# Patient Record
Sex: MALE | Race: WHITE | Employment: OTHER | URBAN - METROPOLITAN AREA
[De-identification: names, ages, dates, MRNs, and addresses within clinical notes are randomized per-mention and may not be internally consistent; named-entity substitution may affect disease eponyms.]

---

## 2017-07-25 ENCOUNTER — OFFICE VISIT (OUTPATIENT)
Dept: FAMILY MEDICINE CLINIC | Age: 62
End: 2017-07-25
Payer: COMMERCIAL

## 2017-07-25 VITALS
HEART RATE: 86 BPM | WEIGHT: 236 LBS | SYSTOLIC BLOOD PRESSURE: 118 MMHG | DIASTOLIC BLOOD PRESSURE: 70 MMHG | OXYGEN SATURATION: 96 % | HEIGHT: 72 IN | BODY MASS INDEX: 31.97 KG/M2

## 2017-07-25 DIAGNOSIS — R23.8 SKIN IRRITATION: Primary | ICD-10-CM

## 2017-07-25 PROCEDURE — 99213 OFFICE O/P EST LOW 20 MIN: CPT | Performed by: FAMILY MEDICINE

## 2017-07-25 RX ORDER — KETOCONAZOLE 20 MG/G
1 CREAM TOPICAL 2 TIMES DAILY
Qty: 60 G | Refills: 3 | Status: SHIPPED | OUTPATIENT
Start: 2017-07-25 | End: 2017-08-07 | Stop reason: SINTOL

## 2017-07-25 ASSESSMENT — PATIENT HEALTH QUESTIONNAIRE - PHQ9
1. LITTLE INTEREST OR PLEASURE IN DOING THINGS: 0
2. FEELING DOWN, DEPRESSED OR HOPELESS: 0
SUM OF ALL RESPONSES TO PHQ QUESTIONS 1-9: 0
SUM OF ALL RESPONSES TO PHQ9 QUESTIONS 1 & 2: 0

## 2017-08-07 ENCOUNTER — OFFICE VISIT (OUTPATIENT)
Dept: FAMILY MEDICINE CLINIC | Age: 62
End: 2017-08-07
Payer: COMMERCIAL

## 2017-08-07 VITALS
SYSTOLIC BLOOD PRESSURE: 118 MMHG | WEIGHT: 235 LBS | HEIGHT: 72 IN | DIASTOLIC BLOOD PRESSURE: 68 MMHG | BODY MASS INDEX: 31.83 KG/M2 | HEART RATE: 73 BPM

## 2017-08-07 DIAGNOSIS — B35.3 TINEA PEDIS OF BOTH FEET: ICD-10-CM

## 2017-08-07 DIAGNOSIS — B35.4 TINEA CORPORIS: Primary | ICD-10-CM

## 2017-08-07 PROCEDURE — 99213 OFFICE O/P EST LOW 20 MIN: CPT | Performed by: FAMILY MEDICINE

## 2017-08-07 RX ORDER — FLUCONAZOLE 100 MG/1
100 TABLET ORAL 2 TIMES DAILY
Qty: 14 TABLET | Refills: 0 | Status: SHIPPED | OUTPATIENT
Start: 2017-08-07 | End: 2017-08-14

## 2017-08-07 ASSESSMENT — ENCOUNTER SYMPTOMS
COUGH: 0
ABDOMINAL PAIN: 0
COLOR CHANGE: 1
BLOOD IN STOOL: 0
DIARRHEA: 0
BACK PAIN: 0
SHORTNESS OF BREATH: 0
CONSTIPATION: 0
WHEEZING: 0

## 2017-08-23 ENCOUNTER — OFFICE VISIT (OUTPATIENT)
Dept: FAMILY MEDICINE CLINIC | Age: 62
End: 2017-08-23
Payer: COMMERCIAL

## 2017-08-23 VITALS
HEART RATE: 81 BPM | SYSTOLIC BLOOD PRESSURE: 120 MMHG | DIASTOLIC BLOOD PRESSURE: 72 MMHG | BODY MASS INDEX: 31.46 KG/M2 | WEIGHT: 232 LBS

## 2017-08-23 DIAGNOSIS — B35.4 TINEA CORPORIS: Primary | ICD-10-CM

## 2017-08-23 PROCEDURE — 99213 OFFICE O/P EST LOW 20 MIN: CPT | Performed by: FAMILY MEDICINE

## 2017-08-23 RX ORDER — FLUCONAZOLE 100 MG/1
100 TABLET ORAL 2 TIMES DAILY
Qty: 14 TABLET | Refills: 0 | Status: SHIPPED | OUTPATIENT
Start: 2017-08-23 | End: 2017-08-30

## 2017-08-23 RX ORDER — CLOTRIMAZOLE AND BETAMETHASONE DIPROPIONATE 10; .64 MG/G; MG/G
CREAM TOPICAL
Qty: 45 G | Refills: 0 | Status: SHIPPED | OUTPATIENT
Start: 2017-08-23 | End: 2018-10-08 | Stop reason: ALTCHOICE

## 2017-08-23 ASSESSMENT — ENCOUNTER SYMPTOMS
WHEEZING: 0
CONSTIPATION: 0
ABDOMINAL PAIN: 0
SHORTNESS OF BREATH: 0
BLOOD IN STOOL: 0
BACK PAIN: 0
DIARRHEA: 0
COUGH: 0

## 2017-10-25 ENCOUNTER — NURSE ONLY (OUTPATIENT)
Dept: FAMILY MEDICINE CLINIC | Age: 62
End: 2017-10-25
Payer: COMMERCIAL

## 2017-10-25 DIAGNOSIS — Z23 IMMUNIZATION DUE: Primary | ICD-10-CM

## 2017-10-25 PROCEDURE — 90471 IMMUNIZATION ADMIN: CPT | Performed by: FAMILY MEDICINE

## 2017-10-25 PROCEDURE — 90688 IIV4 VACCINE SPLT 0.5 ML IM: CPT | Performed by: FAMILY MEDICINE

## 2018-01-04 ENCOUNTER — TELEPHONE (OUTPATIENT)
Dept: FAMILY MEDICINE CLINIC | Age: 63
End: 2018-01-04

## 2018-01-04 RX ORDER — TIZANIDINE HYDROCHLORIDE 2 MG/1
2 CAPSULE, GELATIN COATED ORAL 3 TIMES DAILY
Qty: 24 CAPSULE | Refills: 0 | Status: SHIPPED | OUTPATIENT
Start: 2018-01-04 | End: 2018-10-08 | Stop reason: ALTCHOICE

## 2018-09-05 ENCOUNTER — OFFICE VISIT (OUTPATIENT)
Dept: FAMILY MEDICINE CLINIC | Age: 63
End: 2018-09-05
Payer: COMMERCIAL

## 2018-09-05 VITALS
WEIGHT: 235.6 LBS | SYSTOLIC BLOOD PRESSURE: 128 MMHG | HEART RATE: 80 BPM | BODY MASS INDEX: 31.23 KG/M2 | DIASTOLIC BLOOD PRESSURE: 80 MMHG | OXYGEN SATURATION: 93 % | HEIGHT: 73 IN

## 2018-09-05 DIAGNOSIS — H61.22 IMPACTED CERUMEN OF LEFT EAR: ICD-10-CM

## 2018-09-05 DIAGNOSIS — H10.9 CONJUNCTIVITIS OF BOTH EYES, UNSPECIFIED CONJUNCTIVITIS TYPE: Primary | ICD-10-CM

## 2018-09-05 PROCEDURE — 99213 OFFICE O/P EST LOW 20 MIN: CPT | Performed by: NURSE PRACTITIONER

## 2018-09-05 RX ORDER — CIPROFLOXACIN HYDROCHLORIDE 3.5 MG/ML
1 SOLUTION/ DROPS TOPICAL
Qty: 120 DROP | Refills: 0 | Status: SHIPPED | OUTPATIENT
Start: 2018-09-05 | End: 2018-09-15

## 2018-09-05 ASSESSMENT — PATIENT HEALTH QUESTIONNAIRE - PHQ9
SUM OF ALL RESPONSES TO PHQ QUESTIONS 1-9: 0
SUM OF ALL RESPONSES TO PHQ QUESTIONS 1-9: 0
2. FEELING DOWN, DEPRESSED OR HOPELESS: 0
1. LITTLE INTEREST OR PLEASURE IN DOING THINGS: 0
SUM OF ALL RESPONSES TO PHQ9 QUESTIONS 1 & 2: 0

## 2018-09-05 NOTE — PROGRESS NOTES
Visit Information    Have you changed or started any medications since your last visit including any over-the-counter medicines, vitamins, or herbal medicines? no   Are you having any side effects from any of your medications? -  no  Have you stopped taking any of your medications? Is so, why? -  no    Have you seen any other physician or provider since your last visit? No  Have you had any other diagnostic tests since your last visit? No  Have you been seen in the emergency room and/or had an admission to a hospital since we last saw you? No  Have you had your routine dental cleaning in the past 6 months? yes -     Have you activated your freshbag account? If not, what are your barriers?  No:      Patient Care Team:  Kelli Calvillo MD as PCP - General (Family Medicine)    Medical History Review  Past Medical, Family, and Social History reviewed and does not contribute to the patient presenting condition    Health Maintenance   Topic Date Due    Hepatitis C screen  1955    HIV screen  05/25/1969    Shingles Vaccine (1 of 2 - 2 Dose Series) 05/25/2004    Colon Cancer Screen FIT/FOBT  10/03/2017    Flu vaccine (1) 09/01/2018    Lipid screen  06/07/2021    DTaP/Tdap/Td vaccine (2 - Td) 12/08/2026

## 2018-09-05 NOTE — PATIENT INSTRUCTIONS
· You have a loss of hearing.    Watch closely for changes in your health, and be sure to contact your doctor if:    · You have pain or reduced hearing after 1 week of home treatment.     · You have any new symptoms, such as nausea or balance problems. Where can you learn more? Go to [email or web address] org and sign in to your [employer] account. Enter X489 in the [facility] box to learn more about \"Earwax Blockage: Care Instructions. \"     If you do not have an account, please click on the \"Sign Up Now\" link. Current as of: November 20, 2017  Content Version: 11.7  © 9033-8571 [employer]. Care instructions adapted under license by [employer] [facility] ([facility]). If you have questions about a medical condition or this instruction, always ask your healthcare professional. [place] [address] any warranty or liability for your use of this information. Pinkeye: Care Instructions  Your Care Instructions    Pinkeye is redness and swelling of the eye surface and the conjunctiva (the lining of the eyelid and the covering of the white part of the eye). Pinkeye is also called conjunctivitis. Pinkeye is often caused by infection with bacteria or a virus. Dry air, allergies, smoke, and chemicals are other common causes. Pinkeye often clears on its own in 7 to 10 days. Antibiotics only help if the pinkeye is caused by bacteria. Pinkeye caused by infection spreads easily. If an allergy or chemical is causing pinkeye, it will not go away unless you can avoid whatever is causing it. Follow-up care is a key part of your treatment and safety. Be sure to make and go to all appointments, and call your doctor if you are having problems. It's also a good idea to know your test results and keep a list of the medicines you take. How can you care for yourself at home? · Wash your hands often. Always wash them before and after you treat pinkeye or touch your eyes or face.   · Use moist cotton or a clean, wet cloth to remove crust. Wipe from the inside corner of the eye to the outside. Use a clean part of the cloth for each wipe. · Put cold or warm wet cloths on your eye a few times a day if the eye hurts. · Do not wear contact lenses or eye makeup until the pinkeye is gone. Throw away any eye makeup you were using when you got pinkeye. Clean your contacts and storage case. If you wear disposable contacts, use a new pair when your eye has cleared and it is safe to wear contacts again. · If the doctor gave you antibiotic ointment or eyedrops, use them as directed. Use the medicine for as long as instructed, even if your eye starts looking better soon. Keep the bottle tip clean, and do not let it touch the eye area. · To put in eyedrops or ointment:  ¨ Tilt your head back, and pull your lower eyelid down with one finger. ¨ Drop or squirt the medicine inside the lower lid. ¨ Close your eye for 30 to 60 seconds to let the drops or ointment move around. ¨ Do not touch the ointment or dropper tip to your eyelashes or any other surface. · Do not share towels, pillows, or washcloths while you have pinkeye. When should you call for help? Call your doctor now or seek immediate medical care if:    · You have pain in your eye, not just irritation on the surface.     · You have a change in vision or loss of vision.     · You have an increase in discharge from the eye.     · Your eye has not started to improve or begins to get worse within 48 hours after you start using antibiotics.     · Pinkeye lasts longer than 7 days.    Watch closely for changes in your health, and be sure to contact your doctor if you have any problems. Where can you learn more? Go to https://HouzzpePickataleeb.1000 [email or web address] org and sign in to your [employer] account. Enter Y392 in the [facility] box to learn more about \"Pinkeye: Care Instructions. \"     If you do not have an account, please click on the \"Sign Up

## 2018-09-05 NOTE — PROGRESS NOTES
[address] Lukasz Blind, FNP-C    Mario Castillo is a 58 y.o. male who is here with c/o of:    Chief Complaint: Eye Pain (right eye and started a week ago )      Patient Accompanied by: self    HPI - Mario Castillo is here with c/o right eye pain that started approximately 1 week ago    Conjunctivitis  Patient presents for evaluation of discharge, erythema, itching, pain and tearing in both eyes. He has noticed the above symptoms for 1 week. Onset was acute, sudden. Patient denies blurred vision, foreign body sensation, photophobia and visual field deficit. There is a history of none. Patient Active Problem List:     BPH (benign prostatic hyperplasia)     No past medical history on file. Past Surgical History:   Procedure Laterality Date    COLONOSCOPY  09/24/2008    [facility] Dr. Carolyn Grigsby      No family history on file. Social History   Substance Use Topics    Smoking status: Former Smoker     Packs/day: 1.00     Years: 0.00     Quit date: 1/1/2002    Smokeless tobacco: Never Used    Alcohol use Not on file     ALLERGIES:  No Known Allergies       Subjective     · Constitutional:  Negative for activity change, appetite change, chills, fatigue, fever and unexpected weight change. · HENT: Negative for congestion, ear pain, rhinorrhea, sinus pain, sinus pressure and sore throat. · Eyes:  Positive for pain and discharge. · Respiratory:  Negative for cough, chest tightness, shortness of breath and wheezing. · Cardiovascular:  Negative for chest pain, palpitations and leg swelling. · Gastrointestinal: Negative for abdominal pain, blood in stool, constipation,diarrhea, nausea and vomiting. · Endocrine: Negative for cold intolerance, heat intolerance, polydipsia, polyphagia and polyuria. · Genitourinary: Negative for difficulty urinating, dysuria, flank pain, frequency, hematuria and urgency.    · Musculoskeletal: Negative for arthralgias, back pain, joint swelling, myalgias, neck pain and Alert and oriented to person, place, and time. Normal motor function, Normal sensory function, No focal deficits noted. He has normal strength. · Skin: Skin is warm, dry and intact. No obvious lesions on exposed skin  · Psychiatric: Normal mood and affect. Speech is normal and behavior is normal.     · Nursing note and vitals reviewed. Blood pressure 128/80, pulse 80, height 6' 1\" (1.854 m), weight 235 lb 9.6 oz (106.9 kg), SpO2 93 %. Body mass index is 31.08 kg/m². Wt Readings from Last 3 Encounters:   09/05/18 235 lb 9.6 oz (106.9 kg)   08/23/17 232 lb (105.2 kg)   08/07/17 235 lb (106.6 kg)     BP Readings from Last 3 Encounters:   09/05/18 128/80   08/23/17 120/72   08/07/17 118/68       Assessment       1. Conjunctivitis of both eyes, unspecified conjunctivitis type  - ciprofloxacin (CILOXAN) 0.3 % ophthalmic solution; Place 1 drop into both eyes every 2 hours for 10 days  Dispense: 120 drop; Refill: 0    2. Impacted cerumen of left ear  - carbamide peroxide (DEBROX) 6.5 % otic solution; Place 5 drops in ear(s) 2 times daily  Dispense: 1 Bottle; Refill: 3        Plan/Medical Decision Making     Be Pollard was seen in clinic today for bilateral conjunctivitis and incidental finding of left ear cerumen impaction. · Conjunctivitis - Ciloxan, warm compresses  · Left ear cerumen impaction - debrox, rinse in shower. F/u if decreased hearing is present for flush      Return if symptoms worsen or fail to improve. 1.  Be Pollard received counseling on the following healthy behaviors: nutrition, exercise and medication adherence  2. Patient given educational materials - see patient instructions  3. Was a self-tracking handout given in paper form or via [employer]Mobhart? No  If yes, see orders or list here. 4.  Discussed use, benefit, and side effects of prescribed medications. Barriers to medication compliance addressed. All patient questions answered. Pt voiced understanding.    5.  Reviewed prior labs, imaging, consultation, follow up, and health maintenance  6. Continue current medications, diet and exercise. 7. Discussed use, benefit, and side effects of prescribed medications. Barriers to medication compliance addressed. All her questions were answered. Pt voiced understanding. Isaac Juarez will continue current medications, diet and exercise. Completed Orders/Prescriptions   Orders Placed This Encounter   Medications    ciprofloxacin (CILOXAN) 0.3 % ophthalmic solution     Sig: Place 1 drop into both eyes every 2 hours for 10 days     Dispense:  120 drop     Refill:  0    carbamide peroxide (DEBROX) 6.5 % otic solution     Sig: Place 5 drops in ear(s) 2 times daily     Dispense:  1 Bottle     Refill:  3             Patient given educational materials on conjunctivitis, cerumen impaction    Of the 15 minute duration appointment visit, J Carlos Villegas CNP spent at least 50% of the face-to-face time in counseling, explanation of diagnosis, planning of further management, and answering all questions. Signed:  J Carlos Villegas CNP    This note is created with the assistance of a speech-recognition program.  While intending to generate a document that actually reflects the content of the visit, no guarantees can be provided that every mistake has been identified and corrected by editing.

## 2018-10-08 ENCOUNTER — OFFICE VISIT (OUTPATIENT)
Dept: FAMILY MEDICINE CLINIC | Age: 63
End: 2018-10-08
Payer: COMMERCIAL

## 2018-10-08 VITALS
BODY MASS INDEX: 31.4 KG/M2 | SYSTOLIC BLOOD PRESSURE: 124 MMHG | WEIGHT: 238 LBS | HEART RATE: 65 BPM | DIASTOLIC BLOOD PRESSURE: 80 MMHG

## 2018-10-08 DIAGNOSIS — H10.501 BLEPHAROCONJUNCTIVITIS OF RIGHT EYE, UNSPECIFIED BLEPHAROCONJUNCTIVITIS TYPE: Primary | ICD-10-CM

## 2018-10-08 DIAGNOSIS — Z23 IMMUNIZATION DUE: ICD-10-CM

## 2018-10-08 DIAGNOSIS — H00.011 HORDEOLUM EXTERNUM OF RIGHT UPPER EYELID: ICD-10-CM

## 2018-10-08 DIAGNOSIS — L57.0 AK (ACTINIC KERATOSIS): ICD-10-CM

## 2018-10-08 PROCEDURE — 90688 IIV4 VACCINE SPLT 0.5 ML IM: CPT | Performed by: FAMILY MEDICINE

## 2018-10-08 PROCEDURE — 90471 IMMUNIZATION ADMIN: CPT | Performed by: FAMILY MEDICINE

## 2018-10-08 PROCEDURE — 99213 OFFICE O/P EST LOW 20 MIN: CPT | Performed by: FAMILY MEDICINE

## 2018-10-08 RX ORDER — CEPHALEXIN 500 MG/1
500 CAPSULE ORAL 3 TIMES DAILY
Qty: 30 CAPSULE | Refills: 0 | Status: SHIPPED | OUTPATIENT
Start: 2018-10-08 | End: 2018-11-01 | Stop reason: ALTCHOICE

## 2018-10-08 RX ORDER — NEOMYCIN SULFATE, POLYMYXIN B SULFATE AND GRAMICIDIN 1.75; 10000; .025 MG/ML; [USP'U]/ML; MG/ML
1 SOLUTION/ DROPS OPHTHALMIC 4 TIMES DAILY
Qty: 1 BOTTLE | Refills: 0 | Status: SHIPPED | OUTPATIENT
Start: 2018-10-08 | End: 2018-11-01 | Stop reason: ALTCHOICE

## 2018-10-08 ASSESSMENT — ENCOUNTER SYMPTOMS
DIARRHEA: 0
EYE REDNESS: 1
EYE ITCHING: 1
EYE DISCHARGE: 1
PHOTOPHOBIA: 0
COUGH: 0
CONSTIPATION: 0
BACK PAIN: 0
BLOOD IN STOOL: 0
ABDOMINAL PAIN: 0
WHEEZING: 0
SHORTNESS OF BREATH: 0
EYE PAIN: 1

## 2018-11-01 ENCOUNTER — OFFICE VISIT (OUTPATIENT)
Dept: DERMATOLOGY | Age: 63
End: 2018-11-01
Payer: COMMERCIAL

## 2018-11-01 VITALS
WEIGHT: 238.6 LBS | BODY MASS INDEX: 30.62 KG/M2 | OXYGEN SATURATION: 96 % | SYSTOLIC BLOOD PRESSURE: 123 MMHG | HEART RATE: 78 BPM | DIASTOLIC BLOOD PRESSURE: 83 MMHG | HEIGHT: 74 IN

## 2018-11-01 DIAGNOSIS — L57.0 ACTINIC KERATOSES: Primary | ICD-10-CM

## 2018-11-01 DIAGNOSIS — L82.1 SEBORRHEIC KERATOSES: ICD-10-CM

## 2018-11-01 PROCEDURE — 99202 OFFICE O/P NEW SF 15 MIN: CPT | Performed by: DERMATOLOGY

## 2018-11-01 PROCEDURE — 17000 DESTRUCT PREMALG LESION: CPT | Performed by: DERMATOLOGY

## 2018-11-01 PROCEDURE — 17003 DESTRUCT PREMALG LES 2-14: CPT | Performed by: DERMATOLOGY

## 2018-11-01 RX ORDER — FLUOROURACIL 50 MG/G
CREAM TOPICAL
Qty: 40 G | Refills: 1 | Status: SHIPPED | OUTPATIENT
Start: 2018-11-01

## 2018-11-01 NOTE — LETTER
2. Seborrheic keratoses  - reassurance    RTC 6 months       Patient Instructions   Actinic Keratosis   Apply Efudex apply twice a day for three weeks. Apply a pea size amount on two fingers and rub over the scalp. (let the spots on your scalp heal for 2 weeks before applying the cream)  Cryotherapy    Liquid Nitrogen - \"freeze\" (Cryotherapy)  Your doctor has treated your skin lesions with a very cold substance. The liquid nitrogen is so cold that it may feel like the skin is burning during application. A clear blister or blood blister may form after treatment and may later form a scab. Leave the area alone. Usually this scab will fall of within 1-2 weeks. The area should be kept clean and can be covered with Vaseline and a Band-Aid if needed. If a large blister develops it is ok to use a clean needle to gently pop the blister. Please call our office with any concerns at [phone]. Follow up in 6 months, call if you have any problems        If you have questions, please do not hesitate to call me. I look forward to following Gustavo Da Silva along with you.     Sincerely,        Hitesh Todd MD

## 2018-11-01 NOTE — PROGRESS NOTES
Well nourished     Neuro: Alert and oriented to person, place and time  Psych: Normal affect   Lymph Node: Not performed    Cutaneous Exam: Performed as documented in clinic note below. Waist-up skin, which includes the head/face,neck, both arms, chest, back, abdomen, digits and/or nails, was examined. Pertinent Physical Exam Findings:  Physical Exam  Scalp with multiple gritty erythematous macules and thin papules/plaques, some with hyperkeratotic scale overlying  Crusted tan to brown stuck-on papules on trunk and extremities    Medical Necessity of Exam Performed:   Distribution of patient concerns    Additional Diagnostic Testing performed during exam: Not performed ,  Not performed    ASSESSMENT:   Diagnosis Orders   1. Actinic keratoses  fluorouracil (EFUDEX) 5 % cream    AL DESTRUC PREMALIGNANT, FIRST LESION    AL DESTRUC PREMALIGNANT,2-14 LESIONS   2. Seborrheic keratoses         Plan of Action is as Follows:  Assessment   1. Actinic keratoses, numerous of scalp  - discussed diagnosis, etiology, natural course, and treatment options  - Counseled on sun protection: avoidance, seek shade; use clothing/hats/scarves; use generous quantity of sunscreen, re-apply every 2 hours  -Cryotherapy: After verbal consent was obtained including discussion of the risks (lesion persistence, lesion recurrence and hypo/hyperpigmentation) and benefits (resolution of the lesion) 10 total Actinic Keratosis on the scalp were treated once with liquid nitrogen to achieve a 2-3 mm freeze border. Given the degree of dermatoheliosis and the amount of precancerous lesions present, patient and provider jointly agreed to perform field therapy for the treatment of actinic keratoses. Prescribed 5-fluorouracil 5% cream to be applied BID for 3 weeks to the scalp. Patient counseled on anticipated erythema, tenderness, pruritus, and photosensitivity with treatment, as well as the usual progression of cutaneous reactions.   Mitigation of

## 2019-05-17 ENCOUNTER — OFFICE VISIT (OUTPATIENT)
Dept: FAMILY MEDICINE CLINIC | Age: 64
End: 2019-05-17
Payer: MEDICARE

## 2019-05-17 VITALS
HEART RATE: 65 BPM | SYSTOLIC BLOOD PRESSURE: 118 MMHG | DIASTOLIC BLOOD PRESSURE: 76 MMHG | OXYGEN SATURATION: 99 % | BODY MASS INDEX: 30.3 KG/M2 | WEIGHT: 236 LBS

## 2019-05-17 DIAGNOSIS — Z48.02 ENCOUNTER FOR REMOVAL OF SUTURES: Primary | ICD-10-CM

## 2019-05-17 PROCEDURE — 1036F TOBACCO NON-USER: CPT | Performed by: FAMILY MEDICINE

## 2019-05-17 PROCEDURE — G8417 CALC BMI ABV UP PARAM F/U: HCPCS | Performed by: FAMILY MEDICINE

## 2019-05-17 PROCEDURE — 3017F COLORECTAL CA SCREEN DOC REV: CPT | Performed by: FAMILY MEDICINE

## 2019-05-17 PROCEDURE — G8427 DOCREV CUR MEDS BY ELIG CLIN: HCPCS | Performed by: FAMILY MEDICINE

## 2019-05-17 PROCEDURE — 99213 OFFICE O/P EST LOW 20 MIN: CPT | Performed by: FAMILY MEDICINE

## 2019-05-17 ASSESSMENT — ENCOUNTER SYMPTOMS
CONSTIPATION: 0
COUGH: 0
BLOOD IN STOOL: 0
WHEEZING: 0
BACK PAIN: 0
DIARRHEA: 0
SHORTNESS OF BREATH: 0
ABDOMINAL PAIN: 0

## 2019-05-17 ASSESSMENT — PATIENT HEALTH QUESTIONNAIRE - PHQ9
1. LITTLE INTEREST OR PLEASURE IN DOING THINGS: 0
2. FEELING DOWN, DEPRESSED OR HOPELESS: 0
SUM OF ALL RESPONSES TO PHQ9 QUESTIONS 1 & 2: 0
SUM OF ALL RESPONSES TO PHQ QUESTIONS 1-9: 0
SUM OF ALL RESPONSES TO PHQ QUESTIONS 1-9: 0

## 2019-05-17 NOTE — PROGRESS NOTES
Visit Information          Awa Mckeon is a 58 y.o. male who presents todayfor his medical conditions/complaints as noted below. Awa Mckeon is c/o of Suture / Staple Removal (10 staples placed in top of head on 5/11/19 )  Recheck on head wound. Some purulence was noted as dressing change yesterday by his daughter. HPI:     Visit Information    Have you changed or started any medications since your last visit including any over-the-counter medicines, vitamins, or herbal medicines? no   Are you having any side effects from any of your medications? -  no  Have you stopped taking any of your medications? Is so, why? -  no    Have you seen any other physician or provider since your last visit? No  Have you had any other diagnostic tests since your last visit? No  Have you been seen in the emergency room and/or had an admission to a hospital since we last saw you? Yes - Records Requested  Have you had your routine dental cleaning in the past 6 months? yes -     Have you activated your [employer] account? If not, what are your barriers? No:      Patient Care Team:  Wendie Chiu MD as PCP - General (Family Medicine)    Medical History Review  Past Medical, Family, and Social History reviewed and does not contribute to the patient presenting condition    Health Maintenance   Topic Date Due    Hepatitis C screen  1955    HIV screen  05/25/1969    Shingles Vaccine (1 of 2) 05/25/2004    Colon Cancer Screen FIT/FOBT  10/03/2017    Lipid screen  06/07/2021    DTaP/Tdap/Td vaccine (3 - Td) 05/11/2029    Flu vaccine  Completed    Pneumococcal 0-64 years Vaccine  Aged Out        No past medical history on file. Past Surgical History:   Procedure Laterality Date    COLONOSCOPY  09/24/2008    [facility] Dr. Tilley Base      No family history on file.   Social History     Tobacco Use    Smoking status: Former Smoker     Packs/day: 1.00     Years: 0.00     Pack years: 0.00     Last attempt to quit: 1/1/2002 exhibits tenderness (staples removed some separation middle of wound with slight bleeding easily controlled. Steri strips applied to middle portion of head wound. Wound dressed. ). He exhibits no edema. Lymphadenopathy:     He has no cervical adenopathy. Neurological: He is alert and oriented to person, place, and time. No cranial nerve deficit. Coordination normal.   Skin: No rash noted. He is not diaphoretic. Psychiatric: He has a normal mood and affect. His behavior is normal. Judgment and thought content normal.       Assessment:       Diagnosis Orders   1. Encounter for removal of sutures           Plan:      No follow-ups on file. No orders of the defined types were placed in this encounter. No orders of the defined types were placed in this encounter. Wound care   Tetanus status updated.

## 2020-01-29 ENCOUNTER — OFFICE VISIT (OUTPATIENT)
Dept: FAMILY MEDICINE CLINIC | Age: 65
End: 2020-01-29
Payer: COMMERCIAL

## 2020-01-29 VITALS
HEIGHT: 73 IN | OXYGEN SATURATION: 98 % | BODY MASS INDEX: 31.94 KG/M2 | WEIGHT: 241 LBS | HEART RATE: 88 BPM | DIASTOLIC BLOOD PRESSURE: 72 MMHG | SYSTOLIC BLOOD PRESSURE: 110 MMHG

## 2020-01-29 PROCEDURE — 1123F ACP DISCUSS/DSCN MKR DOCD: CPT | Performed by: FAMILY MEDICINE

## 2020-01-29 PROCEDURE — 90688 IIV4 VACCINE SPLT 0.5 ML IM: CPT | Performed by: FAMILY MEDICINE

## 2020-01-29 PROCEDURE — 3017F COLORECTAL CA SCREEN DOC REV: CPT | Performed by: FAMILY MEDICINE

## 2020-01-29 PROCEDURE — 4040F PNEUMOC VAC/ADMIN/RCVD: CPT | Performed by: FAMILY MEDICINE

## 2020-01-29 PROCEDURE — G8417 CALC BMI ABV UP PARAM F/U: HCPCS | Performed by: FAMILY MEDICINE

## 2020-01-29 PROCEDURE — 90471 IMMUNIZATION ADMIN: CPT | Performed by: FAMILY MEDICINE

## 2020-01-29 PROCEDURE — G8482 FLU IMMUNIZE ORDER/ADMIN: HCPCS | Performed by: FAMILY MEDICINE

## 2020-01-29 PROCEDURE — 1036F TOBACCO NON-USER: CPT | Performed by: FAMILY MEDICINE

## 2020-01-29 PROCEDURE — G8427 DOCREV CUR MEDS BY ELIG CLIN: HCPCS | Performed by: FAMILY MEDICINE

## 2020-01-29 PROCEDURE — 99213 OFFICE O/P EST LOW 20 MIN: CPT | Performed by: FAMILY MEDICINE

## 2020-01-29 RX ORDER — TAMSULOSIN HYDROCHLORIDE 0.4 MG/1
0.4 CAPSULE ORAL NIGHTLY PRN
Qty: 30 CAPSULE | Refills: 5 | Status: SHIPPED | OUTPATIENT
Start: 2020-01-29 | End: 2021-05-13

## 2020-01-29 ASSESSMENT — ENCOUNTER SYMPTOMS
ABDOMINAL PAIN: 0
SHORTNESS OF BREATH: 0
BLOOD IN STOOL: 0
BACK PAIN: 0
CONSTIPATION: 0
DIARRHEA: 0
WHEEZING: 0
COUGH: 0

## 2020-01-29 ASSESSMENT — PATIENT HEALTH QUESTIONNAIRE - PHQ9
SUM OF ALL RESPONSES TO PHQ QUESTIONS 1-9: 1
SUM OF ALL RESPONSES TO PHQ QUESTIONS 1-9: 1
SUM OF ALL RESPONSES TO PHQ9 QUESTIONS 1 & 2: 1
1. LITTLE INTEREST OR PLEASURE IN DOING THINGS: 0
2. FEELING DOWN, DEPRESSED OR HOPELESS: 1

## 2020-01-29 NOTE — PROGRESS NOTES
Standing Expiration Date:   1/29/2021    Comprehensive Metabolic Panel     Standing Status:   Future     Standing Expiration Date:   1/29/2021    Lipid Panel     Standing Status:   Future     Standing Expiration Date:   1/29/2021     Order Specific Question:   Is Patient Fasting?/# of Hours     Answer:   12 hour fast    Psa screening     Standing Status:   Future     Standing Expiration Date:   1/29/2021    External Referral To Ophthalmology     Referral Priority:   Routine     Referral Type:   Eval and Treat     Referral Reason:   Specialty Services Required     Requested Specialty:   Ophthalmology     Number of Visits Requested:   1     Orders Placed This Encounter   Medications    tamsulosin (FLOMAX) 0.4 MG capsule     Sig: Take 1 capsule by mouth nightly as needed (urinary symptoms)     Dispense:  30 capsule     Refill:  5     Update labs  Rx trial on flomax. Encourage Derm follow up on skin lesions.

## 2020-03-10 ENCOUNTER — OFFICE VISIT (OUTPATIENT)
Dept: FAMILY MEDICINE CLINIC | Age: 65
End: 2020-03-10
Payer: COMMERCIAL

## 2020-03-10 ENCOUNTER — NURSE TRIAGE (OUTPATIENT)
Dept: OTHER | Facility: CLINIC | Age: 65
End: 2020-03-10

## 2020-03-10 VITALS
SYSTOLIC BLOOD PRESSURE: 128 MMHG | HEART RATE: 78 BPM | HEIGHT: 73 IN | BODY MASS INDEX: 32.29 KG/M2 | OXYGEN SATURATION: 94 % | DIASTOLIC BLOOD PRESSURE: 80 MMHG | WEIGHT: 243.6 LBS

## 2020-03-10 PROCEDURE — 99213 OFFICE O/P EST LOW 20 MIN: CPT | Performed by: FAMILY MEDICINE

## 2020-03-10 RX ORDER — AZITHROMYCIN 250 MG/1
TABLET, FILM COATED ORAL
Qty: 1 PACKET | Refills: 0 | Status: SHIPPED | OUTPATIENT
Start: 2020-03-10 | End: 2020-10-08

## 2020-03-10 ASSESSMENT — ENCOUNTER SYMPTOMS
BLOOD IN STOOL: 0
CONSTIPATION: 0
DIARRHEA: 0
SHORTNESS OF BREATH: 0
BACK PAIN: 0
SINUS PRESSURE: 1
ABDOMINAL PAIN: 0
WHEEZING: 0
COUGH: 1
SINUS PAIN: 1

## 2020-10-08 ENCOUNTER — HOSPITAL ENCOUNTER (OUTPATIENT)
Dept: GENERAL RADIOLOGY | Age: 65
Discharge: HOME | End: 2020-10-10
Payer: MEDICARE

## 2020-10-08 ENCOUNTER — HOSPITAL ENCOUNTER (OUTPATIENT)
Age: 65
Discharge: HOME | End: 2020-10-10
Payer: MEDICARE

## 2020-10-08 ENCOUNTER — OFFICE VISIT (OUTPATIENT)
Dept: FAMILY MEDICINE CLINIC | Age: 65
End: 2020-10-08
Payer: COMMERCIAL

## 2020-10-08 VITALS
BODY MASS INDEX: 32.18 KG/M2 | DIASTOLIC BLOOD PRESSURE: 80 MMHG | HEIGHT: 73 IN | SYSTOLIC BLOOD PRESSURE: 124 MMHG | OXYGEN SATURATION: 95 % | TEMPERATURE: 97.5 F | HEART RATE: 70 BPM | WEIGHT: 242.8 LBS

## 2020-10-08 PROCEDURE — 90471 IMMUNIZATION ADMIN: CPT | Performed by: FAMILY MEDICINE

## 2020-10-08 PROCEDURE — 99213 OFFICE O/P EST LOW 20 MIN: CPT | Performed by: FAMILY MEDICINE

## 2020-10-08 PROCEDURE — 90686 IIV4 VACC NO PRSV 0.5 ML IM: CPT | Performed by: FAMILY MEDICINE

## 2020-10-08 PROCEDURE — 73560 X-RAY EXAM OF KNEE 1 OR 2: CPT

## 2020-10-08 SDOH — ECONOMIC STABILITY: TRANSPORTATION INSECURITY
IN THE PAST 12 MONTHS, HAS LACK OF TRANSPORTATION KEPT YOU FROM MEETINGS, WORK, OR FROM GETTING THINGS NEEDED FOR DAILY LIVING?: NO

## 2020-10-08 SDOH — ECONOMIC STABILITY: FOOD INSECURITY: WITHIN THE PAST 12 MONTHS, THE FOOD YOU BOUGHT JUST DIDN'T LAST AND YOU DIDN'T HAVE MONEY TO GET MORE.: NEVER TRUE

## 2020-10-08 SDOH — ECONOMIC STABILITY: FOOD INSECURITY: WITHIN THE PAST 12 MONTHS, YOU WORRIED THAT YOUR FOOD WOULD RUN OUT BEFORE YOU GOT MONEY TO BUY MORE.: NEVER TRUE

## 2020-10-08 SDOH — ECONOMIC STABILITY: INCOME INSECURITY: HOW HARD IS IT FOR YOU TO PAY FOR THE VERY BASICS LIKE FOOD, HOUSING, MEDICAL CARE, AND HEATING?: NOT HARD AT ALL

## 2020-10-08 SDOH — ECONOMIC STABILITY: TRANSPORTATION INSECURITY
IN THE PAST 12 MONTHS, HAS THE LACK OF TRANSPORTATION KEPT YOU FROM MEDICAL APPOINTMENTS OR FROM GETTING MEDICATIONS?: NO

## 2020-10-08 ASSESSMENT — ENCOUNTER SYMPTOMS
DIARRHEA: 0
ABDOMINAL PAIN: 0
BACK PAIN: 0
BLOOD IN STOOL: 0
WHEEZING: 0
CONSTIPATION: 0
SHORTNESS OF BREATH: 0
COUGH: 0

## 2020-10-08 ASSESSMENT — PATIENT HEALTH QUESTIONNAIRE - PHQ9
SUM OF ALL RESPONSES TO PHQ QUESTIONS 1-9: 0
2. FEELING DOWN, DEPRESSED OR HOPELESS: 0
1. LITTLE INTEREST OR PLEASURE IN DOING THINGS: 0
SUM OF ALL RESPONSES TO PHQ QUESTIONS 1-9: 0
SUM OF ALL RESPONSES TO PHQ9 QUESTIONS 1 & 2: 0

## 2020-10-08 NOTE — PROGRESS NOTES
kg)   SpO2 95%   BMI 32.04 kg/m²     Physical Exam  Constitutional:       General: He is not in acute distress. Appearance: He is well-developed. He is not diaphoretic. HENT:      Head: Normocephalic and atraumatic. Mouth/Throat:      Pharynx: No oropharyngeal exudate. Eyes:      General: No scleral icterus. Right eye: No discharge. Left eye: No discharge. Neck:      Musculoskeletal: Neck supple. Thyroid: No thyromegaly. Vascular: No carotid bruit. Cardiovascular:      Rate and Rhythm: Normal rate and regular rhythm. Heart sounds: Normal heart sounds. No murmur. No friction rub. No gallop. Pulmonary:      Effort: No respiratory distress. Breath sounds: Normal breath sounds. No wheezing or rales. Chest:      Chest wall: No tenderness. Abdominal:      Tenderness: There is no abdominal tenderness. Musculoskeletal:      Left knee: He exhibits normal range of motion, no swelling, no effusion and normal patellar mobility. Tenderness found. Medial joint line tenderness noted. No lateral joint line, no MCL, no LCL and no patellar tendon tenderness noted. Right lower leg: No edema. Left lower leg: No edema. Lymphadenopathy:      Cervical: No cervical adenopathy. Skin:     Findings: No rash. Neurological:      Mental Status: He is alert and oriented to person, place, and time. Cranial Nerves: No cranial nerve deficit. Coordination: Coordination normal.   Psychiatric:         Behavior: Behavior normal.         Thought Content: Thought content normal.         Judgment: Judgment normal.         Assessment:       Diagnosis Orders   1. Acute pain of left knee  XR KNEE LEFT (1-2 VIEWS)         Plan:      Return if symptoms worsen or fail to improve.     Orders Placed This Encounter   Procedures    XR KNEE LEFT (1-2 VIEWS)     Standing Status:   Future     Standing Expiration Date:   10/8/2021     No orders of the defined types were placed in this encounter. Bay samples  Knee xray  Flu shot update  He will see Dr Florencia Cronin going forward  I did Rx adjustable knee velcro support.

## 2021-03-01 DIAGNOSIS — H00.011 HORDEOLUM EXTERNUM OF RIGHT UPPER EYELID: ICD-10-CM

## 2021-03-01 DIAGNOSIS — H10.501 BLEPHAROCONJUNCTIVITIS OF RIGHT EYE, UNSPECIFIED BLEPHAROCONJUNCTIVITIS TYPE: ICD-10-CM

## 2021-03-01 RX ORDER — NEOMYCIN SULFATE, POLYMYXIN B SULFATE AND GRAMICIDIN 1.75; 10000; .025 MG/ML; [USP'U]/ML; MG/ML
1 SOLUTION/ DROPS OPHTHALMIC 4 TIMES DAILY
Qty: 1 BOTTLE | Refills: 0 | Status: SHIPPED | OUTPATIENT
Start: 2021-03-01 | End: 2021-05-13

## 2021-03-01 NOTE — TELEPHONE ENCOUNTER
Refilled medication. Please have Andrew Gann make an appt to establish care in the near future.  Thank you

## 2021-03-01 NOTE — TELEPHONE ENCOUNTER
Janis Nicolas is calling to request a refill on the following medication(s):    Patient requesting for eye infection he gets. Medication Request:  Requested Prescriptions     Pending Prescriptions Disp Refills    neomycin-polymyxin-gramicidin (NEOSPORIN) [id number]-. 025 ophthalmic solution 1 Bottle 0     Sig: Place 1 drop into the right eye 4 times daily       Last Visit Date (If Applicable):  Visit date not found    Next Visit Date:    Visit date not found

## 2021-03-01 NOTE — LETTER
Casey Jerome, DO  [facility]  [address] [facility] [address]  Dept: [phone]    3/1/2021    [address]  [id number]      Dear Peyton Rodriguez    In addition to helping you feel better when you are sick, we are interested in preventing illness and injury in the first place. In the spirit of maintaining your good health, your record indicates that you are due for an appointment.        Please call [phone] to schedule     I look forward to seeing you soon     Sincerely,       [facility]

## 2021-05-13 ENCOUNTER — OFFICE VISIT (OUTPATIENT)
Dept: FAMILY MEDICINE CLINIC | Age: 66
End: 2021-05-13
Payer: COMMERCIAL

## 2021-05-13 VITALS
WEIGHT: 249.8 LBS | DIASTOLIC BLOOD PRESSURE: 66 MMHG | SYSTOLIC BLOOD PRESSURE: 130 MMHG | BODY MASS INDEX: 32.96 KG/M2 | OXYGEN SATURATION: 96 % | HEART RATE: 83 BPM

## 2021-05-13 DIAGNOSIS — N40.1 BENIGN PROSTATIC HYPERPLASIA WITH URINARY FREQUENCY: Primary | ICD-10-CM

## 2021-05-13 DIAGNOSIS — Z00.00 ROUTINE GENERAL MEDICAL EXAMINATION AT A HEALTH CARE FACILITY: ICD-10-CM

## 2021-05-13 DIAGNOSIS — Z77.090 ASBESTOS EXPOSURE: ICD-10-CM

## 2021-05-13 DIAGNOSIS — Z12.11 ENCOUNTER FOR SCREENING FOR MALIGNANT NEOPLASM OF COLON: ICD-10-CM

## 2021-05-13 DIAGNOSIS — E66.09 CLASS 1 OBESITY DUE TO EXCESS CALORIES WITHOUT SERIOUS COMORBIDITY WITH BODY MASS INDEX (BMI) OF 32.0 TO 32.9 IN ADULT: ICD-10-CM

## 2021-05-13 DIAGNOSIS — Z87.891 FORMER SMOKER: ICD-10-CM

## 2021-05-13 DIAGNOSIS — Z12.5 ENCOUNTER FOR SCREENING FOR MALIGNANT NEOPLASM OF PROSTATE: ICD-10-CM

## 2021-05-13 DIAGNOSIS — Z78.9 ALCOHOL USE: ICD-10-CM

## 2021-05-13 DIAGNOSIS — R35.0 BENIGN PROSTATIC HYPERPLASIA WITH URINARY FREQUENCY: Primary | ICD-10-CM

## 2021-05-13 PROBLEM — E66.811 CLASS 1 OBESITY DUE TO EXCESS CALORIES WITHOUT SERIOUS COMORBIDITY WITH BODY MASS INDEX (BMI) OF 32.0 TO 32.9 IN ADULT: Status: ACTIVE | Noted: 2021-05-13

## 2021-05-13 PROBLEM — F10.90 ALCOHOL USE: Status: ACTIVE | Noted: 2021-05-13

## 2021-05-13 PROCEDURE — G0438 PPPS, INITIAL VISIT: HCPCS | Performed by: FAMILY MEDICINE

## 2021-05-13 PROCEDURE — 99213 OFFICE O/P EST LOW 20 MIN: CPT | Performed by: FAMILY MEDICINE

## 2021-05-13 ASSESSMENT — ENCOUNTER SYMPTOMS
ALLERGIC/IMMUNOLOGIC NEGATIVE: 1
EYES NEGATIVE: 1
GASTROINTESTINAL NEGATIVE: 1
RESPIRATORY NEGATIVE: 1

## 2021-05-13 ASSESSMENT — LIFESTYLE VARIABLES
HAVE YOU OR SOMEONE ELSE BEEN INJURED AS A RESULT OF YOUR DRINKING: 0
HOW OFTEN DO YOU HAVE SIX OR MORE DRINKS ON ONE OCCASION: 0
HOW OFTEN DURING THE LAST YEAR HAVE YOU NEEDED AN ALCOHOLIC DRINK FIRST THING IN THE MORNING TO GET YOURSELF GOING AFTER A NIGHT OF HEAVY DRINKING: 0
HOW OFTEN DURING THE LAST YEAR HAVE YOU FOUND THAT YOU WERE NOT ABLE TO STOP DRINKING ONCE YOU HAD STARTED: 0
AUDIT-C TOTAL SCORE: 3
HAS A RELATIVE, FRIEND, DOCTOR, OR ANOTHER HEALTH PROFESSIONAL EXPRESSED CONCERN ABOUT YOUR DRINKING OR SUGGESTED YOU CUT DOWN: 0
HOW MANY STANDARD DRINKS CONTAINING ALCOHOL DO YOU HAVE ON A TYPICAL DAY: 0
HOW OFTEN DURING THE LAST YEAR HAVE YOU FAILED TO DO WHAT WAS NORMALLY EXPECTED FROM YOU BECAUSE OF DRINKING: 0

## 2021-05-13 ASSESSMENT — PATIENT HEALTH QUESTIONNAIRE - PHQ9
2. FEELING DOWN, DEPRESSED OR HOPELESS: 0
2. FEELING DOWN, DEPRESSED OR HOPELESS: 0
SUM OF ALL RESPONSES TO PHQ QUESTIONS 1-9: 0
SUM OF ALL RESPONSES TO PHQ QUESTIONS 1-9: 0
SUM OF ALL RESPONSES TO PHQ9 QUESTIONS 1 & 2: 0

## 2021-05-13 NOTE — PROGRESS NOTES
[facility] Progress Note    Date:5/13/2021         Patient Name:Nando Gomez     Date of Birth:[birth year]     Age:65 y.o. Assessment/Plan        Problem List Items Addressed This Visit        Genitourinary    BPH (benign prostatic hyperplasia) - Primary      Borderline controlled, continue current treatment plan and stopped flomax b/c of dizziness         Relevant Orders    PSA screening    Comprehensive Metabolic Panel       Other    Asbestos exposure     CXR from [facility] showed scarring/atelectasis RLL  Saw pulm  Monitor         Relevant Orders    Comprehensive Metabolic Panel    Class 1 obesity due to excess calories without serious comorbidity with body mass index (BMI) of 32.0 to 32.9 in adult     · I generally recommend that people of all ages try to get 150 minutes of physical activity per week and it doesnt matter how this totals up, in other words 30 minutes 5 days per week is as good as 50 minutes 3 days a week and so on. The level of activity should be such that it is able to get your heart rate up to 100 or more, for example a brisk walk should achieve this rate. · In terms of diet, I generally recommend low-carb and low-fat, trying to avoid processed foods wherever possible (anything that comes in a can or a box) which can be achieved by sticking to the outside walls of the grocery store where generally you will find fresh fruits/vegetables, meats, dairy, and frozen foods. · Try to avoid starches in the diet where possible and minimize bread, rice, potatoes, and pasta in the diet. Specifically try to avoid gluten, which even in people that dont have a rafa allergy, causes havoc in the small intestine and alters absorption of nutrients which can in turn lead to obesity.            Relevant Orders    Lipid Panel    Comprehensive Metabolic Panel    Former smoker      Quit 20+ years ago         Relevant Orders    Comprehensive Metabolic Panel    Alcohol use     Discussed safe drinking habits Other Visit Diagnoses     Encounter for screening for malignant neoplasm of colon        Relevant Orders    Cologuard (For External Results Only)    Routine general medical examination at a health care facility        Encounter for screening for malignant neoplasm of prostate         Relevant Orders    PSA screening           Return in 6 months (on 11/13/2021). Electronically signed by Michael Huffman DO on 5/13/21         Samantha Soriano is a 76 y.o. male presenting today for   Chief Complaint   Patient presents with   Orest Dane Establish Care  Medicare AWV    COPD     Discuss lung condition   . 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 The current episode started more than 1 year ago. The problem is unchanged. Irritative symptoms include frequency and nocturia. Irritative symptoms do not include urgency. Obstructive symptoms do not include dribbling, incomplete emptying, an intermittent stream, a slower stream, straining or a weak stream. Pertinent negatives include no dysuria, genital pain, hematuria or hesitancy. Past treatments include tamsulosin. The treatment provided no relief. Review of Systems   Review of Systems   Constitutional: Negative. HENT: Negative. Eyes: Negative. Respiratory: Negative. Cardiovascular: Negative. Gastrointestinal: Negative. Endocrine: Negative. Genitourinary: Positive for frequency and nocturia. Negative for dysuria, hematuria, hesitancy, incomplete emptying and urgency. Musculoskeletal: Negative. Skin: Negative. Allergic/Immunologic: Negative. Neurological: Negative. Hematological: Negative. Psychiatric/Behavioral: Negative. All other systems reviewed and are negative.       Medications     Current Outpatient Medications Medication Sig Dispense Refill    fluorouracil (EFUDEX) 5 % cream Apply twice daily to actinic keratosis for 3-4 weeks. Expect redness and irritation. 40 g 1     No current facility-administered medications for this visit. Past History    Past Medical History:   has no past medical history on file. Social History:   reports that he quit smoking about 19 years ago. He smoked 1.00 pack per day for 0.00 years. He has never used smokeless tobacco. He reports current alcohol use. He reports current drug use. Drug: Marijuana. Family History: No family history on file. Surgical History:   Past Surgical History:   Procedure Laterality Date    COLONOSCOPY  09/24/2008    [facility] Dr. Khadijah Drew         Physical Examination      Vitals:  /66 (Site: Right Upper Arm, Position: Sitting, Cuff Size: Medium Adult)   Pulse 83   Wt 249 lb 12.8 oz (113.3 kg)   SpO2 96%   BMI 32.96 kg/m²     Physical Exam  Vitals signs and nursing note reviewed. Constitutional:       General: He is not in acute distress. Appearance: Normal appearance. He is obese. He is not ill-appearing, toxic-appearing or diaphoretic. HENT:      Head: Normocephalic and atraumatic. Right Ear: External ear normal.      Left Ear: External ear normal.   Eyes:      General: No scleral icterus. Right eye: No discharge. Left eye: No discharge. Conjunctiva/sclera: Conjunctivae normal.   Cardiovascular:      Rate and Rhythm: Normal rate and regular rhythm. Pulses: Normal pulses. Heart sounds: Normal heart sounds. No murmur. No friction rub. No gallop. Pulmonary:      Effort: Pulmonary effort is normal. No respiratory distress. Breath sounds: Normal breath sounds. No stridor. No wheezing, rhonchi or rales. Chest:      Chest wall: No tenderness. Skin:     General: Skin is warm. Coloration: Skin is not jaundiced or pale.    Neurological:      Mental Status: He is alert and oriented to person, place, and time. Mental status is at baseline. Psychiatric:         Mood and Affect: Mood normal.         Behavior: Behavior normal.         Thought Content: Thought content normal.         Judgment: Judgment normal.         Labs/Imaging/Diagnostics   Labs:  No results found for: CMPWITHGFR, CBCAUTODIF, TSHFT4, LABA1C, LIPIDPAN    Imaging Last 24 Hours:  XR KNEE LEFT (1-2 VIEWS)  Narrative: EXAMINATION:  TWO XRAY VIEWS OF THE LEFT KNEE    10/8/2020 1:03 pm    COMPARISON:  None. HISTORY:  ORDERING SYSTEM PROVIDED HISTORY: Acute pain of left knee  TECHNOLOGIST PROVIDED HISTORY:  Reason for Exam: medial knee pain no trauma  Acuity: Unknown  Type of Exam: Unknown    FINDINGS:  There is mild medial tibiofemoral and patellofemoral joint space compromise.     Trace joint effusion    There is no definite fracture, dislocation, radiopaque foreign body or bony  destructive lesion  Impression: Mild bicompartmental degenerative changes of the knee    Trace joint effusion

## 2021-05-13 NOTE — PROGRESS NOTES
Medicare Annual Wellness Visit  Name: Alma Baum Date: 2021   MRN: [id number] Sex: Male   Age: 76 y.o. Ethnicity: Non-/Non    : 1955 Race: Lexy Boyd is here for Establish Care, Medicare AWV, and COPD (Discuss lung condition)    Screenings for behavioral, psychosocial and functional/safety risks, and cognitive dysfunction are all negative except as indicated below. These results, as well as other patient data from the [address] form, are documented in Flowsheets linked to this Encounter. No Known Allergies    Prior to Visit Medications    Medication Sig Taking? Authorizing Provider   fluorouracil (EFUDEX) 5 % cream Apply twice daily to actinic keratosis for 3-4 weeks. Expect redness and irritation. Yes Robert Hood MD       No past medical history on file. Past Surgical History:   Procedure Laterality Date    COLONOSCOPY  2008    [facility] Dr. Morgan Love        No family history on file. CareTeam (Including outside providers/suppliers regularly involved in providing care):   Patient Care Team:  Michael Huffman DO as PCP - General (Family Medicine)  Michael Huffman DO as PCP - [facility] Empaneled Provider    Wt Readings from Last 3 Encounters:   21 249 lb 12.8 oz (113.3 kg)   10/08/20 242 lb 12.8 oz (110.1 kg)   03/10/20 243 lb 9.6 oz (110.5 kg)     Vitals:    21 1306   BP: 130/66   Site: Right Upper Arm   Position: Sitting   Cuff Size: Medium Adult   Pulse: 83   SpO2: 96%   Weight: 249 lb 12.8 oz (113.3 kg)     Body mass index is 32.96 kg/m². Based upon direct observation of the patient, evaluation of cognition reveals recent and remote memory intact. Patient's complete Health Risk Assessment and screening values have been reviewed and are found in Flowsheets. The following problems were reviewed today and where indicated follow up appointments were made and/or referrals ordered.     Positive Risk Factor Screenings with Interventions:         Substance History:  Social History     Tobacco History     Smoking Status  Former Smoker Quit date  1/1/2002 Smoking Frequency  1 pack/day for 0 years (0 pk yrs)    Smokeless Tobacco Use  Never Used          Alcohol History     Alcohol Use Status  Yes Comment  beer          Drug Use     Drug Use Status  Yes Types  Marijuana          Sexual Activity     Sexually Active  Not Asked               Alcohol Screening: Audit-C Score: 3  Total Score: 3    A score of 8 or more is associated with harmful or hazardous drinking. A score of 13 or more in women, and 15 or more in men, is likely to indicate alcohol dependence. Substance Abuse Interventions:  · Alcohol misuse/dependence:  educational materials provided    General Health and ACP:  General  In general, how would you say your health is?: Good  In the past 7 days, have you experienced any of the following?  New or Increased Pain, New or Increased Fatigue, Loneliness, Social Isolation, Stress or Anger?: (!) New or Increased Pain, New or Increased Fatigue  Do you get the social and emotional support that you need?: Yes  Do you have a Living Will?: Yes  Advance Directives     Power of  Living Will ACP-Advance Directive ACP-Power of     Not on File Not on File Not on File Not on File      General Health Risk Interventions:  · Pain issues: home exercises provided    Health Habits/Nutrition:  Health Habits/Nutrition  Do you exercise for at least 20 minutes 2-3 times per week?: Yes  Have you lost any weight without trying in the past 3 months?: No  Do you eat only one meal per day?: No  Have you seen the dentist within the past year?: (!) No     Health Habits/Nutrition Interventions:  · Dental exam overdue:  patient encouraged to make appointment with his/her dentist     Safety:  Safety  Do you have working smoke detectors?: Yes  Have all throw rugs been removed or fastened?: (!) No  Do you have non-slip mats or surfaces in all bathtubs/showers?: (!) No  Do all of your stairways have a railing or banister?: Yes  Are your doorways, halls and stairs free of clutter?: Yes  Do you always fasten your seatbelt when you are in a car?: Yes  Safety Interventions:  · Home safety tips provided     Personalized Preventive Plan   Current Health Maintenance Status  Immunization History   Administered Date(s) Administered    Influenza, Quadv, IM, (6 mo and older Fluzone, Flulaval, Fluarix and 3 yrs and older Afluria) 10/25/2017, 10/08/2018, 01/29/2020    Influenza, Norman Majestic, IM, PF (6 mo and older Fluzone, Flulaval, Fluarix, and 3 yrs and older Afluria) 10/08/2020    Tdap (Boostrix, Adacel) 12/08/2016, 05/11/2019        Health Maintenance   Topic Date Due    AAA screen  Never done    Hepatitis C screen  Never done    Diabetes screen  Never done    Shingles Vaccine (1 of 2) Never done    PSA counseling  06/07/2017    Colon Cancer Screen FIT/FOBT  10/03/2017    Pneumococcal 65+ years Vaccine (1 of 1 - PPSV23) Never done   [employer] Visit (AWV)  Never done    Lipid screen  06/07/2021    DTaP/Tdap/Td vaccine (3 - Td) 05/11/2029    Flu vaccine  Completed    COVID-19 Vaccine  Completed    Hepatitis A vaccine  Aged Out    Hepatitis B vaccine  Aged Out    Hib vaccine  Aged Out    Meningococcal (ACWY) vaccine  Aged Out     Recommendations for [employer] Due: see orders and patient instructions/AVS.  . Recommended screening schedule for the next 5-10 years is provided to the patient in written form: see Patient Cindra Holstein was seen today for [facility] [place] care, medicare awv and copd. Diagnoses and all orders for this visit:    Encounter for screening for malignant neoplasm of colon  -     Cologuard (For External Results Only);  Future    Routine general medical examination at a health care facility           [address]

## 2021-05-13 NOTE — ASSESSMENT & PLAN NOTE
· I generally recommend that people of all ages try to get 150 minutes of physical activity per week and it doesnt matter how this totals up, in other words 30 minutes 5 days per week is as good as 50 minutes 3 days a week and so on. The level of activity should be such that it is able to get your heart rate up to 100 or more, for example a brisk walk should achieve this rate. · In terms of diet, I generally recommend low-carb and low-fat, trying to avoid processed foods wherever possible (anything that comes in a can or a box) which can be achieved by sticking to the outside walls of the grocery store where generally you will find fresh fruits/vegetables, meats, dairy, and frozen foods. · Try to avoid starches in the diet where possible and minimize bread, rice, potatoes, and pasta in the diet. Specifically try to avoid gluten, which even in people that dont have a rafa allergy, causes havoc in the small intestine and alters absorption of nutrients which can in turn lead to obesity.

## 2021-08-13 ENCOUNTER — HOSPITAL ENCOUNTER (OUTPATIENT)
Age: 66
Setting detail: SPECIMEN
Discharge: HOME | End: 2021-08-13
Payer: MEDICARE

## 2021-08-13 DIAGNOSIS — Z12.5 ENCOUNTER FOR SCREENING FOR MALIGNANT NEOPLASM OF PROSTATE: ICD-10-CM

## 2021-08-13 DIAGNOSIS — N40.1 BENIGN PROSTATIC HYPERPLASIA WITH URINARY FREQUENCY: ICD-10-CM

## 2021-08-13 DIAGNOSIS — Z87.891 FORMER SMOKER: ICD-10-CM

## 2021-08-13 DIAGNOSIS — Z77.090 ASBESTOS EXPOSURE: ICD-10-CM

## 2021-08-13 DIAGNOSIS — R35.0 BENIGN PROSTATIC HYPERPLASIA WITH URINARY FREQUENCY: ICD-10-CM

## 2021-08-13 DIAGNOSIS — E66.09 CLASS 1 OBESITY DUE TO EXCESS CALORIES WITHOUT SERIOUS COMORBIDITY WITH BODY MASS INDEX (BMI) OF 32.0 TO 32.9 IN ADULT: ICD-10-CM

## 2021-08-13 LAB
ALBUMIN SERPL-MCNC: 3.8 G/DL (ref 3.5–5.2)
ALBUMIN/GLOBULIN RATIO: 1.3 (ref 1–2.5)
ALP BLD-CCNC: 86 U/L (ref 40–129)
ALT SERPL-CCNC: 15 U/L (ref 5–41)
ANION GAP SERPL CALCULATED.3IONS-SCNC: 12 MMOL/L (ref 9–17)
AST SERPL-CCNC: 14 U/L
BILIRUB SERPL-MCNC: 1.85 MG/DL (ref 0.3–1.2)
BUN BLDV-MCNC: 10 MG/DL (ref 8–23)
BUN/CREAT BLD: ABNORMAL (ref 9–20)
CALCIUM SERPL-MCNC: 8.9 MG/DL (ref 8.6–10.4)
CHLORIDE BLD-SCNC: 108 MMOL/L (ref 98–107)
CHOLESTEROL/HDL RATIO: 5.6
CHOLESTEROL: 189 MG/DL
CO2: 22 MMOL/L (ref 20–31)
CREAT SERPL-MCNC: 0.75 MG/DL (ref 0.7–1.2)
GFR AFRICAN AMERICAN: >60 ML/MIN
GFR NON-AFRICAN AMERICAN: >60 ML/MIN
GFR SERPL CREATININE-BSD FRML MDRD: ABNORMAL ML/MIN/{1.73_M2}
GFR SERPL CREATININE-BSD FRML MDRD: ABNORMAL ML/MIN/{1.73_M2}
GLUCOSE BLD-MCNC: 106 MG/DL (ref 70–99)
HDLC SERPL-MCNC: 34 MG/DL
LDL CHOLESTEROL: 111 MG/DL (ref 0–130)
POTASSIUM SERPL-SCNC: 4.1 MMOL/L (ref 3.7–5.3)
PROSTATE SPECIFIC ANTIGEN: 18.86 UG/L
SODIUM BLD-SCNC: 142 MMOL/L (ref 135–144)
TOTAL PROTEIN: 6.7 G/DL (ref 6.4–8.3)
TRIGL SERPL-MCNC: 219 MG/DL
VLDLC SERPL CALC-MCNC: ABNORMAL MG/DL (ref 1–30)

## 2021-08-23 ENCOUNTER — OFFICE VISIT (OUTPATIENT)
Dept: FAMILY MEDICINE CLINIC | Age: 66
End: 2021-08-23
Payer: MEDICARE

## 2021-08-23 VITALS
BODY MASS INDEX: 33.46 KG/M2 | WEIGHT: 247 LBS | HEIGHT: 72 IN | OXYGEN SATURATION: 97 % | HEART RATE: 72 BPM | DIASTOLIC BLOOD PRESSURE: 72 MMHG | TEMPERATURE: 97.2 F | RESPIRATION RATE: 14 BRPM | SYSTOLIC BLOOD PRESSURE: 114 MMHG

## 2021-08-23 DIAGNOSIS — H92.01 OTALGIA OF RIGHT EAR: Primary | ICD-10-CM

## 2021-08-23 DIAGNOSIS — H61.23 BILATERAL IMPACTED CERUMEN: ICD-10-CM

## 2021-08-23 DIAGNOSIS — R97.20 ELEVATED PSA, BETWEEN 10 AND LESS THAN 20 NG/ML: ICD-10-CM

## 2021-08-23 PROCEDURE — 99213 OFFICE O/P EST LOW 20 MIN: CPT | Performed by: FAMILY MEDICINE

## 2021-08-23 NOTE — PROGRESS NOTES
Progress Note    Marjan Rodriguez is a 78 y.o.  male who presents today alone for evaluation of   Chief Complaint   Patient presents with    Ear Problem     RT Ear           HPI:   Patient is here for same day visit today. Patient reports ear fullness, pain, and ringing for the past 2 days. Patient denies f/c. Patient states he does use cue tips. Patient denies bloody or purulent discharge. Patient last PSA ~18 2021. Patient would like referral to urology. Health Maintenance Due   Topic Date Due    AAA screen  Never done    Hepatitis C screen  Never done    Diabetes screen  Never done    Shingles Vaccine (1 of 2) Never done    Colon Cancer Screen FIT/FOBT  10/03/2017    Pneumococcal 65+ years Vaccine (1 of 1 - PPSV23) Never done        Current Medications:     Current Outpatient Medications   Medication Sig Dispense Refill    carbamide peroxide (DEBROX) 6.5 % otic solution Place 5 drops into both ears 2 times daily 1 Bottle 2    fluorouracil (EFUDEX) 5 % cream Apply twice daily to actinic keratosis for 3-4 weeks. Expect redness and irritation. 40 g 1     No current facility-administered medications for this visit. Allergies:   No Known Allergies     Medical History:   No past medical history on file. Past Surgical History:   Procedure Laterality Date    COLONOSCOPY  2008    [facility] Dr. Dotty Steward        No family history on file.      Social History:     Social History     Socioeconomic History    Marital status:      Spouse name: Not on file    Number of children: Not on file    Years of education: Not on file    Highest education level: Not on file   Occupational History    Not on file   Tobacco Use    Smoking status: Former Smoker     Packs/day: 1.00     Years: 0.00     Pack years: 0.00     Quit date: 2002     Years since quittin.6    Smokeless tobacco: Never Used   Substance and Sexual Activity    Alcohol use: Yes     Comment: beer    Drug use: Yes     Types: Marijuana    Sexual activity: Not on file   Other Topics Concern    Not on file   Social History Narrative    Not on file     Social Determinants of Health     Financial Resource Strain: Low Risk     Difficulty of Paying Living Expenses: Not hard at all   Food Insecurity: No Food Insecurity    Worried About Running Out of Food in the Last Year: Never true    [place]o of Food in the Last Year: Never true   Transportation Needs: No Transportation Needs    Lack of Transportation (Medical): No    Lack of Transportation (Non-Medical): No   Physical Activity:     Days of Exercise per Week:     Minutes of Exercise per Session:    Stress:     Feeling of Stress :    Social Connections:     Frequency of Communication with Friends and Family:     Frequency of Social Gatherings with Friends and Family:     Attends [employer] Services:     Active Member of Clubs or Organizations:     Attends Club or Organization Meetings:     Marital Status:    Intimate Partner Violence:     Fear of Current or Ex-Partner:     Emotionally Abused:     Physically Abused:     Sexually Abused:         ROS:     Constitutional: No fevers, chills, fatigue. ENT: No nasal congestion or sore throat; +ear fullness, pain, and ring bilateral ears  Respiratory: No difficulty in breathing or cough. Cardiovascular: No chest pain, palpitations or shortness of breath  Gastrointestinal: No abdominal pain or change in bowel movements. Genitourinary: No change in urinary frequency or dysuria. Skin: No rashes or skin lesions. Neurological: No weakness. No headaches. Last Filed Vitals:  /72 (Site: Left Upper Arm, Position: Sitting, Cuff Size: Medium Adult)   Pulse 72   Temp 97.2 °F (36.2 °C) (Temporal)   Resp 14   Ht 6' (1.829 m)   Wt 247 lb (112 kg)   SpO2 97%   BMI 33.50 kg/m²      Physical Examination:     GENERAL APPEARANCE: in no acute distress, well developed, well nourished.    HEAD: normocephalic, atraumatic. EYES: extraocular movement intact (EOMI), pupils equal, round, reactive to light and accommodation. EARS: abnormal, cerumen impaction bilaterally; no skin lesions. NOSE: nares patent, no erythema, sinuses nontender bilaterally, no rhinorrhea. ORAL CAVITY: mucosa moist, no lesions. THROAT: clear, no mass, no exudate. NECK/THYROID: neck supple, full range of motion, no thyromegaly. HEART: no murmurs, regular rate and rhythm, S1, S2 normal.   LUNGS: clear to auscultation bilaterally, no wheezes, rales, rhonchi. ABDOMEN: normal, bowel sounds present, soft, nontender, nondistended, no rebound guarding or rigidity    Recent Labs/ In Office Testing/ Radiograph review:     Hospital Outpatient Visit on 08/13/2021   Component Date Value Ref Range Status    Cholesterol 08/13/2021 189  <200 mg/dL Final    Comment:    Cholesterol Guidelines:      <200  Desirable   200-240  Borderline      >240  Undesirable         HDL 08/13/2021 34* >40 mg/dL Final    Comment:    HDL Guidelines:    <40     Undesirable   40-59    Borderline    >59     Desirable         LDL Cholesterol 08/13/2021 111  0 - 130 mg/dL Final    Comment:    LDL Guidelines:     <100    Desirable   100-129   Near to/above Desirable   130-159   Borderline      >159   Undesirable     Direct (measured) LDL and calculated LDL are not interchangeable tests.  Chol/HDL Ratio 08/13/2021 5.6* <5 Final            Triglycerides 08/13/2021 219* <150 mg/dL Final    Comment:    Triglyceride Guidelines:     <150   Desirable   150-199  Borderline   200-499  High     >499   Very high   Based on AHA Guidelines for fasting triglyceride, October 2012.          VLDL 08/13/2021 NOT REPORTED* 1 - 30 mg/dL Final    Glucose 08/13/2021 106* 70 - 99 mg/dL Final    BUN 08/13/2021 10  8 - 23 mg/dL Final    CREATININE 08/13/2021 0.75  0.70 - 1.20 mg/dL Final    Bun/Cre Ratio 08/13/2021 NOT REPORTED  9 - 20 Final    Calcium 08/13/2021 8.9  8.6 - 10.4 mg/dL Final    Sodium 08/13/2021 142  135 - 144 mmol/L Final    Potassium 08/13/2021 4.1  3.7 - 5.3 mmol/L Final    Chloride 08/13/2021 108* 98 - 107 mmol/L Final    CO2 08/13/2021 22  20 - 31 mmol/L Final    Anion Gap 08/13/2021 12  9 - 17 mmol/L Final    Alkaline Phosphatase 08/13/2021 86  40 - 129 U/L Final    ALT 08/13/2021 15  5 - 41 U/L Final    AST 08/13/2021 14  <40 U/L Final    Total Bilirubin 08/13/2021 1.85* 0.3 - 1.2 mg/dL Final    Total Protein 08/13/2021 6.7  6.4 - 8.3 g/dL Final    Albumin 08/13/2021 3.8  3.5 - 5.2 g/dL Final    Albumin/Globulin Ratio 08/13/2021 1.3  1.0 - 2.5 Final    GFR Non- 08/13/2021 >60  >60 mL/min Final    GFR  08/13/2021 >60  >60 mL/min Final    GFR Comment 08/13/2021        Final    Comment: Average GFR for 61-76 years old:   80 mL/min/1.73sq m  Chronic Kidney Disease:   <60 mL/min/1.73sq m  Kidney failure:   <15 mL/min/1.73sq m              eGFR calculated using average adult body mass. Additional eGFR calculator available at:        [email or web address]            GFR Staging 08/13/2021 NOT REPORTED   Final    PSA 08/13/2021 18.86* <4.1 ug/L Final    Comment: The Roche \"ECLIA\" assay is used. Results obtained with different assay methods cannot be   used interchangeably. No results found for this visit on 08/23/21. Assessment/Plan:     Jake Crawley was seen today for ear problem. Diagnoses and all orders for this visit:    Otalgia of right ear    Elevated PSA, between 10 and less than 20 ng/ml  -     Surinder Smith MD, Urology, [place]    Bilateral impacted cerumen  -     carbamide peroxide (DEBROX) 6.5 % otic solution; Place 5 drops into both ears 2 times daily    Bilateral cerumen impaction. Ear lavage performed by MA. Referral as above. Encouraged OTC analgesics for pain. TM and auditory canal clear and without abnormality bilaterally. Suspect symptoms due to cerumen impaction. RTC precautions provided. Debrox provided to reduce cerumen burden. All questions answered and addressed to patient satisfaction. Patient understands and agrees to the plan. The patient was evaluated and treated today based on the osteopathic principle that each person is a unit of body, mind, and spirit, the body is capable of self-regulation, self-healing, and health maintenance and that structure and function are reciprocally interrelated. Follow-up:   Return if symptoms worsen or fail to improve.       Angelito Spence D.O.

## 2021-09-14 ENCOUNTER — OFFICE VISIT (OUTPATIENT)
Dept: UROLOGY | Age: 66
End: 2021-09-14
Payer: MEDICARE

## 2021-09-14 ENCOUNTER — HOSPITAL ENCOUNTER (OUTPATIENT)
Age: 66
Setting detail: SPECIMEN
Discharge: HOME | End: 2021-09-14
Payer: MEDICARE

## 2021-09-14 VITALS
TEMPERATURE: 97.5 F | BODY MASS INDEX: 33.46 KG/M2 | WEIGHT: 247 LBS | HEART RATE: 80 BPM | SYSTOLIC BLOOD PRESSURE: 130 MMHG | HEIGHT: 72 IN | DIASTOLIC BLOOD PRESSURE: 80 MMHG

## 2021-09-14 DIAGNOSIS — N13.8 BPH WITH OBSTRUCTION/LOWER URINARY TRACT SYMPTOMS: ICD-10-CM

## 2021-09-14 DIAGNOSIS — N40.1 BPH WITH OBSTRUCTION/LOWER URINARY TRACT SYMPTOMS: ICD-10-CM

## 2021-09-14 DIAGNOSIS — R97.20 ELEVATED PSA: Primary | ICD-10-CM

## 2021-09-14 PROCEDURE — 99204 OFFICE O/P NEW MOD 45 MIN: CPT | Performed by: UROLOGY

## 2021-09-14 ASSESSMENT — ENCOUNTER SYMPTOMS
EYE REDNESS: 0
EYE PAIN: 0
ABDOMINAL PAIN: 0
COUGH: 0
VOMITING: 0
CONSTIPATION: 0
BACK PAIN: 0
NAUSEA: 0
DIARRHEA: 0
SHORTNESS OF BREATH: 0
WHEEZING: 0

## 2021-09-14 NOTE — PROGRESS NOTES
[address] [address]  Dept: [phone]  Dept Fax: Pura Dickson [facility] Urology Office Note - New patient    Patient:  Tricia Norris  YOB: 1955  Date: 9/14/2021    The patient is a 78 y.o. male who presents todayfor evaluation of the following problems:   Chief Complaint   Patient presents with    New Patient     Elevated PSA 18.86    referred by Marcus Jansen DO.      HPI  He is here for elevated PSA. He had a biopsy in 2013, which was negative. His PSA was just over 5 at that time. He was seen for a bout a year after, and his PSA was just over 6. He was then lost to follow up. His PSA is now up to 18. He has some urgency at times. No recent infections. He had been on Flomax and got dizzy, so he stopped it. (Patient's old records have been requested, reviewed and summarized in today's note.)    Summary of old records: N/A    Additional History: N/A    Procedures Today: N/A    Last several PSA's:  Lab Results   Component Value Date    PSA 18.86 (H) 08/13/2021    PSA 6.83 (H) 06/07/2016    PSA 6.01 (H) 03/16/2015     Last total testosterone:  Lab Results   Component Value Date    TESTOSTERONE 95 (L) 03/16/2015     Urinalysis today:  No results found for this visit on 09/14/21. AUA Symptom Score (9/14/2021):   INCOMPLETE EMPTYING: How often have you had the sensation of not emptying your bladder?: Less than Half the time (In the morning feels like doesn't empty bladder completely)  FREQUENCY: How often do you have to urinate less than every two hours?: Not at all  INTERMITTENCY: How often have you found you stopped and started again several times when you urinated?: Not at all  URGENCY: How often have you found it difficult to postpone urination?: Almost always  WEAK STREAM: How often have you had a weak urinary stream?: Less than Half the time  STRAINING: How often have you had to strain to start  urination?: Not at all  NOCTURIA: How many times did you typically get up at night to uriniate?: 2 Times  TOTAL I-PSS SCORE[de-identified] 11       Last BUN and creatinine:  Lab Results   Component Value Date    BUN 10 2021     Lab Results   Component Value Date    CREATININE 0.75 2021       Additional Lab/Culture results: none    Imaging Reviewed during this Office Visit: none  (results were independently reviewed by physician and radiology report verified)    PAST MEDICAL, FAMILY AND SOCIAL HISTORY:  No past medical history on file. Past Surgical History:   Procedure Laterality Date    COLONOSCOPY  2008    [facility] Dr. Mandeep Pinto      No family history on file. Outpatient Medications Marked as Taking for the 21 encounter (Office Visit) with Fabiola Tohmas MD   Medication Sig Dispense Refill    carbamide peroxide (DEBROX) 6.5 % otic solution Place 5 drops into both ears 2 times daily 1 Bottle 2    fluorouracil (EFUDEX) 5 % cream Apply twice daily to actinic keratosis for 3-4 weeks. Expect redness and irritation. 40 g 1        Patient has no known allergies. Social History     Tobacco Use   Smoking Status Former Smoker    Packs/day: 1.00    Years: 0.00    Pack years: 0.00    Quit date: 2002    Years since quittin.7   Smokeless Tobacco Never Used      (If patient a smoker, smoking cessation counseling offered)   Social History     Substance and Sexual Activity   Alcohol Use Yes    Comment: beer       REVIEW OF SYSTEMS:  Review of Systems    Physical Exam:    This a 78 y.o. male   Vitals:    21 1025   BP: 130/80   Pulse: 80   Temp: 97.5 °F (36.4 °C)     Body mass index is 33.5 kg/m². Physical Exam  Constitutional: Patient in no acute distress. Neuro: Alert and oriented to person, place and time.   Psych: Mood normal, affect normal  Skin: No rash noted  Lungs:Respiratory effort is normal  Cardiovascular: Warm & Pink  Abdomen: Soft, non-tender, non-distendedwith no CVA,  No flank tenderness,  Orhepatosplenomegaly   Lymphatics: No palpable lymphadenopathy. Bladder non-tender and not distended. Musculoskeletal: Normal gait and station  Penis normal and circumcised  Urethral meatus normal  Scrotal exam normal  Testicles normal bilaterally  Epididymis normal bilaterally  No evidence of inguinal hernia  Normal rectal tone with no masses  Prostate:  50 grams, smooth, no nodules. Assessment and Plan      1. Elevated PSA    2. BPH with obstruction/lower urinary tract symptoms           Plan:        He had a negative biopsy in the past.   His PSA has worsened to 18. Will move forward with MRI of the prostate. Discussed biopsy, which will likely be needed based on MRI. Prescriptions Ordered:  No orders of the defined types were placed in this encounter. Orders Placed:  Orders Placed This Encounter   Procedures    MRI PROSTATE W WO CONTRAST     Standing Status:   Future     Standing Expiration Date:   9/14/2022             Shayne Olguin MD    Agree with the ROS entered by the MA.

## 2021-09-20 LAB
FLUOROQUINOLONE RESISTANT ORG, CULT: NORMAL
ZZ NTE WITH NAME CLEAN UP: SPECIMEN SOURCE: NORMAL

## 2021-10-18 ENCOUNTER — HOSPITAL ENCOUNTER (OUTPATIENT)
Dept: MRI IMAGING | Age: 66
Discharge: HOME | End: 2021-10-20
Payer: MEDICARE

## 2021-10-18 DIAGNOSIS — R97.20 ELEVATED PSA: ICD-10-CM

## 2021-10-18 LAB
BUN BLDV-MCNC: 9 MG/DL (ref 8–23)
CREAT SERPL-MCNC: 0.79 MG/DL (ref 0.7–1.2)
GFR AFRICAN AMERICAN: >60 ML/MIN
GFR NON-AFRICAN AMERICAN: >60 ML/MIN
GFR SERPL CREATININE-BSD FRML MDRD: NORMAL ML/MIN/{1.73_M2}
GFR SERPL CREATININE-BSD FRML MDRD: NORMAL ML/MIN/{1.73_M2}

## 2021-10-18 PROCEDURE — 2580000003 HC RX 258: Performed by: UROLOGY

## 2021-10-18 PROCEDURE — A9579 GAD-BASE MR CONTRAST NOS,1ML: HCPCS | Performed by: UROLOGY

## 2021-10-18 PROCEDURE — 84520 ASSAY OF UREA NITROGEN: CPT

## 2021-10-18 PROCEDURE — 36415 COLL VENOUS BLD VENIPUNCTURE: CPT

## 2021-10-18 PROCEDURE — 82565 ASSAY OF CREATININE: CPT

## 2021-10-18 PROCEDURE — 6360000004 HC RX CONTRAST MEDICATION: Performed by: UROLOGY

## 2021-10-18 PROCEDURE — 72197 MRI PELVIS W/O & W/DYE: CPT

## 2021-10-18 RX ORDER — SODIUM CHLORIDE 0.9 % (FLUSH) 0.9 %
10 SYRINGE (ML) INJECTION PRN
Status: DISCONTINUED | OUTPATIENT
Start: 2021-10-18 | End: 2021-10-21 | Stop reason: HOSPADM

## 2021-10-18 RX ORDER — 0.9 % SODIUM CHLORIDE 0.9 %
50 INTRAVENOUS SOLUTION INTRAVENOUS ONCE
Status: COMPLETED | OUTPATIENT
Start: 2021-10-18 | End: 2021-10-18

## 2021-10-18 RX ADMIN — SODIUM CHLORIDE 50 ML: 9 INJECTION, SOLUTION INTRAVENOUS at 11:33

## 2021-10-18 RX ADMIN — SODIUM CHLORIDE, PRESERVATIVE FREE 10 ML: 5 INJECTION INTRAVENOUS at 11:33

## 2021-10-18 RX ADMIN — GADOTERIDOL 20 ML: 279.3 INJECTION, SOLUTION INTRAVENOUS at 11:33

## 2021-10-26 ENCOUNTER — TELEPHONE (OUTPATIENT)
Dept: UROLOGY | Age: 66
End: 2021-10-26

## 2021-10-26 ENCOUNTER — OFFICE VISIT (OUTPATIENT)
Dept: UROLOGY | Age: 66
End: 2021-10-26
Payer: MEDICARE

## 2021-10-26 VITALS
HEART RATE: 101 BPM | TEMPERATURE: 97.3 F | SYSTOLIC BLOOD PRESSURE: 119 MMHG | BODY MASS INDEX: 33.46 KG/M2 | DIASTOLIC BLOOD PRESSURE: 77 MMHG | HEIGHT: 72 IN | WEIGHT: 247 LBS

## 2021-10-26 DIAGNOSIS — N13.8 BPH WITH OBSTRUCTION/LOWER URINARY TRACT SYMPTOMS: ICD-10-CM

## 2021-10-26 DIAGNOSIS — R93.89 ABNORMAL MRI: ICD-10-CM

## 2021-10-26 DIAGNOSIS — R97.20 ELEVATED PSA: Primary | ICD-10-CM

## 2021-10-26 DIAGNOSIS — N40.1 BPH WITH OBSTRUCTION/LOWER URINARY TRACT SYMPTOMS: ICD-10-CM

## 2021-10-26 PROCEDURE — 99214 OFFICE O/P EST MOD 30 MIN: CPT | Performed by: UROLOGY

## 2021-10-26 ASSESSMENT — ENCOUNTER SYMPTOMS
DIARRHEA: 0
ABDOMINAL PAIN: 0
CONSTIPATION: 0
COUGH: 0
EYE PAIN: 0
SHORTNESS OF BREATH: 0
BACK PAIN: 0
NAUSEA: 0
VOMITING: 0
EYE REDNESS: 0
WHEEZING: 0

## 2021-10-26 NOTE — PROGRESS NOTES
[address] [address]  Dept:  [facility] Urology Office Note - Established    Patient:  Jillian Kay  YOB: 1955  Date: 10/26/2021    The patient is a 78 y.o. male who presents todayfor evaluation of the following problems:   Chief Complaint   Patient presents with    Results     MRI       HPI  Here for f/u prostate MRI. Hx elevated PSA, now up to 18. He did have a negative biopsy in 2013- PSA just over 5 at that time. He does urinary urgency and frequency, weak stream, nocturia x2. Tried flomax in the past but caused dizziness. Summary of old records: N/A    Additional History: N/A    Procedures Today: N/A    Urinalysis today:  No results found for this visit on 10/26/21. Last several PSA's:  Lab Results   Component Value Date    PSA 18.86 (H) 08/13/2021    PSA 6.83 (H) 06/07/2016    PSA 6.01 (H) 03/16/2015     Last total testosterone:  Lab Results   Component Value Date    TESTOSTERONE 95 (L) 03/16/2015       AUA Symptom Score (10/26/2021):   INCOMPLETE EMPTYING: How often have you had the sensation of not emptying your bladder?: Less than Half the time  FREQUENCY: How often do you have to urinate less than every two hours?: Not at all  INTERMITTENCY: How often have you found you stopped and started again several times when you urinated?: Not at all  URGENCY: How often have you found it difficult to postpone urination?: Almost always  WEAK STREAM: How often have you had a weak urinary stream?: Less than Half the time  STRAINING: How often have you had to strain to start  urination?: Not at all  NOCTURIA: How many times did you typically get up at night to uriniate?: 2 Times  TOTAL I-PSS SCORE[de-identified] 11       Last BUN and creatinine:  Lab Results   Component Value Date    BUN 9 10/18/2021     Lab Results   Component Value Date    CREATININE 0.79 10/18/2021       Additional Lab/Culture results: none    Imaging Reviewed during this Office Visit: MRI reviewed and is PI RADS 4.   (results were independently reviewed by physician and radiology report verified)    PAST MEDICAL, FAMILY AND SOCIAL HISTORY UPDATE:  History reviewed. No pertinent past medical history. Past Surgical History:   Procedure Laterality Date    COLONOSCOPY  2008    [facility] Dr. Timi Bravo      History reviewed. No pertinent family history. Outpatient Medications Marked as Taking for the 10/26/21 encounter (Office Visit) with Dragan Swain MD   Medication Sig Dispense Refill    fluorouracil (EFUDEX) 5 % cream Apply twice daily to actinic keratosis for 3-4 weeks. Expect redness and irritation. 40 g 1       Patient has no known allergies. Social History     Tobacco Use   Smoking Status Former Smoker    Packs/day: 1.00    Years: 0.00    Pack years: 0.00    Quit date: 2002    Years since quittin.8   Smokeless Tobacco Never Used     (Ifpatient a smoker, smoking cessation counseling offered)    Social History     Substance and Sexual Activity   Alcohol Use Yes    Comment: beer       REVIEW OF SYSTEMS:  Review of Systems    Physical Exam:      Vitals:    10/26/21 1039   BP: 119/77   Pulse: 101   Temp: 97.3 °F (36.3 °C)     Body mass index is 33.5 kg/m². Patient is a 78 y.o. male in no acute distress and alert and oriented to person, place and time. Physical Exam  Constitutional: Patient in no acute distress. Neuro: Alert and oriented to person, place and time. Psych: Mood normal, affect normal  Lungs: Respiratory effort is normal  Cardiovascular: Warm & Pink  Abdomen: Soft, non-tender, non-distended with no CVA,  No flank tenderness,  Or hepatosplenomegaly   Lymphatics: No palpablelymphadenopathy. Bladder non-tender and not distended. Assessment and Plan      1. Elevated PSA    2. BPH with obstruction/lower urinary tract symptoms    3.  Abnormal MRI           Plan:          He is doing well  PSa is up  MRI shows PI RADS 4 lesion. Will move forward with targeted biopsy. Return in about 4 weeks (around 11/23/2021) for prostate biopsy. .    Prescriptions Ordered:  No orders of the defined types were placed in this encounter. Orders Placed:  No orders of the defined types were placed in this encounter. Delphine Arzola MD    Agree with the ROS entered by the MA.

## 2021-10-26 NOTE — LETTER
[address] [address]  Dept: [phone]  Dept Fax: [phone]        10/26/21    Patient: Jose Balbuena  YOB: 1955    Dear Faye Felipe DO,    I had the pleasure of seeing one of your patients, Aureliano Orona today in the office today. Below are the relevant portions of my assessment and plan of care. IMPRESSION:  1. Elevated PSA    2. BPH with obstruction/lower urinary tract symptoms    3. Abnormal MRI        PLAN:  He is doing well  PSa is up  MRI shows PI RADS 4 lesion. Will move forward with targeted biopsy. Return in about 4 weeks (around 11/23/2021) for prostate biopsy. .    Prescriptions Ordered:  No orders of the defined types were placed in this encounter. Orders Placed:  No orders of the defined types were placed in this encounter. Thank you for allowing me to participate in the care of this patient. I will keep you updated on this patient's follow up and I look forward to serving you and your patients again in the future.         Shakila Grullon MD

## 2021-10-26 NOTE — PROGRESS NOTES
Review of Systems   Constitutional: Negative for chills, fatigue and fever. Eyes: Negative for pain, redness and visual disturbance. Respiratory: Negative for cough, shortness of breath and wheezing. Cardiovascular: Negative for chest pain and leg swelling. Gastrointestinal: Negative for abdominal pain, constipation, diarrhea, nausea and vomiting. Genitourinary: Positive for frequency and urgency. Negative for difficulty urinating, dysuria, flank pain, hematuria, scrotal swelling and testicular pain. Musculoskeletal: Negative for back pain, joint swelling and myalgias. Skin: Negative for rash and wound. Neurological: Negative for dizziness, tremors and numbness. Hematological: Does not bruise/bleed easily.

## 2021-10-26 NOTE — TELEPHONE ENCOUNTER
fusion prostate bx & us @ [facility] 11/22/21 10:15am **STOP BLOOD THINNERS 11/15/21**   PAT @ [facility] 11/11/21 10:30am   Vaccinated- Kade  with patient in office, procedure info given to patient.

## 2021-10-27 RX ORDER — CIPROFLOXACIN 500 MG/1
500 TABLET, FILM COATED ORAL 2 TIMES DAILY
Qty: 6 TABLET | Refills: 0 | Status: SHIPPED | OUTPATIENT
Start: 2021-10-27 | End: 2021-10-30

## 2021-10-27 NOTE — TELEPHONE ENCOUNTER
Per Dr. Espinoza [place] 500 mg BID for 3 days. Starting the day prior to Prostate Bx. Pt notified.

## 2021-11-10 RX ORDER — SODIUM CHLORIDE, SODIUM LACTATE, POTASSIUM CHLORIDE, CALCIUM CHLORIDE 600; 310; 30; 20 MG/100ML; MG/100ML; MG/100ML; MG/100ML
1000 INJECTION, SOLUTION INTRAVENOUS CONTINUOUS
Status: CANCELLED | OUTPATIENT
Start: 2021-11-10

## 2021-11-11 ENCOUNTER — HOSPITAL ENCOUNTER (OUTPATIENT)
Dept: PREADMISSION TESTING | Age: 66
Discharge: HOME | End: 2021-11-15
Payer: MEDICARE

## 2021-11-11 ENCOUNTER — TELEPHONE (OUTPATIENT)
Dept: FAMILY MEDICINE CLINIC | Age: 66
End: 2021-11-11

## 2021-11-11 VITALS
DIASTOLIC BLOOD PRESSURE: 84 MMHG | WEIGHT: 232 LBS | RESPIRATION RATE: 20 BRPM | BODY MASS INDEX: 30.75 KG/M2 | TEMPERATURE: 97.8 F | SYSTOLIC BLOOD PRESSURE: 126 MMHG | HEIGHT: 73 IN | OXYGEN SATURATION: 96 % | HEART RATE: 67 BPM

## 2021-11-11 DIAGNOSIS — R19.5 POSITIVE FIT (FECAL IMMUNOCHEMICAL TEST): Primary | ICD-10-CM

## 2021-11-11 LAB
BUN BLDV-MCNC: 14 MG/DL (ref 8–23)
CREAT SERPL-MCNC: 0.71 MG/DL (ref 0.7–1.2)
GFR AFRICAN AMERICAN: >60 ML/MIN
GFR NON-AFRICAN AMERICAN: >60 ML/MIN
GFR SERPL CREATININE-BSD FRML MDRD: NORMAL ML/MIN/{1.73_M2}
GFR SERPL CREATININE-BSD FRML MDRD: NORMAL ML/MIN/{1.73_M2}
GLUCOSE BLD-MCNC: 116 MG/DL (ref 70–99)
HCT VFR BLD CALC: 45.4 % (ref 40.7–50.3)
HEMOGLOBIN: 14.4 G/DL (ref 13–17)

## 2021-11-11 PROCEDURE — 82565 ASSAY OF CREATININE: CPT

## 2021-11-11 PROCEDURE — 36415 COLL VENOUS BLD VENIPUNCTURE: CPT

## 2021-11-11 PROCEDURE — 93005 ELECTROCARDIOGRAM TRACING: CPT | Performed by: ANESTHESIOLOGY

## 2021-11-11 PROCEDURE — 85018 HEMOGLOBIN: CPT

## 2021-11-11 PROCEDURE — 82947 ASSAY GLUCOSE BLOOD QUANT: CPT

## 2021-11-11 PROCEDURE — 87086 URINE CULTURE/COLONY COUNT: CPT

## 2021-11-11 PROCEDURE — 85014 HEMATOCRIT: CPT

## 2021-11-11 PROCEDURE — 84520 ASSAY OF UREA NITROGEN: CPT

## 2021-11-11 NOTE — TELEPHONE ENCOUNTER
Pt had a positive fit test called pt to advise of this and let him know we are Referring him  to dr. Shannan Cross for a colonoscopy. Patient did not answer, left a detailed message on recorder to call back.  Please advise patient and give contact referral information: [facility]- [place] Leslie, [id number] [id number] [facility]  [phone]

## 2021-11-11 NOTE — PROGRESS NOTES
Anesthesia Focused Assessment    Has patient ever tested positive for COVID? Patient has been vaccinated. STOP-BANG Sleep Apnea Questionnaire    SNORE loudly (heard through closed doors)? Yes  TIRED, fatigued, sleepy during daytime? No  OBSERVED stopping breathing during sleep? No  High blood PRESSURE being treated? No    BMI over 35? No  AGE over 48? Yes  NECK circumference over 16\"? No  GENDER (male)? Yes             Total 3  High risk 5-8  Intermediate risk 3-4  Low risk 0-2    Obstructive Sleep Apnea: snores but denies apnea  If YES, machine used: no     Type 1 DM:   no  T2DM:  no    Coronary Artery Disease:  no  Hypertension:  no    Active smoker:  Quit 2002, was 1 PPD for years prior to quitting. Drinks Alcohol:  weekly    Dentition: upper and lower full dentures    Defib / AICD / Pacemaker: no      Renal Failure/dialysis:  no    Patient was evaluated in PAT & anesthesia guidelines were applied. NPO guidelines, medication instructions and scheduled arrival time were reviewed with patient.     Hx of anesthesia complications:  no  Family hx of anesthesia complications:  no                                                                                                                     Anesthesia contacted:   no  Medical or cardiac clearance ordered: no    Gabriela Lynn PA-C  11/11/21  10:57 AM

## 2021-11-11 NOTE — H&P
History and Physical    Pt Name: Graciela Macdonald  MRN: [id number]  YOB: 1955  Date of evaluation: 11/11/2021    SUBJECTIVE:   History of Chief Complaint:    Patient presents for PAT appointment. He says that he has a history of elevated PSA level, s/p prostate biopsy in the office in the past.  He says that he has since then had MRI of the prostate and a \"nodule\" was detected he states on imaging. He has been scheduled for prostate biopsy with US. Past Medical History    has a past medical history of Elevated PSA, Hearing loss, Keratosis, Prostate nodule, Snores, Wears dentures, Wears reading eyeglasses, and Wellness examination. Past Surgical History   has a past surgical history that includes Colonoscopy (09/24/2008); Tonsillectomy; hernia repair; skin biopsy; and Prostate biopsy. Medications  Prior to Admission medications    Medication Sig Start Date End Date Taking? Authorizing Provider   fluorouracil (EFUDEX) 5 % cream Apply twice daily to actinic keratosis for 3-4 weeks. Expect redness and irritation. Patient taking differently: Apply twice daily to actinic keratosis for 3-4 weeks. Expect redness and irritation. Not currently using. 11/1/18   Ray Dumont MD     Allergies  has No Known Allergies. Family History  family history is not on file. Social History   reports that he quit smoking about 19 years ago. He smoked 1.00 pack per day for 0.00 years. He has never used smokeless tobacco.   reports current alcohol use. reports current drug use. Drug: Marijuana Candiss Littler).   Marital Status   Occupation retired from [employer] 119:  CONSTITUTIONAL:   negative for fevers, chills, fatigue and malaise    EYES:   negative for double vision, blurred vision and photophobia    HEENT:   negative for tinnitus, epistaxis and sore throat     RESPIRATORY:   negative for cough, shortness of breath, wheezing     CARDIOVASCULAR:   negative for chest pain, palpitations, syncope, edema GASTROINTESTINAL:   negative for nausea, vomiting     GENITOURINARY:   negative for incontinence     MUSCULOSKELETAL:   negative for neck or back pain     NEUROLOGICAL:   Negative for weakness and tingling  negative for headaches and dizziness     PSYCHIATRIC:   negative for anxiety         OBJECTIVE:   VITALS:  height is 6' 1\" (1.854 m) and weight is 232 lb (105.2 kg). His temporal temperature is 97.8 °F (36.6 °C). His blood pressure is 126/84 and his pulse is 67. His respiration is 20 and oxygen saturation is 96%. CONSTITUTIONAL:alert & cooperative, no acute distress. Very pleasant and talkative. SKIN:  Warm and dry, no rashes on exposed areas of skin   HEAD:  Normocephalic, atraumatic   EYES: EOMs intact. EARS:  Hearing loss suspected, very mild  NOSE:  Nares patent. No rhinorrhea   MOUTH/THROAT:  benign  NECK:supple, no lymphadenopathy  LUNGS: Clear to auscultation bilaterally, no wheezes. CARDIOVASCULAR: Heart sounds are normal.  Regular rate and rhythm without murmur. ABDOMEN: soft, non tender, non distended. EXTREMITIES: no edema bilateral lower extremities. Testing:   EK21  Labs pending: drawn 2021     IMPRESSIONS:   Elevated PSA   has a past medical history of Elevated PSA, Hearing loss, Keratosis, Prostate nodule, Snores, Wears dentures, Wears reading eyeglasses, and Wellness examination.    PLANS:   Prostate biopsy with Osman Henderson PA-C  Electronically signed 2021 at 11:15 AM

## 2021-11-11 NOTE — TELEPHONE ENCOUNTER
Patient called back to say he had gotten the message about the abnormal FIT. Patient stated he was having trouble with his hemorrhoids when he took the specimen and believes the blood was from that. He has an appointment with Dr Blanca Matos on 11-17-21 wand wants to discuss this with him at that visit.

## 2021-11-11 NOTE — H&P (VIEW-ONLY)
History and Physical    Pt Name: Bob Andrade  MRN: [id number]  YOB: 1955  Date of evaluation: 11/11/2021    SUBJECTIVE:   History of Chief Complaint:    Patient presents for PAT appointment. He says that he has a history of elevated PSA level, s/p prostate biopsy in the office in the past.  He says that he has since then had MRI of the prostate and a \"nodule\" was detected he states on imaging. He has been scheduled for prostate biopsy with US. Past Medical History    has a past medical history of Elevated PSA, Hearing loss, Keratosis, Prostate nodule, Snores, Wears dentures, Wears reading eyeglasses, and Wellness examination. Past Surgical History   has a past surgical history that includes Colonoscopy (09/24/2008); Tonsillectomy; hernia repair; skin biopsy; and Prostate biopsy. Medications  Prior to Admission medications    Medication Sig Start Date End Date Taking? Authorizing Provider   fluorouracil (EFUDEX) 5 % cream Apply twice daily to actinic keratosis for 3-4 weeks. Expect redness and irritation. Patient taking differently: Apply twice daily to actinic keratosis for 3-4 weeks. Expect redness and irritation. Not currently using. 11/1/18   Carlos Ye MD     Allergies  has No Known Allergies. Family History  family history is not on file. Social History   reports that he quit smoking about 19 years ago. He smoked 1.00 pack per day for 0.00 years. He has never used smokeless tobacco.   reports current alcohol use. reports current drug use. Drug: Marijuana Maurita Handsome).   Marital Status   Occupation retired from [employer] 119:  CONSTITUTIONAL:   negative for fevers, chills, fatigue and malaise    EYES:   negative for double vision, blurred vision and photophobia    HEENT:   negative for tinnitus, epistaxis and sore throat     RESPIRATORY:   negative for cough, shortness of breath, wheezing     CARDIOVASCULAR:   negative for chest pain, palpitations, syncope, edema GASTROINTESTINAL:   negative for nausea, vomiting     GENITOURINARY:   negative for incontinence     MUSCULOSKELETAL:   negative for neck or back pain     NEUROLOGICAL:   Negative for weakness and tingling  negative for headaches and dizziness     PSYCHIATRIC:   negative for anxiety         OBJECTIVE:   VITALS:  height is 6' 1\" (1.854 m) and weight is 232 lb (105.2 kg). His temporal temperature is 97.8 °F (36.6 °C). His blood pressure is 126/84 and his pulse is 67. His respiration is 20 and oxygen saturation is 96%. CONSTITUTIONAL:alert & cooperative, no acute distress. Very pleasant and talkative. SKIN:  Warm and dry, no rashes on exposed areas of skin   HEAD:  Normocephalic, atraumatic   EYES: EOMs intact. EARS:  Hearing loss suspected, very mild  NOSE:  Nares patent. No rhinorrhea   MOUTH/THROAT:  benign  NECK:supple, no lymphadenopathy  LUNGS: Clear to auscultation bilaterally, no wheezes. CARDIOVASCULAR: Heart sounds are normal.  Regular rate and rhythm without murmur. ABDOMEN: soft, non tender, non distended. EXTREMITIES: no edema bilateral lower extremities. Testing:   EK21  Labs pending: drawn 2021     IMPRESSIONS:   Elevated PSA   has a past medical history of Elevated PSA, Hearing loss, Keratosis, Prostate nodule, Snores, Wears dentures, Wears reading eyeglasses, and Wellness examination.    PLANS:   Prostate biopsy with Osman Henderson PA-C  Electronically signed 2021 at 11:15 AM

## 2021-11-12 LAB
CULTURE: NO GROWTH
EKG ATRIAL RATE: 65 BPM
EKG P AXIS: 38 DEGREES
EKG P-R INTERVAL: 144 MS
EKG Q-T INTERVAL: 404 MS
EKG QRS DURATION: 90 MS
EKG QTC CALCULATION (BAZETT): 420 MS
EKG R AXIS: 16 DEGREES
EKG T AXIS: 48 DEGREES
EKG VENTRICULAR RATE: 65 BPM
Lab: NORMAL
SPECIMEN DESCRIPTION: NORMAL

## 2021-11-12 PROCEDURE — 93010 ELECTROCARDIOGRAM REPORT: CPT | Performed by: INTERNAL MEDICINE

## 2021-11-17 ENCOUNTER — OFFICE VISIT (OUTPATIENT)
Dept: FAMILY MEDICINE CLINIC | Age: 66
End: 2021-11-17
Payer: MEDICARE

## 2021-11-17 VITALS
WEIGHT: 237.8 LBS | OXYGEN SATURATION: 97 % | HEART RATE: 82 BPM | DIASTOLIC BLOOD PRESSURE: 74 MMHG | SYSTOLIC BLOOD PRESSURE: 118 MMHG | BODY MASS INDEX: 31.37 KG/M2

## 2021-11-17 DIAGNOSIS — H61.23 BILATERAL IMPACTED CERUMEN: ICD-10-CM

## 2021-11-17 DIAGNOSIS — L72.0 EPIDERMOID CYST OF FINGER OF LEFT HAND: ICD-10-CM

## 2021-11-17 DIAGNOSIS — N40.1 BENIGN PROSTATIC HYPERPLASIA WITH URINARY FREQUENCY: Primary | ICD-10-CM

## 2021-11-17 DIAGNOSIS — R35.0 BENIGN PROSTATIC HYPERPLASIA WITH URINARY FREQUENCY: Primary | ICD-10-CM

## 2021-11-17 PROCEDURE — 69210 REMOVE IMPACTED EAR WAX UNI: CPT | Performed by: FAMILY MEDICINE

## 2021-11-17 PROCEDURE — 99214 OFFICE O/P EST MOD 30 MIN: CPT | Performed by: FAMILY MEDICINE

## 2021-11-17 SDOH — ECONOMIC STABILITY: FOOD INSECURITY: WITHIN THE PAST 12 MONTHS, THE FOOD YOU BOUGHT JUST DIDN'T LAST AND YOU DIDN'T HAVE MONEY TO GET MORE.: NEVER TRUE

## 2021-11-17 SDOH — ECONOMIC STABILITY: FOOD INSECURITY: WITHIN THE PAST 12 MONTHS, YOU WORRIED THAT YOUR FOOD WOULD RUN OUT BEFORE YOU GOT MONEY TO BUY MORE.: NEVER TRUE

## 2021-11-17 ASSESSMENT — SOCIAL DETERMINANTS OF HEALTH (SDOH): HOW HARD IS IT FOR YOU TO PAY FOR THE VERY BASICS LIKE FOOD, HOUSING, MEDICAL CARE, AND HEATING?: NOT HARD AT ALL

## 2021-11-17 ASSESSMENT — ENCOUNTER SYMPTOMS
ALLERGIC/IMMUNOLOGIC NEGATIVE: 1
EYES NEGATIVE: 1
RESPIRATORY NEGATIVE: 1
GASTROINTESTINAL NEGATIVE: 1

## 2021-11-17 NOTE — PATIENT INSTRUCTIONS
Patient Education        Earwax Blockage: Care Instructions  Your Care Instructions     Earwax is a natural substance that protects the ear canal. Normally, earwax drains from the ears and does not cause problems. Sometimes earwax builds up and hardens. Earwax blockage (also called cerumen impaction) can cause some loss of hearing and pain. When wax is tightly packed, you will need to have your doctor remove it. Follow-up care is a key part of your treatment and safety. Be sure to make and go to all appointments, and call your doctor if you are having problems. It's also a good idea to know your test results and keep a list of the medicines you take. How can you care for yourself at home? · Do not try to remove earwax with cotton swabs, fingers, or other objects. This can make the blockage worse and damage the eardrum. · If your doctor recommends that you try to remove earwax at home:  ? Soften and loosen the earwax with warm mineral oil. You also can try hydrogen peroxide mixed with an equal amount of room temperature water. Place 2 drops of the fluid, warmed to body temperature, in the ear two times a day for up to 5 days. ? Once the wax is loose and soft, all that is usually needed to remove it from the ear canal is a gentle, warm shower. Direct the water into the ear, then tip your head to let the earwax drain out. Dry your ear thoroughly with a hair dryer set on low. Hold the dryer several inches from your ear. ? If the warm mineral oil and shower do not work, use an over-the-counter wax softener. Read and follow all instructions on the label. After using the wax softener, use an ear syringe to gently flush the ear. Make sure the flushing solution is body temperature. Cool or hot fluids in the ear can cause dizziness. When should you call for help?    Call your doctor now or seek immediate medical care if:    · Pus or blood drains from your ear.     · Your ears are ringing or feel full.     · You have a loss of hearing. Watch closely for changes in your health, and be sure to contact your doctor if:    · You have pain or reduced hearing after 1 week of home treatment.     · You have any new symptoms, such as nausea or balance problems. Where can you learn more? Go to https://[email or web address].[employer]. org and sign in to your [employer] account. Enter D484 in the [employer] box to learn more about \"Earwax Blockage: Care Instructions. \"     If you do not have an account, please click on the \"Sign Up Now\" link. Current as of: July 1, 2021               Content Version: 13.0  © 8275-8212 [employer], [employer]. Care instructions adapted under license by [facility] ([facility]). If you have questions about a medical condition or this instruction, always ask your healthcare professional. [address] any warranty or liability for your use of this information.

## 2021-11-17 NOTE — PROGRESS NOTES
[facility] Progress Note    Date:11/17/2021         Patient Name:Nando Vences     Date of Birth:[birth year]     Age:66 y.o. Assessment/Plan        Problem List Items Addressed This Visit        Musculoskeletal and Integument    Epidermoid cyst of finger of left hand      Monitor            Genitourinary    BPH (benign prostatic hyperplasia) - Primary      Monitored by specialist- no acute findings meriting change in the plan   Undergoing biopsy Monday           Other Visit Diagnoses     Bilateral impacted cerumen        Removed in office today - remnants left in left side           Return in about 3 months (around 2/17/2022). Electronically signed by Opal Godfrey DO on 11/17/21         Subjective     Dalia Marcelo is a 78 y.o. male presenting today for   Chief Complaint   Patient presents with    Benign Prostatic Hypertrophy     having Bx monday AM    .    Epidermal Cyst  Patient complains of a subcutaneous nodule located over the left thumb. This has been present for several months. There has been no associated symptoms of discharge, tenderness, sudden swelling, or pain. Patient does not have a history of epidermal inclusion cysts. Cerumen Impaction  Leandro Scales presents for evaluation of a plugged ear. He noticed the symptoms in both ears, several weeks ago. He does have a prior history of cerumen impaction. Leandro Scales denies ear pain. He was using ear drops to loosen wax immediately prior to this visit. Benign Prostatic Hypertrophy  This is a chronic problem. The current episode started more than 1 year ago. The problem is unchanged. Irritative symptoms include frequency and nocturia. Irritative symptoms do not include urgency. Obstructive symptoms do not include dribbling, incomplete emptying, an intermittent stream, a slower stream, straining or a weak stream. Pertinent negatives include no dysuria, genital pain, hematuria or hesitancy. Past treatments include tamsulosin.  The treatment provided no relief. Review of Systems   Review of Systems   Constitutional: Negative. HENT: Negative. Eyes: Negative. Respiratory: Negative. Cardiovascular: Negative. Gastrointestinal: Negative. Endocrine: Negative. Genitourinary: Positive for frequency and nocturia. Negative for dysuria, hematuria, hesitancy, incomplete emptying and urgency. Musculoskeletal: Negative. Skin: Negative. Allergic/Immunologic: Negative. Neurological: Negative. Hematological: Negative. Psychiatric/Behavioral: Negative. All other systems reviewed and are negative. Medications     Current Outpatient Medications   Medication Sig Dispense Refill    fluorouracil (EFUDEX) 5 % cream Apply twice daily to actinic keratosis for 3-4 weeks. Expect redness and irritation. (Patient taking differently: Apply twice daily to actinic keratosis for 3-4 weeks. Expect redness and irritation. Not currently using.) 40 g 1     No current facility-administered medications for this visit. Past History    Past Medical History:   has a past medical history of Elevated PSA, Hearing loss, Keratosis, Prostate nodule, Snores, Wears dentures, Wears reading eyeglasses, and Wellness examination. Social History:   reports that he quit smoking about 19 years ago. He smoked 1.00 pack per day for 0.00 years. He has never used smokeless tobacco. He reports current alcohol use. He reports current drug use. Drug: Marijuana Salena Postin). Family History: No family history on file. Surgical History:   Past Surgical History:   Procedure Laterality Date    COLONOSCOPY  09/24/2008    [facility]  [address]   rt inguinal w/ mesh at [address]      head noncancerous. Just keratosis.      TONSILLECTOMY          Physical Examination      Vitals:  /74   Pulse 82   Wt 237 lb 12.8 oz (107.9 kg)   SpO2 97%   BMI 31.37 kg/m²     Physical Exam  Vitals and nursing note reviewed. Constitutional:       General: He is not in acute distress. Appearance: Normal appearance. He is obese. He is not ill-appearing, toxic-appearing or diaphoretic. HENT:      Head: Normocephalic and atraumatic. Right Ear: Tympanic membrane and external ear normal. There is impacted cerumen. Left Ear: Tympanic membrane and external ear normal. There is impacted cerumen. Ears:      Comments: TMs viewed after ears cleaned  Eyes:      General: No scleral icterus. Right eye: No discharge. Left eye: No discharge. Conjunctiva/sclera: Conjunctivae normal.   Cardiovascular:      Rate and Rhythm: Normal rate and regular rhythm. Pulses: Normal pulses. Heart sounds: Normal heart sounds. No murmur heard. No friction rub. No gallop. Pulmonary:      Effort: Pulmonary effort is normal. No respiratory distress. Breath sounds: Normal breath sounds. No stridor. No wheezing, rhonchi or rales. Chest:      Chest wall: No tenderness. Skin:     General: Skin is warm. Coloration: Skin is not jaundiced or pale. Neurological:      Mental Status: He is alert and oriented to person, place, and time. Mental status is at baseline. Psychiatric:         Mood and Affect: Mood normal.         Behavior: Behavior normal.         Thought Content: Thought content normal.         Judgment: Judgment normal.     Patient Name: Cornelia Funes   Medical Record Number: [id number]  Date: 11/17/2021   Time: 2:28 PM     Ear Cerumen Removal Procedure Note  Indication: ear cerumen impaction and unable to visualize tympanic membrane    Procedure: After placing the patient's head in the appropriate position, the patient's bilateral ear canal was irrigated with the appropriate solution and curetted until the majority of the cerumen was flushed out of the canal.  The remaining cerumen was removed by curette. The patient tolerated the procedure well.     Complications: None        Labs/Imaging/Diagnostics   Labs:  No results found for: CMPWITHGFR, CBCAUTODIF, TSHFT4, LABA1C, LIPIDPAN    Imaging Last 24 Hours:  MRI PROSTATE W WO CONTRAST  Narrative: EXAMINATION:  MULTIPARAMETRIC MRI OF THE PROSTATE WITH AND WITHOUT CONTRAST    10/18/2021:    TECHNIQUE:  Multiparametric imaging with dynamic contrast enhanced imaging and diffusion  weighted imaging was performed. COMPARISON:  None. HISTORY:  ORDERING SYSTEM PROVIDED HISTORY: Elevated PSA  TECHNOLOGIST PROVIDED HISTORY:  Reason for Exam: elevated PSA 18.86    FINDINGS:  Prostate dimensions: 6.4 x 4.8 x 5.9 cm    Prostate volume: 94 mL    Peripheral zone: Mildly heterogenous in T2 signal suggestive of prior  prostatitis. A focal area of T2 hypointensity is identified involving the  posterolateral aspect of the left peripheral zone at the level of the mid  gland measuring approximately 5 x 5 mm with associated low ADC value. Please  see series 6, image 14 and series 400, image 16. This is isointense on the  high B value DWI imaging. There appears to be a degree of capsular  retraction in this region. No extension past the capsule is seen. No  subjective abnormal enhancement. Central/transitional zone: BPH changes. No focal T2 or ADC map abnormality  is identified. No subjective abnormal enhancement. Seminal vesicles: Unremarkable. Neurovascular bundle:  Unremarkable. Lymphadenopathy:  No evidence of lymphadenopathy. Bladder:  Urinary bladder diverticulum. Bowel: The visualized bowel is without acute abnormality. Peritoneal cavity:   Left-sided fat filled hernia. Bones/soft tissues:   No suspicious bony lesion. Impression: * A focal area of T2 hypointensity is identified involving the posterolateral  aspect of the left peripheral zone at the level of the mid gland measuring  approximately 5 x 5 mm with associated low ADC value. Please see series 6,  image 14 and series 400, image 16.  This is isointense on the high B value DWI  imaging. There appears to be a degree of capsular retraction in this region. PI-RADS 4. Targeting of this region on biopsy is recommended. *BPH changes.

## 2021-11-22 ENCOUNTER — HOSPITAL ENCOUNTER (OUTPATIENT)
Age: 66
Setting detail: OUTPATIENT SURGERY
Discharge: HOME | End: 2021-11-22
Attending: UROLOGY | Admitting: UROLOGY
Payer: MEDICARE

## 2021-11-22 ENCOUNTER — ANESTHESIA EVENT (OUTPATIENT)
Dept: OPERATING ROOM | Age: 66
End: 2021-11-22
Payer: MEDICARE

## 2021-11-22 ENCOUNTER — HOSPITAL ENCOUNTER (OUTPATIENT)
Dept: ULTRASOUND IMAGING | Age: 66
Discharge: HOME | End: 2021-11-24
Payer: MEDICARE

## 2021-11-22 ENCOUNTER — ANESTHESIA (OUTPATIENT)
Dept: OPERATING ROOM | Age: 66
End: 2021-11-22
Payer: MEDICARE

## 2021-11-22 VITALS
RESPIRATION RATE: 15 BRPM | OXYGEN SATURATION: 98 % | HEART RATE: 75 BPM | SYSTOLIC BLOOD PRESSURE: 127 MMHG | DIASTOLIC BLOOD PRESSURE: 75 MMHG | TEMPERATURE: 97 F

## 2021-11-22 VITALS
DIASTOLIC BLOOD PRESSURE: 73 MMHG | SYSTOLIC BLOOD PRESSURE: 110 MMHG | RESPIRATION RATE: 11 BRPM | OXYGEN SATURATION: 99 %

## 2021-11-22 PROCEDURE — 88305 TISSUE EXAM BY PATHOLOGIST: CPT

## 2021-11-22 PROCEDURE — 7100000040 HC SPAR PHASE II RECOVERY - FIRST 15 MIN: Performed by: UROLOGY

## 2021-11-22 PROCEDURE — 3700000000 HC ANESTHESIA ATTENDED CARE: Performed by: UROLOGY

## 2021-11-22 PROCEDURE — 3600000012 HC SURGERY LEVEL 2 ADDTL 15MIN: Performed by: UROLOGY

## 2021-11-22 PROCEDURE — 76942 ECHO GUIDE FOR BIOPSY: CPT

## 2021-11-22 PROCEDURE — 7100000041 HC SPAR PHASE II RECOVERY - ADDTL 15 MIN: Performed by: UROLOGY

## 2021-11-22 PROCEDURE — 3600000002 HC SURGERY LEVEL 2 BASE: Performed by: UROLOGY

## 2021-11-22 PROCEDURE — 2500000003 HC RX 250 WO HCPCS: Performed by: UROLOGY

## 2021-11-22 PROCEDURE — 3700000001 HC ADD 15 MINUTES (ANESTHESIA): Performed by: UROLOGY

## 2021-11-22 PROCEDURE — 2580000003 HC RX 258: Performed by: ANESTHESIOLOGY

## 2021-11-22 PROCEDURE — 6360000002 HC RX W HCPCS: Performed by: NURSE ANESTHETIST, CERTIFIED REGISTERED

## 2021-11-22 PROCEDURE — 2500000003 HC RX 250 WO HCPCS: Performed by: NURSE ANESTHETIST, CERTIFIED REGISTERED

## 2021-11-22 PROCEDURE — 2709999900 HC NON-CHARGEABLE SUPPLY: Performed by: UROLOGY

## 2021-11-22 RX ORDER — LIDOCAINE HYDROCHLORIDE 10 MG/ML
INJECTION, SOLUTION EPIDURAL; INFILTRATION; INTRACAUDAL; PERINEURAL PRN
Status: DISCONTINUED | OUTPATIENT
Start: 2021-11-22 | End: 2021-11-22 | Stop reason: ALTCHOICE

## 2021-11-22 RX ORDER — MIDAZOLAM HYDROCHLORIDE 1 MG/ML
INJECTION INTRAMUSCULAR; INTRAVENOUS PRN
Status: DISCONTINUED | OUTPATIENT
Start: 2021-11-22 | End: 2021-11-22 | Stop reason: SDUPTHER

## 2021-11-22 RX ORDER — SODIUM CHLORIDE, SODIUM LACTATE, POTASSIUM CHLORIDE, CALCIUM CHLORIDE 600; 310; 30; 20 MG/100ML; MG/100ML; MG/100ML; MG/100ML
1000 INJECTION, SOLUTION INTRAVENOUS CONTINUOUS
Status: DISCONTINUED | OUTPATIENT
Start: 2021-11-22 | End: 2021-11-22 | Stop reason: HOSPADM

## 2021-11-22 RX ORDER — LIDOCAINE HYDROCHLORIDE 10 MG/ML
INJECTION, SOLUTION EPIDURAL; INFILTRATION; INTRACAUDAL; PERINEURAL PRN
Status: DISCONTINUED | OUTPATIENT
Start: 2021-11-22 | End: 2021-11-22 | Stop reason: SDUPTHER

## 2021-11-22 RX ORDER — FENTANYL CITRATE 50 UG/ML
INJECTION, SOLUTION INTRAMUSCULAR; INTRAVENOUS PRN
Status: DISCONTINUED | OUTPATIENT
Start: 2021-11-22 | End: 2021-11-22 | Stop reason: SDUPTHER

## 2021-11-22 RX ORDER — PROPOFOL 10 MG/ML
INJECTION, EMULSION INTRAVENOUS CONTINUOUS PRN
Status: DISCONTINUED | OUTPATIENT
Start: 2021-11-22 | End: 2021-11-22 | Stop reason: SDUPTHER

## 2021-11-22 RX ADMIN — MIDAZOLAM HYDROCHLORIDE 1 MG: 1 INJECTION, SOLUTION INTRAMUSCULAR; INTRAVENOUS at 10:01

## 2021-11-22 RX ADMIN — PROPOFOL 100 MCG/KG/MIN: 10 INJECTION, EMULSION INTRAVENOUS at 09:51

## 2021-11-22 RX ADMIN — MIDAZOLAM HYDROCHLORIDE 1 MG: 1 INJECTION, SOLUTION INTRAMUSCULAR; INTRAVENOUS at 09:55

## 2021-11-22 RX ADMIN — FENTANYL CITRATE 25 MCG: 50 INJECTION, SOLUTION INTRAMUSCULAR; INTRAVENOUS at 10:05

## 2021-11-22 RX ADMIN — SODIUM CHLORIDE, POTASSIUM CHLORIDE, SODIUM LACTATE AND CALCIUM CHLORIDE 1000 ML: 600; 310; 30; 20 INJECTION, SOLUTION INTRAVENOUS at 08:24

## 2021-11-22 RX ADMIN — LIDOCAINE HYDROCHLORIDE 50 MG: 10 INJECTION, SOLUTION EPIDURAL; INFILTRATION; INTRACAUDAL; PERINEURAL at 09:50

## 2021-11-22 ASSESSMENT — PULMONARY FUNCTION TESTS
PIF_VALUE: 0

## 2021-11-22 ASSESSMENT — PAIN - FUNCTIONAL ASSESSMENT: PAIN_FUNCTIONAL_ASSESSMENT: 0-10

## 2021-11-22 ASSESSMENT — LIFESTYLE VARIABLES: SMOKING_STATUS: 0

## 2021-11-22 NOTE — PROGRESS NOTES
Writer spoke with pt's friend, Adryan Dunbar, whom confirmed that he would pick pt up from procedure.

## 2021-11-22 NOTE — ANESTHESIA POSTPROCEDURE EVALUATION
Department of Anesthesiology  Postprocedure Note    Patient: Udell Mcburney  MRN: [id number]  YOB: 1955  Date of evaluation: 11/22/2021  Time:  12:36 PM     Procedure Summary     Date: 11/22/21 Room / Location: [address]    Anesthesia Start: 8404 Anesthesia Stop: 3979    Procedure: FUSION PROSTATE BIOPSY WITH ULTRASOUND, OFFICE NOTIFIED ULTRASOUND (N/A ) Diagnosis: (ELEVATED PSA)    Surgeons: Lydia Scott MD Responsible Provider: Agapito Perez MD    Anesthesia Type: MAC ASA Status: 2          Anesthesia Type: MAC    Chacho Phase I:      Chacho Phase II: Chacho Score: 10    Last vitals: Reviewed and per EMR flowsheets.        Anesthesia Post Evaluation    Patient location during evaluation: PACU  Patient participation: complete - patient participated  Level of consciousness: awake and alert  Pain score: 0  Nausea & Vomiting: no nausea  Cardiovascular status: hemodynamically stable  Respiratory status: room air

## 2021-11-22 NOTE — OP NOTE
Dr. Trish Adams MD      [address] [facility]. [place]    DATE:  11/22/21    SURGEON:   Trish Adams MD    ASSISTANT:  Kelly Alcala MD , PGY5     PREOPERATIVE DIAGNOSIS:  Elevated PSA      POSTOPERATIVE DIAGNOSIS:  same    PROCEDURE PERFORMED:  Tangela Schmidt transrectal ultrasound-guided fusion biopsy of the prostate      ANESTHESIA:  General    COMPLICATIONS:  None. ESTIMATED BLOOD LOSS:  Minimal <5 cc     DRAINS:  None    SPECIMENS: 18 Prostate Cores (12 Standard Prostate Cores and 6 from the targeted lesion)    ANTIBIOTICS: Cipro 500 mg PO     DVT prophylaxis: EPC cuffs Functioning Prior to Induction of Anesthesia    Volume: 103 cc      Indication: This is 78 y.o. gentleman with PSA of 18.86 on 8/13/2021. He had prostate biopsy in 2013 which showed benign prostate tissue in all cores.     Posterolateral left peripheral zone at level of mid gland measuring 5 mm, PIRADS 4 lesion. Had rectal swab on 9/14/21 negative for Fluoroquinolone resistant organisms. His MRI of prostate was mapped for the lesion at the mid/base peripheral zone lesion for the fusion biopsy. Risks benefits and alternatives goals and possible complications of the procedure were explained to the patient for consent was obtained. He has elected to proceed. Procedure in Detail: Patient was brought back to the operating room table. He was laid in the left lateral position. EPC cuffs were placed on and functioning prior to induction of anesthesia. Anesthesia was inducted. A timeout performed. A digital rectal exam showed smooth prostate without nodule. The ultrasound probe was placed per rectum using the Zwittle biopsy system. The prostate was brought into view. The prostate was then mapped and fused to the MRI using the [email or web address]. As previously noted his prostates proximally 103 cc.   Seminal vesicles appeared normal. Using the fused MRI/ultrasound technology, targeted

## 2021-11-22 NOTE — INTERVAL H&P NOTE
Update History & Physical    The patient's History and Physical of May 25, 2021 was reviewed with the patient and I examined the patient. There was no change. The surgical site was confirmed by the patient and me. Elevated psa with nodule on MRI. PSA was 18.86 on 8/13/2021. He had prostate biopsy in 2013 which showed benign prostate tissue in all cores. Posterolateral left peripheral zone at level of mid lgand measuring 5 mm, PIRADS 4 lesion. Had rectal swab on 9/14/21 negative for Fluoroquinolone resistant organisms. Does not take blood thinners. Plan: The risks, benefits, expected outcome, and alternative to the recommended procedure have been discussed with the patient. Patient understands and wants to proceed with the procedure.      Electronically signed by Gena Joseph MD on 11/22/2021 at 8:57 AM

## 2021-11-22 NOTE — PROGRESS NOTES
Anesthesia updated, pt to be discharged, discharge instructions given via phone to pt.kika bynum, he verbalized understanding.

## 2021-11-23 LAB — SURGICAL PATHOLOGY REPORT: NORMAL

## 2021-12-03 ENCOUNTER — OFFICE VISIT (OUTPATIENT)
Dept: UROLOGY | Age: 66
End: 2021-12-03
Payer: MEDICARE

## 2021-12-03 VITALS
HEIGHT: 73 IN | SYSTOLIC BLOOD PRESSURE: 110 MMHG | BODY MASS INDEX: 31.41 KG/M2 | DIASTOLIC BLOOD PRESSURE: 82 MMHG | WEIGHT: 237 LBS | TEMPERATURE: 97.4 F

## 2021-12-03 DIAGNOSIS — R97.20 ELEVATED PSA: Primary | ICD-10-CM

## 2021-12-03 DIAGNOSIS — N13.8 BPH WITH OBSTRUCTION/LOWER URINARY TRACT SYMPTOMS: ICD-10-CM

## 2021-12-03 DIAGNOSIS — N40.1 BPH WITH OBSTRUCTION/LOWER URINARY TRACT SYMPTOMS: ICD-10-CM

## 2021-12-03 DIAGNOSIS — R35.0 URINARY FREQUENCY: ICD-10-CM

## 2021-12-03 PROCEDURE — 99214 OFFICE O/P EST MOD 30 MIN: CPT | Performed by: UROLOGY

## 2021-12-03 ASSESSMENT — ENCOUNTER SYMPTOMS
VOMITING: 0
ABDOMINAL PAIN: 0
WHEEZING: 0
CONSTIPATION: 0
EYE REDNESS: 0
BACK PAIN: 0
SHORTNESS OF BREATH: 0
NAUSEA: 0
DIARRHEA: 0
EYE PAIN: 0
COUGH: 1

## 2021-12-03 NOTE — PROGRESS NOTES
[address] [address]  Dept:  [facility] Urology Office Note - Established    Patient:  Negro Stanford  YOB: 1955  Date: 12/3/2021    The patient is a 78 y.o. male who presents todayfor evaluation of the following problems:   Chief Complaint   Patient presents with    Results     S/P fusion Bx results        HPI  He is here in follow up after fusion biopsy. He did well. His PSA had gone up to 18. He had a negative biopsy 5 years ago. MRI was PI RADS 4, but was only 5x5. His prostate is enlarged at 94ml. He had been on Flomax, but it made him dizzy. Summary of old records: N/A    Additional History: N/A    Procedures Today: N/A    Urinalysis today:  No results found for this visit on 12/03/21. Last several PSA's:  Lab Results   Component Value Date    PSA 18.86 (H) 08/13/2021    PSA 6.83 (H) 06/07/2016    PSA 6.01 (H) 03/16/2015     Last total testosterone:  Lab Results   Component Value Date    TESTOSTERONE 95 (L) 03/16/2015       AUA Symptom Score (12/3/2021):   INCOMPLETE EMPTYING: How often have you had the sensation of not emptying your bladder?: Not at all  FREQUENCY: How often do you have to urinate less than every two hours?: Not at all  INTERMITTENCY: How often have you found you stopped and started again several times when you urinated?: Less than 1 to 5 times (only at night )  URGENCY: How often have you found it difficult to postpone urination?: More than Half the time  WEAK STREAM: How often have you had a weak urinary stream?: Less than 1 to 5 times (only at night )  STRAINING: How often have you had to strain to start  urination?: Not at all  NOCTURIA: How many times did you typically get up at night to uriniate?: 2 Times  TOTAL I-PSS SCORE[de-identified] 8  How would you feel if you were to spend the rest of your life with your urinary condition?: Mixe    Last BUN and creatinine:  Lab Results   Component Value Date    BUN 14 2021     Lab Results   Component Value Date    CREATININE 0.71 2021       Additional Lab/Culture results: none    Imaging Reviewed during this Office Visit: none  (results were independently reviewed by physician and radiology report verified)    PAST MEDICAL, FAMILY AND SOCIAL HISTORY UPDATE:  Past Medical History:   Diagnosis Date    Elevated PSA     Dr. Monroy January    Hearing loss     Keratosis     uses Effudex prn    Prostate nodule     on MRI per patient    Snores     no sleep apnea diagnosis    Wears dentures     full dentures    Wears reading eyeglasses     Wellness examination     Dr. Jeanine Boyd  2021  Has appt .  2pm     Past Surgical History:   Procedure Laterality Date    COLONOSCOPY  2008    [facility]  [address]   rt inguinal w/ mesh at [address] N/A 2021    FUSION PROSTATE BIOPSY WITH ULTRASOUND, OFFICE NOTIFIED ULTRASOUND performed by Delphine Arzola MD at [address]      head noncancerous. Just keratosis.  TONSILLECTOMY       No family history on file. Outpatient Medications Marked as Taking for the 12/3/21 encounter (Office Visit) with Delphine Arzola MD   Medication Sig Dispense Refill    fluorouracil (EFUDEX) 5 % cream Apply twice daily to actinic keratosis for 3-4 weeks. Expect redness and irritation. (Patient taking differently: Apply twice daily to actinic keratosis for 3-4 weeks. Expect redness and irritation. Not currently using.) 40 g 1       Patient has no known allergies.   Social History     Tobacco Use   Smoking Status Former Smoker    Packs/day: 1.00    Years: 0.00    Pack years: 0.00    Quit date: 2002    Years since quittin.9   Smokeless Tobacco Never Used     (Ifpatient a smoker, smoking cessation counseling offered)    Social History     Substance and

## 2021-12-03 NOTE — LETTER
[address] [address]  Dept: [phone]  Dept Fax: [phone]        12/3/21    Patient: Ted Tong  YOB: 1955    Dear Ayana Higginbotham DO,    I had the pleasure of seeing one of your patients, Albertina Leyden today in the office today. Below are the relevant portions of my assessment and plan of care. IMPRESSION:  1. Elevated PSA    2. BPH with obstruction/lower urinary tract symptoms    3. Urinary frequency        PLAN:  He is doing well after the biopsy   Path was all benign. PSA was 18. He has frequency, but did not tolerate Flomax, and he would rather hold off on any other meds at this stephane as we did discuss finasteride. F/U in 3 months with a PSA. Return in about 3 months (around 3/3/2022) for Labs. Prescriptions Ordered:  No orders of the defined types were placed in this encounter. Orders Placed:  Orders Placed This Encounter   Procedures    PSA, Diagnostic     Standing Status:   Future     Standing Expiration Date:   12/3/2022        Thank you for allowing me to participate in the care of this patient. I will keep you updated on this patient's follow up and I look forward to serving you and your patients again in the future.         Cat Brizuela MD

## 2021-12-22 ENCOUNTER — TELEPHONE (OUTPATIENT)
Dept: GASTROENTEROLOGY | Age: 66
End: 2021-12-22

## 2021-12-22 DIAGNOSIS — R19.5 POSITIVE FIT (FECAL IMMUNOCHEMICAL TEST): Primary | ICD-10-CM

## 2021-12-22 RX ORDER — BISACODYL 5 MG
TABLET, DELAYED RELEASE (ENTERIC COATED) ORAL
Qty: 4 TABLET | Refills: 0 | Status: SHIPPED | OUTPATIENT
Start: 2021-12-22 | End: 2022-05-23

## 2021-12-22 RX ORDER — POLYETHYLENE GLYCOL 3350 17 G/17G
POWDER, FOR SOLUTION ORAL
Qty: 238 G | Refills: 0 | Status: SHIPPED | OUTPATIENT
Start: 2021-12-22 | End: 2022-05-23

## 2021-12-22 NOTE — TELEPHONE ENCOUNTER
Pt is not established with this office. Writer called and spoke to pt to discuss scheduling colon per referral. Colon screen questionnaire completed and pt ok to schedule. Pt scheduled STA colon F Maritza Escobar 2/17/22 @ 1130am miralax/mag-cm- vaccinated. Reviewed bowel prep instructions with patient over phone and mailed to home address.

## 2022-02-17 ENCOUNTER — HOSPITAL ENCOUNTER (OUTPATIENT)
Age: 67
Setting detail: OUTPATIENT SURGERY
Discharge: HOME | End: 2022-02-17
Attending: INTERNAL MEDICINE | Admitting: INTERNAL MEDICINE
Payer: MEDICARE

## 2022-02-17 ENCOUNTER — ANESTHESIA EVENT (OUTPATIENT)
Dept: OPERATING ROOM | Age: 67
End: 2022-02-17
Payer: MEDICARE

## 2022-02-17 ENCOUNTER — ANESTHESIA (OUTPATIENT)
Dept: OPERATING ROOM | Age: 67
End: 2022-02-17
Payer: MEDICARE

## 2022-02-17 VITALS
HEIGHT: 73 IN | RESPIRATION RATE: 15 BRPM | HEART RATE: 73 BPM | SYSTOLIC BLOOD PRESSURE: 130 MMHG | BODY MASS INDEX: 30.48 KG/M2 | TEMPERATURE: 97.2 F | DIASTOLIC BLOOD PRESSURE: 88 MMHG | WEIGHT: 230 LBS | OXYGEN SATURATION: 95 %

## 2022-02-17 VITALS
SYSTOLIC BLOOD PRESSURE: 113 MMHG | RESPIRATION RATE: 15 BRPM | OXYGEN SATURATION: 98 % | DIASTOLIC BLOOD PRESSURE: 57 MMHG

## 2022-02-17 PROCEDURE — 88305 TISSUE EXAM BY PATHOLOGIST: CPT

## 2022-02-17 PROCEDURE — 2500000003 HC RX 250 WO HCPCS: Performed by: NURSE ANESTHETIST, CERTIFIED REGISTERED

## 2022-02-17 PROCEDURE — 3700000000 HC ANESTHESIA ATTENDED CARE: Performed by: INTERNAL MEDICINE

## 2022-02-17 PROCEDURE — 6360000002 HC RX W HCPCS: Performed by: NURSE ANESTHETIST, CERTIFIED REGISTERED

## 2022-02-17 PROCEDURE — 7100000010 HC PHASE II RECOVERY - FIRST 15 MIN: Performed by: INTERNAL MEDICINE

## 2022-02-17 PROCEDURE — 7100000011 HC PHASE II RECOVERY - ADDTL 15 MIN: Performed by: INTERNAL MEDICINE

## 2022-02-17 PROCEDURE — 2709999900 HC NON-CHARGEABLE SUPPLY: Performed by: INTERNAL MEDICINE

## 2022-02-17 PROCEDURE — 3700000001 HC ADD 15 MINUTES (ANESTHESIA): Performed by: INTERNAL MEDICINE

## 2022-02-17 PROCEDURE — 2580000003 HC RX 258: Performed by: ANESTHESIOLOGY

## 2022-02-17 PROCEDURE — 3609010400 HC COLONOSCOPY POLYPECTOMY HOT BIOPSY: Performed by: INTERNAL MEDICINE

## 2022-02-17 PROCEDURE — 45385 COLONOSCOPY W/LESION REMOVAL: CPT | Performed by: INTERNAL MEDICINE

## 2022-02-17 RX ORDER — ONDANSETRON 2 MG/ML
4 INJECTION INTRAMUSCULAR; INTRAVENOUS
Status: DISCONTINUED | OUTPATIENT
Start: 2022-02-17 | End: 2022-02-17 | Stop reason: HOSPADM

## 2022-02-17 RX ORDER — SODIUM CHLORIDE, SODIUM LACTATE, POTASSIUM CHLORIDE, CALCIUM CHLORIDE 600; 310; 30; 20 MG/100ML; MG/100ML; MG/100ML; MG/100ML
INJECTION, SOLUTION INTRAVENOUS CONTINUOUS
Status: DISCONTINUED | OUTPATIENT
Start: 2022-02-18 | End: 2022-02-17 | Stop reason: HOSPADM

## 2022-02-17 RX ORDER — LIDOCAINE HYDROCHLORIDE 10 MG/ML
1 INJECTION, SOLUTION EPIDURAL; INFILTRATION; INTRACAUDAL; PERINEURAL
Status: DISCONTINUED | OUTPATIENT
Start: 2022-02-18 | End: 2022-02-17 | Stop reason: HOSPADM

## 2022-02-17 RX ORDER — PROPOFOL 10 MG/ML
INJECTION, EMULSION INTRAVENOUS PRN
Status: DISCONTINUED | OUTPATIENT
Start: 2022-02-17 | End: 2022-02-17 | Stop reason: SDUPTHER

## 2022-02-17 RX ORDER — LIDOCAINE HYDROCHLORIDE 20 MG/ML
INJECTION, SOLUTION INFILTRATION; PERINEURAL PRN
Status: DISCONTINUED | OUTPATIENT
Start: 2022-02-17 | End: 2022-02-17 | Stop reason: SDUPTHER

## 2022-02-17 RX ADMIN — PROPOFOL 20 MG: 10 INJECTION, EMULSION INTRAVENOUS at 12:01

## 2022-02-17 RX ADMIN — PROPOFOL 20 MG: 10 INJECTION, EMULSION INTRAVENOUS at 11:58

## 2022-02-17 RX ADMIN — SODIUM CHLORIDE, POTASSIUM CHLORIDE, SODIUM LACTATE AND CALCIUM CHLORIDE: 600; 310; 30; 20 INJECTION, SOLUTION INTRAVENOUS at 10:31

## 2022-02-17 RX ADMIN — LIDOCAINE HYDROCHLORIDE 100 MG: 20 INJECTION, SOLUTION INFILTRATION; PERINEURAL at 11:43

## 2022-02-17 RX ADMIN — PROPOFOL 20 MG: 10 INJECTION, EMULSION INTRAVENOUS at 11:48

## 2022-02-17 RX ADMIN — PROPOFOL 20 MG: 10 INJECTION, EMULSION INTRAVENOUS at 11:53

## 2022-02-17 RX ADMIN — PROPOFOL 20 MG: 10 INJECTION, EMULSION INTRAVENOUS at 11:51

## 2022-02-17 RX ADMIN — PROPOFOL 20 MG: 10 INJECTION, EMULSION INTRAVENOUS at 11:46

## 2022-02-17 RX ADMIN — PROPOFOL 30 MG: 10 INJECTION, EMULSION INTRAVENOUS at 11:44

## 2022-02-17 RX ADMIN — PROPOFOL 50 MG: 10 INJECTION, EMULSION INTRAVENOUS at 11:43

## 2022-02-17 RX ADMIN — PROPOFOL 20 MG: 10 INJECTION, EMULSION INTRAVENOUS at 11:56

## 2022-02-17 ASSESSMENT — PULMONARY FUNCTION TESTS
PIF_VALUE: 1

## 2022-02-17 ASSESSMENT — PAIN SCALES - GENERAL
PAINLEVEL_OUTOF10: 0

## 2022-02-17 ASSESSMENT — PAIN - FUNCTIONAL ASSESSMENT: PAIN_FUNCTIONAL_ASSESSMENT: 0-10

## 2022-02-17 NOTE — ANESTHESIA PRE PROCEDURE
Department of Anesthesiology  Preprocedure Note       Name:  Pascual Bose   Age:  78 y.o.  :  1955                                          MRN:  [id number]         Date:  2022      Surgeon: Bryan Craig):  Brian Bro MD    Procedure: Procedure(s):  COLONOSCOPY DIAGNOSTIC    Medications prior to admission:   Prior to Admission medications    Medication Sig Start Date End Date Taking? Authorizing Provider   polyethylene glycol (GLYCOLAX) 17 GM/SCOOP powder Follow Instructions provided by physician's office 21  Yes Brian Bro MD   bisacodyl (BISACODYL) 5 MG EC tablet Take 4 tablets in the morning 21  Yes Brian Bro MD   magnesium citrate solution Take 296 mLs by mouth once for 1 dose 21  Brian Bro MD   fluorouracil (EFUDEX) 5 % cream Apply twice daily to actinic keratosis for 3-4 weeks. Expect redness and irritation. Patient taking differently: Apply twice daily to actinic keratosis for 3-4 weeks. Expect redness and irritation. Not currently using. 18   Jed Centeno MD       Current medications:    No current facility-administered medications for this encounter. Allergies:  No Known Allergies    Problem List:    Patient Active Problem List   Diagnosis Code    BPH (benign prostatic hyperplasia) N40.0    Asbestos exposure Z77.090    Class 1 obesity due to excess calories without serious comorbidity with body mass index (BMI) of 32.0 to 32.9 in adult E66.09, Z68.32    Former smoker Z87.891    Alcohol use Z72.89    Epidermoid cyst of finger of left hand L72.0       Past Medical History:        Diagnosis Date    Elevated PSA     Dr. Robert Gudino Hearing loss     Keratosis     uses Effudex prn    Prostate nodule     on MRI per patient    Snores     no sleep apnea diagnosis    Wears dentures     full dentures    Wears reading eyeglasses     Wellness examination     Dr. [facility]  2021  Has appt .   2pm Past Surgical History:        Procedure Laterality Date    COLONOSCOPY  2008    [facility]  [address]   rt inguinal w/ mesh at [address] N/A 2021    FUSION PROSTATE BIOPSY WITH ULTRASOUND, OFFICE NOTIFIED ULTRASOUND performed by Lawrence Eldridge MD at [address] [facility]      head noncancerous. Just keratosis.  TONSILLECTOMY         Social History:    Social History     Tobacco Use    Smoking status: Former Smoker     Packs/day: 1.00     Years: 0.00     Pack years: 0.00     Quit date: 2002     Years since quittin.1    Smokeless tobacco: Never Used   Substance Use Topics    Alcohol use: Yes     Comment: beer few times/wk drinks 3-4                                Counseling given: Not Answered      Vital Signs (Current):   Vitals:    22 1008 22 1012   BP:  116/77   Pulse:  99   Resp:  18   Temp:  97.7 °F (36.5 °C)   TempSrc:  Temporal   SpO2:  95%   Weight: 230 lb (104.3 kg)    Height: 6' 1\" (1.854 m)                                               BP Readings from Last 3 Encounters:   22 116/77   21 110/82   21 127/75       NPO Status: Time of last liquid consumption:                         Time of last solid consumption:                         Date of last liquid consumption: 22                        Date of last solid food consumption: 02/15/22    BMI:   Wt Readings from Last 3 Encounters:   22 230 lb (104.3 kg)   21 237 lb (107.5 kg)   21 237 lb 12.8 oz (107.9 kg)     Body mass index is 30.34 kg/m².     CBC:   Lab Results   Component Value Date    WBC 8.4 2016    RBC 5.04 2016    HGB 14.4 2021    HCT 45.4 2021    MCV 90.7 2016    RDW 13.4 2016     2016       CMP:   Lab Results   Component Value Date     2021    K 4.1 2021     2021    CO2 22 2021    BUN 14 11/11/2021    CREATININE 0.71 11/11/2021    GFRAA >60 11/11/2021    LABGLOM >60 11/11/2021    GLUCOSE 116 11/11/2021    PROT 6.7 08/13/2021    CALCIUM 8.9 08/13/2021    BILITOT 1.85 08/13/2021    ALKPHOS 86 08/13/2021    AST 14 08/13/2021    ALT 15 08/13/2021       POC Tests: No results for input(s): POCGLU, POCNA, POCK, POCCL, POCBUN, POCHEMO, POCHCT in the last 72 hours. Coags: No results found for: PROTIME, INR, APTT    HCG (If Applicable): No results found for: PREGTESTUR, PREGSERUM, HCG, HCGQUANT     ABGs: No results found for: PHART, PO2ART, HGW8GAG, GAP5CXE, BEART, U1XENRXI     Type & Screen (If Applicable):  No results found for: LABABO, LABRH    Drug/Infectious Status (If Applicable):  No results found for: HIV, HEPCAB    COVID-19 Screening (If Applicable): No results found for: COVID19        Anesthesia Evaluation    Airway: Mallampati: I  TM distance: >3 FB   Neck ROM: full  Mouth opening: > = 3 FB Dental:          Pulmonary:                              Cardiovascular:                      Neuro/Psych:               GI/Hepatic/Renal:             Endo/Other:                     Abdominal:             Vascular:           Other Findings:             Anesthesia Plan      MAC     ASA 2                                 Iam Skinner MD   2/17/2022

## 2022-02-17 NOTE — OP NOTE
PROCEDURE NOTE    DATE OF PROCEDURE: 2/17/2022    SURGEON: Dwight Davison MD    ASSISTANT: None    PREOPERATIVE DIAGNOSIS: SCREENING    POSTOPERATIVE DIAGNOSIS: as described below    OPERATION: Total colonoscopy HOT SNARE AND COLD SNARE POLYPECTOMIES    ANESTHESIA: MAC PER ANESTHESIA     ESTIMATED BLOOD LOSS: less than 50     COMPLICATIONS: None. SPECIMENS:  Was Obtained:     HISTORY: The patient is a 78y.o. year old male with history of above preop diagnosis. I recommended colonoscopy with possible biopsy or polypectomy and I explained the risk, benefits, expected outcome, and alternatives to the procedure. Risks included but are not limited to bleeding, infection, respiratory distress, hypotension, and perforation of the colon and possibility of missing a lesion. The patient understands and is in agreement. PROCEDURE: The patient was given IV conscious sedation. The patient's SPO2 remained above 90% throughout the procedure. The colonoscope was inserted per rectum and advanced under direct vision to the cecum without difficulty. The prep was fair. PREP WAS NOT IDEAL AND WAS FAIR  LARGE AMOUNT OF RETAINED STOOLS AND HARD BALLS WERE NOTED    Findings:  Terminal ileum: not examined    Cecum/Ascending colon: abnormal: EXTENSIVE DIVERTICULOSIS   RETAINED PASTY STOOLS    Transverse colon: abnormal: EXTENSIVE DIVERTICULOSIS  PREP IN THIS AREA WAS BETTER    ONE 4 MM POLYP WAS REMOVED WITH COLD SNARE  ONE ONE CM POLYP WAS REMOVED WITH HOT SNARE    Descending/Sigmoid colon: abnormal: EXTENSIVE LARGE MOUTHED DIVETICULI WERE NOTED WITH RETAINED STOOLS AND HARD BALLS  NO GROSS PATHOLOGY WAS NOTED    Rectum/Anus: examined in normal and retroflexed positions and was abnormal: RETAINED STOOLS  INT HEMORRHOIDS    Withdrawal Time was (minutes): 22    The colon was decompressed and the scope was removed. The patient tolerated the procedure well. Recommendations/Plan:   1.  Lifestyle and dietary modifications as discussed  2. F/U Biopsies  3. F/U In Office in 3-4 weeks  4. Discussed with the family  5. Colonoscopy Recall :1 -1.5 YEARS WITH BETTER PREP  6. POST SEDATION PATIENT WAS STABLE WITH STABLE VITAL SIGNS AND OXYGEN SATURATIONS AND WAS DISCHARGED HOME WITH RIDE IN A STABLE CONDITION.     Electronically signed by Shanika Bashir MD  on 2/17/2022 at 12:05 PM

## 2022-02-17 NOTE — ANESTHESIA POSTPROCEDURE EVALUATION
Department of Anesthesiology  Postprocedure Note    Patient: Mellisa Alcantar  MRN: [id number]  [place]: 1955  Date of evaluation: 2/17/2022  Time:  1:13 PM     Procedure Summary     Date: 02/17/22 Room / Location: [place] [address] / [facility] - INPATIENT    Anesthesia Start: 3444 Anesthesia Stop: 6003    Procedure: COLONOSCOPY POLYPECTOMY HOT BIOPSY (N/A ) Diagnosis: (DX POSITIVE FIT)    Surgeons: Gina Cha MD Responsible Provider: Dolly Tellez MD    Anesthesia Type: MAC ASA Status: 2          Anesthesia Type: MAC    Chacho Phase I:      Chacho Phase II: Chacho Score: 8    Last vitals: Reviewed and per EMR flowsheets.        Anesthesia Post Evaluation    Complications: no

## 2022-02-17 NOTE — H&P
History and Physical Service   [facility] - INPATIENT    HISTORY AND PHYSICAL EXAMINATION            Date of Evaluation: 2/17/2022  Patient name:  Nkechi Hong  MRN:   [id number]  YOB: 1955  PCP:    Liudmila Deal DO    History Obtained From:     Patient, medical records     History of Present Illness: This is Nkechi Hong a 78 y.o. male who presents today for a diagnostic colonoscopy by Dr. Patricia Price for Positive FIT. The patient completed the prep as directed until watery clear. Bowel movements have not significantly changed No FH colon cancer or polyps. Previous colonoscopy 2013. Patient denies bowel changes, blood tarry stools, unintentional weight loss, abdominal pain   No constipation, fever, chills, night sweats, pain or unexplained weight loss. Past Medical History:     Past Medical History:   Diagnosis Date    Elevated PSA     Dr. Becca Mensah    Hearing loss     Keratosis     uses Effudex prn    Prostate nodule     on MRI per patient    Snores     no sleep apnea diagnosis    Wears dentures     full dentures    Wears reading eyeglasses     Wellness examination     Dr. Saul Orozco  9/2021  Has appt . 11/17 2pm        Past Surgical History:     Past Surgical History:   Procedure Laterality Date    COLONOSCOPY  09/24/2008    [facility]  [address]   rt inguinal w/ mesh at [address] N/A 11/22/2021    FUSION PROSTATE BIOPSY WITH ULTRASOUND, OFFICE NOTIFIED ULTRASOUND performed by Ayo Gomez MD at [address]      head noncancerous. Just keratosis.  TONSILLECTOMY          Medications Prior to Admission:     Prior to Admission medications    Medication Sig Start Date End Date Taking?  Authorizing Provider   polyethylene glycol (GLYCOLAX) 17 GM/SCOOP powder Follow Instructions provided by physician's office 12/22/21  Yes Nahid Cheung MD bisacodyl (BISACODYL) 5 MG EC tablet Take 4 tablets in the morning 12/22/21  Yes Ruth Solo MD   magnesium citrate solution Take 296 mLs by mouth once for 1 dose 12/22/21 12/22/21  Ruth Solo MD   fluorouracil (EFUDEX) 5 % cream Apply twice daily to actinic keratosis for 3-4 weeks. Expect redness and irritation. Patient taking differently: Apply twice daily to actinic keratosis for 3-4 weeks. Expect redness and irritation. Not currently using. 11/1/18   Bhavesh Mora MD        Allergies:     Patient has no known allergies. Social History:     Tobacco:    reports that he quit smoking about 20 years ago. He smoked 1.00 pack per day for 0.00 years. He has never used smokeless tobacco.  Alcohol:      reports current alcohol use. Drug Use:  reports current drug use. Drug: Marijuana Oleguerline Rubin). Family History:     No family history on file. Review of Systems:     Positive and Negative as described in HPI. CONSTITUTIONAL:  negative for fevers, chills, sweats, fatigue, weight loss  HEENT:  Glasses  Full dentures negative for vision, hearing changes, runny nose, throat pain  RESPIRATORY:  negative for shortness of breath, cough, congestion, wheezing. CARDIOVASCULAR:  negative for chest pain, palpitations.   GASTROINTESTINAL:  negative for nausea, vomiting, diarrhea, constipation, change in bowel habits, abdominal pain   GENITOURINARY: elevated PSA  Follows with Dr Cornelius Smith Had fusion prostate bx 11/22/21  negative for difficulty of urination, burning with urination, frequency   INTEGUMENT:  negative for rash, skin lesions, easy bruising   HEMATOLOGIC/LYMPHATIC:  negative for swelling/edema   ALLERGIC/IMMUNOLOGIC:  negative for urticaria , itching  ENDOCRINE:  negative increase in drinking, increase in urination, hot or cold intolerance  MUSCULOSKELETAL:  negative joint pains, muscle aches, swelling of joints  NEUROLOGICAL:  negative for headaches, dizziness, lightheadedness, numbness, pain, tingling extremities  BEHAVIOR/PSYCH:  negative for depression, anxiety    Physical Exam:   /77   Pulse 99   Temp 97.7 °F (36.5 °C) (Temporal)   Resp 18   Ht 6' 1\" (1.854 m)   Wt 230 lb (104.3 kg)   SpO2 95%   BMI 30.34 kg/m²      General Appearance:  alert, well appearing, and in no acute distress  Mental status: oriented to person, place, and time with normal affect  Head:  normocephalic, atraumatic. Eye: no icterus, redness, pupils equal and reactive, extraocular eye movements intact, conjunctiva clear  Ear: normal external ear, no discharge, hearing intact  Nose:  no drainage noted  Mouth: mucous membranes moist Full dentures   Neck: supple, no carotid bruits, thyroid not palpable  Lungs: Bilateral equal air entry, clear to ausculation, no wheezing, rales or rhonchi, normal effort  Cardiovascular: HR 99  normal rate, regular rhythm, no murmur, gallop, rub. Abdomen: Soft, nontender, nondistended, normal bowel sounds, no hepatomegaly or splenomegaly  Neurologic: There are no new focal motor or sensory deficits, normal muscle tone and bulk, no abnormal sensation, normal speech, cranial nerves II through XII grossly intact  Skin: generalized senile keratosis No gross lesions, rashes, bruising or bleeding on exposed skin area  Extremities:  peripheral pulses palpable, no pedal edema or calf pain with palpation  Psych: normal affect     Investigations:      Laboratory Testing:  No results for input(s): COVID19 in the last 720 hours. No results for input(s): POCGLU in the last 72 hours. No results found for this or any previous visit (from the past 24 hour(s)). No results for input(s): HGB, HCT, WBC, MCV, PLATELET, NA, K, CL, CO2, BUN, CREATININE, GLUCOSE, INR, PROTIME, APTT, AST, ALT, LABALBU, HCG in the last 720 hours. Imaging/Diagnostics:    Diagnosis:      1. Positive FIT    Plans:     1.  Diagnostic colonoscopy      RAMYA Martínez CNP  2/17/2022  10:19 AM

## 2022-02-21 LAB — SURGICAL PATHOLOGY REPORT: NORMAL

## 2022-03-01 ENCOUNTER — TELEPHONE (OUTPATIENT)
Dept: UROLOGY | Age: 67
End: 2022-03-01

## 2022-03-01 NOTE — TELEPHONE ENCOUNTER
Patient called in to find out if his appointment on 03/04/22 is necessary. Patient states that he knows that everything has checked out but was not sure if this appointment was needed. Informed that a message will be sent to MA, who will follow up with Dr. Jennifer Murray.

## 2022-03-04 NOTE — TELEPHONE ENCOUNTER
Patient called in and cancelled appointment. Per note, patient states that he does not wish to reschedule/be seen at this time.

## 2022-05-20 ENCOUNTER — TELEPHONE (OUTPATIENT)
Dept: FAMILY MEDICINE CLINIC | Age: 67
End: 2022-05-20

## 2022-05-20 NOTE — TELEPHONE ENCOUNTER
----- Message from Cymbet Madison sent at 5/20/2022 10:50 AM EDT -----  Subject: Appointment Request    Reason for Call: Routine (Patient Request) No Script    QUESTIONS  Type of Appointment? Established Patient  Reason for appointment request? No appointments available during search  Additional Information for Provider? Pt is having digestive issues, some   vomiting, hurts when he swallows. No immediate appts available. . Please   reach out to Pt if he can be seen sometime next week.   ---------------------------------------------------------------------------  --------------  CALL BACK INFO  What is the best way for the office to contact you? OK to leave message on   voicemail  Preferred Call Back Phone Number? [phone]  ---------------------------------------------------------------------------  --------------  SCRIPT ANSWERS  Relationship to Patient? Self  Do you have pain that has started or worsened within the past 24 hours? No  Are you vomiting blood or have bloody or black stool? No  Have you recently (14 days) seen a provider for this pain? No  Have you been diagnosed with, awaiting test results for, or told that you   are suspected of having COVID-19 (Coronavirus)? (If patient has tested   negative or was tested as a requirement for work, school, or travel and   not based on symptoms, answer no)? No  Within the past 10 days have you developed any of the following symptoms   (answer no if symptoms have been present longer than 10 days or began   more than 10 days ago)? Fever or Chills, Cough, Shortness of breath or   difficulty breathing, Loss of taste or smell, Sore throat, Nasal   congestion, Sneezing or runny nose, Fatigue or generalized body aches   (answer no if pain is specific to a body part e.g. back pain), Diarrhea,   Headache? No  Have you had close contact with someone with COVID-19 in the last 7 days?    No  (Service Expert  click yes below to proceed with [employer] As Usual Scheduling)? Yes  (Is the patient requesting to see the provider for a procedure?)? No  (Is the patient requesting to see the provider urgently  today or   tomorrow. )?  No

## 2022-05-23 ENCOUNTER — OFFICE VISIT (OUTPATIENT)
Dept: FAMILY MEDICINE CLINIC | Age: 67
End: 2022-05-23
Payer: MEDICARE

## 2022-05-23 VITALS
BODY MASS INDEX: 30.3 KG/M2 | HEART RATE: 79 BPM | DIASTOLIC BLOOD PRESSURE: 78 MMHG | HEIGHT: 73 IN | OXYGEN SATURATION: 96 % | WEIGHT: 228.6 LBS | SYSTOLIC BLOOD PRESSURE: 112 MMHG | TEMPERATURE: 97.8 F

## 2022-05-23 DIAGNOSIS — R13.10 DYSPHAGIA, UNSPECIFIED TYPE: ICD-10-CM

## 2022-05-23 DIAGNOSIS — R11.11 NON-INTRACTABLE VOMITING WITHOUT NAUSEA, UNSPECIFIED VOMITING TYPE: Primary | ICD-10-CM

## 2022-05-23 DIAGNOSIS — K21.9 GASTROESOPHAGEAL REFLUX DISEASE, UNSPECIFIED WHETHER ESOPHAGITIS PRESENT: ICD-10-CM

## 2022-05-23 PROCEDURE — 99214 OFFICE O/P EST MOD 30 MIN: CPT | Performed by: FAMILY MEDICINE

## 2022-05-23 RX ORDER — OMEPRAZOLE 40 MG/1
40 CAPSULE, DELAYED RELEASE ORAL
Qty: 30 CAPSULE | Refills: 0 | Status: ON HOLD | OUTPATIENT
Start: 2022-05-23 | End: 2022-08-01

## 2022-05-23 ASSESSMENT — ENCOUNTER SYMPTOMS
NAUSEA: 0
WHEEZING: 0
VOMITING: 1
BELCHING: 1
ALLERGIC/IMMUNOLOGIC NEGATIVE: 1
CHOKING: 0
HEARTBURN: 1
RESPIRATORY NEGATIVE: 1
COUGH: 0
SWOLLEN GLANDS: 0
ABDOMINAL PAIN: 0
VISUAL CHANGE: 0
EYES NEGATIVE: 1
GLOBUS SENSATION: 0
CHANGE IN BOWEL HABIT: 0
WATER BRASH: 0
STRIDOR: 0
SORE THROAT: 0
HOARSE VOICE: 0

## 2022-05-23 ASSESSMENT — PATIENT HEALTH QUESTIONNAIRE - PHQ9
2. FEELING DOWN, DEPRESSED OR HOPELESS: 0
SUM OF ALL RESPONSES TO PHQ QUESTIONS 1-9: 0
SUM OF ALL RESPONSES TO PHQ QUESTIONS 1-9: 0
2. FEELING DOWN, DEPRESSED OR HOPELESS: 0
SUM OF ALL RESPONSES TO PHQ QUESTIONS 1-9: 0
SUM OF ALL RESPONSES TO PHQ9 QUESTIONS 1 & 2: 0
1. LITTLE INTEREST OR PLEASURE IN DOING THINGS: 0
SUM OF ALL RESPONSES TO PHQ QUESTIONS 1-9: 0
SUM OF ALL RESPONSES TO PHQ9 QUESTIONS 1 & 2: 0
SUM OF ALL RESPONSES TO PHQ QUESTIONS 1-9: 0
1. LITTLE INTEREST OR PLEASURE IN DOING THINGS: 0

## 2022-05-23 NOTE — PROGRESS NOTES
[facility] Progress Note    Date:5/23/2022         Patient Name:Nando Espinoza     YOB: 1955     Age:66 y.o. Assessment/Plan        Problem List Items Addressed This Visit     None      Visit Diagnoses     Non-intractable vomiting without nausea, unspecified vomiting type    -  Primary    Relevant Medications    omeprazole (PRILOSEC) 40 MG delayed release capsule    Other Relevant Orders    Bernabe Cohen MD, Gastroenterology, [place]    Dysphagia, unspecified type        Relevant Medications    omeprazole (PRILOSEC) 40 MG delayed release capsule    Other Relevant Orders    Bernabe Cohen MD, Gastroenterology, [place]    Gastroesophageal reflux disease, unspecified whether esophagitis present        Trial omeprazole    Relevant Medications    omeprazole (PRILOSEC) 40 MG delayed release capsule    Other Relevant Orders    Bernabe Cohen MD, Gastroenterology, [place]           Return if symptoms worsen or fail to improve. Electronically signed by Jordan Jacobsen DO on 5/23/22       Total time spent was between  mins. This included time spent reviewing the patient's medical record (e.g., recent visits, labs, and studies); seeing the patient in the office (face-to-face time); ordering medications, studies, procedures, or referrals; calling the patient or family later in the day with results and further recommendations; and documenting the visit in the medical record. Samantha Foster is a 78 y.o. male presenting today for   Chief Complaint   Patient presents with    Nausea & Vomiting     digestive issues when eating, vomiting right after eating, gas and burping often    . Nausea & Vomiting  This is a new problem. The current episode started more than 1 month ago. The problem occurs intermittently. The problem has been waxing and waning. Associated symptoms include vomiting.  Pertinent negatives include no abdominal pain, anorexia, arthralgias, change in bowel habit, chest pain, chills, congestion, coughing, diaphoresis, fatigue, fever, headaches, joint swelling, myalgias, nausea, neck pain, numbness, rash, sore throat, swollen glands, urinary symptoms, vertigo, visual change or weakness. The symptoms are aggravated by eating. He has tried nothing for the symptoms. The treatment provided no relief. Gastroesophageal Reflux  He complains of belching, dysphagia and heartburn. He reports no abdominal pain, no chest pain, no choking, no coughing, no early satiety, no globus sensation, no hoarse voice, no nausea, no sore throat, no stridor, no tooth decay, no water brash or no wheezing. This is a new problem. The current episode started more than 1 month ago. The problem occurs occasionally. The problem has been waxing and waning. The symptoms are aggravated by certain foods and caffeine. Pertinent negatives include no anemia, fatigue, melena, muscle weakness, orthopnea or weight loss. He has tried nothing for the symptoms. Review of Systems   Review of Systems   Constitutional: Negative. Negative for chills, diaphoresis, fatigue, fever and weight loss. HENT: Negative. Negative for congestion, hoarse voice and sore throat. Eyes: Negative. Respiratory: Negative. Negative for cough, choking and wheezing. Cardiovascular: Negative. Negative for chest pain. Gastrointestinal: Positive for dysphagia, heartburn and vomiting. Negative for abdominal pain, anorexia, change in bowel habit, melena and nausea. Endocrine: Negative. Genitourinary: Negative. Musculoskeletal: Negative. Negative for arthralgias, joint swelling, myalgias, muscle weakness and neck pain. Skin: Negative. Negative for rash. Allergic/Immunologic: Negative. Neurological: Negative. Negative for vertigo, weakness, numbness and headaches. Hematological: Negative. Psychiatric/Behavioral: Negative. All other systems reviewed and are negative.       Medications     Current Outpatient Medications   Medication Sig Dispense Refill    omeprazole (PRILOSEC) 40 MG delayed release capsule Take 1 capsule by mouth every morning (before breakfast) 30 capsule 0    fluorouracil (EFUDEX) 5 % cream Apply twice daily to actinic keratosis for 3-4 weeks. Expect redness and irritation. (Patient taking differently: Apply twice daily to actinic keratosis for 3-4 weeks. Expect redness and irritation. Not currently using.) 40 g 1     No current facility-administered medications for this visit. Past History    Past Medical History:   has a past medical history of Elevated PSA, Hearing loss, Keratosis, Prostate nodule, Snores, Wears dentures, Wears reading eyeglasses, and Wellness examination. Social History:   reports that he quit smoking about 20 years ago. He smoked 1.00 pack per day for 0.00 years. He has never used smokeless tobacco. He reports current alcohol use. He reports current drug use. Drug: Marijuana Melissa Awkward). Family History: No family history on file. Surgical History:   Past Surgical History:   Procedure Laterality Date    COLONOSCOPY  09/24/2008    [place] [facility] Dr. Efe Marroquin     COLONOSCOPY N/A 2/17/2022    COLONOSCOPY POLYPECTOMY HOT BIOPSY performed by Stefano Alcantar MD at [place] [address]   rt inguinal w/ mesh at [address] [place] [address] N/A 11/22/2021    FUSION PROSTATE BIOPSY WITH ULTRASOUND, OFFICE NOTIFIED ULTRASOUND performed by Quyen Zelaya MD at [address] [facility]      head noncancerous. Just keratosis.  TONSILLECTOMY          Physical Examination      Vitals:  /78 (Site: Left Upper Arm, Position: Sitting, Cuff Size: Large Adult)   Pulse 79   Temp 97.8 °F (36.6 °C) (Temporal)   Ht 6' 1\" (1.854 m)   Wt 228 lb 9.6 oz (103.7 kg)   SpO2 96%   BMI 30.16 kg/m²     Physical Exam  Vitals and nursing note reviewed. Constitutional:       General: He is not in acute distress.      Appearance: Normal appearance. He is normal weight. He is not ill-appearing, toxic-appearing or diaphoretic. HENT:      Head: Normocephalic and atraumatic. Right Ear: External ear normal.      Left Ear: External ear normal.   Eyes:      General: No scleral icterus. Right eye: No discharge. Left eye: No discharge. Conjunctiva/sclera: Conjunctivae normal.   Cardiovascular:      Rate and Rhythm: Normal rate and regular rhythm. Pulses: Normal pulses. Heart sounds: Normal heart sounds. No murmur heard. No friction rub. No gallop. Pulmonary:      Effort: Pulmonary effort is normal. No respiratory distress. Breath sounds: Normal breath sounds. No stridor. No wheezing, rhonchi or rales. Chest:      Chest wall: No tenderness. Abdominal:      General: Abdomen is flat. Bowel sounds are normal. There is no distension. Palpations: Abdomen is soft. There is no mass. Tenderness: There is no abdominal tenderness. There is no guarding or rebound. Hernia: No hernia is present. Skin:     General: Skin is warm. Coloration: Skin is not jaundiced or pale. Neurological:      Mental Status: He is alert and oriented to person, place, and time. Mental status is at baseline. Psychiatric:         Mood and Affect: Mood normal.         Behavior: Behavior normal.         Thought Content: Thought content normal.         Judgment: Judgment normal.         Labs/Imaging/Diagnostics   Labs:  No results found for: CMPWITHGFR, CBCAUTODIF, TSHFT4, LABA1C, LIPIDPAN    Imaging Last 24 Hours:  [address] [place] [address]  Radiology exam is complete. No Radiologist dictation. Please follow up   with ordering provider.

## 2022-05-23 NOTE — PATIENT INSTRUCTIONS
Patient Education        Gastroesophageal Reflux Disease (GERD): Care Instructions  Overview     Gastroesophageal reflux disease (GERD) is the backward flow of stomach acid into the esophagus. The esophagus is the tube that leads from your throat to your stomach. A one-way valve prevents the stomach acid from backing up into this tube. But when you have GERD, this valve does not close tightly enough. This can also cause pain and swelling in your esophagus. (This is calledesophagitis.)  If you have mild GERD symptoms including heartburn, you may be able to control the problem with antacids or over-the-counter medicine. You can also make lifestyle changes to help reduce your symptoms. These include changing yourdiet and eating habits, such as not eating late at night and losing weight. Follow-up care is a key part of your treatment and safety. Be sure to make and go to all appointments, and call your doctor if you are having problems. It's also a good idea to know your test results and keep alist of the medicines you take. How can you care for yourself at home?  Take your medicines exactly as prescribed. Call your doctor if you think you are having a problem with your medicine.  Your doctor may recommend over-the-counter medicine. For mild or occasional indigestion, antacids, such as Tums, Gaviscon, Mylanta, or Maalox, may help. Your doctor also may recommend over-the-counter acid reducers, such as famotidine (Pepcid AC), cimetidine (Tagamet HB), or omeprazole (Prilosec). Read and follow all instructions on the label. If you use these medicines often, talk with your doctor.  Change your eating habits. ? It's best to eat several small meals instead of two or three large meals. ? After you eat, wait 2 to 3 hours before you lie down. ? Avoid foods that make your symptoms worse.  These may include chocolate, mint, alcohol, pepper, spicy foods, high-fat foods, or drinks with caffeine in them, such as tea, coffee, zuri, or energy drinks. If your symptoms are worse after you eat a certain food, you may want to stop eating it to see if your symptoms get better.  Do not smoke or chew tobacco. Smoking can make GERD worse. If you need help quitting, talk to your doctor about stop-smoking programs and medicines. These can increase your chances of quitting for good.  If you have GERD symptoms at night, raise the head of your bed 6 to 8 inches by putting the frame on blocks or placing a foam wedge under the head of your mattress. (Adding extra pillows does not work.)   Do not wear tight clothing around your middle.  Lose weight if you need to. Losing just 5 to 10 pounds can help. When should you call for help? Call your doctor now or seek immediate medical care if:     You have new or different belly pain.      Your stools are black and tarlike or have streaks of blood. Watch closely for changes in your health, and be sure to contact your doctor if:     Your symptoms have not improved after 2 days.      Food seems to catch in your throat or chest.   Where can you learn more? Go to https://[employer] org and sign in to your [employer] account. Enter B690 in the [employer] box to learn more about \"Gastroesophageal Reflux Disease (GERD): Care Instructions. \"     If you do not have an account, please click on the \"Sign Up Now\" link. Current as of: September 8, 2021               Content Version: 13.2  © 0935-5403 Healthwise, [facility]. Care instructions adapted under license by [facility] ([facility]). If you have questions about a medical condition or this instruction, always ask your healthcare professional. [place] [address] any warranty or liability for your use of this information.

## 2022-06-16 ENCOUNTER — OFFICE VISIT (OUTPATIENT)
Dept: FAMILY MEDICINE CLINIC | Age: 67
End: 2022-06-16
Payer: MEDICARE

## 2022-06-16 VITALS
OXYGEN SATURATION: 93 % | HEART RATE: 111 BPM | WEIGHT: 216.6 LBS | TEMPERATURE: 98.2 F | DIASTOLIC BLOOD PRESSURE: 68 MMHG | BODY MASS INDEX: 28.58 KG/M2 | SYSTOLIC BLOOD PRESSURE: 108 MMHG

## 2022-06-16 DIAGNOSIS — R82.998 DARK URINE: ICD-10-CM

## 2022-06-16 DIAGNOSIS — K21.9 GASTROESOPHAGEAL REFLUX DISEASE, UNSPECIFIED WHETHER ESOPHAGITIS PRESENT: ICD-10-CM

## 2022-06-16 DIAGNOSIS — R73.01 IMPAIRED FASTING GLUCOSE: ICD-10-CM

## 2022-06-16 DIAGNOSIS — R13.10 DYSPHAGIA, UNSPECIFIED TYPE: ICD-10-CM

## 2022-06-16 DIAGNOSIS — R63.1 POLYDIPSIA: ICD-10-CM

## 2022-06-16 DIAGNOSIS — R10.84 GENERALIZED ABDOMINAL PAIN: ICD-10-CM

## 2022-06-16 DIAGNOSIS — R11.11 NON-INTRACTABLE VOMITING WITHOUT NAUSEA, UNSPECIFIED VOMITING TYPE: Primary | ICD-10-CM

## 2022-06-16 PROCEDURE — 99214 OFFICE O/P EST MOD 30 MIN: CPT | Performed by: FAMILY MEDICINE

## 2022-06-16 PROCEDURE — 83036 HEMOGLOBIN GLYCOSYLATED A1C: CPT | Performed by: FAMILY MEDICINE

## 2022-06-16 PROCEDURE — 1123F ACP DISCUSS/DSCN MKR DOCD: CPT | Performed by: FAMILY MEDICINE

## 2022-06-16 ASSESSMENT — ENCOUNTER SYMPTOMS
ALLERGIC/IMMUNOLOGIC NEGATIVE: 1
CRAMPS: 1
GLOBUS SENSATION: 0
WATER BRASH: 0
BELCHING: 1
RESPIRATORY NEGATIVE: 1
HOARSE VOICE: 0
EYES NEGATIVE: 1
HEARTBURN: 1
VOMITING: 1
STRIDOR: 0
CHOKING: 0
NAUSEA: 1
WHEEZING: 0

## 2022-06-16 ASSESSMENT — PATIENT HEALTH QUESTIONNAIRE - PHQ9
1. LITTLE INTEREST OR PLEASURE IN DOING THINGS: 0
SUM OF ALL RESPONSES TO PHQ9 QUESTIONS 1 & 2: 0
SUM OF ALL RESPONSES TO PHQ QUESTIONS 1-9: 0
2. FEELING DOWN, DEPRESSED OR HOPELESS: 0

## 2022-06-16 NOTE — PATIENT INSTRUCTIONS
Patient Education        Learning About the Diet for Swallowing Problems  What are swallowing problems? Difficulty swallowing is also called dysphagia (say \"[id number]\"). It is most often a sign of a problem with your throat or esophagus. This is the tubethat moves food and liquids from the back of your mouth to your stomach. Trouble swallowing can occur when the muscles and nerves that move food through the throat and esophagus are not working right. To help you swallow food, yourdoctor or speech therapist may advise a special dysphagia diet for you. Why is a special diet important? A dysphagia diet can help you handle some problems that can occur when it'shard to swallow food and liquids easily. These problems can include:   Malnutrition. This means you aren't getting enough healthy foods to keep your body working well.  Dehydration. This means you aren't getting enough liquids to keep your body healthy.  Aspiration. This means that food, liquid, or saliva goes down your windpipe (trachea) into your lungs, instead of down your esophagus to your stomach. This can lead to aspiration pneumonia, which is an inflammation of the lungs. What is the dysphagia diet? In the dysphagia diet, you change the foods you eat and the liquids you drinkto make it easier to swallow them. You may:   Change the texture of the foods you eat. Your doctor or speech therapist may advise you to eat one of these types of foods:  ? Easy-to-chew foods. These are foods that are soft or tender. ? Soft and bite-sized foods. These are soft foods that have been cut into small pieces. ? Minced and moist foods. These are very soft, small, and moist lumps of food that need very little chewing. ? Pureed foods. These are foods that have been blended smooth. The puree must be thick enough to hold its shape on a spoon. These foods don't need to be chewed. ? Liquidized foods.  These are foods that have been blended smooth but are not as thick as pureed foods. You can drink them from a cup.  Thicken the liquids you drink. Your doctor or speech therapist will tell you what kind of thickener to use and how thick to make the liquids. ? Slightly thick liquids. These are thicker than water but can flow through a straw. ? Mildly thick liquids. These can be sipped from a cup but take some effort to drink with a straw. ? Moderately thick liquids. These liquids are thick enough to drink from a cup or from a spoon. But they are hard to drink through a wide straw. ? Extremely thick liquids. These are thick enough to hold their shape on a spoon. They are too thick to drink from a cup or suck through a straw. Your speech therapist will help you learn exercises to train your muscles to work together so you can swallow. You may also need to learn how to [facility] body or how to put food in your mouth to be able to swallow better. Follow-up care is a key part of your treatment and safety. Be sure to make and go to all appointments, and call your doctor if you are having problems. It's also a good idea to know your test results and keep alist of the medicines you take. Where can you learn more? Go to https://[email or web address] [employer]. org and sign in to your [employer] account. Enter B009 in the [facility] box to learn more about \"Learning About the Diet for Swallowing Problems. \"     If you do not have an account, please click on the \"Sign Up Now\" link. Current as of: July 1, 2021               Content Version: 13.2  © 2006-2022 Healthwise, Incorporated. Care instructions adapted under license by [facility] ([facility]). If you have questions about a medical condition or this instruction, always ask your healthcare professional. [place] [address] any warranty or liability for your use of this information.

## 2022-06-16 NOTE — PROGRESS NOTES
[facility] Progress Note    Date:6/16/2022         Patient Name:Nando Montanez     Date of Birth:[birth year]     Age:67 y.o. Assessment/Plan        Problem List Items Addressed This Visit     None      Visit Diagnoses     Non-intractable vomiting without nausea, unspecified vomiting type    -  Primary    Relevant Orders    CT ABDOMEN PELVIS W IV CONTRAST    CBC with Auto Differential    Comprehensive Metabolic Panel    [facility]    Gastroesophageal reflux disease, unspecified whether esophagitis present        Relevant Orders    CT ABDOMEN PELVIS W IV CONTRAST    CBC with Auto Differential    Comprehensive Metabolic Panel    [facility]    Polydipsia        Relevant Orders    POCT glycosylated hemoglobin (Hb A1C)    CBC with Auto Differential    Comprehensive Metabolic Panel    Dark urine        Relevant Orders    CBC with Auto Differential    Comprehensive Metabolic Panel    Dysphagia, unspecified type        Relevant Orders    CT ABDOMEN PELVIS W IV CONTRAST    CBC with Auto Differential    Comprehensive Metabolic Panel    [facility]    Generalized abdominal pain        Relevant Orders    CT ABDOMEN PELVIS W IV CONTRAST    CBC with Auto Differential    Comprehensive Metabolic Panel    [facility]    Impaired fasting glucose        A1c 5.1% in office    Relevant Orders    POCT glycosylated hemoglobin (Hb A1C)    CBC with Auto Differential    Comprehensive Metabolic Panel           Return in about 1 month (around 7/16/2022), or if symptoms worsen or fail to improve. Electronically signed by Aminah Jones DO on 6/16/22       Total time spent was between Time personally spent assessing and managing the patient on the date of service: Est: 30-39 minutes ([id number]) mins.  This included time spent reviewing the patient's medical record (e.g., recent visits, labs, and studies); seeing the patient in the office (face-to-face time); ordering medications, studies, procedures, or referrals; calling the patient or family later in the day with results and further recommendations; and documenting the visit in the medical record. Subjective     Orly Tovar is a 75 y.o. male presenting today for   Chief Complaint   Patient presents with    Follow-up    Other     Uncomfortable when eating certain things, vomiting when eating    . Nausea & Vomiting  This is a new problem. The current episode started more than 1 month ago. The problem occurs intermittently. The problem has been waxing and waning. Associated symptoms include anorexia, nausea and vomiting. The symptoms are aggravated by eating. He has tried nothing for the symptoms. The treatment provided no relief. Gastroesophageal Reflux  He complains of belching, dysphagia, heartburn and nausea. He reports no choking, no early satiety, no globus sensation, no hoarse voice, no stridor, no tooth decay, no water brash or no wheezing. This is a new problem. The current episode started more than 1 month ago. The problem occurs occasionally. The problem has been waxing and waning. The symptoms are aggravated by certain foods and caffeine. Pertinent negatives include no anemia, melena, muscle weakness, orthopnea or weight loss. He has tried nothing for the symptoms. Abdominal Cramping  This is a chronic problem. The current episode started more than 1 month ago. The onset quality is sudden. The problem occurs intermittently. The problem has been waxing and waning. The pain is located in the generalized abdominal region. The pain is mild. The quality of the pain is colicky. Associated symptoms include anorexia, belching, nausea and vomiting. Pertinent negatives include no melena or weight loss. He has tried proton pump inhibitors for the symptoms. The treatment provided mild relief.  His past medical history is significant for GERD.       Review of Systems   Review of Systems   Constitutional: Negative. Negative for weight loss. HENT: Negative. Negative for hoarse voice. Eyes: Negative. Respiratory: Negative. Negative for choking and wheezing. Cardiovascular: Negative. Gastrointestinal: Positive for anorexia, dysphagia, heartburn, nausea and vomiting. Negative for melena. Endocrine: Negative. Genitourinary: Negative. Musculoskeletal: Negative. Negative for muscle weakness. Skin: Negative. Allergic/Immunologic: Negative. Neurological: Negative. Hematological: Negative. Psychiatric/Behavioral: Negative. All other systems reviewed and are negative. Medications     Current Outpatient Medications   Medication Sig Dispense Refill    omeprazole (PRILOSEC) 40 MG delayed release capsule Take 1 capsule by mouth every morning (before breakfast) 30 capsule 0    fluorouracil (EFUDEX) 5 % cream Apply twice daily to actinic keratosis for 3-4 weeks. Expect redness and irritation. (Patient taking differently: Apply twice daily to actinic keratosis for 3-4 weeks. Expect redness and irritation. Not currently using.) 40 g 1     No current facility-administered medications for this visit. Past History    Past Medical History:   has a past medical history of Elevated PSA, Hearing loss, Keratosis, Prostate nodule, Snores, Wears dentures, Wears reading eyeglasses, and Wellness examination. Social History:   reports that he quit smoking about 20 years ago. He smoked 1.00 pack per day for 0.00 years. He has never used smokeless tobacco. He reports current alcohol use. He reports current drug use. Drug: Marijuana Mariia Dinning). Family History: No family history on file.     Surgical History:   Past Surgical History:   Procedure Laterality Date    COLONOSCOPY  09/24/2008    [facility] Dr. Mily Barclay     COLONOSCOPY N/A 2/17/2022    COLONOSCOPY POLYPECTOMY HOT BIOPSY performed by Marcelina Young MD at [address] [place] [address] [facility]   rt inguinal w/ mesh at [address] N/A 11/22/2021    FUSION PROSTATE BIOPSY WITH ULTRASOUND, OFFICE NOTIFIED ULTRASOUND performed by Bambi Mendiola MD at [facility]      head noncancerous. Just keratosis.  TONSILLECTOMY          Physical Examination      Vitals:  /68 (Site: Left Upper Arm, Position: Sitting, Cuff Size: Large Adult)   Pulse (!) 111   Temp 98.2 °F (36.8 °C) (Temporal)   Wt 216 lb 9.6 oz (98.2 kg)   SpO2 93%   BMI 28.58 kg/m²     Physical Exam  Vitals and nursing note reviewed. Constitutional:       General: He is not in acute distress. Appearance: Normal appearance. He is obese. He is not ill-appearing, toxic-appearing or diaphoretic. HENT:      Head: Normocephalic and atraumatic. Right Ear: External ear normal.      Left Ear: External ear normal.   Eyes:      General: No scleral icterus. Right eye: No discharge. Left eye: No discharge. Conjunctiva/sclera: Conjunctivae normal.   Cardiovascular:      Rate and Rhythm: Normal rate and regular rhythm. Pulses: Normal pulses. Heart sounds: Normal heart sounds. No murmur heard. No friction rub. No gallop. Pulmonary:      Effort: Pulmonary effort is normal. No respiratory distress. Breath sounds: Normal breath sounds. No stridor. No wheezing, rhonchi or rales. Chest:      Chest wall: No tenderness. Abdominal:      General: There is distension. Tenderness: There is abdominal tenderness. There is no guarding or rebound. Skin:     General: Skin is warm. Coloration: Skin is not jaundiced or pale. Neurological:      Mental Status: He is alert and oriented to person, place, and time. Mental status is at baseline. Psychiatric:         Mood and Affect: Mood normal.         Behavior: Behavior normal.         Thought Content:  Thought content normal.         Judgment: Judgment normal. Labs/Imaging/Diagnostics   Labs:  No results found for: CMPWITHGFR, CBCAUTODIF, TSHFT4, LABA1C, LIPIDPAN    Imaging Last 24 Hours:  [address] [place]  Radiology exam is complete. No Radiologist dictation. Please follow up   with ordering provider.

## 2022-06-20 ENCOUNTER — HOSPITAL ENCOUNTER (OUTPATIENT)
Age: 67
Setting detail: SPECIMEN
Discharge: HOME | End: 2022-06-20

## 2022-06-20 DIAGNOSIS — R82.998 DARK URINE: ICD-10-CM

## 2022-06-20 DIAGNOSIS — R73.01 IMPAIRED FASTING GLUCOSE: ICD-10-CM

## 2022-06-20 DIAGNOSIS — K21.9 GASTROESOPHAGEAL REFLUX DISEASE, UNSPECIFIED WHETHER ESOPHAGITIS PRESENT: ICD-10-CM

## 2022-06-20 DIAGNOSIS — R13.10 DYSPHAGIA, UNSPECIFIED TYPE: ICD-10-CM

## 2022-06-20 DIAGNOSIS — R11.11 NON-INTRACTABLE VOMITING WITHOUT NAUSEA, UNSPECIFIED VOMITING TYPE: ICD-10-CM

## 2022-06-20 DIAGNOSIS — R63.1 POLYDIPSIA: ICD-10-CM

## 2022-06-20 DIAGNOSIS — R10.84 GENERALIZED ABDOMINAL PAIN: ICD-10-CM

## 2022-06-20 LAB
ABSOLUTE EOS #: 0.34 K/UL (ref 0–0.44)
ABSOLUTE IMMATURE GRANULOCYTE: 0.04 K/UL (ref 0–0.3)
ABSOLUTE LYMPH #: 1.33 K/UL (ref 1.1–3.7)
ABSOLUTE MONO #: 0.83 K/UL (ref 0.1–1.2)
ALBUMIN SERPL-MCNC: 3.8 G/DL (ref 3.5–5.2)
ALBUMIN/GLOBULIN RATIO: 1.3 (ref 1–2.5)
ALP BLD-CCNC: 85 U/L (ref 40–129)
ALT SERPL-CCNC: 12 U/L (ref 5–41)
ANION GAP SERPL CALCULATED.3IONS-SCNC: 13 MMOL/L (ref 9–17)
AST SERPL-CCNC: 12 U/L
BASOPHILS # BLD: 1 % (ref 0–2)
BASOPHILS ABSOLUTE: 0.07 K/UL (ref 0–0.2)
BILIRUB SERPL-MCNC: 1.95 MG/DL (ref 0.3–1.2)
BUN BLDV-MCNC: 10 MG/DL (ref 8–23)
CALCIUM SERPL-MCNC: 9.3 MG/DL (ref 8.6–10.4)
CHLORIDE BLD-SCNC: 102 MMOL/L (ref 98–107)
CO2: 24 MMOL/L (ref 20–31)
CREAT SERPL-MCNC: 0.66 MG/DL (ref 0.7–1.2)
EOSINOPHILS RELATIVE PERCENT: 4 % (ref 1–4)
GFR AFRICAN AMERICAN: >60 ML/MIN
GFR NON-AFRICAN AMERICAN: >60 ML/MIN
GFR SERPL CREATININE-BSD FRML MDRD: ABNORMAL ML/MIN/{1.73_M2}
GLUCOSE BLD-MCNC: 99 MG/DL (ref 70–99)
HCT VFR BLD CALC: 43.4 % (ref 40.7–50.3)
HEMOGLOBIN: 13.8 G/DL (ref 13–17)
IMMATURE GRANULOCYTES: 1 %
LYMPHOCYTES # BLD: 17 % (ref 24–43)
MCH RBC QN AUTO: 30.4 PG (ref 25.2–33.5)
MCHC RBC AUTO-ENTMCNC: 31.8 G/DL (ref 28.4–34.8)
MCV RBC AUTO: 95.6 FL (ref 82.6–102.9)
MONOCYTES # BLD: 10 % (ref 3–12)
NRBC AUTOMATED: 0 PER 100 WBC
PDW BLD-RTO: 12.5 % (ref 11.8–14.4)
PLATELET # BLD: 333 K/UL (ref 138–453)
PMV BLD AUTO: 11.7 FL (ref 8.1–13.5)
POTASSIUM SERPL-SCNC: 4.2 MMOL/L (ref 3.7–5.3)
RBC # BLD: 4.54 M/UL (ref 4.21–5.77)
SEG NEUTROPHILS: 67 % (ref 36–65)
SEGMENTED NEUTROPHILS ABSOLUTE COUNT: 5.45 K/UL (ref 1.5–8.1)
SODIUM BLD-SCNC: 139 MMOL/L (ref 135–144)
TOTAL PROTEIN: 6.7 G/DL (ref 6.4–8.3)
WBC # BLD: 8.1 K/UL (ref 3.5–11.3)

## 2022-07-13 ENCOUNTER — OFFICE VISIT (OUTPATIENT)
Dept: GASTROENTEROLOGY | Age: 67
End: 2022-07-13
Payer: MEDICARE

## 2022-07-13 VITALS
DIASTOLIC BLOOD PRESSURE: 81 MMHG | WEIGHT: 219 LBS | TEMPERATURE: 97.9 F | BODY MASS INDEX: 28.89 KG/M2 | SYSTOLIC BLOOD PRESSURE: 132 MMHG

## 2022-07-13 DIAGNOSIS — K21.9 GASTROESOPHAGEAL REFLUX DISEASE, UNSPECIFIED WHETHER ESOPHAGITIS PRESENT: ICD-10-CM

## 2022-07-13 DIAGNOSIS — E66.09 CLASS 1 OBESITY DUE TO EXCESS CALORIES WITHOUT SERIOUS COMORBIDITY WITH BODY MASS INDEX (BMI) OF 32.0 TO 32.9 IN ADULT: ICD-10-CM

## 2022-07-13 DIAGNOSIS — R11.14 BILIOUS VOMITING WITH NAUSEA: ICD-10-CM

## 2022-07-13 DIAGNOSIS — Z87.891 FORMER SMOKER: ICD-10-CM

## 2022-07-13 DIAGNOSIS — Z78.9 ALCOHOL USE: ICD-10-CM

## 2022-07-13 DIAGNOSIS — R10.84 ABDOMINAL PAIN, GENERALIZED: Primary | ICD-10-CM

## 2022-07-13 PROCEDURE — 1123F ACP DISCUSS/DSCN MKR DOCD: CPT | Performed by: INTERNAL MEDICINE

## 2022-07-13 PROCEDURE — 99214 OFFICE O/P EST MOD 30 MIN: CPT | Performed by: INTERNAL MEDICINE

## 2022-07-13 ASSESSMENT — ENCOUNTER SYMPTOMS
RECTAL PAIN: 0
VOMITING: 1
SHORTNESS OF BREATH: 0
ANAL BLEEDING: 0
ABDOMINAL PAIN: 0
ABDOMINAL DISTENTION: 0
CHOKING: 0
SORE THROAT: 0
WHEEZING: 0
DIARRHEA: 0
VOICE CHANGE: 0
NAUSEA: 1
CONSTIPATION: 0
COUGH: 0
TROUBLE SWALLOWING: 0
BLOOD IN STOOL: 0

## 2022-07-13 NOTE — PROGRESS NOTES
GI CLINIC FOLLOW UP    NTERVAL HISTORY:   DO Sahil Awan [address]  [facility]    Chief Complaint   Patient presents with    Emesis     pt is here today for vomiting, GERD & generalized abd pain. 1. Abdominal pain, generalized    2. Gastroesophageal reflux disease, unspecified whether esophagitis present    3. Class 1 obesity due to excess calories without serious comorbidity with body mass index (BMI) of 32.0 to 32.9 in adult    4. Bilious vomiting with nausea    5. Alcohol use    6. Former smoker         The patient is here as a follow up of his recent GI procedure.    The results have been sent to you separately   The findings were explained to the patient in detail and biopsies were also discussed   with him    This patient had a colonoscopy done in February and was found to have multiple polyps revealing them to be tubular adenoma    He was found to have extensive colonic diverticular    Prep was not ideal and repeat colonoscopy is recommended for 1.5 years    Patient has not been seen in the office before he is primarily here because he complains a lot of abdominal bloating gas symptoms nausea and vomiting indigestion acid reflux    He has been taking Prilosec with some partial benefit of the    He is a former smoker he feels like food gets stuck in the lower esophageal area        He also admits to drinking some time he drinks more than other times however denies any alcohol abuse or known liver problems or pancreatitis in the past    He has history for asbestos exposure    Denies any weight loss or loss of appetite    Patient has been complaining of some abdominal pains, off and on cramping  Also complains of abdominal bloating and gas  Has off and on nausea without any sig vomiting  Has some alternating constipation and diarrhea  Has no weight loss  Has some anxiety issues    HISTORY OF PRESENT ILLNESS: Abhi Suarez is a 75 y.o. male with a past history remarkable for , referred for evaluation of   Chief Complaint   Patient presents with    Emesis     pt is here today for vomiting, GERD & generalized abd pain. .    Past Medical,Family, and Social History reviewed and does contribute to the patient presenting condition. Patient's PMH/PSH,SH,PSYCH Hx, MEDs, ALLERGIES, and ROS were all reviewed and updated in the appropriate sections. PAST MEDICAL HISTORY:  Past Medical History:   Diagnosis Date    Elevated PSA     Dr. Romero Glow Hearing loss     Keratosis     uses Effudex prn    Prostate nodule     on MRI per patient    Snores     no sleep apnea diagnosis    Wears dentures     full dentures    Wears reading eyeglasses     Wellness examination     Dr. Amanda Priest  9/2021  Has appt . 11/17 2pm       Past Surgical History:   Procedure Laterality Date    COLONOSCOPY  09/24/2008    [facility] Dr. Samia Hermosillo     COLONOSCOPY N/A 2/17/2022    COLONOSCOPY POLYPECTOMY HOT BIOPSY performed by Rukhsana Delaney MD at [facility]   rt inguinal w/ mesh at [address] N/A 11/22/2021    FUSION PROSTATE BIOPSY WITH ULTRASOUND, OFFICE NOTIFIED ULTRASOUND performed by Fariha Padilla MD at [address] [facility] [address]      head noncancerous. Just keratosis.  TONSILLECTOMY         CURRENT MEDICATIONS:    Current Outpatient Medications:     omeprazole (PRILOSEC) 40 MG delayed release capsule, Take 1 capsule by mouth every morning (before breakfast) (Patient not taking: Reported on 7/13/2022), Disp: 30 capsule, Rfl: 0    fluorouracil (EFUDEX) 5 % cream, Apply twice daily to actinic keratosis for 3-4 weeks. Expect redness and irritation. (Patient not taking: Reported on 7/13/2022), Disp: 40 g, Rfl: 1    ALLERGIES:   No Known Allergies    FAMILY HISTORY: No family history on file.       SOCIAL HISTORY:   Social History     Socioeconomic History    Marital status:  Spouse name: Not on file    Number of children: Not on file    Years of education: Not on file    Highest education level: Not on file   Occupational History    Not on file   Tobacco Use    Smoking status: Former Smoker     Packs/day: 1.00     Years: 0.00     Pack years: 0.00     Quit date: 2002     Years since quittin.5    Smokeless tobacco: Never Used   Vaping Use    Vaping Use: Never used   Substance and Sexual Activity    Alcohol use: Yes     Comment: beer few times/wk drinks 3-4    Drug use: Yes     Types: Marijuana (Weed)     Comment: uses edibles. Does not smoke    Sexual activity: Not on file   Other Topics Concern    Not on file   Social History Narrative    Not on file     Social Determinants of Health     Financial Resource Strain: Low Risk     Difficulty of Paying Living Expenses: Not hard at all   Food Insecurity: No Food Insecurity    Worried About Running Out of Food in the Last Year: Never true    [address] in the Last Year: Never true   Transportation Needs:     Lack of Transportation (Medical): Not on file    Lack of Transportation (Non-Medical):  Not on file   Physical Activity:     Days of Exercise per Week: Not on file    Minutes of Exercise per Session: Not on file   Stress:     Feeling of Stress : Not on file   Social Connections:     Frequency of Communication with Friends and Family: Not on file    Frequency of Social Gatherings with Friends and Family: Not on file    Attends Orthodox Services: Not on file    Active Member of Clubs or Organizations: Not on file    Attends Club or Organization Meetings: Not on file    Marital Status: Not on file   Intimate Partner Violence:     Fear of Current or Ex-Partner: Not on file    Emotionally Abused: Not on file    Physically Abused: Not on file    Sexually Abused: Not on file   Housing Stability:     Unable to Pay for Housing in the Last Year: Not on file    Number of [place] in the Last Year: Not on file    Unstable Housing in the Last Year: Not on file         REVIEW OF SYSTEMS:         Review of Systems   Constitutional: Positive for appetite change and fatigue. Negative for unexpected weight change. HENT: Negative for sore throat, trouble swallowing and voice change. Respiratory: Negative for cough, choking, shortness of breath and wheezing. Cardiovascular: Negative for chest pain, palpitations and leg swelling. Gastrointestinal: Positive for nausea and vomiting. Negative for abdominal distention, abdominal pain, anal bleeding, blood in stool, constipation, diarrhea and rectal pain. Neurological: Negative for dizziness, weakness, light-headedness, numbness and headaches. Hematological: Does not bruise/bleed easily. Psychiatric/Behavioral: Negative for confusion and sleep disturbance. The patient is not nervous/anxious. PHYSICAL EXAMINATION: Vital signs reviewed per the nursing documentation. /81   Temp 97.9 °F (36.6 °C)   Wt 219 lb (99.3 kg)   BMI 28.89 kg/m²   Body mass index is 28.89 kg/m². Physical Exam  Nursing note reviewed. Constitutional:       Appearance: He is well-developed. Comments: Anxious     HENT:      Head: Normocephalic and atraumatic. Eyes:      Conjunctiva/sclera: Conjunctivae normal.      Pupils: Pupils are equal, round, and reactive to light. Cardiovascular:      Rate and Rhythm: Normal rate and regular rhythm. Pulmonary:      Effort: Pulmonary effort is normal.      Breath sounds: Normal breath sounds. Abdominal:      General: Bowel sounds are normal.      Palpations: Abdomen is soft. Comments: NON TENDER, NON DISTENTED  LIVER SPLEEN AND HERNIAS ARE NOT  PALPABLE  BOWEL SOUNDS ARE POSITIVE      Genitourinary:     Rectum: Normal.   Musculoskeletal:         General: Normal range of motion. Cervical back: Normal range of motion and neck supple. Skin:     General: Skin is warm.    Neurological:      Mental Status: He is alert and oriented to person, place, and time. Deep Tendon Reflexes: Reflexes are normal and symmetric. LABORATORY DATA: Reviewed  Lab Results   Component Value Date    WBC 8.1 06/20/2022    HGB 13.8 06/20/2022    HCT 43.4 06/20/2022    MCV 95.6 06/20/2022     06/20/2022     06/20/2022    K 4.2 06/20/2022     06/20/2022    CO2 24 06/20/2022    BUN 10 06/20/2022    CREATININE 0.66 (L) 06/20/2022    LABPROT 7.0 03/09/2013    LABALBU 3.8 06/20/2022    BILITOT 1.95 (H) 06/20/2022    ALKPHOS 85 06/20/2022    AST 12 06/20/2022    ALT 12 06/20/2022         Lab Results   Component Value Date    RBC 4.54 06/20/2022    HGB 13.8 06/20/2022    MCV 95.6 06/20/2022    MCH 30.4 06/20/2022    MCHC 31.8 06/20/2022    RDW 12.5 06/20/2022    MPV 11.7 06/20/2022    BASOPCT 1 06/20/2022    LYMPHSABS 1.33 06/20/2022    MONOSABS 0.83 06/20/2022    NEUTROABS 5.45 06/20/2022    EOSABS 0.34 06/20/2022    BASOSABS 0.07 06/20/2022         DIAGNOSTIC TESTING:     No results found. Assessment  1. Abdominal pain, generalized    2. Gastroesophageal reflux disease, unspecified whether esophagitis present    3. Class 1 obesity due to excess calories without serious comorbidity with body mass index (BMI) of 32.0 to 32.9 in adult    4. Bilious vomiting with nausea    5. Alcohol use    6. Former smoker        Plan    Plan EGD    Check for C sprue    Quit ETOH     Extensive discussions were done about some lifestyle and dietary modification    The Endoscopic procedure was explained to the patient in detail  The prep and NPO were explained  All the Risks, Benefits, and Alternatives were explained  Risk of Bleeding, Perforation and Cardio Respiratory risks were explained  his questions were answered  The procedure has been scheduled with the  in the office  Patient was asked to give us a call for any questions  The patient has verbalized understanding and agreement to this plan.      Pt seems to have signs and symptoms consistent with GERD, acid indigestion and heartburns. He was discussed  in detail about some possible life style and dietary modifications. He was stressed about the maintenance  of appropriate weight and effect of obesity contributing to reflux symptoms. Routine exercise was streesed. Avoidance of Caffeine, nicotine and chocolate were explained. Pt was asked to avoid spices grease and fried food. Advices were also given about avoidance of any kind of fast foods, soda pops and high energy drinks. Pt was advised to place two small block under the head end of the bed which may help with night time reflux. Was advised not to eat any thin at least 2-3 hrs before going to bed and walk especially after dinner    Pt has verbalized understanding and agreement to this plan. Try to avoid ETOH     The patient was instructed to start taking some OTC Probiotics products   These are available over the counter at the Pharmacy stores and Grocery stores  He was explained about the beneficial effects they have in the GI track  They will help to establish the good bacterial trena and will help with the digestion and bowel movements  The patient has verbalized understanding and agreement to this plan      Pt was advised in detail about some life style and dietary modifications. He was advised about avoidance of caffeine, nicotine and chocolate. Pt was also told to stay away from any kind of fast foods, soda pops. He was also advised to avoid lots of spices, grease and fried food etc.     Instructions were also given about trying to arrange the timing, quality and quantity of food. Instructions were given about using ample amount of fiber including dietary and supplemental fiber either metamucil, bennafiber or citrucell etc.  Pt was advised about drinking ample amount of water without any colors or chemicals. Stress was given about regular exercise.     Pt has verbalized understanding and agreement to these modifications. Thank you for allowing me to participate in the care of Mr. Moose Fitzgerald. For any further questions please do not hesitate to contact me. I have reviewed and agree with the ROS entered by the MA/Nurse.          Deisy De La Cruz MD, [facility]  Board Certified in Gastroenterology and [address] [place] [address] Gastroenterology  Office #: [phone]

## 2022-07-14 ENCOUNTER — HOSPITAL ENCOUNTER (OUTPATIENT)
Dept: CT IMAGING | Age: 67
Discharge: HOME | End: 2022-07-16
Payer: MEDICARE

## 2022-07-14 ENCOUNTER — TELEPHONE (OUTPATIENT)
Dept: GASTROENTEROLOGY | Age: 67
End: 2022-07-14

## 2022-07-14 DIAGNOSIS — K21.9 GASTROESOPHAGEAL REFLUX DISEASE, UNSPECIFIED WHETHER ESOPHAGITIS PRESENT: ICD-10-CM

## 2022-07-14 DIAGNOSIS — R10.84 GENERALIZED ABDOMINAL PAIN: ICD-10-CM

## 2022-07-14 DIAGNOSIS — R13.10 DYSPHAGIA, UNSPECIFIED TYPE: ICD-10-CM

## 2022-07-14 DIAGNOSIS — R11.11 NON-INTRACTABLE VOMITING WITHOUT NAUSEA, UNSPECIFIED VOMITING TYPE: ICD-10-CM

## 2022-07-14 PROCEDURE — 6360000004 HC RX CONTRAST MEDICATION: Performed by: FAMILY MEDICINE

## 2022-07-14 PROCEDURE — 74177 CT ABD & PELVIS W/CONTRAST: CPT

## 2022-07-14 PROCEDURE — 2580000003 HC RX 258: Performed by: FAMILY MEDICINE

## 2022-07-14 PROCEDURE — 2500000003 HC RX 250 WO HCPCS: Performed by: FAMILY MEDICINE

## 2022-07-14 RX ORDER — 0.9 % SODIUM CHLORIDE 0.9 %
80 INTRAVENOUS SOLUTION INTRAVENOUS ONCE
Status: COMPLETED | OUTPATIENT
Start: 2022-07-14 | End: 2022-07-14

## 2022-07-14 RX ORDER — SODIUM CHLORIDE 0.9 % (FLUSH) 0.9 %
10 SYRINGE (ML) INJECTION PRN
Status: DISCONTINUED | OUTPATIENT
Start: 2022-07-14 | End: 2022-07-17 | Stop reason: HOSPADM

## 2022-07-14 RX ADMIN — IOPAMIDOL 75 ML: 755 INJECTION, SOLUTION INTRAVENOUS at 09:12

## 2022-07-14 RX ADMIN — SODIUM CHLORIDE 80 ML: 9 INJECTION, SOLUTION INTRAVENOUS at 09:12

## 2022-07-14 RX ADMIN — SODIUM CHLORIDE, PRESERVATIVE FREE 10 ML: 5 INJECTION INTRAVENOUS at 09:12

## 2022-07-14 RX ADMIN — BARIUM SULFATE 450 ML: 20 SUSPENSION ORAL at 09:12

## 2022-07-14 NOTE — TELEPHONE ENCOUNTER
Writer called and spoke to pt to move him up from waitlist. Pt now scheduled  NERI No[facility] 8/1/22 @ [facility]. Reviewed egd prep instructions with patient over phone and mailed to home address.

## 2022-07-18 ENCOUNTER — OFFICE VISIT (OUTPATIENT)
Dept: FAMILY MEDICINE CLINIC | Age: 67
End: 2022-07-18
Payer: MEDICARE

## 2022-07-18 VITALS
BODY MASS INDEX: 28.44 KG/M2 | SYSTOLIC BLOOD PRESSURE: 118 MMHG | HEART RATE: 80 BPM | DIASTOLIC BLOOD PRESSURE: 70 MMHG | OXYGEN SATURATION: 98 % | WEIGHT: 215.6 LBS | TEMPERATURE: 97.3 F

## 2022-07-18 DIAGNOSIS — K76.89 LIVER CYST: ICD-10-CM

## 2022-07-18 DIAGNOSIS — N40.1 BENIGN PROSTATIC HYPERPLASIA WITH URINARY FREQUENCY: ICD-10-CM

## 2022-07-18 DIAGNOSIS — M47.816 ARTHRITIS OF LUMBAR SPINE: ICD-10-CM

## 2022-07-18 DIAGNOSIS — N28.1 BENIGN CYST OF RIGHT KIDNEY: ICD-10-CM

## 2022-07-18 DIAGNOSIS — K21.9 GASTROESOPHAGEAL REFLUX DISEASE, UNSPECIFIED WHETHER ESOPHAGITIS PRESENT: ICD-10-CM

## 2022-07-18 DIAGNOSIS — K44.9 HIATAL HERNIA: Primary | ICD-10-CM

## 2022-07-18 DIAGNOSIS — R11.11 VOMITING WITHOUT NAUSEA, INTRACTABILITY OF VOMITING NOT SPECIFIED, UNSPECIFIED VOMITING TYPE: ICD-10-CM

## 2022-07-18 DIAGNOSIS — H61.23 BILATERAL IMPACTED CERUMEN: ICD-10-CM

## 2022-07-18 DIAGNOSIS — K57.92 DIVERTICULITIS: ICD-10-CM

## 2022-07-18 DIAGNOSIS — R35.0 BENIGN PROSTATIC HYPERPLASIA WITH URINARY FREQUENCY: ICD-10-CM

## 2022-07-18 PROBLEM — R11.14 BILIOUS VOMITING WITH NAUSEA: Status: RESOLVED | Noted: 2022-07-13 | Resolved: 2022-07-18

## 2022-07-18 PROCEDURE — 99214 OFFICE O/P EST MOD 30 MIN: CPT | Performed by: FAMILY MEDICINE

## 2022-07-18 PROCEDURE — 69210 REMOVE IMPACTED EAR WAX UNI: CPT | Performed by: FAMILY MEDICINE

## 2022-07-18 PROCEDURE — 1123F ACP DISCUSS/DSCN MKR DOCD: CPT | Performed by: FAMILY MEDICINE

## 2022-07-18 RX ORDER — CIPROFLOXACIN 500 MG/1
500 TABLET, FILM COATED ORAL 2 TIMES DAILY
Qty: 14 TABLET | Refills: 0 | Status: SHIPPED | OUTPATIENT
Start: 2022-07-18 | End: 2022-07-25

## 2022-07-18 RX ORDER — METRONIDAZOLE 500 MG/1
500 TABLET ORAL 2 TIMES DAILY
Qty: 14 TABLET | Refills: 0 | Status: SHIPPED | OUTPATIENT
Start: 2022-07-18 | End: 2022-07-25

## 2022-07-18 RX ORDER — BIFIDOBACTERIUM LONGUM SUBSP. INFANTIS, AVOBENZONE, HOMOSALATE, OCTISALATE, OCTOCRYLENE, AND OXYBENZONE
KIT
COMMUNITY

## 2022-07-18 ASSESSMENT — ENCOUNTER SYMPTOMS
COUGH: 0
EYES NEGATIVE: 1
WHEEZING: 0
STRIDOR: 0
HEARTBURN: 1
SORE THROAT: 0
CHOKING: 0
VOMITING: 1
RESPIRATORY NEGATIVE: 1
WATER BRASH: 0
NAUSEA: 1
CRAMPS: 1
RHINORRHEA: 0
ABDOMINAL PAIN: 0
GLOBUS SENSATION: 0
BELCHING: 1
ALLERGIC/IMMUNOLOGIC NEGATIVE: 1
HOARSE VOICE: 0

## 2022-07-18 ASSESSMENT — PATIENT HEALTH QUESTIONNAIRE - PHQ9
SUM OF ALL RESPONSES TO PHQ QUESTIONS 1-9: 0
1. LITTLE INTEREST OR PLEASURE IN DOING THINGS: 0
SUM OF ALL RESPONSES TO PHQ QUESTIONS 1-9: 0
SUM OF ALL RESPONSES TO PHQ9 QUESTIONS 1 & 2: 0
SUM OF ALL RESPONSES TO PHQ QUESTIONS 1-9: 0
SUM OF ALL RESPONSES TO PHQ QUESTIONS 1-9: 0
2. FEELING DOWN, DEPRESSED OR HOPELESS: 0

## 2022-07-18 NOTE — PROGRESS NOTES
APSO Progress Note    Date:7/18/2022         Patient Name:Nando Montanez     Date of Birth:[birth year]     Age:67 y.o. Assessment/Plan        Problem List Items Addressed This Visit          Digestive    Gastroesophageal reflux disease      Well-controlled, continue current medications, continue current treatment plan, medication adherence emphasized and lifestyle modifications recommended         Relevant Medications    Bacillus Coagulans-Inulin (ALIGN PREBIOTIC-PROBIOTIC) 5-1.25 MG-GM CHEW    Liver cyst      Seen on CT scan  Likely benign            Musculoskeletal and Integument    Arthritis of lumbar spine      Seen on CT            Genitourinary    Benign cyst of right kidney       Seen on CT scan  Likely benign         BPH (benign prostatic hyperplasia)      Monitored by specialist- no acute findings meriting change in the plan            Other    Hiatal hernia - Primary      Refer to CT surgery         Relevant Orders    Mino Brantley MD, Cardiothoracic Surgery, [facility]     Other Visit Diagnoses       Diverticulitis        Relevant Medications    ciprofloxacin (CIPRO) 500 MG tablet    metroNIDAZOLE (FLAGYL) 500 MG tablet    Vomiting without nausea, intractability of vomiting not specified, unspecified vomiting type        Possibly 2/2 to hiatal hernia    Bilateral impacted cerumen        Cleaned in office today             Return in about 4 months (around 11/18/2022). Electronically signed by Willy Medina DO on 7/18/22       Total time spent was between Time personally spent assessing and managing the patient on the date of service: Est: 30-39 minutes (10655) mins.  This included time spent reviewing the patient's medical record (e.g., recent visits, labs, and studies); seeing the patient in the office (face-to-face time); ordering medications, studies, procedures, or referrals; calling the patient or family later in the day with results and further recommendations; and documenting the visit in the medical record. Subjective     Rodo Spencer is a 75 y.o. male presenting today for   Chief Complaint   Patient presents with    Results     Ct results from [facility] .    Belinda Coles presents for evaluation of a plugged ear. He noticed the symptoms in both ears, several weeks ago. He does have a prior history of cerumen impaction. Syliva Primas denies ear pain. He was using ear drops to loosen wax immediately prior to this visit. CT scan showed hiatal hernia and diverticulitis, also arthritis in low back and likely benign liver and kidney cysts. He saw GI who is planning an EGD    Benign Prostatic Hypertrophy  This is a chronic problem. The current episode started more than 1 year ago. The problem is unchanged. Irritative symptoms include frequency and nocturia. Irritative symptoms do not include urgency. Obstructive symptoms do not include dribbling, incomplete emptying, an intermittent stream, a slower stream, straining or a weak stream. Associated symptoms include nausea and vomiting. Pertinent negatives include no dysuria, genital pain, hematuria or hesitancy. Past treatments include tamsulosin. The treatment provided no relief. Nausea & Vomiting  This is a new problem. The current episode started more than 1 month ago. The problem occurs intermittently. The problem has been waxing and waning. Associated symptoms include anorexia, nausea and vomiting. Pertinent negatives include no abdominal pain, coughing, headaches, neck pain, rash or sore throat. The symptoms are aggravated by eating. He has tried nothing for the symptoms. The treatment provided no relief. Gastroesophageal Reflux  He complains of belching, dysphagia, heartburn and nausea. He reports no abdominal pain, no choking, no coughing, no early satiety, no globus sensation, no hoarse voice, no sore throat, no stridor, no tooth decay, no water brash or no wheezing. This is a new problem.  The current episode started more than 1 month ago. The problem occurs occasionally. The problem has been waxing and waning. The symptoms are aggravated by certain foods and caffeine. Pertinent negatives include no anemia, melena, muscle weakness, orthopnea or weight loss. He has tried nothing for the symptoms. Abdominal Cramping  This is a chronic problem. The current episode started more than 1 month ago. The onset quality is sudden. The problem occurs intermittently. The problem has been waxing and waning. The pain is located in the generalized abdominal region. The pain is mild. The quality of the pain is colicky. Associated symptoms include anorexia, belching, frequency, nausea and vomiting. Pertinent negatives include no dysuria, headaches, hematuria, melena or weight loss. He has tried proton pump inhibitors for the symptoms. The treatment provided mild relief. His past medical history is significant for GERD. Ear Fullness   There is pain in both ears. This is a chronic problem. The current episode started more than 1 month ago. The problem occurs constantly. The problem has been gradually worsening. There has been no fever. The pain is mild. Associated symptoms include ear discharge and vomiting. Pertinent negatives include no abdominal pain, coughing, headaches, hearing loss, neck pain, rash, rhinorrhea or sore throat. Review of Systems   Review of Systems   Constitutional: Negative. Negative for weight loss. HENT:  Positive for ear discharge. Negative for hearing loss, hoarse voice, rhinorrhea and sore throat. Eyes: Negative. Respiratory: Negative. Negative for cough, choking and wheezing. Cardiovascular: Negative. Gastrointestinal:  Positive for anorexia, dysphagia, heartburn, nausea and vomiting. Negative for abdominal pain and melena. Endocrine: Negative. Genitourinary:  Positive for frequency and nocturia. Negative for dysuria, hematuria, hesitancy, incomplete emptying and urgency.    Musculoskeletal: Negative. Negative for muscle weakness and neck pain. Skin: Negative. Negative for rash. Allergic/Immunologic: Negative. Neurological: Negative. Negative for headaches. Hematological: Negative. Psychiatric/Behavioral: Negative. All other systems reviewed and are negative. Medications     Current Outpatient Medications   Medication Sig Dispense Refill    Bacillus Coagulans-Inulin (ALIGN PREBIOTIC-PROBIOTIC) 5-1.25 MG-GM CHEW Take by mouth      ciprofloxacin (CIPRO) 500 MG tablet Take 1 tablet by mouth in the morning and 1 tablet before bedtime. Do all this for 7 days. 14 tablet 0    metroNIDAZOLE (FLAGYL) 500 MG tablet Take 1 tablet by mouth in the morning and 1 tablet before bedtime. Do all this for 7 days. 14 tablet 0    fluorouracil (EFUDEX) 5 % cream Apply twice daily to actinic keratosis for 3-4 weeks. Expect redness and irritation. 40 g 1    omeprazole (PRILOSEC) 40 MG delayed release capsule Take 1 capsule by mouth every morning (before breakfast) (Patient not taking: No sig reported) 30 capsule 0     No current facility-administered medications for this visit. Past History    Past Medical History:   has a past medical history of Elevated PSA, Hearing loss, Keratosis, Prostate nodule, Snores, Wears dentures, Wears reading eyeglasses, and Wellness examination. Social History:   reports that he quit smoking about 20 years ago. He smoked an average of 1.00 packs per day. He has never used smokeless tobacco. He reports current alcohol use. He reports current drug use. Drug: Marijuana Juanetta Smyth). Family History: History reviewed. No pertinent family history.     Surgical History:   Past Surgical History:   Procedure Laterality Date    COLONOSCOPY  09/24/2008    [facility] Dr. Layla Madrid     COLONOSCOPY N/A 2/17/2022    COLONOSCOPY POLYPECTOMY HOT BIOPSY performed by Mary Mueller MD at [address] [place], [place] [address]ate Dr rt farley w/ mesh at [place] [address] PROSTATE BIOPSY N/A 11/22/2021    FUSION PROSTATE BIOPSY WITH ULTRASOUND, OFFICE NOTIFIED ULTRASOUND performed by Harinder Rincon MD at [address]      head noncancerous. Just keratosis. TONSILLECTOMY          Physical Examination      Vitals:  /70 (Site: Left Upper Arm, Position: Sitting, Cuff Size: Large Adult)   Pulse 80   Temp 97.3 °F (36.3 °C) (Temporal)   Wt 215 lb 9.6 oz (97.8 kg)   SpO2 98%   BMI 28.44 kg/m²     Physical Exam  Vitals and nursing note reviewed. Constitutional:       General: He is not in acute distress. Appearance: Normal appearance. He is obese. He is not ill-appearing, toxic-appearing or diaphoretic. HENT:      Head: Normocephalic and atraumatic. Right Ear: External ear normal.      Left Ear: External ear normal.   Eyes:      General: No scleral icterus. Right eye: No discharge. Left eye: No discharge. Conjunctiva/sclera: Conjunctivae normal.   Cardiovascular:      Rate and Rhythm: Normal rate and regular rhythm. Pulses: Normal pulses. Heart sounds: Normal heart sounds. No murmur heard. No friction rub. No gallop. Pulmonary:      Effort: Pulmonary effort is normal. No respiratory distress. Breath sounds: Normal breath sounds. No stridor. No wheezing, rhonchi or rales. Chest:      Chest wall: No tenderness. Abdominal:      General: There is distension. Tenderness: There is abdominal tenderness. There is no guarding or rebound. Skin:     General: Skin is warm. Coloration: Skin is not jaundiced or pale. Neurological:      Mental Status: He is alert and oriented to person, place, and time. Mental status is at baseline. Psychiatric:         Mood and Affect: Mood normal.         Behavior: Behavior normal.         Thought Content:  Thought content normal.         Judgment: Judgment normal.     Patient Name: Bella Nicole   Medical Record Number: [id number]  Date: 7/18/2022   Time: 1:16 PM     Ear Cerumen Removal Procedure Note  Indication: ear cerumen impaction and unable to visualize tympanic membrane    Procedure: After placing the patient's head in the appropriate position, the patient's bilateral ear canal was irrigated with the appropriate solution and curetted until all cerumen was removed and the ear canal was clear. At this point, the procedure was complete. The patient tolerated the procedure well. Complications: None      Labs/Imaging/Diagnostics   Labs:  No results found for: CMPWITHGFR, CBCAUTODIF, TSHFT4, LABA1C, LIPIDPAN    Imaging Last 24 Hours:  CT ABDOMEN PELVIS W IV CONTRAST  Narrative: EXAMINATION:  CT OF THE ABDOMEN AND PELVIS WITH CONTRAST 7/14/2022 9:19 am    TECHNIQUE:  CT of the abdomen and pelvis was performed with the administration of  intravenous contrast. Multiplanar reformatted images are provided for review. Automated exposure control, iterative reconstruction, and/or weight based  adjustment of the mA/kV was utilized to reduce the radiation dose to as low  as reasonably achievable. COMPARISON:  None. HISTORY:  ORDERING SYSTEM PROVIDED HISTORY: Non-intractable vomiting without nausea,  unspecified vomiting type  TECHNOLOGIST PROVIDED HISTORY:  STAT Creatinine as needed:->Yes  See ICDM-10 code attached  Reason for Exam: Non-intractable vomiting without nausea, unspecified  vomiting type, Gastroesophageal reflux disease, unspecified whether  esophagitis present, dysphagia    FINDINGS:  There iare some chronic changes in the lung bases bilaterally with some  atelectatic changes. Some scar. Some findings suggesting old viral  pneumonia residual.  No evidence of pericardial or pleural effusion. The  heart appears within limits of normal for size. No evidence of threshold enlarged adenopathy. The wall of the abdomen and pelvis appears intact. Very small fatty  umbilical hernia.     Osseous structures: Mild scoliosis of the lumbar spine, convexity toward the  left, with straightening. Narrowing of the disc spaces, more pronounced at  L5-S1. Spondylitic endplate degenerative changes throughout. Degenerative  changes in the lower facets. Aorta and inferior vena cava: Unremarkable in appearance. Liver: Scattered areas of decreased attenuation density compatible with  cysts. The largest of these resides in the right lower lobe and measures up  to 7.2 by 5.6 cm. Spleen: Unremarkable in appearance. Adrenals: Normal in appearance. Pancreas: Normal in appearance. Right kidney: Peripheral cyst arising from the lower pole measuring 20 mm  diameter. Left kidney: Unremarkable in appearance. Contrast excretion from both kidneys appears symmetrical.  No evidence of  renal or ureteral calculus. GI/bowel: Moderate sized hiatal hernia measuring up to 5.5 cm diameter. Normal distribution. Very small area of fat stranding adjacent to the  junction of distal descending and sigmoid colon compatible with a small focus  of diverticulitis. A few diverticuli are apparent in this region. (Series 2  images 118-121). Pelvic structures: Very small amount of contrast within the base of the  urinary bladder. No evidence of wall thickening. Compression on the  posterior aspect of the urinary bladder due to a large prostate with the  prostate measuring at least 5.5 x 7.3 cm with minimal irregularity of the  margins and some adjacent shotty lymph nodes. Seminal vesicles are somewhat  asymmetric and lobulated. No evidence of fat stranding in the perirectal fat. Impression: 1. Very small area compatible with diverticulitis at the junction of distal  descending and retrosigmoid colon. 2.  Enlarged prostate. 3.  Benign-appearing cysts in the liver. Single benign-appearing cyst right  kidney. 4.  Degenerative changes in the spine. 5.  Changes in the lung bases as described above. 6.  Moderate sized hiatal hernia.

## 2022-08-01 ENCOUNTER — ANESTHESIA (OUTPATIENT)
Dept: OPERATING ROOM | Age: 67
End: 2022-08-01
Payer: MEDICARE

## 2022-08-01 ENCOUNTER — ANESTHESIA EVENT (OUTPATIENT)
Dept: OPERATING ROOM | Age: 67
End: 2022-08-01
Payer: MEDICARE

## 2022-08-01 ENCOUNTER — HOSPITAL ENCOUNTER (OUTPATIENT)
Age: 67
Setting detail: OUTPATIENT SURGERY
Discharge: HOME | End: 2022-08-01
Attending: INTERNAL MEDICINE | Admitting: INTERNAL MEDICINE
Payer: MEDICARE

## 2022-08-01 VITALS
SYSTOLIC BLOOD PRESSURE: 105 MMHG | WEIGHT: 214 LBS | HEIGHT: 73 IN | RESPIRATION RATE: 18 BRPM | BODY MASS INDEX: 28.36 KG/M2 | TEMPERATURE: 96.8 F | HEART RATE: 90 BPM | DIASTOLIC BLOOD PRESSURE: 80 MMHG | OXYGEN SATURATION: 95 %

## 2022-08-01 DIAGNOSIS — K21.9 GASTROESOPHAGEAL REFLUX DISEASE, UNSPECIFIED WHETHER ESOPHAGITIS PRESENT: ICD-10-CM

## 2022-08-01 PROCEDURE — 7100000010 HC PHASE II RECOVERY - FIRST 15 MIN: Performed by: INTERNAL MEDICINE

## 2022-08-01 PROCEDURE — 2709999900 HC NON-CHARGEABLE SUPPLY: Performed by: INTERNAL MEDICINE

## 2022-08-01 PROCEDURE — 3609012400 HC EGD TRANSORAL BIOPSY SINGLE/MULTIPLE: Performed by: INTERNAL MEDICINE

## 2022-08-01 PROCEDURE — 43239 EGD BIOPSY SINGLE/MULTIPLE: CPT | Performed by: INTERNAL MEDICINE

## 2022-08-01 PROCEDURE — 3700000000 HC ANESTHESIA ATTENDED CARE: Performed by: INTERNAL MEDICINE

## 2022-08-01 PROCEDURE — 7100000011 HC PHASE II RECOVERY - ADDTL 15 MIN: Performed by: INTERNAL MEDICINE

## 2022-08-01 PROCEDURE — 2580000003 HC RX 258: Performed by: ANESTHESIOLOGY

## 2022-08-01 PROCEDURE — 88305 TISSUE EXAM BY PATHOLOGIST: CPT

## 2022-08-01 PROCEDURE — 6360000002 HC RX W HCPCS: Performed by: NURSE ANESTHETIST, CERTIFIED REGISTERED

## 2022-08-01 RX ORDER — LIDOCAINE HYDROCHLORIDE 10 MG/ML
1 INJECTION, SOLUTION EPIDURAL; INFILTRATION; INTRACAUDAL; PERINEURAL
Status: DISCONTINUED | OUTPATIENT
Start: 2022-08-01 | End: 2022-08-01 | Stop reason: HOSPADM

## 2022-08-01 RX ORDER — PROPOFOL 10 MG/ML
INJECTION, EMULSION INTRAVENOUS PRN
Status: DISCONTINUED | OUTPATIENT
Start: 2022-08-01 | End: 2022-08-01 | Stop reason: SDUPTHER

## 2022-08-01 RX ORDER — SODIUM CHLORIDE 0.9 % (FLUSH) 0.9 %
5-40 SYRINGE (ML) INJECTION EVERY 12 HOURS SCHEDULED
Status: DISCONTINUED | OUTPATIENT
Start: 2022-08-01 | End: 2022-08-01 | Stop reason: HOSPADM

## 2022-08-01 RX ORDER — SODIUM CHLORIDE, SODIUM LACTATE, POTASSIUM CHLORIDE, CALCIUM CHLORIDE 600; 310; 30; 20 MG/100ML; MG/100ML; MG/100ML; MG/100ML
INJECTION, SOLUTION INTRAVENOUS CONTINUOUS
Status: DISCONTINUED | OUTPATIENT
Start: 2022-08-01 | End: 2022-08-01 | Stop reason: HOSPADM

## 2022-08-01 RX ORDER — SODIUM CHLORIDE 9 MG/ML
INJECTION, SOLUTION INTRAVENOUS CONTINUOUS
Status: DISCONTINUED | OUTPATIENT
Start: 2022-08-01 | End: 2022-08-01 | Stop reason: HOSPADM

## 2022-08-01 RX ORDER — SODIUM CHLORIDE 0.9 % (FLUSH) 0.9 %
5-40 SYRINGE (ML) INJECTION PRN
Status: DISCONTINUED | OUTPATIENT
Start: 2022-08-01 | End: 2022-08-01 | Stop reason: HOSPADM

## 2022-08-01 RX ORDER — SODIUM CHLORIDE 9 MG/ML
INJECTION, SOLUTION INTRAVENOUS PRN
Status: DISCONTINUED | OUTPATIENT
Start: 2022-08-01 | End: 2022-08-01 | Stop reason: HOSPADM

## 2022-08-01 RX ADMIN — PROPOFOL 30 MG: 10 INJECTION, EMULSION INTRAVENOUS at 11:24

## 2022-08-01 RX ADMIN — PROPOFOL 20 MG: 10 INJECTION, EMULSION INTRAVENOUS at 11:25

## 2022-08-01 RX ADMIN — PROPOFOL 30 MG: 10 INJECTION, EMULSION INTRAVENOUS at 11:26

## 2022-08-01 RX ADMIN — PROPOFOL 50 MG: 10 INJECTION, EMULSION INTRAVENOUS at 11:22

## 2022-08-01 RX ADMIN — PROPOFOL 30 MG: 10 INJECTION, EMULSION INTRAVENOUS at 11:28

## 2022-08-01 RX ADMIN — SODIUM CHLORIDE, POTASSIUM CHLORIDE, SODIUM LACTATE AND CALCIUM CHLORIDE: 600; 310; 30; 20 INJECTION, SOLUTION INTRAVENOUS at 10:58

## 2022-08-01 ASSESSMENT — PAIN - FUNCTIONAL ASSESSMENT: PAIN_FUNCTIONAL_ASSESSMENT: 0-10

## 2022-08-01 NOTE — ANESTHESIA POSTPROCEDURE EVALUATION
Department of Anesthesiology  Postprocedure Note    Patient: Alon Mckeon  MRN: [id number]  [place]: 1955  Date of evaluation: 8/1/2022      Procedure Summary     Date: 08/01/22 Room / Location: [place] [address] / [facility] - INPATIENT    Anesthesia Start: 1118 Anesthesia Stop: 9430    Procedure: EGD BIOPSY Diagnosis:       Gastroesophageal reflux disease, unspecified whether esophagitis present      (Gastroesophageal reflux disease, unspecified whether esophagitis present [K21.9])    Surgeons: Deisi Munoz MD Responsible Provider: Patricia Jovel MD    Anesthesia Type: MAC ASA Status: 2          Anesthesia Type: No value filed.     Chacho Phase I:      Chacho Phase II: Chacho Score: 10      Anesthesia Post Evaluation    Patient location during evaluation: PACU  Patient participation: complete - patient participated  Level of consciousness: awake  Airway patency: patent  Nausea & Vomiting: no nausea and no vomiting  Complications: no  Cardiovascular status: hemodynamically stable  Respiratory status: acceptable  Hydration status: stable  Multimodal analgesia pain management approach

## 2022-08-01 NOTE — DISCHARGE INSTRUCTIONS
Upper GI Endoscopy: What to Expect at [address] may have a sore throat for a day or two after the test.  How can you care for yourself at home? Activity  Rest as much as you need to after you go home. You should be able to go back to your usual activities the day after the test.  Diet  Drink plenty of fluids (unless your doctor has told you not to). Follow-up care is a key part of your treatment and safety. Be sure to make and go to all appointments, and call your doctor if you are having problems. When should you call for help? Call 911 anytime you think you may need emergency care. For example, call if:  You passed out (lost consciousness). You cough up blood. You vomit blood or what looks like coffee grounds. You pass maroon or very bloody stools. Call your doctor now or seek immediate medical care if:  You have trouble swallowing. You have belly pain. Your stools are black and tarlike or have streaks of blood. You are sick to your stomach or cannot keep fluids down. Watch closely for changes in your health, and be sure to contact your doctor if:  Your throat still hurts after a day or two. You do not get better as expected. Where can you learn more? Go to [email or web address] org and sign in to your [employer] account. Enter S018 in the [facility] box to learn more about Upper GI Endoscopy: What to Expect at Home.     .     © 8810-8008 Healthwise, Incorporated. Care instructions adapted under license by [facility]. This care instruction is for use with your licensed healthcare professional. If you have questions about a medical condition or this instruction, always ask your healthcare professional. [place] [address] any warranty or liability for your use of this information.   Content Version: 8.2.565756; Last Revised: February 20, 2013

## 2022-08-01 NOTE — ANESTHESIA POSTPROCEDURE EVALUATION
Department of Anesthesiology  Postprocedure Note    Patient: Hansa Dominguez  MRN: [id number]  [place]: 1955  Date of evaluation: 8/1/2022      Procedure Summary     Date: 08/01/22 [facility] [address] [place] [address]    Anesthesia Start: 1118 Anesthesia Stop: 2723    Procedure: EGD BIOPSY Diagnosis:       Gastroesophageal reflux disease, unspecified whether esophagitis present      (Gastroesophageal reflux disease, unspecified whether esophagitis present [K21.9])    Surgeons: America Arredondo MD Responsible Provider: Malka Bo MD    Anesthesia Type: MAC ASA Status: 2          Anesthesia Type: No value filed.     Chacho Phase I:      Chacoh Phase II: Chacho Score: 10      Anesthesia Post Evaluation    Patient location during evaluation: PACU  Patient participation: complete - patient participated  Level of consciousness: awake and alert  Airway patency: patent  Nausea & Vomiting: no nausea and no vomiting  Complications: no  Cardiovascular status: hemodynamically stable  Respiratory status: acceptable  Hydration status: stable

## 2022-08-01 NOTE — ANESTHESIA PRE PROCEDURE
Department of Anesthesiology  Preprocedure Note       Name:  Torres Olivera   Age:  75 y.o.  :  1955                                          MRN:  [id number]         Date:  2022      Surgeon: Dago Query):  Jose Chiu MD    Procedure: Procedure(s):  EGD ESOPHAGOGASTRODUODENOSCOPY    Medications prior to admission:   Prior to Admission medications    Medication Sig Start Date End Date Taking? Authorizing Provider   Bacillus Coagulans-Inulin (ALIGN PREBIOTIC-PROBIOTIC) 5-1.25 MG-GM CHEW Take by mouth    Historical Provider, MD   fluorouracil (EFUDEX) 5 % cream Apply twice daily to actinic keratosis for 3-4 weeks. Expect redness and irritation.  18   Vince Richard MD       Current medications:    Current Facility-Administered Medications   Medication Dose Route Frequency Provider Last Rate Last Admin    lidocaine PF 1 % injection 1 mL  1 mL IntraDERmal Once PRN Sharyn Oleary MD        0.9 % sodium chloride infusion   IntraVENous Continuous Ndal Deisi aBr MD        lactated ringers infusion   IntraVENous Continuous Ndal Deisi Bar MD        sodium chloride flush 0.9 % injection 5-40 mL  5-40 mL IntraVENous 2 times per day Sharyn Oleary MD        sodium chloride flush 0.9 % injection 5-40 mL  5-40 mL IntraVENous PRN Sharyn Oleary MD        0.9 % sodium chloride infusion   IntraVENous PRN Sharyn Oleary MD           Allergies:  No Known Allergies    Problem List:    Patient Active Problem List   Diagnosis Code    BPH (benign prostatic hyperplasia) N40.0    Asbestos exposure Z77.090    Class 1 obesity due to excess calories without serious comorbidity with body mass index (BMI) of 32.0 to 32.9 in adult E66.09, Z68.32    Former smoker Z87.891    Alcohol use Z72.89    Epidermoid cyst of finger of left hand L72.0    Gastroesophageal reflux disease K21.9    Abdominal pain, generalized R10.84    Hiatal hernia K44.9    Arthritis of lumbar spine M47.816    Benign cyst of right kidney N28.1    Liver cyst K76.89       Past Medical History:        Diagnosis Date    Elevated PSA     Dr. Finney Nickel    Hearing loss     Keratosis     uses Effudex prn    Prostate nodule     on MRI per patient    Snores     no sleep apnea diagnosis    Wears dentures     full dentures    Wears reading eyeglasses     Wellness examination     Dr. Sandra José  2021  Has appt .  2pm       Past Surgical History:        Procedure Laterality Date    COLONOSCOPY  [facility]    [facility] Dr. Ileana Vázquez     COLONOSCOPY N/A 2022    COLONOSCOPY POLYPECTOMY HOT BIOPSY performed by Zeferino Coto MD at [facility]   rt inguinal w/ mesh at [email or web address] N/A 2021    FUSION PROSTATE BIOPSY WITH ULTRASOUND, OFFICE NOTIFIED ULTRASOUND performed by Sai Mejia MD at [facility]      head noncancerous. Just keratosis.      TONSILLECTOMY         Social History:    Social History     Tobacco Use    Smoking status: Former     Packs/day: 1.00     Years: 0.00     Pack years: 0.00     Types: Cigarettes     Quit date: 2002     Years since quittin.5    Smokeless tobacco: Never   Substance Use Topics    Alcohol use: Yes     Comment: beer few times/wk drinks 3-4                                Counseling given: Not Answered      Vital Signs (Current):   Vitals:    22 1046 22 1049   BP: 115/62    Pulse: 73    Resp: 16    Temp: 96.9 °F (36.1 °C)    SpO2: 95%    Weight:  214 lb (97.1 kg)   Height:  6' 1\" (1.854 m)                                              BP Readings from Last 3 Encounters:   22 115/62   22 118/70   22 132/81       NPO Status: Time of last liquid consumption:                         Time of last solid consumption:                         Date of last liquid consumption: 22                        Date of last solid food consumption: 22    BMI:   Wt Readings from Last 3 Encounters:   08/01/22 214 lb (97.1 kg)   07/18/22 215 lb 9.6 oz (97.8 kg)   07/13/22 219 lb (99.3 kg)     Body mass index is 28.23 kg/m². CBC:   Lab Results   Component Value Date/Time    WBC 8.1 06/20/2022 10:18 AM    RBC 4.54 06/20/2022 10:18 AM    HGB 13.8 06/20/2022 10:18 AM    HCT 43.4 06/20/2022 10:18 AM    MCV 95.6 06/20/2022 10:18 AM    RDW 12.5 06/20/2022 10:18 AM     06/20/2022 10:18 AM       CMP:   Lab Results   Component Value Date/Time     06/20/2022 10:18 AM    K 4.2 06/20/2022 10:18 AM     06/20/2022 10:18 AM    CO2 24 06/20/2022 10:18 AM    BUN 10 06/20/2022 10:18 AM    CREATININE 0.66 06/20/2022 10:18 AM    GFRAA >60 06/20/2022 10:18 AM    LABGLOM >60 06/20/2022 10:18 AM    GLUCOSE 99 06/20/2022 10:18 AM    PROT 6.7 06/20/2022 10:18 AM    CALCIUM 9.3 06/20/2022 10:18 AM    BILITOT 1.95 06/20/2022 10:18 AM    ALKPHOS 85 06/20/2022 10:18 AM    AST 12 06/20/2022 10:18 AM    ALT 12 06/20/2022 10:18 AM       POC Tests: No results for input(s): POCGLU, POCNA, POCK, POCCL, POCBUN, POCHEMO, POCHCT in the last 72 hours.     Coags: No results found for: PROTIME, INR, APTT    HCG (If Applicable): No results found for: PREGTESTUR, PREGSERUM, HCG, HCGQUANT     ABGs: No results found for: PHART, PO2ART, PAY8VTG, PFT5WIH, BEART, C5EUCAOG     Type & Screen (If Applicable):  No results found for: LABABO, LABRH    Drug/Infectious Status (If Applicable):  No results found for: HIV, HEPCAB    COVID-19 Screening (If Applicable): No results found for: COVID19        Anesthesia Evaluation  Patient summary reviewed no history of anesthetic complications:   Airway: Mallampati: I  TM distance: >3 FB   Neck ROM: full  Mouth opening: > = 3 FB   Dental:    (+) upper dentures, lower dentures and edentulous      Pulmonary:normal exam        (-) COPD and asthma                           Cardiovascular:  Exercise tolerance: good (>4 METS),       (-) valvular problems/murmurs, past MI, CABG/stent, dysrhythmias and  angina    ECG reviewed                        Neuro/Psych:      (-) seizures and CVA           GI/Hepatic/Renal:   (+) hiatal hernia, GERD:,      (-) liver disease and no renal disease      ROS comment: diverticulitis. Endo/Other:        (-) diabetes mellitus, hypothyroidism               Abdominal:             Vascular:     - DVT and PE. Other Findings:             Anesthesia Plan      MAC     ASA 2       Induction: intravenous. Anesthetic plan and risks discussed with patient. Plan discussed with CRNA.     Attending anesthesiologist reviewed and agrees with Glorya Sacks, MD   8/1/2022

## 2022-08-01 NOTE — H&P
Interval H&P Note    Pt Name: Jose Lauren  MRN: [id number]  YOB: 1955  Date of evaluation: 8/1/2022      [x] I have reviewed in [facility] the gastroenterology progress note by Dr. Karol Edmonds dated 7/13/2022 for an Interval History and Physical note. [x] I have examined  Jose Lauren  There are no changes to the patient who is scheduled for EGD ESOPHAGOGASTRODUODENOSCOPY by Sejal Meek MD for Gastroesophageal reflux disease, unspecified whether esophagitis present [K21.9]. Patient states he was treated for diverticulitis 1 week ago per PCP. The patient denies new health changes, fever, chills, wheezing, cough, increased SOB, chest pain, open sores or wounds. Denies hx diabetes and taking any blood thinning medications in the last 10 days. Vital signs: /62   Pulse 73   Temp 96.9 °F (36.1 °C)   Resp 16   Ht 6' 1\" (1.854 m)   Wt 214 lb (97.1 kg)   SpO2 95%   BMI 28.23 kg/m²     Allergies:  Patient has no known allergies. Medications:    Prior to Admission medications    Medication Sig Start Date End Date Taking? Authorizing Provider   Bacillus Coagulans-Inulin (ALIGN PREBIOTIC-PROBIOTIC) 5-1.25 MG-GM CHEW Take by mouth    Historical Provider, MD   fluorouracil (EFUDEX) 5 % cream Apply twice daily to actinic keratosis for 3-4 weeks. Expect redness and irritation. 11/1/18   Isadora Limon MD         This is a 75 y.o. male who is pleasant, cooperative, alert and oriented x3, in no acute distress. Heart: Heart sounds are normal.  HR 73 regular rate and rhythm without murmur, gallop or rub. Lungs: Normal respiratory effort with equal expansion, good air exchange, unlabored and clear to auscultation without wheezes or rales bilaterally   Abdomen: soft, tender right lower quadrant with palpation, nondistended with bowel sounds active.        Labs:  No results for input(s): HGB, HCT, WBC, MCV, PLT, NA, K, CL, CO2, BUN, CREATININE, GLUCOSE, INR, PROTIME, APTT, AST, ALT, LABALBU, HCG in the last 720 hours. No results for input(s): COVID19 in the last 720 hours. RAMYA Willard CNP  Electronically signed 8/1/2022 at [address] [place], MD   Physician   Specialty:  Gastroenterology   Progress Notes       Signed   Encounter Date:  7/13/2022          Related encounter: Office Visit from 7/13/2022 in [address]:   Keyla Murphy, DO  [address] [facility]  [facility] [address]  [facility]          Chief Complaint   Patient presents with    Emesis       pt is here today for vomiting, GERD & generalized abd pain. 1. Abdominal pain, generalized   2. Gastroesophageal reflux disease, unspecified whether esophagitis present   3. Class 1 obesity due to excess calories without serious comorbidity with body mass index (BMI) of 32.0 to 32.9 in adult   4. Bilious vomiting with nausea   5. Alcohol use   6. Former smoker           The patient is here as a follow up of his recent GI procedure.    The results have been sent to you separately   The findings were explained to the patient in detail and biopsies were also discussed   with him     This patient had a colonoscopy done in February and was found to have multiple polyps revealing them to be tubular adenoma     He was found to have extensive colonic diverticular     Prep was not ideal and repeat colonoscopy is recommended for 1.5 years     Patient has not been seen in the office before he is primarily here because he complains a lot of abdominal bloating gas symptoms nausea and vomiting indigestion acid reflux     He has been taking Prilosec with some partial benefit of the     He is a former smoker he feels like food gets stuck in the lower esophageal area           He also admits to drinking some time he drinks more than other times however denies any alcohol abuse or known liver problems or pancreatitis in the past     He has history for asbestos exposure     Denies any weight loss or loss of appetite     Patient has been complaining of some abdominal pains, off and on cramping  Also complains of abdominal bloating and gas  Has off and on nausea without any sig vomiting  Has some alternating constipation and diarrhea  Has no weight loss  Has some anxiety issues     HISTORY OF PRESENT ILLNESS: Isabel Carmona is a 75 y.o. male with a past history remarkable for , referred for evaluation of       Chief Complaint   Patient presents with    Emesis       pt is here today for vomiting, GERD & generalized abd pain. .     Past Medical,Family, and Social History reviewed and does contribute to the patient presenting condition. Patient's PMH/PSH,SH,PSYCH Hx, MEDs, ALLERGIES, and ROS were all reviewed and updated in the appropriate sections. PAST MEDICAL HISTORY:  Past Medical History        Past Medical History:   Diagnosis Date    Elevated PSA       Dr. Laura Duong    Hearing loss      Keratosis       uses Effudex prn    Prostate nodule       on MRI per patient    Snores       no sleep apnea diagnosis    Wears dentures       full dentures    Wears reading eyeglasses      Wellness examination       Dr. Muir [facility]  9/2021  Has appt . 11/17 2pm            Past Surgical History         Past Surgical History:   Procedure Laterality Date    COLONOSCOPY   09/24/2008     [facility] Dr. Reuben Gage    COLONOSCOPY N/A 2/17/2022     COLONOSCOPY POLYPECTOMY HOT BIOPSY performed by Janel Lion MD at [address]   rt inguinal w/ mesh at [facility] N/A 11/22/2021     FUSION PROSTATE BIOPSY WITH ULTRASOUND, OFFICE NOTIFIED ULTRASOUND performed by Sukhi Kinney MD at [address]         head noncancerous. Just keratosis.     TONSILLECTOMY                CURRENT MEDICATIONS:    Current Medication      Current Outpatient Medications: omeprazole (PRILOSEC) 40 MG delayed release capsule, Take 1 capsule by mouth every morning (before breakfast) (Patient not taking: Reported on 2022), Disp: 30 capsule, Rfl: 0    fluorouracil (EFUDEX) 5 % cream, Apply twice daily to actinic keratosis for 3-4 weeks. Expect redness and irritation. (Patient not taking: Reported on 2022), Disp: 40 g, Rfl: 1        ALLERGIES:   No Known Allergies     FAMILY HISTORY:   Family History   No family history on file. SOCIAL HISTORY:   Social History               Socioeconomic History    Marital status:        Spouse name: Not on file    Number of children: Not on file    Years of education: Not on file    Highest education level: Not on file   Occupational History    Not on file   Tobacco Use    Smoking status: Former Smoker       Packs/day: 1.00       Years: 0.00       Pack years: 0.00       Quit date: 2002       Years since quittin.5    Smokeless tobacco: Never Used   Vaping Use    Vaping Use: Never used   Substance and Sexual Activity    Alcohol use: Yes       Comment: beer few times/wk drinks 3-4    Drug use: Yes       Types: Marijuana (Weed)       Comment: uses edibles. Does not smoke    Sexual activity: Not on file   Other Topics Concern    Not on file   Social History Narrative    Not on file      Social Determinants of Health          Financial Resource Strain: Low Risk    Difficulty of Paying Living Expenses: Not hard at all   Food Insecurity: No Food Insecurity    Worried About [address] in the Last Year: Never true    [address] in the Last Year: Never true   Transportation Needs:    Lack of Transportation (Medical): Not on file    Lack of Transportation (Non-Medical):  Not on file   Physical Activity:    Days of Exercise per Week: Not on file    Minutes of Exercise per Session: Not on file   Stress:    Feeling of Stress : Not on file   Social Connections:    Frequency of Communication with Friends and Family: Not on file    Frequency of Social Gatherings with Friends and Family: Not on file    Attends Muslim Services: Not on file    Active Member of Clubs or Organizations: Not on file    Attends Club or Organization Meetings: Not on file    Marital Status: Not on file   Intimate Partner Violence:    Fear of Current or Ex-Partner: Not on file    Emotionally Abused: Not on file    Physically Abused: Not on file    Sexually Abused: Not on file   Housing Stability:    Unable to Pay for Housing in the Last Year: Not on file    Number of [place] in the Last Year: Not on file    Unstable Housing in the Last Year: Not on file               REVIEW OF SYSTEMS:           Review of Systems  Constitutional: Positive for appetite change and fatigue. Negative for unexpected weight change. HENT: Negative for sore throat, trouble swallowing and voice change. Respiratory: Negative for cough, choking, shortness of breath and wheezing. Cardiovascular: Negative for chest pain, palpitations and leg swelling. Gastrointestinal: Positive for nausea and vomiting. Negative for abdominal distention, abdominal pain, anal bleeding, blood in stool, constipation, diarrhea and rectal pain. Neurological: Negative for dizziness, weakness, light-headedness, numbness and headaches. Hematological: Does not bruise/bleed easily. Psychiatric/Behavioral: Negative for confusion and sleep disturbance. The patient is not nervous/anxious. PHYSICAL EXAMINATION: Vital signs reviewed per the nursing documentation. /81   Temp 97.9 °F (36.6 °C)   Wt 219 lb (99.3 kg)   BMI 28.89 kg/m²   Body mass index is 28.89 kg/m². Physical Exam  Nursing note reviewed. Constitutional:       Appearance: He is well-developed. Comments: Anxious     HENT:     Head: Normocephalic and atraumatic. Eyes:     Conjunctiva/sclera: Conjunctivae normal.      Pupils: Pupils are equal, round, and reactive to light.    Cardiovascular:     Rate and Rhythm: Normal rate and regular rhythm. Pulmonary:     Effort: Pulmonary effort is normal.      Breath sounds: Normal breath sounds. Abdominal:      General: Bowel sounds are normal.      Palpations: Abdomen is soft. Comments: NON TENDER, NON DISTENTED  LIVER SPLEEN AND HERNIAS ARE NOT  PALPABLE  BOWEL SOUNDS ARE POSITIVE      Genitourinary:     Rectum: Normal.   Musculoskeletal:         General: Normal range of motion. Cervical back: Normal range of motion and neck supple. Skin:     General: Skin is warm. Neurological:     Mental Status: He is alert and oriented to person, place, and time. Deep Tendon Reflexes: Reflexes are normal and symmetric. LABORATORY DATA: Reviewed        Lab Results   Component Value Date     WBC 8.1 06/20/2022     HGB 13.8 06/20/2022     HCT 43.4 06/20/2022     MCV 95.6 06/20/2022      06/20/2022      06/20/2022     K 4.2 06/20/2022      06/20/2022     CO2 24 06/20/2022     BUN 10 06/20/2022     CREATININE 0.66 (L) 06/20/2022     LABPROT 7.0 03/09/2013     LABALBU 3.8 06/20/2022     BILITOT 1.95 (H) 06/20/2022     ALKPHOS 85 06/20/2022     AST 12 06/20/2022     ALT 12 06/20/2022                  Lab Results   Component Value Date     RBC 4.54 06/20/2022     HGB 13.8 06/20/2022     MCV 95.6 06/20/2022     MCH 30.4 06/20/2022     MCHC 31.8 06/20/2022     RDW 12.5 06/20/2022     MPV 11.7 06/20/2022     BASOPCT 1 06/20/2022     LYMPHSABS 1.33 06/20/2022     MONOSABS 0.83 06/20/2022     NEUTROABS 5.45 06/20/2022     EOSABS 0.34 06/20/2022     BASOSABS 0.07 06/20/2022            DIAGNOSTIC TESTING:     No results found. Assessment  1. Abdominal pain, generalized   2. Gastroesophageal reflux disease, unspecified whether esophagitis present   3. Class 1 obesity due to excess calories without serious comorbidity with body mass index (BMI) of 32.0 to 32.9 in adult   4. Bilious vomiting with nausea   5. Alcohol use   6.  Former smoker Plan     Plan EGD     Check for C sprue     Quit ETOH      Extensive discussions were done about some lifestyle and dietary modification     The Endoscopic procedure was explained to the patient in detail  The prep and NPO were explained  All the Risks, Benefits, and Alternatives were explained  Risk of Bleeding, Perforation and Cardio Respiratory risks were explained  his questions were answered  The procedure has been scheduled with the  in the office  Patient was asked to give us a call for any questions  The patient has verbalized understanding and agreement to this plan. Pt seems to have signs and symptoms consistent with GERD, acid indigestion and heartburns. He was discussed  in detail about some possible life style and dietary modifications. He was stressed about the maintenance  of appropriate weight and effect of obesity contributing to reflux symptoms. Routine exercise was streesed. Avoidance of Caffeine, nicotine and chocolate were explained. Pt was asked to avoid spices grease and fried food. Advices were also given about avoidance of any kind of fast foods, soda pops and high energy drinks. Pt was advised to place two small block under the head end of the bed which may help with night time reflux. Was advised not to eat any thin at least 2-3 hrs before going to bed and walk especially after dinner     Pt has verbalized understanding and agreement to this plan. Try to avoid ETOH      The patient was instructed to start taking some OTC Probiotics products  These are available over the counter at the Pharmacy stores and Grocery stores  He was explained about the beneficial effects they have in the GI track  They will help to establish the good bacterial trena and will help with the digestion and bowel movements  The patient has verbalized understanding and agreement to this plan        Pt was advised in detail about some life style and dietary modifications.  He was advised about avoidance of caffeine, nicotine and chocolate. Pt was also told to stay away from any kind of fast foods, soda pops. He was also advised to avoid lots of spices, grease and fried food etc.     Instructions were also given about trying to arrange the timing, quality and quantity of food. Instructions were given about using ample amount of fiber including dietary and supplemental fiber either metamucil, bennafiber or citrucell etc.  Pt was advised about drinking ample amount of water without any colors or chemicals. Stress was given about regular exercise. Pt has verbalized understanding and agreement to these modifications. Thank you for allowing me to participate in the care of Mr. Luh Soto. For any further questions please do not hesitate to contact me. I have reviewed and agree with the ROS entered by the MA/Nurse.            Jay Burgess MD, [facility]  Board Certified in Gastroenterology and [address] [place] [address] Gastroenterology  Office #: [phone]                Revision History

## 2022-08-01 NOTE — OP NOTE
PROCEDURE NOTE    DATE OF PROCEDURE: 8/1/2022     SURGEON: Karla Casiano MD    ASSISTANT: None    PREOPERATIVE DIAGNOSIS: DYSPHAGIA  GERD  ABD PAINS  HX OF SMOKING    POSTOPERATIVE DIAGNOSIS: As described below    OPERATION: Upper GI endoscopy with Biopsy    ANESTHESIA: MAC PER ANESTHESIA     ESTIMATED BLOOD LOSS: Less than 50 ml    COMPLICATIONS: None. SPECIMENS:  Was Obtained:     HISTORY: The patient is a 75y.o. year old male with history of above preop diagnosis. I recommended esophagogastroduodenoscopy with possible biopsy and I explained the risk, benefits, expected outcome, and alternatives to the procedure. Risks included but are not limited to bleeding, infection, respiratory distress, hypotension, and perforation of the esophagus, stomach, or duodenum. Patient understands and is in agreement. PROCEDURE: The patient was given IV conscious sedation. The patient's SPO2 remained above 90% throughout the procedure. The gastroscope was inserted orally and advanced under direct vision through the esophagus, through the stomach, through the pylorus, and into the descending duodenum. Findings:    Retropharyngeal area was grossly normal appearing    Esophagus: abnormal: TERTIARY CONTRACTIONS  A LESION IN THE DISTAL ESOPHAGUS WAS NOTED  THIS LESION STARTED AT 36 CM AND EXTENDED IN TO A WHAT APPEARS TO BE A HIATAL HERNIA? 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      Recommendations/Plan:   F/U Biopsies  F/U In Office in 1-2   Discussed with the family  Post sedation patient was stable with stable vital signs and stable O2 saturations    Electronically signed by Renetta Roque MD  on 8/1/2022 at 11:29 AM

## 2022-08-03 LAB — SURGICAL PATHOLOGY REPORT: NORMAL

## 2022-08-10 ENCOUNTER — TELEPHONE (OUTPATIENT)
Dept: GASTROENTEROLOGY | Age: 67
End: 2022-08-10

## 2022-08-10 NOTE — TELEPHONE ENCOUNTER
Called pt to move his appt up from 11/23 to [email or web address] LVM letting pt know his appt was moved up. If pt calls back just please tell him Dr. Radha Brown would like to see him sooner.

## 2022-08-17 ENCOUNTER — OFFICE VISIT (OUTPATIENT)
Dept: GASTROENTEROLOGY | Age: 67
End: 2022-08-17
Payer: MEDICARE

## 2022-08-17 VITALS
BODY MASS INDEX: 27.31 KG/M2 | SYSTOLIC BLOOD PRESSURE: 103 MMHG | WEIGHT: 207 LBS | DIASTOLIC BLOOD PRESSURE: 64 MMHG | TEMPERATURE: 97.5 F

## 2022-08-17 DIAGNOSIS — Z87.891 FORMER SMOKER: ICD-10-CM

## 2022-08-17 DIAGNOSIS — K44.9 HIATAL HERNIA: ICD-10-CM

## 2022-08-17 DIAGNOSIS — D00.1 ADENOCARCINOMA IN SITU OF ESOPHAGUS: Primary | ICD-10-CM

## 2022-08-17 DIAGNOSIS — K21.00 GASTROESOPHAGEAL REFLUX DISEASE WITH ESOPHAGITIS WITHOUT HEMORRHAGE: ICD-10-CM

## 2022-08-17 DIAGNOSIS — K22.89 ESOPHAGEAL MASS: ICD-10-CM

## 2022-08-17 DIAGNOSIS — Z78.9 ALCOHOL USE: ICD-10-CM

## 2022-08-17 DIAGNOSIS — K76.89 LIVER CYST: ICD-10-CM

## 2022-08-17 PROBLEM — C15.9: Status: ACTIVE | Noted: 2022-08-17

## 2022-08-17 PROCEDURE — 99214 OFFICE O/P EST MOD 30 MIN: CPT | Performed by: INTERNAL MEDICINE

## 2022-08-17 PROCEDURE — 1123F ACP DISCUSS/DSCN MKR DOCD: CPT | Performed by: INTERNAL MEDICINE

## 2022-08-17 RX ORDER — OMEPRAZOLE 20 MG/1
20 CAPSULE, DELAYED RELEASE ORAL
Qty: 180 CAPSULE | Refills: 1 | Status: SHIPPED | OUTPATIENT
Start: 2022-08-17

## 2022-08-17 ASSESSMENT — ENCOUNTER SYMPTOMS
ABDOMINAL PAIN: 1
COLOR CHANGE: 0
ANAL BLEEDING: 0
SHORTNESS OF BREATH: 0
WHEEZING: 0
RECTAL PAIN: 0
TROUBLE SWALLOWING: 1
CHOKING: 0
SORE THROAT: 0
BLOOD IN STOOL: 0
VOMITING: 0
COUGH: 0
NAUSEA: 0
DIARRHEA: 0
ABDOMINAL DISTENTION: 0
CONSTIPATION: 1

## 2022-08-17 NOTE — PROGRESS NOTES
GI CLINIC FOLLOW UP    NTERVAL HISTORY:   No referring provider defined for this encounter. Chief Complaint   Patient presents with    Procedure     Pt is here today for a f/u from EGD proc done on 08/01/22, pt last seen for GERD & abd pain. 1. Adenocarcinoma in situ of esophagus    2. Alcohol use    3. Former smoker    4. Liver cyst    5. Hiatal hernia    6. Gastroesophageal reflux disease with esophagitis without hemorrhage    7. Esophageal mass       The patient is here as a follow up of his recent GI procedure. The results have been sent to you separately   The findings were explained to the patient in detail and biopsies were also discussed   with him    He is unfortunately found to have adenocarcinoma of the esophagus with presence of hiatus hernia    Results were explained to the patient in length pictures were shown    He had a CT scan done recently which was reviewed with him    Has not seen the oncologist yet    Mild dysphagia especially with solid food however overall feeling okay and eating well      Not taking any PPIs    Had a colonoscopy done in the recent past was found to have polyps no rectal bleeding melanotic stool            HISTORY OF PRESENT ILLNESS: Ricardo Neff is a 75 y.o. male with a past history remarkable for , referred for evaluation of   Chief Complaint   Patient presents with    Procedure     Pt is here today for a f/u from EGD proc done on 08/01/22, pt last seen for GERD & abd pain. .    Past Medical,Family, and Social History reviewed and does contribute to the patient presenting condition. Patient's PMH/PSH,SH,PSYCH Hx, MEDs, ALLERGIES, and ROS were all reviewed and updated in the appropriate sections.     PAST MEDICAL HISTORY:  Past Medical History:   Diagnosis Date    Elevated PSA     Dr. Monroy January    Hearing loss     Keratosis     uses Effudex prn    Prostate nodule     on MRI per patient    Snores     no sleep apnea diagnosis    Wears dentures Dora Martinez     Comment: uses edibles. Does not smoke    Sexual activity: Not on file   Other Topics Concern    Not on file   Social History Narrative    Not on file     Social Determinants of Health     Financial Resource Strain: Low Risk     Difficulty of Paying Living Expenses: Not hard at all   Food Insecurity: No Food Insecurity    Worried About Running Out of Food in the Last Year: Never true    Ran Out of Food in the Last Year: Never true   Transportation Needs: Not on file   Physical Activity: Not on file   Stress: Not on file   Social Connections: Not on file   Intimate Partner Violence: Not on file   Housing Stability: Not on file         REVIEW OF SYSTEMS:         Review of Systems   Constitutional:  Positive for appetite change (no appetite) and unexpected weight change. Negative for fatigue. HENT:  Positive for trouble swallowing. Negative for sore throat. Eyes:  Negative for visual disturbance. Respiratory:  Negative for cough, choking, shortness of breath and wheezing. Cardiovascular:  Negative for chest pain, palpitations and leg swelling. Gastrointestinal:  Positive for abdominal pain (discomfort) and constipation (Occasional). Negative for abdominal distention, anal bleeding, blood in stool, diarrhea, nausea, rectal pain and vomiting. Skin:  Negative for color change. Allergic/Immunologic: Negative for environmental allergies and food allergies. Neurological:  Negative for dizziness, weakness, light-headedness, numbness and headaches. Hematological:  Does not bruise/bleed easily. Psychiatric/Behavioral:  Negative for confusion and sleep disturbance. The patient is not nervous/anxious. PHYSICAL EXAMINATION: Vital signs reviewed per the nursing documentation. /64   Temp 97.5 °F (36.4 °C)   Wt 207 lb (93.9 kg)   BMI 27.31 kg/m²   Body mass index is 27.31 kg/m². Physical Exam  Nursing note reviewed. Constitutional:       Appearance: He is well-developed. Comments: Anxious    HENT:      Head: Normocephalic and atraumatic. Eyes:      Conjunctiva/sclera: Conjunctivae normal.      Pupils: Pupils are equal, round, and reactive to light. Cardiovascular:      Rate and Rhythm: Normal rate and regular rhythm. Pulmonary:      Effort: Pulmonary effort is normal.      Breath sounds: Normal breath sounds. Abdominal:      General: Bowel sounds are normal.      Palpations: Abdomen is soft. Comments: NON TENDER, NON DISTENTED  LIVER SPLEEN AND HERNIAS ARE NOT  PALPABLE  BOWEL SOUNDS ARE POSITIVE      Genitourinary:     Rectum: Normal.   Musculoskeletal:         General: Normal range of motion. Cervical back: Normal range of motion and neck supple. Skin:     General: Skin is warm. Neurological:      Mental Status: He is alert and oriented to person, place, and time. Deep Tendon Reflexes: Reflexes are normal and symmetric. LABORATORY DATA: Reviewed  Lab Results   Component Value Date    WBC 8.1 06/20/2022    HGB 13.8 06/20/2022    HCT 43.4 06/20/2022    MCV 95.6 06/20/2022     06/20/2022     06/20/2022    K 4.2 06/20/2022     06/20/2022    CO2 24 06/20/2022    BUN 10 06/20/2022    CREATININE 0.66 (L) 06/20/2022    LABPROT 7.0 03/09/2013    LABALBU 3.8 06/20/2022    BILITOT 1.95 (H) 06/20/2022    ALKPHOS 85 06/20/2022    AST 12 06/20/2022    ALT 12 06/20/2022         Lab Results   Component Value Date    RBC 4.54 06/20/2022    HGB 13.8 06/20/2022    MCV 95.6 06/20/2022    MCH 30.4 06/20/2022    MCHC 31.8 06/20/2022    RDW 12.5 06/20/2022    MPV 11.7 06/20/2022    BASOPCT 1 06/20/2022    LYMPHSABS 1.33 06/20/2022    MONOSABS 0.83 06/20/2022    NEUTROABS 5.45 06/20/2022    EOSABS 0.34 06/20/2022    BASOSABS 0.07 06/20/2022         DIAGNOSTIC TESTING:     No results found. Assessment  1. Adenocarcinoma in situ of esophagus    2. Alcohol use    3. Former smoker    4. Liver cyst    5. Hiatal hernia    6.  Gastroesophageal reflux disease with esophagitis without hemorrhage    7. Esophageal mass        Plan    Will refer to oncology     Pt was asked to chew food well  Take time in eating  Sit up or prop up when eating  Don't talk when eating  Walk after finish eating  Use liquids with meals if has issues  The patient has verbalized understanding and agreemenet to this. May need PEG in future depending on his condition    Start omeprazole    Avoid ETOH    Pt seems to have signs and symptoms consistent with GERD, acid indigestion and heartburns. He was discussed  in detail about some possible life style and dietary modifications. He was stressed about the maintenance  of appropriate weight and effect of obesity contributing to reflux symptoms. Routine exercise was streesed. Avoidance of Caffeine, nicotine and chocolate were explained. Pt was asked to avoid spices grease and fried food. Advices were also given about avoidance of any kind of fast foods, soda pops and high energy drinks. Pt was advised to place two small block under the head end of the bed which may help with night time reflux. Was advised not to eat any thin at least 2-3 hrs before going to bed and walk especially after dinner    Pt has verbalized understanding and agreement to this plan. Pt was advised in detail about some life style and dietary modifications. He was advised about avoidance of caffeine, nicotine and chocolate. Pt was also told to stay away from any kind of fast foods, soda pops. He was also advised to avoid lots of spices, grease and fried food etc.     Instructions were also given about trying to arrange the timing, quality and quantity of food. Instructions were given about using ample amount of fiber including dietary and supplemental fiber either metamucil, bennafiber or citrucell etc.  Pt was advised about drinking ample amount of water without any colors or chemicals. Stress was given about regular exercise.     Pt has verbalized understanding and agreement to these modifications. Thank you for allowing me to participate in the care of Mr. Luh Soto. For any further questions please do not hesitate to contact me. I have reviewed and agree with the ROS entered by the MA/Nurse.          Jay Burgess MD, [facility]  Board Certified in Gastroenterology and [address] [place] [address] Gastroenterology  Office #: [phone]

## 2022-08-18 ENCOUNTER — INITIAL CONSULT (OUTPATIENT)
Dept: ONCOLOGY | Age: 67
End: 2022-08-18
Payer: MEDICARE

## 2022-08-18 ENCOUNTER — TELEPHONE (OUTPATIENT)
Dept: ONCOLOGY | Age: 67
End: 2022-08-18

## 2022-08-18 ENCOUNTER — HOSPITAL ENCOUNTER (OUTPATIENT)
Facility: MEDICAL CENTER | Age: 67
End: 2022-08-18

## 2022-08-18 ENCOUNTER — TELEPHONE (OUTPATIENT)
Dept: GASTROENTEROLOGY | Age: 67
End: 2022-08-18

## 2022-08-18 VITALS
HEART RATE: 71 BPM | RESPIRATION RATE: 18 BRPM | HEIGHT: 73 IN | DIASTOLIC BLOOD PRESSURE: 67 MMHG | TEMPERATURE: 99 F | WEIGHT: 205.5 LBS | SYSTOLIC BLOOD PRESSURE: 112 MMHG | BODY MASS INDEX: 27.23 KG/M2

## 2022-08-18 DIAGNOSIS — C15.5 MALIGNANT NEOPLASM OF LOWER THIRD OF ESOPHAGUS (HCC): Primary | ICD-10-CM

## 2022-08-18 PROCEDURE — 99202 OFFICE O/P NEW SF 15 MIN: CPT | Performed by: INTERNAL MEDICINE

## 2022-08-18 PROCEDURE — 99205 OFFICE O/P NEW HI 60 MIN: CPT | Performed by: INTERNAL MEDICINE

## 2022-08-18 NOTE — LETTER
Manjit Guillory MD    8/20/2022     Momo Carmona Jonathan [address]    Dear Dr Sayda Ferrara: Thank you for referring Barbara Siegel, 1955, to me for evaluation. Below are the relevant portions of my assessment and plan of care. Mr. Barbara Siegel is a very pleasant 75 y.o. male with history of multiple co morbidities as listed. Patient is referred for evaluation and further management of recently diagnosed esophageal adenocarcinoma. The patient was doing fairly well except for mild chronic GERD. It was not significant so he did not pay attention to it and he did not receive any treatment for it. For the last few weeks patient started having difficulty swallowing especially solid food. He was evaluated by gastroenterology and he had EGD which showed suspicious lesion at the distal part of the esophagus at 36 cm. Biopsy from the lesion on August 1, 2022 showed adenocarcinoma. Patient is referred for further management. Patient denies any active bleeding. No melena or hematochezia. No hematemesis. No weight loss or decreased appetite. No fever or night sweats. No other complaints. Patient quit smoking more than 20 years ago. He drinks alcohol socially. PAST MEDICAL HISTORY: has a past medical history of Elevated PSA, Hearing loss, Keratosis, Prostate nodule, Snores, Wears dentures, Wears reading eyeglasses, and Wellness examination. PAST SURGICAL HISTORY: has a past surgical history that includes Colonoscopy (09/24/2008); Tonsillectomy; hernia repair; skin biopsy; Prostate biopsy; Prostate biopsy (N/A, 11/22/2021); Colonoscopy (N/A, 2/17/2022); and Upper gastrointestinal endoscopy (N/A, 8/1/2022). CURRENT MEDICATIONS:  has a current medication list which includes the following prescription(s): omeprazole, align prebiotic-probiotic, and fluorouracil. ALLERGIES:  has No Known Allergies.     FAMILY HISTORY: Negative for any hematological or oncological conditions. SOCIAL HISTORY:  reports that he quit smoking about 20 years ago. His smoking use included cigarettes. He smoked an average of 1.00 packs per day. He has never used smokeless tobacco. He reports current alcohol use. He reports current drug use. Drug: Marijuana Jhon Mustard). REVIEW OF SYSTEMS:     · General: Positive for weakness and fatigue. No unanticipated weight loss or decreased appetite. No fever or chills. · Eyes: No blurred vision, eye pain or double vision. · Ears: No hearing problems or drainage. No tinnitus. · Throat: No sore throat, problems with swallowing or dysphagia. · Respiratory: No cough, sputum or hemoptysis. No shortness of breath. No pleuritic chest pain. · Cardiovascular: No chest pain, orthopnea or PND. No lower extremity edema. No palpitation. · Gastrointestinal: As above. · Genitourinary: No dysuria, hematuria, frequency or urgency. · Musculoskeletal: No muscle aches or pains. No limitation of movement. No back pain. No gait disturbance, No joint complaints. · Dermatologic: No skin rashes or pruritus. No skin lesions or discolorations. · Psychiatric: No depression, anxiety, or stress or signs of schizophrenia. No change in mood or affect. · Hematologic: No history of bleeding tendency. No bruises or ecchymosis. No history of clotting problems. · Infectious disease: No fever, chills or frequent infections. · Endocrine: No polydipsia or polyuria. No temperature intolerance. · Neurologic: No headaches or dizziness. No weakness or numbness of the extremities. No changes in balance, coordination,  memory, mentation, behavior. · Allergic/Immunologic: No nasal congestion or hives. No repeated infections. PHYSICAL EXAM:  The patient is not in acute distress. Vital signs: Blood pressure 112/67, pulse 71, temperature 99 °F (37.2 °C), temperature source Temporal, resp. rate 18, height 6' 1.2\" (1.859 m), weight 205 lb 8 oz (93.2 kg). General appearance - well appearing, not in pain or distress  Mental status - good mood, alert and oriented  Eyes - pupils equal and reactive, extraocular eye movements intact  Ears - bilateral TM's and external ear canals normal  Nose - normal and patent, no erythema, discharge or polyps  Mouth - mucous membranes moist, pharynx normal without lesions  Neck - supple, no significant adenopathy  Lymphatics - no palpable lymphadenopathy, no hepatosplenomegaly  Chest - clear to auscultation, no wheezes, rales or rhonchi, symmetric air entry  Heart - normal rate, regular rhythm, normal S1, S2, no murmurs, rubs, clicks or gallops  Abdomen - soft, nontender, nondistended, no masses or organomegaly  Neurological - alert, oriented, normal speech, no focal findings or movement disorder noted  Musculoskeletal - no joint tenderness, deformity or swelling  Extremities - peripheral pulses normal, no pedal edema, no clubbing or cyanosis  Skin - normal coloration and turgor, no rashes, no suspicious skin lesions noted     Review of Diagnostic data:   Lab Results   Component Value Date    WBC 8.1 06/20/2022    HGB 13.8 06/20/2022    HCT 43.4 06/20/2022    MCV 95.6 06/20/2022     06/20/2022       Chemistry        Component Value Date/Time     06/20/2022 1018    K 4.2 06/20/2022 1018     06/20/2022 1018    CO2 24 06/20/2022 1018    BUN 10 06/20/2022 1018    CREATININE 0.66 (L) 06/20/2022 1018        Component Value Date/Time    CALCIUM 9.3 06/20/2022 1018    ALKPHOS 85 06/20/2022 1018    AST 12 06/20/2022 1018    ALT 12 06/20/2022 1018    BILITOT 1.95 (H) 06/20/2022 1018          CT ABDOMEN PELVIS W IV CONTRAST  Narrative: EXAMINATION:  CT OF THE ABDOMEN AND PELVIS WITH CONTRAST 7/14/2022 9:19 am    TECHNIQUE:  CT of the abdomen and pelvis was performed with the administration of  intravenous contrast. Multiplanar reformatted images are provided for review.   Automated exposure control, iterative reconstruction, and/or weight based  adjustment of the mA/kV was utilized to reduce the radiation dose to as low  as reasonably achievable. COMPARISON:  None. HISTORY:  ORDERING SYSTEM PROVIDED HISTORY: Non-intractable vomiting without nausea,  unspecified vomiting type  TECHNOLOGIST PROVIDED HISTORY:  STAT Creatinine as needed:->Yes  See ICDM-10 code attached  Reason for Exam: Non-intractable vomiting without nausea, unspecified  vomiting type, Gastroesophageal reflux disease, unspecified whether  esophagitis present, dysphagia    FINDINGS:  There iare some chronic changes in the lung bases bilaterally with some  atelectatic changes. Some scar. Some findings suggesting old viral  pneumonia residual.  No evidence of pericardial or pleural effusion. The  heart appears within limits of normal for size. No evidence of threshold enlarged adenopathy. The wall of the abdomen and pelvis appears intact. Very small fatty  umbilical hernia. Osseous structures: Mild scoliosis of the lumbar spine, convexity toward the  left, with straightening. Narrowing of the disc spaces, more pronounced at  L5-S1. Spondylitic endplate degenerative changes throughout. Degenerative  changes in the lower facets. Aorta and inferior vena cava: Unremarkable in appearance. Liver: Scattered areas of decreased attenuation density compatible with  cysts. The largest of these resides in the right lower lobe and measures up  to 7.2 by 5.6 cm. Spleen: Unremarkable in appearance. Adrenals: Normal in appearance. Pancreas: Normal in appearance. Right kidney: Peripheral cyst arising from the lower pole measuring 20 mm  diameter. Left kidney: Unremarkable in appearance. Contrast excretion from both kidneys appears symmetrical.  No evidence of  renal or ureteral calculus. GI/bowel: Moderate sized hiatal hernia measuring up to 5.5 cm diameter. Normal distribution.   Very small area of fat stranding adjacent to the  junction of distal descending and sigmoid colon compatible with a small focus  of diverticulitis. A few diverticuli are apparent in this region. (Series 2  images [id number]). Pelvic structures: Very small amount of contrast within the base of the  urinary bladder. No evidence of wall thickening. Compression on the  posterior aspect of the urinary bladder due to a large prostate with the  prostate measuring at least 5.5 x 7.3 cm with minimal irregularity of the  margins and some adjacent shotty lymph nodes. Seminal vesicles are somewhat  asymmetric and lobulated. No evidence of fat stranding in the perirectal fat. Impression: 1. Very small area compatible with diverticulitis at the junction of distal  descending and retrosigmoid colon. 2.  Enlarged prostate. 3.  Benign-appearing cysts in the liver. Single benign-appearing cyst right  kidney. 4.  Degenerative changes in the spine. 5.  Changes in the lung bases as described above. 6.  Moderate sized hiatal hernia. IMPRESSION:   Distal esophageal adenocarcinoma  GERD  Past history of tobacco abuse    PLAN: Records, labs and images were reviewed and discussed with the patient. I explained to the patient the nature of severe cancer, staging, prognosis and treatment. Patient is otherwise healthy. He has minimal upper GI symptoms are secondary to GERD and recently difficulty swallowing solid food. We will finish the staging work-up with PET/CT scan and endoscopic ultrasound. We will refer patient for endoscopic ultrasound and we will meet him again after getting the PET CT scan for final recommendations about management. I explained to the patient that treatment of metastatic disease will be palliative systemic treatment but hopefully he will have early stage disease so we will be treated with surgery or combined chemoradiation followed by surgery overall based on the final staging outcome.   We also discussed management of GERD and he had further management by his gastroenterologist for that matter. He will continue PPIs. Patient's questions were answered to the best of his satisfaction and he verbalized full understanding and agreement. If you have questions, please do not hesitate to call me. I look forward to following Didier Nicole along with you. Sincerely,                            [address] [facility]                            This note is created with the assistance of a speech recognition program.  While intending to generate a document that actually reflects the content of the visit, the document can still have some errors including those of syntax and sound a like substitutions which may escape proof reading. It such instances, actual meaning can be extrapolated by contextual diversion.

## 2022-08-18 NOTE — TELEPHONE ENCOUNTER
[facility] ARRIVES AMBULATORY FOR CONSULTATION APPOINTMENT  DR Isrrael Ramirez IN TO SEE PATIENT  ORDERS RECEIVED  PET/SCAN SOON  REFER TO DR Jackie Forte FOR EUS  RV AFTER TEST RESULTS  PET/CT SCHEDULED WITH JAZMIN FOR 08/25/22 AT 8:30AM ARRIVE BY 8AM @PB  WRITER CALLED DR REEVES'S OFFICE @ [phone] & SPOKE TO KALPESH. [address] [facility] NOT WORK IN THAT OFFICE AND IS WITH [facility]. WRITER CALLED [phone] AND SPOKE TO ALEE. [address]ole[address] Hairston [facility] NOT WORK IN THAT OFFICE BUT ARE ABLE TO SCHEDULE THE EUS WITH DR Jackie Forte. WRITER WAS TRANSFERRED TO [facility] (PROCEDURE ), PT IS SCHEDULED FOR EUS ON 08/24/22 @8AM @ . Michelle Godoy 89.  PREP GIVEN TO PT  MD VISIT 09/08/22 @11AM (WILL BE CANCELLED IF EUS RESULTS ARE NOT BACK)  AVS PRINTED AND GIVEN TO PATIENT WITH INSTRUCTIONS  PATIENT DISCHARGED AMBULATORY

## 2022-08-18 NOTE — PROGRESS NOTES
_           Mr. Davis Araujo is a very pleasant 75 y.o. male with history of multiple co morbidities as listed. Patient is referred for evaluation and further management of recently diagnosed esophageal adenocarcinoma. The patient was doing fairly well except for mild chronic GERD. It was not significant so he did not pay attention to it and he did not receive any treatment for it. For the last few weeks patient started having difficulty swallowing especially solid food. He was evaluated by gastroenterology and he had EGD which showed suspicious lesion at the distal part of the esophagus at 36 cm. Biopsy from the lesion on August 1, 2022 showed adenocarcinoma. Patient is referred for further management. Patient denies any active bleeding. No melena or hematochezia. No hematemesis. No weight loss or decreased appetite. No fever or night sweats. No other complaints. Patient quit smoking more than 20 years ago. He drinks alcohol socially. PAST MEDICAL HISTORY: has a past medical history of Elevated PSA, Hearing loss, Keratosis, Prostate nodule, Snores, Wears dentures, Wears reading eyeglasses, and Wellness examination. PAST SURGICAL HISTORY: has a past surgical history that includes Colonoscopy (09/24/2008); Tonsillectomy; hernia repair; skin biopsy; Prostate biopsy; Prostate biopsy (N/A, 11/22/2021); Colonoscopy (N/A, 2/17/2022); and Upper gastrointestinal endoscopy (N/A, 8/1/2022). CURRENT MEDICATIONS:  has a current medication list which includes the following prescription(s): omeprazole, align prebiotic-probiotic, and fluorouracil. ALLERGIES:  has No Known Allergies. FAMILY HISTORY: Negative for any hematological or oncological conditions. SOCIAL HISTORY:  reports that he quit smoking about 20 years ago. His smoking use included cigarettes. He smoked an average of 1.00 packs per day.  He has never used smokeless tobacco. He reports current alcohol use. He reports current drug use. Drug: Marijuana Tashabhargav Hollins). REVIEW OF SYSTEMS:     General: Positive for weakness and fatigue. No unanticipated weight loss or decreased appetite. No fever or chills. Eyes: No blurred vision, eye pain or double vision. Ears: No hearing problems or drainage. No tinnitus. Throat: No sore throat, problems with swallowing or dysphagia. Respiratory: No cough, sputum or hemoptysis. No shortness of breath. No pleuritic chest pain. Cardiovascular: No chest pain, orthopnea or PND. No lower extremity edema. No palpitation. Gastrointestinal: As above. Genitourinary: No dysuria, hematuria, frequency or urgency. Musculoskeletal: No muscle aches or pains. No limitation of movement. No back pain. No gait disturbance, No joint complaints. Dermatologic: No skin rashes or pruritus. No skin lesions or discolorations. Psychiatric: No depression, anxiety, or stress or signs of schizophrenia. No change in mood or affect. Hematologic: No history of bleeding tendency. No bruises or ecchymosis. No history of clotting problems. Infectious disease: No fever, chills or frequent infections. Endocrine: No polydipsia or polyuria. No temperature intolerance. Neurologic: No headaches or dizziness. No weakness or numbness of the extremities. No changes in balance, coordination,  memory, mentation, behavior. Allergic/Immunologic: No nasal congestion or hives. No repeated infections. PHYSICAL EXAM:  The patient is not in acute distress. Vital signs: Blood pressure 112/67, pulse 71, temperature 99 °F (37.2 °C), temperature source Temporal, resp. rate 18, height 6' 1.2\" (1.859 m), weight 205 lb 8 oz (93.2 kg).      General appearance - well appearing, not in pain or distress  Mental status - good mood, alert and oriented  Eyes - pupils equal and reactive, extraocular eye movements intact  Ears - bilateral TM's and external ear canals normal  Nose - normal and patent, no erythema, discharge or polyps  Mouth - mucous membranes moist, pharynx normal without lesions  Neck - supple, no significant adenopathy  Lymphatics - no palpable lymphadenopathy, no hepatosplenomegaly  Chest - clear to auscultation, no wheezes, rales or rhonchi, symmetric air entry  Heart - normal rate, regular rhythm, normal S1, S2, no murmurs, rubs, clicks or gallops  Abdomen - soft, nontender, nondistended, no masses or organomegaly  Neurological - alert, oriented, normal speech, no focal findings or movement disorder noted  Musculoskeletal - no joint tenderness, deformity or swelling  Extremities - peripheral pulses normal, no pedal edema, no clubbing or cyanosis  Skin - normal coloration and turgor, no rashes, no suspicious skin lesions noted     Review of Diagnostic data:   Lab Results   Component Value Date    WBC 8.1 06/20/2022    HGB 13.8 06/20/2022    HCT 43.4 06/20/2022    MCV 95.6 06/20/2022     06/20/2022       Chemistry        Component Value Date/Time     06/20/2022 1018    K 4.2 06/20/2022 1018     06/20/2022 1018    CO2 24 06/20/2022 1018    BUN 10 06/20/2022 1018    CREATININE 0.66 (L) 06/20/2022 1018        Component Value Date/Time    CALCIUM 9.3 06/20/2022 1018    ALKPHOS 85 06/20/2022 1018    AST 12 06/20/2022 1018    ALT 12 06/20/2022 1018    BILITOT 1.95 (H) 06/20/2022 1018          CT ABDOMEN PELVIS W IV CONTRAST  Narrative: EXAMINATION:  CT OF THE ABDOMEN AND PELVIS WITH CONTRAST 7/14/2022 9:19 am    TECHNIQUE:  CT of the abdomen and pelvis was performed with the administration of  intravenous contrast. Multiplanar reformatted images are provided for review. Automated exposure control, iterative reconstruction, and/or weight based  adjustment of the mA/kV was utilized to reduce the radiation dose to as low  as reasonably achievable. COMPARISON:  None.     HISTORY:  ORDERING SYSTEM PROVIDED HISTORY: Non-intractable vomiting without nausea,  unspecified vomiting type  TECHNOLOGIST PROVIDED HISTORY:  STAT Creatinine as needed:->Yes  See ICDM-10 code attached  Reason for Exam: Non-intractable vomiting without nausea, unspecified  vomiting type, Gastroesophageal reflux disease, unspecified whether  esophagitis present, dysphagia    FINDINGS:  There iare some chronic changes in the lung bases bilaterally with some  atelectatic changes. Some scar. Some findings suggesting old viral  pneumonia residual.  No evidence of pericardial or pleural effusion. The  heart appears within limits of normal for size. No evidence of threshold enlarged adenopathy. The wall of the abdomen and pelvis appears intact. Very small fatty  umbilical hernia. Osseous structures: Mild scoliosis of the lumbar spine, convexity toward the  left, with straightening. Narrowing of the disc spaces, more pronounced at  L5-S1. Spondylitic endplate degenerative changes throughout. Degenerative  changes in the lower facets. Aorta and inferior vena cava: Unremarkable in appearance. Liver: Scattered areas of decreased attenuation density compatible with  cysts. The largest of these resides in the right lower lobe and measures up  to 7.2 by 5.6 cm. Spleen: Unremarkable in appearance. Adrenals: Normal in appearance. Pancreas: Normal in appearance. Right kidney: Peripheral cyst arising from the lower pole measuring 20 mm  diameter. Left kidney: Unremarkable in appearance. Contrast excretion from both kidneys appears symmetrical.  No evidence of  renal or ureteral calculus. GI/bowel: Moderate sized hiatal hernia measuring up to 5.5 cm diameter. Normal distribution. Very small area of fat stranding adjacent to the  junction of distal descending and sigmoid colon compatible with a small focus  of diverticulitis. A few diverticuli are apparent in this region. (Series 2  images [id number]).     Pelvic structures: Very small amount of contrast within the base of the  urinary bladder. No evidence of wall thickening. Compression on the  posterior aspect of the urinary bladder due to a large prostate with the  prostate measuring at least 5.5 x 7.3 cm with minimal irregularity of the  margins and some adjacent shotty lymph nodes. Seminal vesicles are somewhat  asymmetric and lobulated. No evidence of fat stranding in the perirectal fat. Impression: 1. Very small area compatible with diverticulitis at the junction of distal  descending and retrosigmoid colon. 2.  Enlarged prostate. 3.  Benign-appearing cysts in the liver. Single benign-appearing cyst right  kidney. 4.  Degenerative changes in the spine. 5.  Changes in the lung bases as described above. 6.  Moderate sized hiatal hernia. IMPRESSION:   Distal esophageal adenocarcinoma  GERD  Past history of tobacco abuse    PLAN: Records, labs and images were reviewed and discussed with the patient. I explained to the patient the nature of severe cancer, staging, prognosis and treatment. Patient is otherwise healthy. He has minimal upper GI symptoms are secondary to GERD and recently difficulty swallowing solid food. We will finish the staging work-up with PET/CT scan and endoscopic ultrasound. We will refer patient for endoscopic ultrasound and we will meet him again after getting the PET CT scan for final recommendations about management. I explained to the patient that treatment of metastatic disease will be palliative systemic treatment but hopefully he will have early stage disease so we will be treated with surgery or combined chemoradiation followed by surgery overall based on the final staging outcome. We also discussed management of GERD and he had further management by his gastroenterologist for that matter. He will continue PPIs. Patient's questions were answered to the best of his satisfaction and he verbalized full understanding and agreement.

## 2022-08-18 NOTE — TELEPHONE ENCOUNTER
Pt was dx with esophagus CA and needs EUS done by Nerissa Rivas spoke with ANKUSH and scheduled as follows;    ANKUSH Allan Wednesday [email or web address] EUS. Writer gave [facility] all instructions for EUS, they are printing off information sheet and relying information to pt.

## 2022-08-19 ENCOUNTER — TELEPHONE (OUTPATIENT)
Dept: ONCOLOGY | Age: 67
End: 2022-08-19

## 2022-08-19 NOTE — TELEPHONE ENCOUNTER
Name: Wojciech Mas  : 1955  MRN: [id number]    Oncology Navigation- Initial Note:    Intake-  Contact Type: Telephone    Diagnosis: GI- malignant    Home Disposition: Lives alone    Patient needs and barriers to care: Nio Barriers Identified     Referral Source: Health Professional    Interventions-   General Interventions: none     Education/Screenings:  no     Currently on HRT: no     Referrals: none     Biopsy site status: esophagus       Continuum of Care: Diagnosis/Active Treatment    Notes: Writer called patient and introduced self as navigator. Name and contact number provided and writer explained my role in his care moving forward. Pt lives alone, has transportation and his only real concern is being retired on a fixed income, otherwise, no barriers to care identified today. Pt has EUS and PET scheduled next week and f/u with Dr. Samantha Covington on  for results and plan. Pt states he understands everything thus far and felt like Dr. Samantha Covington did a great job explaining his dx. Pt expresses appreciation for call and support. Will continue to follow.     Electronically signed by Farooq Forbes RN on 2022 at 2:14 PM

## 2022-08-24 ENCOUNTER — ANESTHESIA EVENT (OUTPATIENT)
Dept: ENDOSCOPY | Age: 67
End: 2022-08-24
Payer: MEDICARE

## 2022-08-24 ENCOUNTER — HOSPITAL ENCOUNTER (OUTPATIENT)
Age: 67
Setting detail: OUTPATIENT SURGERY
Discharge: HOME | End: 2022-08-24
Attending: INTERNAL MEDICINE | Admitting: INTERNAL MEDICINE
Payer: MEDICARE

## 2022-08-24 ENCOUNTER — APPOINTMENT (OUTPATIENT)
Dept: ULTRASOUND IMAGING | Age: 67
End: 2022-08-24
Attending: INTERNAL MEDICINE
Payer: MEDICARE

## 2022-08-24 ENCOUNTER — ANESTHESIA (OUTPATIENT)
Dept: ENDOSCOPY | Age: 67
End: 2022-08-24
Payer: MEDICARE

## 2022-08-24 VITALS
HEART RATE: 57 BPM | HEIGHT: 73 IN | SYSTOLIC BLOOD PRESSURE: 113 MMHG | WEIGHT: 205 LBS | TEMPERATURE: 97.2 F | DIASTOLIC BLOOD PRESSURE: 81 MMHG | BODY MASS INDEX: 27.17 KG/M2 | OXYGEN SATURATION: 99 % | RESPIRATION RATE: 18 BRPM

## 2022-08-24 PROCEDURE — 2580000003 HC RX 258

## 2022-08-24 PROCEDURE — 7100000011 HC PHASE II RECOVERY - ADDTL 15 MIN: Performed by: INTERNAL MEDICINE

## 2022-08-24 PROCEDURE — 76975 GI ENDOSCOPIC ULTRASOUND: CPT | Performed by: INTERNAL MEDICINE

## 2022-08-24 PROCEDURE — 3609018500 HC EGD US SCOPE W/ADJACENT STRUCTURES: Performed by: INTERNAL MEDICINE

## 2022-08-24 PROCEDURE — 7100000010 HC PHASE II RECOVERY - FIRST 15 MIN: Performed by: INTERNAL MEDICINE

## 2022-08-24 PROCEDURE — 3700000000 HC ANESTHESIA ATTENDED CARE: Performed by: INTERNAL MEDICINE

## 2022-08-24 PROCEDURE — 2500000003 HC RX 250 WO HCPCS

## 2022-08-24 PROCEDURE — 43231 ESOPHAGOSCOP ULTRASOUND EXAM: CPT | Performed by: INTERNAL MEDICINE

## 2022-08-24 PROCEDURE — 6360000002 HC RX W HCPCS

## 2022-08-24 RX ORDER — PROPOFOL 10 MG/ML
INJECTION, EMULSION INTRAVENOUS PRN
Status: DISCONTINUED | OUTPATIENT
Start: 2022-08-24 | End: 2022-08-24 | Stop reason: SDUPTHER

## 2022-08-24 RX ORDER — LIDOCAINE HYDROCHLORIDE 10 MG/ML
INJECTION, SOLUTION EPIDURAL; INFILTRATION; INTRACAUDAL; PERINEURAL PRN
Status: DISCONTINUED | OUTPATIENT
Start: 2022-08-24 | End: 2022-08-24 | Stop reason: SDUPTHER

## 2022-08-24 RX ORDER — SODIUM CHLORIDE, SODIUM LACTATE, POTASSIUM CHLORIDE, CALCIUM CHLORIDE 600; 310; 30; 20 MG/100ML; MG/100ML; MG/100ML; MG/100ML
INJECTION, SOLUTION INTRAVENOUS CONTINUOUS PRN
Status: DISCONTINUED | OUTPATIENT
Start: 2022-08-24 | End: 2022-08-24 | Stop reason: SDUPTHER

## 2022-08-24 RX ORDER — PROPOFOL 10 MG/ML
INJECTION, EMULSION INTRAVENOUS CONTINUOUS PRN
Status: DISCONTINUED | OUTPATIENT
Start: 2022-08-24 | End: 2022-08-24 | Stop reason: SDUPTHER

## 2022-08-24 RX ADMIN — LIDOCAINE HYDROCHLORIDE ANHYDROUS 50 MG: 10 INJECTION, SOLUTION INFILTRATION at 08:51

## 2022-08-24 RX ADMIN — PROPOFOL 100 MCG/KG/MIN: 10 INJECTION, EMULSION INTRAVENOUS at 08:51

## 2022-08-24 RX ADMIN — PROPOFOL 50 MG: 10 INJECTION, EMULSION INTRAVENOUS at 08:51

## 2022-08-24 RX ADMIN — SODIUM CHLORIDE, POTASSIUM CHLORIDE, SODIUM LACTATE AND CALCIUM CHLORIDE: 600; 310; 30; 20 INJECTION, SOLUTION INTRAVENOUS at 08:45

## 2022-08-24 ASSESSMENT — PAIN - FUNCTIONAL ASSESSMENT: PAIN_FUNCTIONAL_ASSESSMENT: NONE - DENIES PAIN

## 2022-08-24 NOTE — H&P
History and Physical    Pt Name: Israel Bob  MRN: [id number]  YOB: 1955  Date of evaluation: 8/24/2022  Primary Care Physician: Jose David Mustafa DO    SUBJECTIVE:   History of Chief Complaint:    Israel Bob is a 75 y.o. male who is scheduled today for ENDOSCOPIC ULTRASOUND WITH RADIO SCOPE. Patient has recent diagnosis of esophageal cancer. He states he has been having dysphagia with intermittent vomiting since May of this year. He denies any history of abdominal pain. Allergies  has No Known Allergies. Medications  Prior to Admission medications    Medication Sig Start Date End Date Taking? Authorizing Provider   omeprazole (PRILOSEC) 20 MG delayed release capsule Take 1 capsule by mouth 2 times daily (before meals) 8/17/22   Parish May MD   Bacillus Coagulans-Inulin (ALIGN PREBIOTIC-PROBIOTIC) 5-1.25 MG-GM CHEW Take by mouth    Historical Provider, MD   fluorouracil (EFUDEX) 5 % cream Apply twice daily to actinic keratosis for 3-4 weeks. Expect redness and irritation. Patient taking differently: Apply twice daily to actinic keratosis for 3-4 weeks. Expect redness and irritation./  using prn 11/1/18   Darwin Paige MD     Past Medical History    has a past medical history of Cancer ([address] Elevated PSA, Hearing loss, Keratosis, Prostate nodule, Snores, Wears dentures, Wears reading eyeglasses, and Wellness examination. Past Surgical History   has a past surgical history that includes Colonoscopy (09/24/2008); Tonsillectomy; hernia repair; skin biopsy; Prostate biopsy; Prostate biopsy (N/A, 11/22/2021); Colonoscopy (N/A, 2/17/2022); and Upper gastrointestinal endoscopy (N/A, 8/1/2022). Social History   reports that he quit smoking about 20 years ago. His smoking use included cigarettes. He smoked an average of 1.00 packs per day. He has never used smokeless tobacco.   reports current alcohol use. reports current drug use. Drug: Marijuana Vimal Myers).   Marital Status   Occupation assessed. IMPRESSIONS:   ADENOCARCINOMA OF ESOPHAGUS  PLANS:   ENDOSCOPIC ULTRASOUND WITH RADIO SCOPE.     RAMYA Schuster CNP   Electronically signed 8/24/2022 at 8:40 AM

## 2022-08-24 NOTE — DISCHARGE INSTRUCTIONS
[facility]    POST-ENDOSCOPY INSTRUCTIONS:    1. ACTIVITY   No driving, operating machinery, or making important decisions for 24 hours. Resume normal activity after 24 hours. You may return to work after 24 hours. 2. DIET     ____ (EGD/ERCP):  Do not eat or drink for one hour after your exam.  You may then   try a sip of water, and if you are able to swallow as usual you may advance to a   regular diet. 3. MEDICATIONS (Do not consume alcohol, tranquilizers, or sleeping medications for 24           hours unless advised by your physician)       _____ Resume your usual medications    4. PHYSICIAN FOLLOW-UP        ____ Please call the office for an appointment/further instructions. Please keep your appointment for your PET Scan.         ____ See your family physician. 5. NORMAL CHANGES YOU MAY EXPERIENCE AFTER ENDOSCOPY:          EGD/ERCP     COLONOSCOPY      Sore throat after EGD/ERCP   Passing of gas for several hours after      A bloated feeling and belching from  Some mild abdominal cramping   air in stomach    If a biopsy/polypectomy was done, you      If a biopsy was done, you may spit   may see some spotting of blood   up some blood tinged mucous  You may feel fatigued for the next 24-48         hours due to the prep and sedation    6. CALL YOUR PHYSICIAN IF YOU EXPERIENCE ANY OF THE FOLLOWING:      A.  Passing blood rectally or vomiting blood (color may be red or black)      B. Severe abdominal pain or tenderness (that is not relieved by passing air)      C.   Fever, chills, or excessive sweating      D.  Persistent nausea or vomiting      E.  Redness or swelling at the IV site    If you have additional questions, PLEASE call your doctor Dr Mylene Muniz [phone] or the [address] [place] [address] GI Unit [phone]

## 2022-08-24 NOTE — OP NOTE
EGD/EUS      Patient:   Syed Noble    :    1955    Facility:   [facility]   Referring/PCP: Hiren Vora DO    Procedure:   Endoscopic ultrasound   Date:     2022   Endoscopist:  Ehsan Peguero MD, [facility]    Indication:   Esophageal cancer staging    Procedure diagnosis: T3 N2 MX esophageal adenocarcinoma    Anesthesia:  MAC    Complications: None    Specimen: None    Description of Procedure:  Informed consent was obtained from the patient after explanation of the procedure including indications, description of the procedure,  benefits and possible risks and complications of the procedure, and alternatives. Questions were answered. The patient's history was reviewed and a directed physical examination was performed prior to the procedure. Patient was monitored throughout the procedure with pulse oximetry and periodic assessment of vital signs. Patient was sedated as noted above. With the patient in the left lateral decubitus position, the Olympus endoechoendoscope was placed in the patient's mouth and under direct visualization passed into the esophagus. The scope was passed to the lower esophagus. The patient tolerated the procedure well and was taken to the recovery area in good condition. Estimated blood loss was none. EUS findings:  Esophagus: There was a large mass starting at 36 cm from incisors which was penetrating through all the layers of the esophagus. This was covering 50% of the circumference. The EUS scope could not traverse the mass. The staging was done by wedging the scope against the tumor. The maximal thickness of the tumor was 4 cm. Lymphadenopathy: There were at least 3 lymph nodes seen in the peritumoral area. The largest lymph node was 9 mm x 10 mm hypoechoic with well defined borders, homogeneous. This was suspicious for metastatic disease. Recommendations:  Follow-up with medical oncology and radiation oncology for neoadjuvant chemoradiation therapy if there is no evidence of metastatic disease. If patient develops dysphagia symptoms then esophageal stenting or J-tube placement can be considered. Resume home medications.     Electronically signed by Josiah Robbins MD, FACG  on 8/24/2022 at 9:16 AM

## 2022-08-24 NOTE — ANESTHESIA PRE PROCEDURE
Department of Anesthesiology  Preprocedure Note       Name:  Cornelia Funes   Age:  75 y.o.  :  1955                                          MRN:  [id number]         Date:  2022      Surgeon: Bao Malone):  Judy Raines MD    Procedure: Procedure(s):  ENDOSCOPIC ULTRASOUND WITH RADIO SCOPE    Medications prior to admission:   Prior to Admission medications    Medication Sig Start Date End Date Taking? Authorizing Provider   omeprazole (PRILOSEC) 20 MG delayed release capsule Take 1 capsule by mouth 2 times daily (before meals) 22   Aura Marino MD   Bacillus Coagulans-Inulin (ALIGN PREBIOTIC-PROBIOTIC) 5-1.25 MG-GM CHEW Take by mouth    Historical Provider, MD   fluorouracil (EFUDEX) 5 % cream Apply twice daily to actinic keratosis for 3-4 weeks. Expect redness and irritation. Patient taking differently: Apply twice daily to actinic keratosis for 3-4 weeks. Expect redness and irritation./  using prn 18   Adelaide Reed MD       Current medications:    No current outpatient medications on file. No current facility-administered medications for this visit.        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 Titi Joaquin Hearing loss     Keratosis     uses Effudex prn    Prostate nodule     on MRI per patient    Snores     no sleep apnea diagnosis    Wears dentures     full dentures    Wears reading eyeglasses     Wellness examination     Dr. [facility]  2021  Has appt .  2pm       Past Surgical History:        Procedure Laterality Date    COLONOSCOPY  2008    [facility] Dr. Pj Gracia     COLONOSCOPY N/A 2022    COLONOSCOPY POLYPECTOMY HOT BIOPSY performed by Jovi Malik MD at [facility]   rt inguinal w/ mesh at [address] N/A 2021    FUSION PROSTATE BIOPSY WITH ULTRASOUND, OFFICE NOTIFIED ULTRASOUND performed by Ramiro Stinson MD at [address]      head noncancerous. Just keratosis.  TONSILLECTOMY      UPPER GASTROINTESTINAL ENDOSCOPY N/A 2022    EGD BIOPSY performed by Jovi Malik MD at [place] [address] History:    Social History     Tobacco Use    Smoking status: Former     Packs/day: 1.00     Years: 0.00     Pack years: 0.00     Types: Cigarettes     Quit date: 2002     Years since quittin.6    Smokeless tobacco: Never   Substance Use Topics    Alcohol use: Yes     Comment: beer few times/wk drinks 3-4                                Counseling given: Not Answered      Vital Signs (Current): There were no vitals filed for this visit.                                            BP Readings from Last 3 Encounters:   22 112/67   22 103/64   22 105/80       NPO Status:                                                                                 BMI:   Wt Readings from Last 3 Encounters:   22 205 lb 8 oz (93.2 kg)   22 207 lb (93.9 kg)   22 214 lb (97.1 kg)     There is no height or weight on file to calculate BMI.    CBC:   Lab Results   Component Value Date/Time    WBC 8.1 2022 10:18 AM    RBC 4.54 2022 10:18 AM HGB 13.8 06/20/2022 10:18 AM    HCT 43.4 06/20/2022 10:18 AM    MCV 95.6 06/20/2022 10:18 AM    RDW 12.5 06/20/2022 10:18 AM     06/20/2022 10:18 AM       CMP:   Lab Results   Component Value Date/Time     06/20/2022 10:18 AM    K 4.2 06/20/2022 10:18 AM     06/20/2022 10:18 AM    CO2 24 06/20/2022 10:18 AM    BUN 10 06/20/2022 10:18 AM    CREATININE 0.66 06/20/2022 10:18 AM    GFRAA >60 06/20/2022 10:18 AM    LABGLOM >60 06/20/2022 10:18 AM    GLUCOSE 99 06/20/2022 10:18 AM    PROT 6.7 06/20/2022 10:18 AM    CALCIUM 9.3 06/20/2022 10:18 AM    BILITOT 1.95 06/20/2022 10:18 AM    ALKPHOS 85 06/20/2022 10:18 AM    AST 12 06/20/2022 10:18 AM    ALT 12 06/20/2022 10:18 AM       POC Tests: No results for input(s): POCGLU, POCNA, POCK, POCCL, POCBUN, POCHEMO, POCHCT in the last 72 hours. Coags: No results found for: PROTIME, INR, APTT    HCG (If Applicable): No results found for: PREGTESTUR, PREGSERUM, HCG, HCGQUANT     ABGs: No results found for: PHART, PO2ART, YWT5CSV, DGK9HEU, BEART, V4OCCUUM     Type & Screen (If Applicable):  No results found for: LABABO, LABRH    Drug/Infectious Status (If Applicable):  No results found for: HIV, HEPCAB    COVID-19 Screening (If Applicable): No results found for: COVID19        Anesthesia Evaluation  Patient summary reviewed no history of anesthetic complications:   Airway: Mallampati: I  TM distance: >3 FB   Neck ROM: full  Mouth opening: > = 3 FB   Dental:    (+) upper dentures, lower dentures and edentulous      Pulmonary:normal exam        (-) COPD and asthma                           Cardiovascular:  Exercise tolerance: good (>4 METS),       (-) valvular problems/murmurs, past MI, CABG/stent, dysrhythmias and  angina    ECG reviewed                        Neuro/Psych:      (-) seizures and CVA           GI/Hepatic/Renal:   (+) hiatal hernia, GERD:,      (-) liver disease and no renal disease      ROS comment: diverticulitis.    Endo/Other:        (-) diabetes mellitus, hypothyroidism               Abdominal:             Vascular:     - DVT and PE. Other Findings:             Anesthesia Plan      MAC     ASA 2       Induction: intravenous. Anesthetic plan and risks discussed with patient. Plan discussed with CRNA.     Attending anesthesiologist reviewed and agrees with Preprocedure content          Narrative & Impression    Normal sinus rhythm  Normal ECG  When compared with ECG of 12-NOV-2002 15:07,  No significant change was found      Specimen Collected: 11/11/21 12:00 LORENZA Vail MD   8/24/2022

## 2022-08-24 NOTE — ANESTHESIA POSTPROCEDURE EVALUATION
Department of Anesthesiology  Postprocedure Note    Patient: Dalia Marcelo  MRN: [id number]  YOB: 1955  Date of evaluation: 8/24/2022      Procedure Summary     Date: 08/24/22 Room / Location: [address] [place] [address]    Anesthesia Start: 5924 Anesthesia Stop: 8652    Procedure: ENDOSCOPIC ULTRASOUND WITH RADIO SCOPE Diagnosis:       Adenocarcinoma of esophagus ([address]      (ADENOCARCINOMA OF ESOPHAGUS)    Surgeons: Tim Diaz MD Responsible Provider: Dewey Soulier, MD    Anesthesia Type: MAC ASA Status: 2          Anesthesia Type: No value filed.     Chacho Phase I: Chacho Score: 10    Chacho Phase II: Chacho Score: 10      Anesthesia Post Evaluation    Patient location during evaluation: PACU  Patient participation: complete - patient participated  Level of consciousness: awake  Pain score: 1  Airway patency: patent  Nausea & Vomiting: no nausea and no vomiting  Complications: no  Cardiovascular status: blood pressure returned to baseline and hemodynamically stable  Respiratory status: acceptable  Hydration status: euvolemic

## 2022-08-25 ENCOUNTER — HOSPITAL ENCOUNTER (OUTPATIENT)
Dept: NUCLEAR MEDICINE | Age: 67
Discharge: HOME | End: 2022-08-27
Payer: MEDICARE

## 2022-08-25 DIAGNOSIS — C15.5 MALIGNANT NEOPLASM OF LOWER THIRD OF ESOPHAGUS (HCC): ICD-10-CM

## 2022-08-25 LAB — GLUCOSE BLD-MCNC: 105 MG/DL (ref 75–110)

## 2022-08-25 PROCEDURE — 82947 ASSAY GLUCOSE BLOOD QUANT: CPT

## 2022-08-25 PROCEDURE — 3430000000 HC RX DIAGNOSTIC RADIOPHARMACEUTICAL: Performed by: INTERNAL MEDICINE

## 2022-08-25 PROCEDURE — 78815 PET IMAGE W/CT SKULL-THIGH: CPT

## 2022-08-25 PROCEDURE — A9552 F18 FDG: HCPCS | Performed by: INTERNAL MEDICINE

## 2022-08-25 PROCEDURE — 2580000003 HC RX 258: Performed by: INTERNAL MEDICINE

## 2022-08-25 RX ORDER — SODIUM CHLORIDE 0.9 % (FLUSH) 0.9 %
10 SYRINGE (ML) INJECTION ONCE
Status: COMPLETED | OUTPATIENT
Start: 2022-08-25 | End: 2022-08-25

## 2022-08-25 RX ORDER — FLUDEOXYGLUCOSE F 18 200 MCI/ML
10 INJECTION, SOLUTION INTRAVENOUS
Status: COMPLETED | OUTPATIENT
Start: 2022-08-25 | End: 2022-08-25

## 2022-08-25 RX ADMIN — FLUDEOXYGLUCOSE F 18 12.42 MILLICURIE: 200 INJECTION, SOLUTION INTRAVENOUS at 08:13

## 2022-08-25 RX ADMIN — SODIUM CHLORIDE, PRESERVATIVE FREE 10 ML: 5 INJECTION INTRAVENOUS at 08:13

## 2022-09-08 ENCOUNTER — INITIAL CONSULT (OUTPATIENT)
Dept: BARIATRICS/WEIGHT MGMT | Age: 67
End: 2022-09-08
Payer: MEDICARE

## 2022-09-08 ENCOUNTER — TELEPHONE (OUTPATIENT)
Dept: ONCOLOGY | Age: 67
End: 2022-09-08

## 2022-09-08 ENCOUNTER — OFFICE VISIT (OUTPATIENT)
Dept: ONCOLOGY | Age: 67
End: 2022-09-08
Payer: MEDICARE

## 2022-09-08 VITALS
BODY MASS INDEX: 27.04 KG/M2 | SYSTOLIC BLOOD PRESSURE: 120 MMHG | HEIGHT: 73 IN | HEART RATE: 80 BPM | WEIGHT: 204 LBS | DIASTOLIC BLOOD PRESSURE: 80 MMHG

## 2022-09-08 VITALS
BODY MASS INDEX: 27.11 KG/M2 | RESPIRATION RATE: 16 BRPM | TEMPERATURE: 98.7 F | DIASTOLIC BLOOD PRESSURE: 71 MMHG | SYSTOLIC BLOOD PRESSURE: 113 MMHG | WEIGHT: 205.5 LBS | HEART RATE: 71 BPM

## 2022-09-08 DIAGNOSIS — C15.5 MALIGNANT NEOPLASM OF LOWER THIRD OF ESOPHAGUS (HCC): Primary | ICD-10-CM

## 2022-09-08 DIAGNOSIS — K21.00 GASTROESOPHAGEAL REFLUX DISEASE WITH ESOPHAGITIS WITHOUT HEMORRHAGE: ICD-10-CM

## 2022-09-08 PROCEDURE — 99204 OFFICE O/P NEW MOD 45 MIN: CPT | Performed by: SURGERY

## 2022-09-08 PROCEDURE — 1123F ACP DISCUSS/DSCN MKR DOCD: CPT | Performed by: SURGERY

## 2022-09-08 PROCEDURE — 99211 OFF/OP EST MAY X REQ PHY/QHP: CPT | Performed by: INTERNAL MEDICINE

## 2022-09-08 PROCEDURE — 1123F ACP DISCUSS/DSCN MKR DOCD: CPT | Performed by: INTERNAL MEDICINE

## 2022-09-08 PROCEDURE — 99215 OFFICE O/P EST HI 40 MIN: CPT | Performed by: INTERNAL MEDICINE

## 2022-09-08 RX ORDER — SODIUM CHLORIDE 9 MG/ML
5-250 INJECTION, SOLUTION INTRAVENOUS PRN
Status: CANCELLED | OUTPATIENT
Start: 2022-09-27

## 2022-09-08 RX ORDER — DIPHENHYDRAMINE HYDROCHLORIDE 50 MG/ML
50 INJECTION INTRAMUSCULAR; INTRAVENOUS ONCE
Status: CANCELLED | OUTPATIENT
Start: 2022-09-27 | End: 2022-09-08

## 2022-09-08 RX ORDER — HEPARIN SODIUM (PORCINE) LOCK FLUSH IV SOLN 100 UNIT/ML 100 UNIT/ML
500 SOLUTION INTRAVENOUS PRN
Status: CANCELLED | OUTPATIENT
Start: 2022-09-27

## 2022-09-08 RX ORDER — ACETAMINOPHEN 325 MG/1
650 TABLET ORAL
Status: CANCELLED | OUTPATIENT
Start: 2022-09-27

## 2022-09-08 RX ORDER — SODIUM CHLORIDE 9 MG/ML
5-40 INJECTION INTRAVENOUS PRN
Status: CANCELLED | OUTPATIENT
Start: 2022-09-27

## 2022-09-08 RX ORDER — SODIUM CHLORIDE 9 MG/ML
INJECTION, SOLUTION INTRAVENOUS CONTINUOUS
Status: CANCELLED | OUTPATIENT
Start: 2022-09-27

## 2022-09-08 RX ORDER — ALBUTEROL SULFATE 90 UG/1
4 AEROSOL, METERED RESPIRATORY (INHALATION) PRN
Status: CANCELLED | OUTPATIENT
Start: 2022-09-27

## 2022-09-08 RX ORDER — PALONOSETRON 0.05 MG/ML
0.25 INJECTION, SOLUTION INTRAVENOUS ONCE
Status: CANCELLED | OUTPATIENT
Start: 2022-09-27 | End: 2022-09-08

## 2022-09-08 RX ORDER — FAMOTIDINE 10 MG/ML
20 INJECTION, SOLUTION INTRAVENOUS
Status: CANCELLED | OUTPATIENT
Start: 2022-09-27

## 2022-09-08 RX ORDER — MEPERIDINE HYDROCHLORIDE 50 MG/ML
12.5 INJECTION INTRAMUSCULAR; INTRAVENOUS; SUBCUTANEOUS PRN
Status: CANCELLED | OUTPATIENT
Start: 2022-09-27

## 2022-09-08 RX ORDER — EPINEPHRINE 1 MG/ML
0.3 INJECTION, SOLUTION, CONCENTRATE INTRAVENOUS PRN
Status: CANCELLED | OUTPATIENT
Start: 2022-09-27

## 2022-09-08 RX ORDER — DIPHENHYDRAMINE HYDROCHLORIDE 50 MG/ML
50 INJECTION INTRAMUSCULAR; INTRAVENOUS
Status: CANCELLED | OUTPATIENT
Start: 2022-09-27

## 2022-09-08 RX ORDER — ONDANSETRON 2 MG/ML
8 INJECTION INTRAMUSCULAR; INTRAVENOUS
Status: CANCELLED | OUTPATIENT
Start: 2022-09-27

## 2022-09-08 RX ORDER — SODIUM CHLORIDE 0.9 % (FLUSH) 0.9 %
5-40 SYRINGE (ML) INJECTION PRN
Status: CANCELLED | OUTPATIENT
Start: 2022-09-27

## 2022-09-08 RX ORDER — FAMOTIDINE 10 MG/ML
20 INJECTION, SOLUTION INTRAVENOUS ONCE
Status: CANCELLED | OUTPATIENT
Start: 2022-09-27 | End: 2022-09-08

## 2022-09-08 NOTE — PATIENT INSTRUCTIONS
Dr Sheyla Vigil for J-tube and mediport  Refer to radiation [facility] [place]  Start chemo radiation in [facility]  RV one week after starting treatment in [facility]

## 2022-09-08 NOTE — TELEPHONE ENCOUNTER
Jerome Blizzard MD VISIT  DR Leoncio Flores IN TO SEE PATIENT  ORDERS RECEIVED  DR WILKS FOR J-TUBE & MEDIPORT  REFER TO RAD/ONC [facility]  START CHEMO/RAD IN [facility]  RV 1 WEEK AFTER STARTING TX IN [facility]  NEW CHEMO ORDERS PENDING [address], AVS PRINTED AND FAXED TO [facility] @ [phone]  REFERRAL TO DR Tolu Lombardo FOR MEDIPORT & J-TUBE . WRITER CALLED [phone] & SPOKE  [facility].  PT IS SCHEDULED FOR  09/08/22 @3:30PM  REFERRAL TO RAD/ONC ([facility]), WRITER CALLED, NO ANSWER LEFT ALCIDES TRISTAN VISIT 1 WEEK AFTER STARTING CHEMO @[facility]  AVS PRINTED AND GIVEN TO PATIENT WITH INSTRUCTIONS  PATIENT DISCHARGED AMBULATORY

## 2022-09-08 NOTE — PROGRESS NOTES
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 Radha Botello is a very pleasant 75 y.o. male with history of multiple co morbidities as listed. Patient is referred for evaluation and further management of recently diagnosed esophageal adenocarcinoma. The patient was doing fairly well except for mild chronic GERD. It was not significant so he did not pay attention to it and he did not receive any treatment for it. For the last few weeks patient started having difficulty swallowing especially solid food. He was evaluated by gastroenterology and he had EGD which showed suspicious lesion at the distal part of the esophagus at 36 cm. Biopsy from the lesion on August 1, 2022 showed adenocarcinoma. Patient is referred for further management. Patient denies any active bleeding. No melena or hematochezia. No hematemesis. No weight loss or decreased appetite. No fever or night sweats. No other complaints. Patient quit smoking more than 20 years ago. He drinks alcohol socially. INTERIM HISTORY:   Patient seen for follow-up esophageal cancer. He continues to have some difficulty swallowing solids. No weight loss. No GI bleeding. No chest pain. No fever. No other complaints. PAST MEDICAL HISTORY: has a past medical history of Cancer ([address] Elevated PSA, Hearing loss, Keratosis, Prostate nodule, Snores, Wears dentures, Wears reading eyeglasses, and Wellness examination.     PAST SURGICAL HISTORY: has a past surgical history that includes Colonoscopy (09/24/2008); Tonsillectomy; hernia repair; skin biopsy; Prostate biopsy; Prostate biopsy (N/A, 11/22/2021); Colonoscopy (N/A, 2/17/2022); Upper gastrointestinal endoscopy (N/A, 8/1/2022); and Upper gastrointestinal endoscopy (N/A, 8/24/2022). CURRENT MEDICATIONS:  has a current medication list which includes the following prescription(s): omeprazole, align prebiotic-probiotic, and fluorouracil. ALLERGIES:  has No Known Allergies. FAMILY HISTORY: Negative for any hematological or oncological conditions. SOCIAL HISTORY:  reports that he quit smoking about 20 years ago. His smoking use included cigarettes. He smoked an average of 1.00 packs per day. He has never used smokeless tobacco. He reports current alcohol use. He reports current drug use. Drug: Marijuana Naomi Finch). REVIEW OF SYSTEMS:     General: Positive for weakness and fatigue. No unanticipated weight loss or decreased appetite. No fever or chills. Eyes: No blurred vision, eye pain or double vision. Ears: No hearing problems or drainage. No tinnitus. Throat: No sore throat, problems with swallowing or dysphagia. Respiratory: No cough, sputum or hemoptysis. No shortness of breath. No pleuritic chest pain. Cardiovascular: No chest pain, orthopnea or PND. No lower extremity edema. No palpitation. Gastrointestinal: As above. Genitourinary: No dysuria, hematuria, frequency or urgency. Musculoskeletal: No muscle aches or pains. No limitation of movement. No back pain. No gait disturbance, No joint complaints. Dermatologic: No skin rashes or pruritus. No skin lesions or discolorations. Psychiatric: No depression, anxiety, or stress or signs of schizophrenia. No change in mood or affect. Hematologic: No history of bleeding tendency. No bruises or ecchymosis. No history of clotting problems. Infectious disease: No fever, chills or frequent infections. Endocrine: No polydipsia or polyuria.  No temperature intolerance. Neurologic: No headaches or dizziness. No weakness or numbness of the extremities. No changes in balance, coordination,  memory, mentation, behavior. Allergic/Immunologic: No nasal congestion or hives. No repeated infections. PHYSICAL EXAM:  The patient is not in acute distress. Vital signs: Blood pressure 113/71, pulse 71, temperature 98.7 °F (37.1 °C), temperature source Temporal, resp. rate 16, weight 205 lb 8 oz (93.2 kg).      General appearance - well appearing, not in pain or distress  Mental status - good mood, alert and oriented  Eyes - pupils equal and reactive, extraocular eye movements intact  Ears - bilateral TM's and external ear canals normal  Nose - normal and patent, no erythema, discharge or polyps  Mouth - mucous membranes moist, pharynx normal without lesions  Neck - supple, no significant adenopathy  Lymphatics - no palpable lymphadenopathy, no hepatosplenomegaly  Chest - clear to auscultation, no wheezes, rales or rhonchi, symmetric air entry  Heart - normal rate, regular rhythm, normal S1, S2, no murmurs, rubs, clicks or gallops  Abdomen - soft, nontender, nondistended, no masses or organomegaly  Neurological - alert, oriented, normal speech, no focal findings or movement disorder noted  Musculoskeletal - no joint tenderness, deformity or swelling  Extremities - peripheral pulses normal, no pedal edema, no clubbing or cyanosis  Skin - normal coloration and turgor, no rashes, no suspicious skin lesions noted     Review of Diagnostic data:   Lab Results   Component Value Date    WBC 8.1 06/20/2022    HGB 13.8 06/20/2022    HCT 43.4 06/20/2022    MCV 95.6 06/20/2022     06/20/2022       Chemistry        Component Value Date/Time     06/20/2022 1018    K 4.2 06/20/2022 1018     06/20/2022 1018    CO2 24 06/20/2022 1018    BUN 10 06/20/2022 1018    CREATININE 0.66 (L) 06/20/2022 1018        Component Value Date/Time    CALCIUM 9.3 06/20/2022 1018    ALKPHOS described on recent abdominal CT exam.    BONES/SOFT TISSUE: No abnormal metabolic activity. INCIDENTAL CT FINDINGS: Benign liver cysts. Diverticulosis with similar to  slightly improved appearance of uncomplicated proximal sigmoid  diverticulitis, as described above. Prostatomegaly. Impression: 1. Metabolically active GE junction mass with associated hiatal hernia. Subcentimeter nearby paraesophageal lymph lymph node and gastrohepatic  ligament lymph nodes are without significant metabolic activity. 2.  No PET-CT evidence for distant metastatic disease. 3.  Minimal metabolic activity associated with the region of recently  described mild acute diverticulitis. No new inflammatory findings otherwise  appreciated in this area. 4.  Prostatomegaly with nonspecific focal area of metabolic activity in the  left posterior prostate, which may correspond to the previously described  abnormality on prostate imaging in 2021. 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 I explained to the patient the nature of severe cancer, staging, prognosis and treatment. Explained the results of the PET CT scan and the endoscopic ultrasound. Obviously patient is having locally advanced disease with no evidence of metastasis. My recommendations to give neoadjuvant combined chemoradiation followed by surgery. We will give weekly Taxol/ carboplatin. I explained the benefits and possible side effects related to chemotherapy, which may include but not limited to nausea, vomiting, hair loss, mouth sores, allergy, neuropathy, fever infection sepsis, anemia and thrombocytopenia. We also discussed nutritional support. I will refer the patient for J-tube insertion. We will arrange for Mediport insertion for starting chemotherapy.   We also discussed management of GERD and he had further management by his gastroenterologist for that matter. He will continue PPIs. Patient's questions were answered to the best of his satisfaction and he verbalized full understanding and agreement. Time spent during this visit included 40 minutes of face to face discussion.

## 2022-09-09 ENCOUNTER — TELEPHONE (OUTPATIENT)
Dept: ONCOLOGY | Age: 67
End: 2022-09-09

## 2022-09-09 RX ORDER — SODIUM CHLORIDE, SODIUM LACTATE, POTASSIUM CHLORIDE, CALCIUM CHLORIDE 600; 310; 30; 20 MG/100ML; MG/100ML; MG/100ML; MG/100ML
1000 INJECTION, SOLUTION INTRAVENOUS CONTINUOUS
Status: CANCELLED | OUTPATIENT
Start: 2022-09-09

## 2022-09-09 NOTE — DISCHARGE INSTRUCTIONS
Preoperative Instructions:    Stop eating solid foods at midnight the night prior to surgery. Stop drinking clear liquids at midnight the night prior to surgery. (Follow bowel prep instructions if instructed by your surgeon.)    Adela Longo at the surgery center (Entrance B) by 5:45 on 9/17/2022  (or as directed by your surgeon's office). Please stop any blood thinning medications as directed by your surgeon or prescribing physician. Failure to stop certain medications may interfere with your scheduled surgery. These may include:  Aspirin, Warfarin (Coumadin), Clopidogrel (Plavix), Ibuprofen (Motrin, Advil), Naproxen (Aleve), Meloxicam (Mobic), Celecoxib (Celebrex), Eliquis, Pradaxa, Xarelto, Effient, Fish Oil, Herbal supplements. You may continue the rest of your medications through the night before surgery unless instructed otherwise. Please take only the following medication(s) the day of surgery with a small sip of water:    Please use and bring inhalers the day of surgery. Please bring CPAP the day of surgery. ____________________________  ____________________________  Signature (Patient)           Signature/date(Provider)      REMINDERS:  ** If you are going home the day of your procedure, you will need a friend or family member to drive you home after your procedure. Your  must be 25years of age or older and able to sign off on your discharge instructions. Taxi cabs or any form of public transportation is not acceptable. ** It is preferable that the friend or family member stay at the hospital throughout your procedure. ** If you are going home the same day as your procedure, someone must remain with you for the first 24 hours after your surgery if you receive anesthesia or sedation. If you do not have someone to stay with you, your procedure may be cancelled. **  Please do not wear any jewelry or body piercings the day of surgery.     PREPARING FOR YOUR SURGERY:     Before surgery, you can play an important role in your own health. Because skin is not sterile, we need to be sure that your skin is as free of germs as possible before surgery by carefully washing before surgery. Preparing or prepping skin before surgery can reduce the risk of a surgical site infection.   Do not shave the area of your body where your surgery will be performed unless you received specific permission from your physician. You will need to shower at home the night before surgery and the morning of surgery with a special soap called chlorhexidine gluconate (CHG*). *Not to be used by people allergic to Chlorhexidine Gluconate (CHG). Following these instructions will help you be sure that your skin is clean before surgery. Instructions on cleaning your skin before surgery: The night before your surgery: You will need to shower with warm water (not hot) and the CHG soap. Use a clean wash cloth and a clean towel. Have clean clothes available to put on after the shower. First wash your hair with regular shampoo. Rinse your hair and body thoroughly to remove the shampoo. Wash your face with your regular soap or water only. Thoroughly rinse your body with warm water from the neck down. Turn water off to prevent rinsing the soap off too soon. With a clean wet washcloth and half of the CHG soap in the bottle, lather your entire body from the neck down. Do not use CHG soap near your eyes or ears to avoid injury to those areas. Wash thoroughly, paying special attention to the area where your surgery will be performed. Wash your body gently for five (5) minutes. Avoid scrubbing your skin too hard. Turn the water back on and rinse your body thoroughly. Pat yourself dry with a clean, soft towel. Do not apply lotion, cream or powder. Dress with clean freshly washed clothes.     The morning of surgery:    Repeat shower following steps above  - using remaining half of CHG soap in bottle. If you have any questions, call the Pre-Admission Testing Unit at [phone]. Day of Surgery/Procedure    As a patient at [facility] you can expect quality medical and nursing care that is centered on your individual needs. Our goal is to make your surgical experience as comfortable as possible  . Directions to the [address] [place] [address] is located at [address] [facility], [address] Please pull into the Emergency [address] parking lot (Entrance B) and park in that lot. We also have additional parking across the street. You will enter the facility following the [address] sign. Please stop at the reception desk where you will be checked in by the staff. If you have any questions please call [phone]. Transportation after your procedure. You will need a friend or family member to drive you home after your procedure. Your  must be 25years of age or older and able to sign off on your discharge instructions. Taxi cabs or any form of public transportation is not acceptable. It is preferable that the friend or family member stay at the hospital throughout your procedure. Someone must remain at home with you for the first 24 hours after your surgery if you receive anesthesia or sedation. If you do not have someone to stay with you, your procedure may be cancelled. Patient Instructions    If you are having any type of anesthesia you are to have nothing to eat or drink after midnight the night before your surgery. This includes gum, mints, water or smoking or chewing tobacco.  The only exception to this is a small sip of water to take with any morning dose of heart, blood pressure, or seizure medications. Bring a list of all medications you take, along with the dose of the medications and how often you take it.   If more convenient bring the pharmacy bottles in a zip lock bag.      Please shower the night before and the morning of surgery with an antibacterial soap. Please use the wipes given to you the night before your surgery after your shower. Unless otherwise told by your physician, please do not shave legs or any part of your body below your neck the night before or day of your surgery. You may shave your face or neck. Brush your teeth but do not swallow water. Bring your inhaler if you are currently using one. Bring your eyeglasses and case with you. No contacts are to be worn the day of surgery. You also may bring your hearing aids. Bring your blood band if one has been given to you. Please do not close the clasp. If you are on C-PAP or Bi-PAP at home and plan on staying in the hospital overnight for your surgery please bring the machine with you. Do not wear any jewelry or body piercings day of surgery. Also, NO lotion, perfume or deodorant to be used the day of surgery. Do not bring any valuables, such as jewelry, cash or credit cards. If you are staying overnight with us, please bring a SMALL bag of personal items. We cannot accommodate large items, like suitcases. Please wear loose, comfortable clothing. If you are potentially going to have a cast or brace bring clothing that will fit over them. In case of illness - If you have cold or flu like symptoms (high fever, runny nose, sore throat, cough, etc.) rash, nausea, vomiting, loose stools, and/or recent contact with someone who has a contagious disease (chicken pox, measles, etc.) Please call your doctor before coming to the hospital.      If your child is having surgery please make arrangements for any other children to be cared for at home on the day of surgery.   Other children are not permitted in recovery room and we want you to be able to spend time with the patient. If other arrangements are not available then we suggest that you have a second adult to stay in the waiting room.       If you have any other questions regarding your procedure or the day of surgery, please call [phone], or [phone]

## 2022-09-09 NOTE — TELEPHONE ENCOUNTER
Name: Ysabel Wagner  : 1955  MRN: [id number]    Oncology Navigation Follow-Up Note    Contact Type:  Telephone    Notes: Writer called patient to follow up with him since his visit yesterday with Dr. Lex Heath to confirm he understands his plan of care. Pt was a bit confused on his appointments coming up and what they were for. Writer did go over each appt with locations and purpose. Pt was appreciative of call. Will continue to follow.     Electronically signed by Leonel Briones RN on 2022 at 3:23 PM

## 2022-09-12 ENCOUNTER — HOSPITAL ENCOUNTER (OUTPATIENT)
Dept: PREADMISSION TESTING | Age: 67
Discharge: HOME | End: 2022-09-16
Payer: MEDICARE

## 2022-09-12 VITALS
OXYGEN SATURATION: 98 % | TEMPERATURE: 98.6 F | HEIGHT: 73 IN | DIASTOLIC BLOOD PRESSURE: 72 MMHG | SYSTOLIC BLOOD PRESSURE: 108 MMHG | HEART RATE: 67 BPM | RESPIRATION RATE: 14 BRPM | BODY MASS INDEX: 27.3 KG/M2 | WEIGHT: 206 LBS

## 2022-09-12 LAB
ANION GAP SERPL CALCULATED.3IONS-SCNC: 17 MMOL/L (ref 9–17)
BUN BLDV-MCNC: 12 MG/DL (ref 8–23)
CALCIUM SERPL-MCNC: 9.3 MG/DL (ref 8.6–10.4)
CHLORIDE BLD-SCNC: 108 MMOL/L (ref 98–107)
CO2: 22 MMOL/L (ref 20–31)
CREAT SERPL-MCNC: 0.66 MG/DL (ref 0.7–1.2)
GFR AFRICAN AMERICAN: >60 ML/MIN
GFR NON-AFRICAN AMERICAN: >60 ML/MIN
GFR SERPL CREATININE-BSD FRML MDRD: ABNORMAL ML/MIN/{1.73_M2}
GLUCOSE BLD-MCNC: 103 MG/DL (ref 70–99)
HCT VFR BLD CALC: 43.7 % (ref 40.7–50.3)
HEMOGLOBIN: 14.2 G/DL (ref 13–17)
INR BLD: 1
MCH RBC QN AUTO: 30 PG (ref 25.2–33.5)
MCHC RBC AUTO-ENTMCNC: 32.5 G/DL (ref 28.4–34.8)
MCV RBC AUTO: 92.4 FL (ref 82.6–102.9)
NRBC AUTOMATED: 0 PER 100 WBC
PDW BLD-RTO: 13.1 % (ref 11.8–14.4)
PLATELET # BLD: 286 K/UL (ref 138–453)
PMV BLD AUTO: 11 FL (ref 8.1–13.5)
POTASSIUM SERPL-SCNC: 4.2 MMOL/L (ref 3.7–5.3)
PROTHROMBIN TIME: 11 SEC (ref 9.1–12.3)
RBC # BLD: 4.73 M/UL (ref 4.21–5.77)
SODIUM BLD-SCNC: 147 MMOL/L (ref 135–144)
WBC # BLD: 8.6 K/UL (ref 3.5–11.3)

## 2022-09-12 PROCEDURE — 36415 COLL VENOUS BLD VENIPUNCTURE: CPT

## 2022-09-12 PROCEDURE — 80048 BASIC METABOLIC PNL TOTAL CA: CPT

## 2022-09-12 PROCEDURE — 85610 PROTHROMBIN TIME: CPT

## 2022-09-12 PROCEDURE — 85027 COMPLETE CBC AUTOMATED: CPT

## 2022-09-12 RX ORDER — HEPARIN SODIUM 5000 [USP'U]/ML
5000 INJECTION, SOLUTION INTRAVENOUS; SUBCUTANEOUS ONCE
Status: CANCELLED | OUTPATIENT
Start: 2022-09-12 | End: 2022-09-12

## 2022-09-12 NOTE — PROGRESS NOTES
Anesthesia Focused Assessment    Has patient ever tested positive for COVID? Yes, possibly in 2019, suspected but not tested. STOP-BANG Sleep Apnea Questionnaire    SNORE loudly (heard through closed doors)? Yes  TIRED, fatigued, sleepy during daytime? No  OBSERVED stopping breathing during sleep? No  High blood PRESSURE being treated? No    BMI over 35? No  AGE over 48? Yes  NECK circumference over 16\"? No  GENDER (male)? Yes             Total 3  High risk 5-8  Intermediate risk 3-4  Low risk 0-2    Obstructive Sleep Apnea: snores  If YES, machine used: not tested     Type 1 DM:   no  T2DM:  no    Coronary Artery Disease:  no  Hypertension:  no    Active smoker:  quit 2002  Drinks Alcohol:  weekly    Dentition: full dentures, lower implant x 2    Defib / AICD / Pacemaker: no      Renal Failure/dialysis:  no    Patient was evaluated in PAT & anesthesia guidelines were applied. NPO guidelines, medication instructions and scheduled arrival time were reviewed with patient.   I advised patient to please contact the surgeon's office, ahead of time if possible, if any new signs or symptoms of illness, infection, rash, etc    Hx of anesthesia complications:  no  Family hx of anesthesia complications:  no                                                                                                                     Anesthesia contacted:   no  Medical or cardiac clearance ordered: no    Joe Montoya PA-C  9/12/22  1:52 PM

## 2022-09-13 ENCOUNTER — TELEPHONE (OUTPATIENT)
Dept: RADIATION ONCOLOGY | Age: 67
End: 2022-09-13

## 2022-09-13 ENCOUNTER — HOSPITAL ENCOUNTER (OUTPATIENT)
Dept: RADIATION ONCOLOGY | Age: 67
Discharge: HOME | End: 2022-09-13
Payer: MEDICARE

## 2022-09-13 VITALS
BODY MASS INDEX: 27.42 KG/M2 | TEMPERATURE: 98.1 F | SYSTOLIC BLOOD PRESSURE: 119 MMHG | DIASTOLIC BLOOD PRESSURE: 71 MMHG | WEIGHT: 207.8 LBS | RESPIRATION RATE: 16 BRPM | HEART RATE: 61 BPM

## 2022-09-13 PROCEDURE — 99205 OFFICE O/P NEW HI 60 MIN: CPT | Performed by: RADIOLOGY

## 2022-09-13 PROCEDURE — 99213 OFFICE O/P EST LOW 20 MIN: CPT | Performed by: RADIOLOGY

## 2022-09-13 NOTE — TELEPHONE ENCOUNTER
Pt seen at [facility] rad/onc today for consult with Dr. Salvador Nicely. Pt prefers tx at [facility] office and will get chemo @[facility]. I called Dakota Vu., scheduled SIM appt at [facility] on 9/19/22 @1:00. Address /phone appt time given to pt. Along w/copy of consent signed today in office.

## 2022-09-13 NOTE — PROGRESS NOTES
Referring Physician: Dr. Diane Mendiola:    09/13/22 1045   BP: 119/71   Pulse: 61   Resp: 16   Temp: 98.1 °F (36.7 °C)    :     Pain Assessment: None - Denies Pain          Wt Readings from Last 1 Encounters:   09/13/22 207 lb 12.8 oz (94.3 kg)        Body mass index is 27.42 kg/m². Immunizations:    Influenza status:    []   Current   []   Patient declined    Pneumococcal status:  []   Current  [x]   Patient declined  Covid status:   [x]  Dose #1:                     [x]  Dose #2:     [x]  Booster:               []  Patient declined    Smoking Status:    [] Smoker - PPD:   [x] Nonsmoker - Quit Date:  20 years ago (occasional Marijuana smoking)            [] Never a smoker      No chief complaint on file. Cancer Staging  No matching staging information was found for the patient. Prior Radiation Therapy? No   If yes, site treated:   Facility:                             Date:    Concurrent Chemo/radiation? No   If yes, start date:    Prior Chemotherapy? No   If yes    Facility:                             Date:    Prior Hormonal Therapy or Contraceptive Medications? No   If yes,  Medication:                                Duration:    Head and Neck Cancer? No   If yes, please remind physician to place swallow study order and speech therapy order. Pacemaker/Defibulator/ICD:  No    Do you have any musculoskeletal diseases, Lupus or arthritic conditions? No   If yes, are you currently taking Methotrexate? []  Yes  [x]  No                 Current Outpatient Medications   Medication Sig Dispense Refill    omeprazole (PRILOSEC) 20 MG delayed release capsule Take 1 capsule by mouth 2 times daily (before meals) 180 capsule 1    Bacillus Coagulans-Inulin (ALIGN PREBIOTIC-PROBIOTIC) 5-1.25 MG-GM CHEW Take by mouth      fluorouracil (EFUDEX) 5 % cream Apply twice daily to actinic keratosis for 3-4 weeks. Expect redness and irritation.  (Patient taking differently: Apply twice daily to actinic keratosis for 3-4 weeks. Expect redness and irritation./  using prn) 40 g 1     No current facility-administered medications for this encounter. Past Medical History:   Diagnosis Date    Cancer [facility])     esophageal    Dental root implant present     2 metal implants in lower jaw    Elevated PSA     Dr. Laura Duong    Hearing loss     Hiatal hernia     Keratosis     uses Effudex prn    Prostate nodule     on MRI per patient    Snores     no sleep apnea diagnosis    Under care of team 09/12/2022    Oncology: Shakir Alexandra, last visit 9/2022    Wears dentures     full dentures    Wears reading eyeglasses     Wellness examination     PCP:Dr. Fabiola Fernández [facility], last visit 8/2022       Past Surgical History:   Procedure Laterality Date    COLONOSCOPY  09/24/2008    [facility] Dr. Reuben Gage     COLONOSCOPY N/A 2/17/2022    COLONOSCOPY POLYPECTOMY HOT BIOPSY performed by Janel Lion MD at [address]   rt inguinal w/ mesh at [facility] 11/22/2021    FUSION PROSTATE BIOPSY WITH ULTRASOUND, OFFICE NOTIFIED ULTRASOUND performed by Sukhi Kinney MD at [address]      head noncancerous. Just keratosis. TONSILLECTOMY      UPPER GASTROINTESTINAL ENDOSCOPY N/A 8/1/2022    EGD BIOPSY performed by Janel Lion MD at [address] [facility] N/A 8/24/2022    ENDOSCOPIC ULTRASOUND WITH RADIO SCOPE performed by Verna Rico MD at [facility]       History reviewed. No pertinent family history.     Social History     Socioeconomic History    Marital status:      Spouse name: Not on file    Number of children: Not on file    Years of education: Not on file    Highest education level: Not on file   Occupational History    Not on file   Tobacco Use    Smoking status: Former     Packs/day: 1.00     Years: 0.00     Pack years: 0.00     Types: Cigarettes     Quit date: 1/1/2002 Years since quittin.7    Smokeless tobacco: Never   Vaping Use    Vaping Use: Never used   Substance and Sexual Activity    Alcohol use: Yes     Comment: beer few times/wk drinks 3-4    Drug use: Yes     Types: Marijuana (Weed)     Comment: uses edibles-once a week, smokes marijuana 1-2 times a week. Sexual activity: Not on file   Other Topics Concern    Not on file   Social History Narrative    Not on file     Social Determinants of Health     Financial Resource Strain: Low Risk     Difficulty of Paying Living Expenses: Not hard at all   Food Insecurity: No Food Insecurity    Worried About Running Out of Food in the Last Year: Never true    Ran Out of Food in the Last Year: Never true   Transportation Needs: Not on file   Physical Activity: Not on file   Stress: Not on file   Social Connections: Not on file   Intimate Partner Violence: Not on file   Housing Stability: Not on file             FALLS RISK SCREEN  Instructions:  Assess the patient and enter the appropriate indicators that are present for fall risk identification. Total the numbers entered and assign a fall risk score from Table 2.  Reassess patient at a minimum every 12 weeks or with status change. Assessment   Date  2022     1. Mental Ability: confusion/cognitively impaired 0     2. Elimination Issues: incontinence, frequency 0       3. Ambulatory: use of assistive devices (walker, cane, off-loading devices),        attached to equipment (IV pole, oxygen) 0     4. Sensory Limitations: dizziness, vertigo, impaired vision 0     5. Age less than 65        0     6. Age 72 or greater 1     7. Medication: diuretics, strong analgesics, hypnotics, sedatives,        antihypertensive agents 0   8. Falls:  recent history of falls within the last 3 months (not to include slipping or        tripping) 0   TOTAL 1    If score of 4 or greater was education given?  No       TABLE 2   Risk Score Risk Level Plan of Care   0-3 Little or  No Risk 1.  Provide assistance as indicated for ambulation activities  2. Reorient confused/cognitively impaired patient  3. Call-light/bell within patient's reach  4. Chair/bed in low position, stretcher/bed with siderails up except when performing patient care activities  5. Educate patient/family/caregiver on falls prevention  6.  Reassess in 12 weeks or with any noted change in patient condition which places them at a risk for a fall   4-6 Moderate Risk 1. Provide assistance as indicated for ambulation activities  2. Reorient confused/cognitively impaired patient  3. Call-light/bell within patient's reach  4. Chair/bed in low position, stretcher/bed with siderails up except when performing patient care activities  5. Educate patient/family/caregiver on falls prevention     7 or   Higher High Risk 1. Place patient in easily observable treatment room  2. Patient attended at all times by family member or staff  3. Provide assistance as indicated for ambulation activities  4. Reorient confused/cognitively impaired patient  5. Call-light/bell within patient's reach  6. Chair/bed in low position, stretcher/bed with siderails up except when performing patient care activities  7. Educate patient/family/caregiver on falls prevention             Assessment/Plan: Patient was seen today for consultation. He is able to eat regular food if he chews it thoroughly and in small amounts. He is scheduled for a J Tube and Port Placement this Saturday. Dr. Pedro Tariq discussed concurrent chemo/RT for esophageal cancer. Patient plans on getting his chemo at [address] and so he was scheduled for simulation at [facility] and consent was signed.           Remigio Del Rosario RN 9/13/2022 11:01 AM

## 2022-09-14 NOTE — CONSULTS
[address]            Radiation Oncology          [address] [facility] [address]        Ilya Field: [phone]        F: [phone]       [employer] [address] [place] [address]       Radiation Oncology   [address] [facility]       Ilya Field: [phone]       F: [phone]       [employer]        2022    Patient: Martha Yuen   YOB: 1955       Dear Dr Derik Mendez:    Thank you for referring Martha Yuen to me for evaluation. Below are the relevant portions of my assessment and plan of care. If you have questions, please do not hesitate to call me. I look forward to following this patient along with you. Sincerely,    Electronically signed by Merline Flow, MD on 22 at 8:54 AM EDT    CC: Patient Care Team:  Olive Cogan, DO as PCP - General (Family Medicine)  Olive Cogan, DO as PCP - [facility]  Melissa Mehta RN as Nurse Navigator (Oncology)  ------------------------------------------------------------------------------------------------------------------------------------------------------------------------------------------      RADIATION ONCOLOGY NEW PATIENT VISIT:    Date of Service: 2022    Location:  [facility] Radiation Oncology,   [address] [facility]   [phone]     Patient ID:   Martha Yuen  : 1955   MRN: [id number]    CHIEF COMPLAINT: \"Esophageal cancer\"    DIAGNOSIS:  Cancer Staging  Malignant neoplasm of lower third of esophagus [facility])  Staging form: Esophagus - Adenocarcinoma, AJCC 8th Edition  - Clinical stage from 2022: Stage RED (cT3, cN2, cM0, GX) - Signed by Merline Flow, MD on 2022      HISTORY OF PRESENT ILLNESS:   Martha Yuen is a 75 y.o. male with a history of chronic GERD who presented to GI after noticing some difficulty with swallowing certain solid foods.   Patient had an EGD and colonoscopy in August 1, 2022 which noted a lesion of the distal esophagus. Patient had a biopsy which came back positive unfortunately for adenocarcinoma. Patient was therefore referred to have medical oncology will recommended patient undergo imaging with PET scan which showed a metabolically active lesion at the GE junction on August 25, 2022. Patient also had a EUS done on August 24, 2022 which revealed a T3N2 stage diagnosis. Patient was referred to us today in radiation oncology to discuss role of definitive treatment with concurrent chemoradiation treatments. Patient is also scheduled to undergo J-tube and port placement this upcoming weekend. Patient does note that he is able to eat a normal diet though does has to be cautious. If he eats too fast he will have emesis. He denies any headaches, dizziness, hemoptysis, hematemesis, abdominal pain, nausea, diarrhea, bony pain, swelling, or weight loss.         PRIOR RADIATION HISTORY:  No prior history of radiation therapy    PACEMAKER: None    PAST MEDICAL HISTORY:  Past Medical History:   Diagnosis Date    Cancer ([address]     esophageal    Dental root implant present     2 metal implants in lower jaw    Elevated PSA     Dr. Cleo Staples    Hearing loss     Hiatal hernia     Keratosis     uses Effudex prn    Prostate nodule     on MRI per patient    Snores     no sleep apnea diagnosis    Under care of team 09/12/2022    Oncology: Owen Evans, last visit 9/2022    Wears dentures     full dentures    Wears reading eyeglasses     Wellness examination     PCP:Dr. Winn [facility], last visit 8/2022       PAST SURGICAL HISTORY:  Past Surgical History:   Procedure Laterality Date    COLONOSCOPY  09/24/2008    [facility] Dr. Barb Ryan     COLONOSCOPY N/A 2/17/2022    COLONOSCOPY POLYPECTOMY HOT BIOPSY performed by Naida Robles MD at [address] [place], [place] [address]ate Dr    inguinal w/ mesh at [place] [address] PROSTATE BIOPSY N/A 2021    FUSION PROSTATE BIOPSY WITH ULTRASOUND, OFFICE NOTIFIED ULTRASOUND performed by Anabella Melton MD at [address]      head noncancerous. Just keratosis. TONSILLECTOMY      UPPER GASTROINTESTINAL ENDOSCOPY N/A 2022    EGD BIOPSY performed by Karla Casiano MD at [address] [facility] N/A 2022    ENDOSCOPIC ULTRASOUND WITH RADIO SCOPE performed by Florencio Walter MD at [facility]       MEDICATIONS:    Current Outpatient Medications:     omeprazole (PRILOSEC) 20 MG delayed release capsule, Take 1 capsule by mouth 2 times daily (before meals), Disp: 180 capsule, Rfl: 1    Bacillus Coagulans-Inulin (ALIGN PREBIOTIC-PROBIOTIC) 5-1.25 MG-GM CHEW, Take by mouth, Disp: , Rfl:     fluorouracil (EFUDEX) 5 % cream, Apply twice daily to actinic keratosis for 3-4 weeks. Expect redness and irritation. (Patient taking differently: Apply twice daily to actinic keratosis for 3-4 weeks. Expect redness and irritation./  using prn), Disp: 40 g, Rfl: 1    ALLERGIES:   No Known Allergies    SOCIAL HISTORY:  Social History     Socioeconomic History    Marital status:      Spouse name: None    Number of children: None    Years of education: None    Highest education level: None   Tobacco Use    Smoking status: Former     Packs/day: 1.00     Years: 0.00     Pack years: 0.00     Types: Cigarettes     Quit date: 2002     Years since quittin.7    Smokeless tobacco: Never   Vaping Use    Vaping Use: Never used   Substance and Sexual Activity    Alcohol use: Yes     Comment: beer few times/wk drinks 3-4    Drug use: Yes     Types: Marijuana (Weed)     Comment: uses edibles-once a week, smokes marijuana 1-2 times a week.      Social Determinants of Health     Financial Resource Strain: Low Risk     Difficulty of Paying Living Expenses: Not hard at all   Food Insecurity: No Food Insecurity    Worried About [address] in the Last Year: Never true    Ran Out of Food in the Last Year: Never true       FAMILY HISTORY:  History reviewed. No pertinent family history. REVIEW OF SYSTEMS:    GENERAL/CONSTITUTIONAL: The patient denies fever, fatigue, weakness, weight gain or weight loss. HEENT: The patient denies pain, redness, loss of vision, double or blurred vision. The patient denies ringing in the ears, loss of hearing, hoarseness, or painful swallowing. CARDIOVASCULAR: The patient denies chest pain, irregular heartbeats, sudden changes in heartbeat or palpitation. RESPIRATORY: The patient denies shortness of breath, cough, hemoptysis. GASTROINTESTINAL: (+) Trouble swallowing and vomiting. The patient denies nausea, diarrhea, constipation, blood in the stools. GENITOURINARY: The patient denies difficult urination, pain or burning with urination, blood in the urine. MUSCULOSKELETAL: The patient denies arm, buttock, thigh or calf cramps. No joint or muscle pain. SKIN: The patient denies easy bruising, skin redness, skin rash, hives. NEUROLOGIC: The patient denies headache, dizziness, fainting, muscle spasm, loss of consciousness. ENDOCRINE: The patient denies intolerance to hot or cold temperature, flushing. HEMATOLOGIC/LYMPHATIC: The patient denies anemia, bleeding tendency or clotting tendency. ALLERGIC/IMMUNOLOGIC: The patient denies rhinitis, asthma, skin sensitivity. PYSCHOLOGIC: The patient denies any depression, anxiety, or confusion. A full 14 point review of systems was performed and assessed and found to be negative except as noted above. PHYSICAL EXAMINATION:    CHAPERONE: Not Required    ECO Asymptomatic    VITAL SIGNS: /71   Pulse 61   Temp 98.1 °F (36.7 °C) (Temporal)   Resp 16   Wt 207 lb 12.8 oz (94.3 kg)   BMI 27.42 kg/m²   GENERAL:  General appearance is that of a well-nourished, well-developed in no apparent distress. HEENT: Normocephalic, atraumatic, EOMI, moist mucosa, no erythema.    NECK: No adenopathy or a palpable thyroid mass, trachea is midline. LYMPHATICS: No cervical, supraclavicular adenopathy. HEART:  Normal rate and regular rhythm, normal heart sounds. LUNGS:  Pulmonary effort normal. Normal breath sounds. ABDOMEN:  Soft, nontender, non distended, and no hepatosplenomegaly. EXTREMITIES:  No edema. No calf tenderness. MSK:  No spinal tenderness. Normal ROM. NEUROLOGICAL: Alert and oriented. Strength and sensation intact bilaterally. No focal deficits. PSYCH: Mood normal, behavior normal.  SKIN: No erythema, no desquamation. LABS:  WBC   Date Value Ref Range Status   09/12/2022 8.6 3.5 - 11.3 k/uL Final     Segs Absolute   Date Value Ref Range Status   06/20/2022 5.45 1.50 - 8.10 k/uL Final     Hemoglobin   Date Value Ref Range Status   09/12/2022 14.2 13.0 - 17.0 g/dL Final     Platelets   Date Value Ref Range Status   09/12/2022 286 138 - 453 k/uL Final     No results found for: , CEA  No results found for:   PSA   Date Value Ref Range Status   08/13/2021 18.86 (H) <4.1 ug/L Final     Comment:     The Roche \"ECLIA\" assay is used. Results obtained with different assay methods cannot be   used interchangeably. 06/07/2016 6.83 (H) <4.1 ug/L Final     Comment:     The Roche \"ECLIA\" assay is used. Results obtained with different assay methods   cannot be used interchangeably. Performed at [facility] [address] [place] [address] [phone]     03/16/2015 6.01 (H) <4.1 ug/L Final     Comment:     The Roche \"ECLIA\" assay is used. Results obtained with different assay methods   cannot be used interchangeably. Performed at [address] [place] [address] [phone]     05/23/2013 6.8 (H) 0.0 - 4.0 ug/L Final     Comment:     Siemens Immulite 2000 immunometric chemiluminescent assay is used. Results   obtained with different assay methods or instruments cannot be used   interchangeably.    05/20/2013 5.13 (H) 0 - 4 ug/L Final Comment:     Performed at [facility] [address] [facility], [facility], [place] [address] [phone]         IMAGIN22 PET Scan  Impression   1. Metabolically active GE junction mass with associated hiatal hernia. Subcentimeter nearby paraesophageal lymph lymph node and gastrohepatic   ligament lymph nodes are without significant metabolic activity. 2.  No PET-CT evidence for distant metastatic disease. 3.  Minimal metabolic activity associated with the region of recently   described mild acute diverticulitis. No new inflammatory findings otherwise   appreciated in this area. 4.  Prostatomegaly with nonspecific focal area of metabolic activity in the   left posterior prostate, which may correspond to the previously described   abnormality on prostate imaging in .     22 EUS  Procedure diagnosis: T3 N2 MX esophageal adenocarcinoma      PATHOLOGY:  22  EGD colonoscopy biopsy  -- Diagnosis --     A. Duodenum, biopsy:     -  Normal duodenal mucosa. B.  Distal esophagus, lesion, biopsy:     -  Adenocarcinoma. ASSESSMENT AND PLAN:   Ysabel Wagner is a 75 y.o. male with a Cancer Staging  Malignant neoplasm of lower third of esophagus ([facility] [address]  Staging form: Esophagus - Adenocarcinoma, AJCC 8th Edition  - Clinical stage from 2022: Stage RED (cT3, cN2, cM0, GX) - Signed by Carol Iglesias MD on 2022       We reviewed with the patient the testing that has been done so far, including imaging and pathology results. We also reviewed their staging based on the testing that as been done and the recommendations per the NCCN guidelines. We discussed the options of treatment, including surgery, chemotherapy, and radiation, and the rationale of why radiation therapy would be indicated for this diagnosis. We also discussed that they have the option to not pursue our recommended treatment as well.     We reviewed the logistics of radiation treatment planning, including a CT Simulation session, as well as daily treatments for approximately 6 weeks. With regards to radiation to the distal esophagus, I discussed the possible short-term side effects which included skin irritation (causing redness, dryness or peeling), hair loss in treated area, tiredness, low blood counts (causing infection or bleeding), abdominal pain, nausea/vomiting and diarrhea. Possible long-term side effects discussed included hyperpigmentation of the skin, damage to the bowels or intestines (resulting in bleeding or obstruction that may require surgery), damage to the kidneys (resulting in decreased function), damage to the liver (resulting in decreased function) and damage to the nerves (causing numbness, weakness or paralysis). After ample time to review the patient's questions, an informed consent was obtained to proceed with scheduling a treatment planning session for radiation therapy at the [address] [place] [address] as it is closer to home for him. Patient was in agreement with my recommendations. All questions were answered to their satisfaction. Patient was advised to contact us anytime should they have any questions or concerns. Electronically signed by Lucinda Mckeon MD on 9/14/22 at 8:54 AM EDT      Drugs Prescribed:  New Prescriptions    No medications on file       Orders Placed:   No orders of the defined types were placed in this encounter. CC:  Patient Care Team:  Celia Anaya DO as PCP - General (Family Medicine)  Celia Anaya DO as PCP - [facility]  Becky Ortega RN as Nurse Navigator (Oncology)         Total time spent on this case on the day of encounter is 60 minutes.  This time includes combination of one or more of the following - review of necessary tests, review of pertinent medical records from the EMR, performing medically appropriate examination and evaluation, counseling and educating the patient/family/caregiver, ordering

## 2022-09-15 ENCOUNTER — TELEPHONE (OUTPATIENT)
Dept: ONCOLOGY | Age: 67
End: 2022-09-15

## 2022-09-15 ENCOUNTER — TELEPHONE (OUTPATIENT)
Dept: INFUSION THERAPY | Age: 67
End: 2022-09-15

## 2022-09-15 DIAGNOSIS — C15.9 ADENOSQUAMOUS CARCINOMA OF ESOPHAGUS (HCC): Primary | ICD-10-CM

## 2022-09-15 NOTE — TELEPHONE ENCOUNTER
Chemotherapy orders received:    Ht=73 inches  Jy=719 lbs  BSA=2.19  Chemotherapy doses verified:   Taxol 50 mg/m2 = 109 mg  Carboplatin AUC 2 to be determined day of treatment   Deborah Riedel, RN

## 2022-09-15 NOTE — TELEPHONE ENCOUNTER
Name: Martha Yuen  : 1955  MRN: [id number]    Oncology Navigation Follow-Up Note    Contact Type:  Telephone    Notes: Writer called patient to check on him and to follow up with him on his plan of care. Pt states he has a full understanding of his upcoming concurrent chemo radiation and he feels mentally prepared. Pt is scheduled for PORT and Jtube placement this Saturday,  with Dr. Isaac Arrieta. Writer informed Sobeida Gibsonon of patient and will route this note to her for nutritional supplements and Jtube support. Pt teach/sim is . Writer did inform patient that I would be out of the office from  and returning 10/3. Writer did inform him that Diana Donald, navigator, would be covering my patients if he had any urgent needs. Pt appreciative of f/u call and check in. Will continue to follow.     Electronically signed by Audelia Ayala RN on 9/15/2022 at 11:07 AM

## 2022-09-16 ENCOUNTER — TELEPHONE (OUTPATIENT)
Dept: ONCOLOGY | Age: 67
End: 2022-09-16

## 2022-09-16 DIAGNOSIS — R13.19 ESOPHAGEAL DYSPHAGIA: Primary | ICD-10-CM

## 2022-09-16 RX ORDER — NUTRITIONAL SUPPLEMENT/FIBER 0.06 G-1.4
1 LIQUID (ML) ORAL
Qty: 58500 ML | Refills: 12
Start: 2022-09-16 | End: 2023-09-15

## 2022-09-16 NOTE — TELEPHONE ENCOUNTER
[facility]  Initial Nutrition Assessment    Libia Reed is a 75 y.o.  male     Reason for Evaluation: New J tube    NUTRITION RECOMMENDATIONS / MONITORING / EVALUATION  1. Jeannie Jernigan 1.4 6 cartons per day, 2730 kcals and 120 g protein/day, run at 121 mL/hr x 16 hours  2. Continue to eat by mouth as able by continuing strategies of eating slowly and chewing foods well  3. Will monitor EN initiation and tolerance, po intakes, wts, labs, care plan, s/s    Subjective/Current Data:  Spoke with pt who reports weight loss of 40 pounds in 2 months. Reports he can eat ok if he takes his time and chews really well and eats very slowly. Pt is scheduled for J tube tomorrow. Provided instructional videos on what to expect. Rx sent to [facility] to provide pump, formula, and supplies to pt on Monday next week. Writer will meet with pt on Monday for hands on teaching.      Past Medical History:  Past Medical History:   Diagnosis Date    Cancer ([address]     esophageal    Dental root implant present     2 metal implants in lower jaw    Elevated PSA     Dr. Moreno Carcamo    Hearing loss     Hiatal hernia     Keratosis     uses Effudex prn    Prostate nodule     on MRI per patient    Snores     no sleep apnea diagnosis    Under care of team 09/12/2022    Oncology: Alexis Cobb, last visit 9/2022    Wears dentures     full dentures    Wears reading eyeglasses     Wellness examination     PCP:Dr. Lisa Henriquez [facility], last visit 8/2022     Past Surgical History:   Procedure Laterality Date    COLONOSCOPY  09/24/2008    [facility] Dr. Rizzo Fought     COLONOSCOPY N/A 2/17/2022    COLONOSCOPY POLYPECTOMY HOT BIOPSY performed by Jace Mar MD at [address] [place] [address]    rt inguinal w/ mesh at [facility] 11/22/2021    FUSION PROSTATE BIOPSY WITH ULTRASOUND, OFFICE NOTIFIED ULTRASOUND performed by Jhoana Morales MD at [place] [address] SKIN BIOPSY      head noncancerous. Just keratosis. TONSILLECTOMY      UPPER GASTROINTESTINAL ENDOSCOPY N/A 8/1/2022    EGD BIOPSY performed by Naida Robles MD at [address] [place] [address] N/A 8/24/2022    ENDOSCOPIC ULTRASOUND WITH RADIO SCOPE performed by Joellen Brown MD at [facility]        Anthropometric Measures:  Current Weight: 94.3 kg  Ideal Body Weight: Ideal body weight: 79.9 kg (176 lb 2.4 oz)    Ideal Body Weight %: 128%  Estimated body mass index is 27.42 kg/m² as calculated from the following:    Height as of 9/12/22: 6' 1\" (1.854 m). Weight as of 9/13/22: 207 lb 12.8 oz (94.3 kg). which is classified as overweight      Nutritional Requirements:  Estimated Energy Needs:  Weight Used: 94.3 kg   Method Used: [place] [address] Enpocket  Estimated kcal Needs: 7271-3992 kcal per day  Protein Needs:  Weight used: 94.3 kg  1.2-1.4 g/kg = 113-141 g per day    Nutrition-focused Physical Findings   GI symptoms: difficulty swallowing  Skin:  Intact    Nutrition Diagnosis and Goal  Problem:  increased calorie needs  Etiology/related to: catabolic illness  Symptoms/Signs/as evidenced by: esophageal ca undergoing dual therapy tx       Goal: Adequate intake to aide in wt maintenaince, signs and symptoms management  Progress towards goal: stable  Education Needs: introduction to J-tube feeds    Malnutrition Assessment  Patient is at risk for malnutrition based on a history of weight loss and current intake. Per AND/ASPEN guidelines, will monitor for additional RD, RN and MD documentation re weight status, nutritional intake, fluid accumulation, possible loss of body fat or muscle mass, or possible reduced  strength.     Jonny Baires, MS, RD, LD  Registered Dietitian  [facility] [place]  [phone]

## 2022-09-16 NOTE — PROGRESS NOTES
[address] [facility] MIN INVASIVE BARIATRIC SURG  [address] [place] [address] [place]  [address] [facility] [address]  Dept: [phone]    ROBOTIC & MINIMALLY INVASIVE SURGERY  PROGRESS NOTE INITIAL EVALUATION     Patient: Terri Francisco        Service Date: 9/8/2022      HPI:     Chief Complaint   Patient presents with    Consultation     J-tube and port insertion       The patient is a pleasant 75y.o. year old male  with long standing GERD, who stands Height: 6' 1\" (185.4 cm) tall with a weight of Weight: 204 lb (92.5 kg) , resulting in a BMI of Body mass index is 26.91 kg/m². 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 He presents today for evaluation for J tube placement and mediport placement. He does not appear to have liver disease at this point based on CT. The patient denies  a history of myocardial infarction, deep vein thrombosis, pulmonary embolism, renal failure, hepatic failure, and stroke.         Medical History:  Past Medical History:   Diagnosis Date    Cancer [facility])     esophageal    Dental root implant present     2 metal implants in lower jaw    Elevated PSA     Dr. Amanda Lizarraga    Hearing loss     Hiatal hernia     Keratosis     uses Effudex prn    Prostate nodule     on MRI per patient    Snores     no sleep apnea diagnosis    Under care of team 09/12/2022    Oncology: Damon Davis, last visit 9/2022    Wears dentures     full dentures    Wears reading eyeglasses     Wellness examination     PCP:Dr. Belen Wilcox [facility], last visit 8/2022       Surgical History:  Past Surgical History:   Procedure Laterality Date    COLONOSCOPY  09/24/2008    [facility] Dr. Pamella Gibson     COLONOSCOPY N/A 2/17/2022    COLONOSCOPY POLYPECTOMY HOT BIOPSY performed by Carlton Barron MD at [address] [place], [place] [address]ate Dr   rt inguinal w/ mesh at [facility] N/A 11/22/2021 FUSION PROSTATE BIOPSY WITH ULTRASOUND, OFFICE NOTIFIED ULTRASOUND performed by Seun Weems MD at [address] [facility]      head noncancerous. Just keratosis. TONSILLECTOMY      UPPER GASTROINTESTINAL ENDOSCOPY N/A 2022    EGD BIOPSY performed by America Arredondo MD at [facility] N/A 2022    ENDOSCOPIC ULTRASOUND WITH RADIO SCOPE performed by Alexandro Cobian MD at [facility]       Family History:  No family history on file. Social History:   Social History     Tobacco Use    Smoking status: Former     Packs/day: 1.00     Years: 0.00     Pack years: 0.00     Types: Cigarettes     Quit date: 2002     Years since quittin.7    Smokeless tobacco: Never   Vaping Use    Vaping Use: Never used   Substance Use Topics    Alcohol use: Yes     Comment: beer few times/wk drinks 3-4    Drug use: Yes     Types: Marijuana (Weed)     Comment: uses edibles-once a week, smokes marijuana 1-2 times a week. Current Med List:  Current Outpatient Medications   Medication Sig Dispense Refill    omeprazole (PRILOSEC) 20 MG delayed release capsule Take 1 capsule by mouth 2 times daily (before meals) 180 capsule 1    Bacillus Coagulans-Inulin (ALIGN PREBIOTIC-PROBIOTIC) 5-1.25 MG-GM CHEW Take by mouth      fluorouracil (EFUDEX) 5 % cream Apply twice daily to actinic keratosis for 3-4 weeks. Expect redness and irritation. (Patient taking differently: Apply twice daily to actinic keratosis for 3-4 weeks. Expect redness and irritation./  using prn) 40 g 1     No current facility-administered medications for this visit. No Known Allergies       SOCIAL:      This patient is alone for the evaluation today.       [] HIV Risk Factors (i.e.) intravenous drug abuser; at risk sexual behavior; received blood products    [] TB Risk Factors (i.e.) Medically underserved, institutional care, foreign born, endemic area; exposure to active case    [] Hepatitis B&C Risk Factors (i.e.) Received blood transfusion prior to 1992; recreational drug use; high risk sexual behaviors; tattoos or body piercings; contact with blood or needle sticks in the workplace    Comprehension    Ability to grasp concepts and respond to questions:   [x] High   [] Medium   [] Low    Motivation    [x] Asks Questions; eager to learn   [] Needs education   [] Extreme anxiety    [] uncooperative   [] Denies need for education    English Speaking Ability    [x] Speaks English well   [x] Reads English well   [x] Understands spoken english    [x] Understands written English   [] No need for interpretive support      [] Might benefit from interpretive support   []  required for all services     REVIEW OF SYSTEMS: (Negative unless marked otherwise)     See review of Systems scanned into media    PRESENT ILLNESS:     Weight Parameters  Weight 204 lb (92.5 kg)   Height 6' 1\" (1.854 m)   BMI Body mass index is 26.91 kg/m². IBW     EBW               IMMUNIZATION STATUS  Immunization History   Administered Date(s) Administered    COVID-19, PFIZER PURPLE top, DILUTE for use, (age 15 y+), 30mcg/0.3mL 02/25/2021, 03/18/2021, 10/07/2021    Influenza, AFLURIA (age 1 yrs+), FLUZONE, (age 10 mo+), MDV, 0.5mL 10/25/2017, 10/08/2018, 01/29/2020    Influenza, FLUARIX, FLULAVAL, FLUZONE (age 10 mo+) AND AFLURIA, (age 1 y+), PF, 0.5mL 10/08/2020    Tdap (Boostrix, Adacel) 12/08/2016, 05/11/2019       SMOKING CESSATION     [x] Not needed     [] Instructed to stop smoking    [] Pamphlet community resources given     VTE SCREEN    [] Family hx DVT/PE  /   [] Personal hx of DVT/PE    [x] Denies any family or personal hx of DVT/PE    Physician Review    [x] Past medical, family, & social history reviewed and discussed with patient.      Review of surgery and post-surgical changes (by surgeon for surgical patients only)    [x] Lifelong diet expectations reviewed with patient    PHYSICAL EXAMINATION:      /80 (Site: Right Upper Arm, Position: Sitting, Cuff Size: Large Adult)   Pulse 80   Ht 6' 1\" (1.854 m)   Wt 204 lb (92.5 kg)   BMI 26.91 kg/m²     Constitutional:  Vital signs are normal. The patient appears well-developed   HEENT:      Head: Normocephalic. Atraumatic     Eyes: pupils are equal and reactive. No scleral icterus is present. Neck: No mass and no thyromegaly present. Cardiovascular: Normal rate, regular rhythm, S1 normal and S2 normal.  Bilateral pulses present. Pulmonary/Chest: Effort normal and breath sounds normal. No retractions. Abdominal: Soft. Normal appearance. There is no organomegaly. No tenderness. There is no rigidity, no rebound, no guarding and no Kurtz's sign. Musculoskeletal:      Right lower leg: Normal. No tenderness and no edema. Left lower leg: Normal. No tenderness and no edema. Lymphadenopathy:     No cervical adenopathy, No Exrtemity Adenopathy. Neurological: The patient is alert and oriented. Moving all four extremities equally, sensation grossly intact bilateral.  Skin: Skin is warm, dry and intact. Psychiatric: The patient has a normal mood and affect. Speech is normal and behavior is normal. Judgment and thought content normal. Cognition and memory are normal.     RECOMMENDATIONS:     We spent a great deal of time discussing the risks and benefits of Robotic/laparoscopic Jejunostomy tube placement, mediport placement, possible open including but not limited to injury to intra-abdominal organs, breakdown of the J tube, the need for re-operative therapy,  prolonged hospitalization,  mechanical ventilation,  and death. We discussed the possibility of bleeding, the need for blood transfusions, blood clots, hospital-acquired and intra-abdominal infection, pneumothorax and worsening malignancy. And we discussed the need for post-operative visit compliance, behavior modifications and diet changes,      PLAN:       Diagnosis Orders   1.  Malignant neoplasm of lower third of esophagus ([facility]        2.  Gastroesophageal reflux disease with esophagitis without hemorrhage                   Plan for J tube placement  Staging discussed with patient  Plan for Mediport placement  All questions answered  Decision for surgery    Electronically signed by Aarti Carvalho DO on 9/16/2022 at 11:38 AM

## 2022-09-17 ENCOUNTER — APPOINTMENT (OUTPATIENT)
Dept: GENERAL RADIOLOGY | Age: 67
End: 2022-09-17
Attending: SURGERY
Payer: MEDICARE

## 2022-09-17 ENCOUNTER — ANESTHESIA EVENT (OUTPATIENT)
Dept: OPERATING ROOM | Age: 67
End: 2022-09-17
Payer: MEDICARE

## 2022-09-17 ENCOUNTER — ANESTHESIA (OUTPATIENT)
Dept: OPERATING ROOM | Age: 67
End: 2022-09-17
Payer: MEDICARE

## 2022-09-17 ENCOUNTER — HOSPITAL ENCOUNTER (OUTPATIENT)
Age: 67
Setting detail: OBSERVATION
LOS: 1 days | Discharge: HOME | End: 2022-09-18
Attending: SURGERY | Admitting: SURGERY
Payer: MEDICARE

## 2022-09-17 DIAGNOSIS — C15.5 MALIGNANT NEOPLASM OF LOWER THIRD OF ESOPHAGUS (HCC): ICD-10-CM

## 2022-09-17 DIAGNOSIS — G89.18 ACUTE POST-OPERATIVE PAIN: Primary | ICD-10-CM

## 2022-09-17 PROBLEM — Z78.9 ENCOUNTER FOR GASTROJEJUNAL TUBE PLACEMENT: Status: ACTIVE | Noted: 2022-09-17

## 2022-09-17 PROBLEM — C15.9 ESOPHAGEAL CANCER (HCC): Status: ACTIVE | Noted: 2022-09-17

## 2022-09-17 PROCEDURE — 87086 URINE CULTURE/COLONY COUNT: CPT

## 2022-09-17 PROCEDURE — 2720000010 HC SURG SUPPLY STERILE: Performed by: SURGERY

## 2022-09-17 PROCEDURE — 44186 LAP JEJUNOSTOMY: CPT | Performed by: SURGERY

## 2022-09-17 PROCEDURE — 88112 CYTOPATH CELL ENHANCE TECH: CPT

## 2022-09-17 PROCEDURE — 3600000019 HC SURGERY ROBOT ADDTL 15MIN: Performed by: SURGERY

## 2022-09-17 PROCEDURE — 96372 THER/PROPH/DIAG INJ SC/IM: CPT

## 2022-09-17 PROCEDURE — 3700000000 HC ANESTHESIA ATTENDED CARE: Performed by: SURGERY

## 2022-09-17 PROCEDURE — 88305 TISSUE EXAM BY PATHOLOGIST: CPT

## 2022-09-17 PROCEDURE — 77001 FLUOROGUIDE FOR VEIN DEVICE: CPT | Performed by: SURGERY

## 2022-09-17 PROCEDURE — 2580000003 HC RX 258: Performed by: SPECIALIST

## 2022-09-17 PROCEDURE — G0378 HOSPITAL OBSERVATION PER HR: HCPCS

## 2022-09-17 PROCEDURE — 6360000002 HC RX W HCPCS: Performed by: SURGERY

## 2022-09-17 PROCEDURE — 3209999900 FLUORO FOR SURGICAL PROCEDURES

## 2022-09-17 PROCEDURE — 2709999900 HC NON-CHARGEABLE SUPPLY: Performed by: SURGERY

## 2022-09-17 PROCEDURE — C1788 PORT, INDWELLING, IMP: HCPCS | Performed by: SURGERY

## 2022-09-17 PROCEDURE — 51798 US URINE CAPACITY MEASURE: CPT

## 2022-09-17 PROCEDURE — S2900 ROBOTIC SURGICAL SYSTEM: HCPCS | Performed by: SURGERY

## 2022-09-17 PROCEDURE — A4217 STERILE WATER/SALINE, 500 ML: HCPCS | Performed by: SURGERY

## 2022-09-17 PROCEDURE — 7100000000 HC PACU RECOVERY - FIRST 15 MIN: Performed by: SURGERY

## 2022-09-17 PROCEDURE — 2500000003 HC RX 250 WO HCPCS: Performed by: SPECIALIST

## 2022-09-17 PROCEDURE — 71045 X-RAY EXAM CHEST 1 VIEW: CPT

## 2022-09-17 PROCEDURE — 7100000001 HC PACU RECOVERY - ADDTL 15 MIN: Performed by: SURGERY

## 2022-09-17 PROCEDURE — 36561 INSERT TUNNELED CV CATH: CPT | Performed by: SURGERY

## 2022-09-17 PROCEDURE — 2700000000 HC OXYGEN THERAPY PER DAY

## 2022-09-17 PROCEDURE — 6360000002 HC RX W HCPCS: Performed by: ANESTHESIOLOGY

## 2022-09-17 PROCEDURE — 6360000002 HC RX W HCPCS: Performed by: SPECIALIST

## 2022-09-17 PROCEDURE — 3600000009 HC SURGERY ROBOT BASE: Performed by: SURGERY

## 2022-09-17 PROCEDURE — 6370000000 HC RX 637 (ALT 250 FOR IP): Performed by: SURGERY

## 2022-09-17 PROCEDURE — 2500000003 HC RX 250 WO HCPCS: Performed by: SURGERY

## 2022-09-17 PROCEDURE — 2580000003 HC RX 258: Performed by: SURGERY

## 2022-09-17 PROCEDURE — 3700000001 HC ADD 15 MINUTES (ANESTHESIA): Performed by: SURGERY

## 2022-09-17 PROCEDURE — 94640 AIRWAY INHALATION TREATMENT: CPT

## 2022-09-17 PROCEDURE — 94761 N-INVAS EAR/PLS OXIMETRY MLT: CPT

## 2022-09-17 DEVICE — PORT INFUS OD8FR SGL LUMN PLAS FILL N VLV PG BIOFLO H965440130] ANGIODYNAMICS INC]: Type: IMPLANTABLE DEVICE | Site: CHEST  WALL | Status: FUNCTIONAL

## 2022-09-17 RX ORDER — MAGNESIUM HYDROXIDE 1200 MG/15ML
LIQUID ORAL CONTINUOUS PRN
Status: DISCONTINUED | OUTPATIENT
Start: 2022-09-17 | End: 2022-09-17 | Stop reason: HOSPADM

## 2022-09-17 RX ORDER — FENTANYL CITRATE 50 UG/ML
50 INJECTION, SOLUTION INTRAMUSCULAR; INTRAVENOUS EVERY 5 MIN PRN
Status: COMPLETED | OUTPATIENT
Start: 2022-09-17 | End: 2022-09-17

## 2022-09-17 RX ORDER — HEPARIN SODIUM 5000 [USP'U]/ML
5000 INJECTION, SOLUTION INTRAVENOUS; SUBCUTANEOUS ONCE
Status: DISCONTINUED | OUTPATIENT
Start: 2022-09-17 | End: 2022-09-17 | Stop reason: HOSPADM

## 2022-09-17 RX ORDER — OXYCODONE HYDROCHLORIDE AND ACETAMINOPHEN 5; 325 MG/1; MG/1
2 TABLET ORAL EVERY 6 HOURS PRN
Status: DISCONTINUED | OUTPATIENT
Start: 2022-09-17 | End: 2022-09-18 | Stop reason: HOSPADM

## 2022-09-17 RX ORDER — SODIUM CHLORIDE 0.9 % (FLUSH) 0.9 %
5-40 SYRINGE (ML) INJECTION PRN
Status: DISCONTINUED | OUTPATIENT
Start: 2022-09-17 | End: 2022-09-17 | Stop reason: HOSPADM

## 2022-09-17 RX ORDER — PANTOPRAZOLE SODIUM 40 MG/1
40 TABLET, DELAYED RELEASE ORAL DAILY
Status: DISCONTINUED | OUTPATIENT
Start: 2022-09-17 | End: 2022-09-18 | Stop reason: HOSPADM

## 2022-09-17 RX ORDER — IPRATROPIUM BROMIDE AND ALBUTEROL SULFATE 2.5; .5 MG/3ML; MG/3ML
1 SOLUTION RESPIRATORY (INHALATION)
Status: DISCONTINUED | OUTPATIENT
Start: 2022-09-17 | End: 2022-09-18 | Stop reason: HOSPADM

## 2022-09-17 RX ORDER — OXYCODONE HYDROCHLORIDE AND ACETAMINOPHEN 5; 325 MG/1; MG/1
1 TABLET ORAL EVERY 6 HOURS PRN
Status: DISCONTINUED | OUTPATIENT
Start: 2022-09-17 | End: 2022-09-18 | Stop reason: HOSPADM

## 2022-09-17 RX ORDER — METRONIDAZOLE 500 MG/100ML
INJECTION, SOLUTION INTRAVENOUS PRN
Status: DISCONTINUED | OUTPATIENT
Start: 2022-09-17 | End: 2022-09-17 | Stop reason: SDUPTHER

## 2022-09-17 RX ORDER — LIDOCAINE HYDROCHLORIDE 10 MG/ML
INJECTION, SOLUTION EPIDURAL; INFILTRATION; INTRACAUDAL; PERINEURAL PRN
Status: DISCONTINUED | OUTPATIENT
Start: 2022-09-17 | End: 2022-09-17 | Stop reason: SDUPTHER

## 2022-09-17 RX ORDER — FENTANYL CITRATE 50 UG/ML
INJECTION, SOLUTION INTRAMUSCULAR; INTRAVENOUS PRN
Status: DISCONTINUED | OUTPATIENT
Start: 2022-09-17 | End: 2022-09-17 | Stop reason: SDUPTHER

## 2022-09-17 RX ORDER — LIDOCAINE HYDROCHLORIDE 10 MG/ML
INJECTION, SOLUTION EPIDURAL; INFILTRATION; INTRACAUDAL; PERINEURAL PRN
Status: DISCONTINUED | OUTPATIENT
Start: 2022-09-17 | End: 2022-09-17 | Stop reason: HOSPADM

## 2022-09-17 RX ORDER — PROPOFOL 10 MG/ML
INJECTION, EMULSION INTRAVENOUS PRN
Status: DISCONTINUED | OUTPATIENT
Start: 2022-09-17 | End: 2022-09-17 | Stop reason: SDUPTHER

## 2022-09-17 RX ORDER — SODIUM CHLORIDE 9 MG/ML
INJECTION, SOLUTION INTRAVENOUS PRN
Status: DISCONTINUED | OUTPATIENT
Start: 2022-09-17 | End: 2022-09-17 | Stop reason: HOSPADM

## 2022-09-17 RX ORDER — CYCLOBENZAPRINE HCL 10 MG
10 TABLET ORAL 3 TIMES DAILY PRN
Status: DISCONTINUED | OUTPATIENT
Start: 2022-09-17 | End: 2022-09-18 | Stop reason: HOSPADM

## 2022-09-17 RX ORDER — SODIUM CHLORIDE, SODIUM LACTATE, POTASSIUM CHLORIDE, CALCIUM CHLORIDE 600; 310; 30; 20 MG/100ML; MG/100ML; MG/100ML; MG/100ML
1000 INJECTION, SOLUTION INTRAVENOUS CONTINUOUS
Status: DISCONTINUED | OUTPATIENT
Start: 2022-09-17 | End: 2022-09-17 | Stop reason: HOSPADM

## 2022-09-17 RX ORDER — FENTANYL CITRATE 50 UG/ML
25 INJECTION, SOLUTION INTRAMUSCULAR; INTRAVENOUS EVERY 5 MIN PRN
Status: DISCONTINUED | OUTPATIENT
Start: 2022-09-17 | End: 2022-09-17 | Stop reason: HOSPADM

## 2022-09-17 RX ORDER — SODIUM CHLORIDE, SODIUM LACTATE, POTASSIUM CHLORIDE, CALCIUM CHLORIDE 600; 310; 30; 20 MG/100ML; MG/100ML; MG/100ML; MG/100ML
INJECTION, SOLUTION INTRAVENOUS CONTINUOUS PRN
Status: DISCONTINUED | OUTPATIENT
Start: 2022-09-17 | End: 2022-09-17 | Stop reason: SDUPTHER

## 2022-09-17 RX ORDER — GLYCOPYRROLATE 0.2 MG/ML
INJECTION INTRAMUSCULAR; INTRAVENOUS PRN
Status: DISCONTINUED | OUTPATIENT
Start: 2022-09-17 | End: 2022-09-17 | Stop reason: SDUPTHER

## 2022-09-17 RX ORDER — ONDANSETRON 2 MG/ML
4 INJECTION INTRAMUSCULAR; INTRAVENOUS EVERY 6 HOURS PRN
Status: DISCONTINUED | OUTPATIENT
Start: 2022-09-17 | End: 2022-09-18 | Stop reason: HOSPADM

## 2022-09-17 RX ORDER — ONDANSETRON 2 MG/ML
INJECTION INTRAMUSCULAR; INTRAVENOUS PRN
Status: DISCONTINUED | OUTPATIENT
Start: 2022-09-17 | End: 2022-09-17 | Stop reason: SDUPTHER

## 2022-09-17 RX ORDER — SODIUM CHLORIDE 9 MG/ML
INJECTION, SOLUTION INTRAVENOUS PRN
Status: DISCONTINUED | OUTPATIENT
Start: 2022-09-17 | End: 2022-09-18 | Stop reason: HOSPADM

## 2022-09-17 RX ORDER — SODIUM CHLORIDE, SODIUM LACTATE, POTASSIUM CHLORIDE, CALCIUM CHLORIDE 600; 310; 30; 20 MG/100ML; MG/100ML; MG/100ML; MG/100ML
INJECTION, SOLUTION INTRAVENOUS CONTINUOUS
Status: DISCONTINUED | OUTPATIENT
Start: 2022-09-17 | End: 2022-09-18

## 2022-09-17 RX ORDER — BUPIVACAINE HYDROCHLORIDE 5 MG/ML
INJECTION, SOLUTION PERINEURAL PRN
Status: DISCONTINUED | OUTPATIENT
Start: 2022-09-17 | End: 2022-09-17 | Stop reason: HOSPADM

## 2022-09-17 RX ORDER — SODIUM CHLORIDE 0.9 % (FLUSH) 0.9 %
5-40 SYRINGE (ML) INJECTION EVERY 12 HOURS SCHEDULED
Status: DISCONTINUED | OUTPATIENT
Start: 2022-09-17 | End: 2022-09-18 | Stop reason: HOSPADM

## 2022-09-17 RX ORDER — ROCURONIUM BROMIDE 10 MG/ML
INJECTION, SOLUTION INTRAVENOUS PRN
Status: DISCONTINUED | OUTPATIENT
Start: 2022-09-17 | End: 2022-09-17 | Stop reason: SDUPTHER

## 2022-09-17 RX ORDER — MIDAZOLAM HYDROCHLORIDE 1 MG/ML
INJECTION INTRAMUSCULAR; INTRAVENOUS PRN
Status: DISCONTINUED | OUTPATIENT
Start: 2022-09-17 | End: 2022-09-17 | Stop reason: SDUPTHER

## 2022-09-17 RX ORDER — SODIUM CHLORIDE 9 MG/ML
INJECTION, SOLUTION INTRAVENOUS CONTINUOUS PRN
Status: DISCONTINUED | OUTPATIENT
Start: 2022-09-17 | End: 2022-09-17 | Stop reason: SDUPTHER

## 2022-09-17 RX ORDER — SODIUM CHLORIDE 0.9 % (FLUSH) 0.9 %
5-40 SYRINGE (ML) INJECTION PRN
Status: DISCONTINUED | OUTPATIENT
Start: 2022-09-17 | End: 2022-09-18 | Stop reason: HOSPADM

## 2022-09-17 RX ORDER — ONDANSETRON 2 MG/ML
4 INJECTION INTRAMUSCULAR; INTRAVENOUS
Status: DISCONTINUED | OUTPATIENT
Start: 2022-09-17 | End: 2022-09-17 | Stop reason: HOSPADM

## 2022-09-17 RX ORDER — CEFAZOLIN SODIUM 1 G/3ML
INJECTION, POWDER, FOR SOLUTION INTRAMUSCULAR; INTRAVENOUS PRN
Status: DISCONTINUED | OUTPATIENT
Start: 2022-09-17 | End: 2022-09-17 | Stop reason: SDUPTHER

## 2022-09-17 RX ORDER — SODIUM CHLORIDE 0.9 % (FLUSH) 0.9 %
5-40 SYRINGE (ML) INJECTION EVERY 12 HOURS SCHEDULED
Status: DISCONTINUED | OUTPATIENT
Start: 2022-09-17 | End: 2022-09-17 | Stop reason: HOSPADM

## 2022-09-17 RX ORDER — HEPARIN SODIUM 5000 [USP'U]/ML
5000 INJECTION, SOLUTION INTRAVENOUS; SUBCUTANEOUS EVERY 8 HOURS SCHEDULED
Status: DISCONTINUED | OUTPATIENT
Start: 2022-09-17 | End: 2022-09-18 | Stop reason: HOSPADM

## 2022-09-17 RX ADMIN — FENTANYL CITRATE 50 MCG: 50 INJECTION, SOLUTION INTRAMUSCULAR; INTRAVENOUS at 13:38

## 2022-09-17 RX ADMIN — LIDOCAINE HYDROCHLORIDE 50 MG: 10 INJECTION, SOLUTION EPIDURAL; INFILTRATION; INTRACAUDAL; PERINEURAL at 07:42

## 2022-09-17 RX ADMIN — CEFAZOLIN 2000 MG: 1 INJECTION, POWDER, FOR SOLUTION INTRAMUSCULAR; INTRAVENOUS at 08:02

## 2022-09-17 RX ADMIN — SODIUM CHLORIDE, POTASSIUM CHLORIDE, SODIUM LACTATE AND CALCIUM CHLORIDE: 600; 310; 30; 20 INJECTION, SOLUTION INTRAVENOUS at 11:49

## 2022-09-17 RX ADMIN — PHENYLEPHRINE HYDROCHLORIDE 100 MCG: 10 INJECTION INTRAVENOUS at 08:11

## 2022-09-17 RX ADMIN — SODIUM CHLORIDE, POTASSIUM CHLORIDE, SODIUM LACTATE AND CALCIUM CHLORIDE: 600; 310; 30; 20 INJECTION, SOLUTION INTRAVENOUS at 07:47

## 2022-09-17 RX ADMIN — IPRATROPIUM BROMIDE AND ALBUTEROL SULFATE 1 AMPULE: .5; 2.5 SOLUTION RESPIRATORY (INHALATION) at 16:39

## 2022-09-17 RX ADMIN — PROPOFOL 150 MG: 10 INJECTION, EMULSION INTRAVENOUS at 07:42

## 2022-09-17 RX ADMIN — PHENYLEPHRINE HYDROCHLORIDE 100 MCG: 10 INJECTION INTRAVENOUS at 08:03

## 2022-09-17 RX ADMIN — PHENYLEPHRINE HYDROCHLORIDE 100 MCG: 10 INJECTION INTRAVENOUS at 10:37

## 2022-09-17 RX ADMIN — PHENYLEPHRINE HYDROCHLORIDE 100 MCG: 10 INJECTION INTRAVENOUS at 10:02

## 2022-09-17 RX ADMIN — ROCURONIUM BROMIDE 10 MG: 10 INJECTION, SOLUTION INTRAVENOUS at 09:14

## 2022-09-17 RX ADMIN — HYDROMORPHONE HYDROCHLORIDE 1 MG: 1 INJECTION, SOLUTION INTRAMUSCULAR; INTRAVENOUS; SUBCUTANEOUS at 18:35

## 2022-09-17 RX ADMIN — MIDAZOLAM 2 MG: 1 INJECTION INTRAMUSCULAR; INTRAVENOUS at 07:45

## 2022-09-17 RX ADMIN — METRONIDAZOLE 500 MG: 500 INJECTION, SOLUTION INTRAVENOUS at 08:05

## 2022-09-17 RX ADMIN — SODIUM CHLORIDE: 9 INJECTION, SOLUTION INTRAVENOUS at 09:44

## 2022-09-17 RX ADMIN — FENTANYL CITRATE 100 MCG: 50 INJECTION, SOLUTION INTRAMUSCULAR; INTRAVENOUS at 07:42

## 2022-09-17 RX ADMIN — PANTOPRAZOLE SODIUM 40 MG: 40 TABLET, DELAYED RELEASE ORAL at 16:52

## 2022-09-17 RX ADMIN — ONDANSETRON 4 MG: 2 INJECTION INTRAMUSCULAR; INTRAVENOUS at 09:46

## 2022-09-17 RX ADMIN — ROCURONIUM BROMIDE 20 MG: 10 INJECTION, SOLUTION INTRAVENOUS at 08:14

## 2022-09-17 RX ADMIN — GLYCOPYRROLATE 0.8 MG: 0.2 INJECTION INTRAMUSCULAR; INTRAVENOUS at 11:01

## 2022-09-17 RX ADMIN — PHENYLEPHRINE HYDROCHLORIDE 100 MCG: 10 INJECTION INTRAVENOUS at 10:52

## 2022-09-17 RX ADMIN — HEPARIN SODIUM 5000 UNITS: 5000 INJECTION INTRAVENOUS; SUBCUTANEOUS at 21:44

## 2022-09-17 RX ADMIN — IPRATROPIUM BROMIDE AND ALBUTEROL SULFATE 1 AMPULE: .5; 2.5 SOLUTION RESPIRATORY (INHALATION) at 19:33

## 2022-09-17 RX ADMIN — PHENYLEPHRINE HYDROCHLORIDE 100 MCG: 10 INJECTION INTRAVENOUS at 10:14

## 2022-09-17 RX ADMIN — FENTANYL CITRATE 50 MCG: 50 INJECTION, SOLUTION INTRAMUSCULAR; INTRAVENOUS at 11:35

## 2022-09-17 RX ADMIN — PHENYLEPHRINE HYDROCHLORIDE 100 MCG: 10 INJECTION INTRAVENOUS at 10:26

## 2022-09-17 RX ADMIN — OXYCODONE HYDROCHLORIDE AND ACETAMINOPHEN 1 TABLET: 5; 325 TABLET ORAL at 16:54

## 2022-09-17 RX ADMIN — PHENYLEPHRINE HYDROCHLORIDE 100 MCG: 10 INJECTION INTRAVENOUS at 10:46

## 2022-09-17 RX ADMIN — ROCURONIUM BROMIDE 50 MG: 10 INJECTION, SOLUTION INTRAVENOUS at 07:42

## 2022-09-17 ASSESSMENT — PAIN SCALES - GENERAL
PAINLEVEL_OUTOF10: 10
PAINLEVEL_OUTOF10: 4
PAINLEVEL_OUTOF10: 7
PAINLEVEL_OUTOF10: 4
PAINLEVEL_OUTOF10: 6
PAINLEVEL_OUTOF10: 4
PAINLEVEL_OUTOF10: 6
PAINLEVEL_OUTOF10: 4
PAINLEVEL_OUTOF10: 7

## 2022-09-17 ASSESSMENT — PAIN DESCRIPTION - LOCATION
LOCATION: ABDOMEN

## 2022-09-17 ASSESSMENT — PAIN DESCRIPTION - DESCRIPTORS
DESCRIPTORS: ACHING

## 2022-09-17 ASSESSMENT — PAIN - FUNCTIONAL ASSESSMENT: PAIN_FUNCTIONAL_ASSESSMENT: NONE - DENIES PAIN

## 2022-09-17 NOTE — OP NOTE
Operative Note      Patient: Martha Yuen  YOB: 1955  MRN: [id number]    Date of Procedure: 9/17/2022    Pre-Op Diagnosis: ESOPHAGEAL CANCER    Post-Op Diagnosis: Same       Procedure(s):  XI ROBOTIC LAPAROSCOPIC JEJUNOSTOMY TUBE PLACEMENT, RIGHT INTERNAL JUGULAR VEIN MEDI-PORT PLACEMENT WITH FLOURO  (C-ARM)    Surgeon(s):  Laura Diop DO    Assistant:   Resident: Tamra Carlin MD    Anesthesia: General    Estimated Blood Loss (mL): Minimal    Complications: None    Specimens:   ID Type Source Tests Collected by Time Destination   A : PERITONEAL FLUID  Body Fluid Fluid CYTOLOGY, NON-GYN Laura Diop DO 9/17/2022 4417        Implants:  Implant Name Type Inv. Item Serial No.  Lot No. LRB No. Used Action   PORT INFUS OD8FR SGL LUMN PLAS FILL N VLV PG BIOFLO [id number]] [employer]] - [id number]  PORT INFUS OD8FR SGL LUMN PLAS FILL N VLV PG BIOFLO [id number]] [employer]]  [id number] [employer]WD [id number] Right 1 Implanted         Drains:   [REMOVED] Urinary Catheter 09/17/22 [address] (Removed)       Findings:     No evidence of peritoneal metastasis, liver without sign of metastasis, pelvis appeared without signs of metastasis  Some edema at GE junction, but not apparent tumor  All sponge, needle and instrument counts reported to me as correct  J tube aspirating and flushing, placed at 40 cm distal to LT  Central venous catheter in position    Detailed Description of Procedure: The patient taken to the operative suite and prepared and draped in normal sterile fashion. Time out called and patient and procedure confirmed. SCD's and antibiotics given. Incision at rosales's point, optical trocar used to gain access under visualization. Pneumoperitoneum established without complications. Underlying structures surveyed. 12 mm port placed at midline. We placed 3 right sided 8 mm ports.   The bowel ran from ligament of treitz to about 35 cm distal.  The bowel marked with clips for orientation. Omentum lifted up over the colon. The robot docked. Site selected and enterotomy made, small bowel decompression sign noted. 2-0 silk suture used for purse string suture. Site selected at the abdominal wall. The J tube passed through a small incision in the left upper quadrant. The balloon checked and insufflated to 3-4 cc water. The bowel was somewhat small, hence we did not insufflate more. The purse string tied down. The tube directed down the small bowel. Four 2-0 pexy sutures placed in circumferential fashion around the site and used to pexy the bowel into position against the abdominal wall. All needles removed from the abdomen. Hemostasis noted. The tube flushed well. The ports closed with 0 vicryl sutures and a suture passer under visualization. Pneumoperitoneum released. The skin closed with 4-0 monocryl sutures and then a skin glue. J tube hemostatic. Patient tolerated procedure well. The neck and upper chest redraped, new surgical field obtained. Using ultrasound guided access the right internal jugular vein was accessed with a multipurpose needle. The guide wire then placed and down into the inferior vena cava under fluoroscopy. Site selected at the right chest wall for port site. Incision made. A pocket dissected out along the fascia for the port. Port then placed and positioned and secured to the underlying fascia with two 3-0 prolene sutures. The tubing connected to the port using 's recommendations. Small incision made next to our guidewire. The port then tunneled and measured. The port cut under fluoroscopy for measurements. The port flushed with sterile saline. The dilator and sheath then placed under fluoroscopy. The guidewire and dilator removed. The port tubing passed down into the sheath under fluoroscopy, while peeling the sheath away.     The port in satisfactory position and Heplock applied with 1000 units/cc. The incision closed with a 3-0 deep dermal Vicryl interrupted sutures. Then a running 4-0 subcuticular stitch. The small incision in the neck closed with an interrupted 4-0 Monocryl. The incisions coved with Tinco-Anuj and steri strips and sterile bandage. All counts reported to me as correct. The patient tolerated the procedure well.     Electronically signed by German Estrella DO on 9/17/2022 at 11:09 AM

## 2022-09-17 NOTE — ANESTHESIA POSTPROCEDURE EVALUATION
Department of Anesthesiology  Postprocedure Note    Patient: Terri Francisco  MRN: [id number]  YOB: 1955  Date of evaluation: 9/17/2022      Procedure Summary     Date: 09/17/22 Room / Location: [address]    Anesthesia Start: 6771 Anesthesia Stop: 3104    Procedure: XI ROBOTIC [address], RIGHT INTERNAL JUGULAR VEIN MEDI-PORT PLACEMENT WITH FLOURO  (C-ARM) Diagnosis:       Malignant neoplasm of lower third of esophagus ([address]      (ESOPHAGEAL CANCER)    Surgeons: Mike Armenta DO Responsible Provider: Bell Billings MD    Anesthesia Type: general ASA Status: 2          Anesthesia Type: No value filed.     Chacho Phase I: Chacho Score: 9    Chacho Phase II:    POST-OP ANESTHESIA NOTE       /83   Pulse 82   Temp 97.8 °F (36.6 °C)   Resp 12   Ht 6' 1\" (1.854 m)   Wt 207 lb (93.9 kg)   SpO2 94%   BMI 27.31 kg/m²    Pain Assessment: Face, Legs, Activity, Cry, and Consolability (FLACC)  Pain Level: 4           Anesthesia Post Evaluation    Patient location during evaluation: PACU  Patient participation: complete - patient participated  Level of consciousness: awake  Pain score: 4  Airway patency: patent  Nausea & Vomiting: no vomiting and no nausea  Complications: no  Cardiovascular status: hemodynamically stable  Respiratory status: acceptable  Hydration status: stable

## 2022-09-17 NOTE — ANESTHESIA PRE PROCEDURE
Department of Anesthesiology  Preprocedure Note       Name:  Syed Noble   Age:  75 y.o.  :  1955                                          MRN:  [id number]         Date:  2022      Surgeon: Mike Pérez):  Vidhi Contreras DO    Procedure: Procedure(s):  XI ROBOTIC LAPAROSCOPIC JEJUNOSTOMY TUBE PLACEMENT, POSSIBLE OPEN, RIGHT MEDI-PORT PLACEMENT POSSIBLE LEFT WITH FLOURO  (C-ARM)    Medications prior to admission:   Prior to Admission medications    Medication Sig Start Date End Date Taking? Authorizing Provider   Nutritional Supplements ([employer] STANDARD 1.4) LIQD 1 Can by Enteral route 6 times daily Lana Reynolds eASIC 1.4, 6 cans daily, 121 mL/hr x 16 hours via J tube per pump 9/16/22 9/15/23  Tanner Babb MD   omeprazole (PRILOSEC) 20 MG delayed release capsule Take 1 capsule by mouth 2 times daily (before meals) 22   Rukhsana Delaney MD   Bacillus Coagulans-Inulin (ALIGN PREBIOTIC-PROBIOTIC) 5-1.25 MG-GM CHEW Take by mouth    Historical Provider, MD   fluorouracil (EFUDEX) 5 % cream Apply twice daily to actinic keratosis for 3-4 weeks. Expect redness and irritation. Patient taking differently: Apply twice daily to actinic keratosis for 3-4 weeks. Expect redness and irritation./  using prn 18   Joe Smith MD       Current medications:    No current facility-administered medications for this encounter.        Allergies:  No Known Allergies    Problem List:    Patient Active Problem List   Diagnosis Code    BPH (benign prostatic hyperplasia) N40.0    Asbestos exposure Z77.090    Class 1 obesity due to excess calories without serious comorbidity with body mass index (BMI) of 32.0 to 32.9 in adult E66.09, Z68.32    Former smoker Z87.891    Alcohol use Z72.89    Epidermoid cyst of finger of left hand L72.0    Gastroesophageal reflux disease K21.9    Abdominal pain, generalized R10.84    Hiatal hernia K44.9    Arthritis of lumbar spine M47.816    Benign cyst of right kidney N28.1  Liver cyst K76.89    Esophageal mass K22.89    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 Juan Antonio Kirkland Hearing loss     Hiatal hernia     Keratosis     uses Effudex prn    Prostate nodule     on MRI per patient    Snores     no sleep apnea diagnosis    Under care of team 2022    Oncology: Sergio Waller, last visit 2022    Wears dentures     full dentures    Wears reading eyeglasses     Wellness examination     PCP:Dr. Thiago Donald [facility], last visit 2022       Past Surgical History:        Procedure Laterality Date    COLONOSCOPY  2008    [facility] Dr. Osmar Damon     COLONOSCOPY N/A 2022    COLONOSCOPY POLYPECTOMY HOT BIOPSY performed by Sejal Meek MD at [facility]   rt inguinal w/ mesh at [address] N/A 2021    FUSION PROSTATE BIOPSY WITH ULTRASOUND, OFFICE NOTIFIED ULTRASOUND performed by Niko Higginbotham MD at [facility]      head noncancerous. Just keratosis.      TONSILLECTOMY      UPPER GASTROINTESTINAL ENDOSCOPY N/A 2022    EGD BIOPSY performed by Sejal Meek MD at [facility] N/A 2022    ENDOSCOPIC ULTRASOUND WITH RADIO SCOPE performed by Don Oliver MD at [facility]       Social History:    Social History     Tobacco Use    Smoking status: Former     Packs/day: 1.00     Years: 0.00     Pack years: 0.00     Types: Cigarettes     Quit date: 2002     Years since quittin.7    Smokeless tobacco: Never   Substance Use Topics    Alcohol use: Yes     Comment: beer few times/wk drinks 3-4                                Counseling given: Not Answered      Vital Signs (Current):   Vitals:    09/17/22 0614   BP: 121/76   Pulse: 67   Resp: 14   Temp: 98.1 °F (36.7 °C)   TempSrc: Temporal   SpO2: 94%   Weight: 207 lb (93.9 kg)   Height: 6' 1\" (1.854 m)                                              BP Readings from Last 3 Encounters:   09/17/22 121/76   09/12/22 108/72   09/13/22 119/71       NPO Status: Time of last liquid consumption: 2200                        Time of last solid consumption: 2200                        Date of last liquid consumption: 09/16/22                        Date of last solid food consumption: 09/16/22    BMI:   Wt Readings from Last 3 Encounters:   09/17/22 207 lb (93.9 kg)   09/12/22 206 lb (93.4 kg)   09/13/22 207 lb 12.8 oz (94.3 kg)     Body mass index is 27.31 kg/m². CBC:   Lab Results   Component Value Date/Time    WBC 8.6 09/12/2022 01:56 PM    RBC 4.73 09/12/2022 01:56 PM    HGB 14.2 09/12/2022 01:56 PM    HCT 43.7 09/12/2022 01:56 PM    MCV 92.4 09/12/2022 01:56 PM    RDW 13.1 09/12/2022 01:56 PM     09/12/2022 01:56 PM       CMP:   Lab Results   Component Value Date/Time     09/12/2022 01:56 PM    K 4.2 09/12/2022 01:56 PM     09/12/2022 01:56 PM    CO2 22 09/12/2022 01:56 PM    BUN 12 09/12/2022 01:56 PM    CREATININE 0.66 09/12/2022 01:56 PM    GFRAA >60 09/12/2022 01:56 PM    LABGLOM >60 09/12/2022 01:56 PM    GLUCOSE 103 09/12/2022 01:56 PM    PROT 6.7 06/20/2022 10:18 AM    CALCIUM 9.3 09/12/2022 01:56 PM    BILITOT 1.95 06/20/2022 10:18 AM    ALKPHOS 85 06/20/2022 10:18 AM    AST 12 06/20/2022 10:18 AM    ALT 12 06/20/2022 10:18 AM       POC Tests: No results for input(s): POCGLU, POCNA, POCK, POCCL, POCBUN, POCHEMO, POCHCT in the last 72 hours.     Coags:   Lab Results   Component Value Date/Time    PROTIME 11.0 09/12/2022 01:56 PM    INR 1.0 09/12/2022 01:56 PM       HCG (If Applicable): No results found for: PREGTESTUR, PREGSERUM, HCG, HCGQUANT     ABGs: No results found for: PHART, PO2ART, RIQ7BAH, QYC7RZX, BEART, I7KZMOIM     Type & Screen (If Applicable):  No results found for: LABABO, LABRH    Drug/Infectious Status (If Applicable):  No results found for: HIV, HEPCAB    COVID-19 Screening (If Applicable): No results found for: COVID19        Anesthesia Evaluation  Patient summary reviewed no history of anesthetic complications:   Airway: Mallampati: II  TM distance: >3 FB   Neck ROM: full  Mouth opening: > = 3 FB   Dental:    (+) upper dentures, lower dentures and edentulous      Pulmonary:normal exam        (-) COPD and asthma                           Cardiovascular:  Exercise tolerance: good (>4 METS),       (-) valvular problems/murmurs, past MI, CABG/stent, dysrhythmias and  angina    ECG reviewed                        Neuro/Psych:      (-) seizures and CVA           GI/Hepatic/Renal:   (+) hiatal hernia, GERD:,      (-) liver disease and no renal disease      ROS comment: diverticulitis. Endo/Other:    (+) : arthritis:., malignancy/cancer. (-) diabetes mellitus, hypothyroidism                ROS comment: History of esophageal cancer Abdominal:             Vascular:     - DVT and PE. Other Findings:             Anesthesia Plan      general     ASA 2       Induction: intravenous. MIPS: Postoperative opioids intended. Anesthetic plan and risks discussed with patient and child/children. Plan discussed with CRNA.               Narrative & Impression    Normal sinus rhythm  Normal ECG  When compared with ECG of 12-NOV-2002 15:07,  No significant change was found      Specimen Collected: 11/11/21 12:00 LORENZA Bob MD   9/17/2022

## 2022-09-17 NOTE — H&P
Subjective     The patient is a 75 y.o. male who is here to undergo J tube and Mediport placement for Esophageal cancer       Past Medical History:   Diagnosis Date    Cancer ([address]     esophageal    Dental root implant present     2 metal implants in lower jaw    Elevated PSA     Dr. Jaciel Redman    Hearing loss     Hiatal hernia     Keratosis     uses Effudex prn    Prostate nodule     on MRI per patient    Snores     no sleep apnea diagnosis    Under care of team 09/12/2022    Oncology: Alana Marie, last visit 9/2022    Wears dentures     full dentures    Wears reading eyeglasses     Wellness examination     PCP:Dr. Paul Mercado [facility], last visit 8/2022   . Review of Systems - A complete 14 point review of systems was performed. All was negative unless otherwise documented in HPI. Allergies:  No Known Allergies    Past Surgical History:  Past Surgical History:   Procedure Laterality Date    COLONOSCOPY  09/24/[facility]    [facility] Dr. Monika Mendiola     COLONOSCOPY N/A 2/17/2022    COLONOSCOPY POLYPECTOMY HOT BIOPSY performed by Aura Yu MD at [address] [place] [address] Dr   rt inguinal w/ mesh at [place] [address] N/A 11/22/2021    FUSION PROSTATE BIOPSY WITH ULTRASOUND, OFFICE NOTIFIED ULTRASOUND performed by Lydia Scott MD at [facility]      head noncancerous. Just keratosis. TONSILLECTOMY      UPPER GASTROINTESTINAL ENDOSCOPY N/A 8/1/2022    EGD BIOPSY performed by Aura Yu MD at [address] N/A 8/24/2022    ENDOSCOPIC ULTRASOUND WITH RADIO SCOPE performed by Susan Solomon MD at [facility]       Family History:  History reviewed. No pertinent family history.     Social History:  Social History     Socioeconomic History    Marital status:      Spouse name: Not on file    Number of children: Not on file    Years of education: Not on file    Highest education level: Not on file   Occupational History    Not on file   Tobacco Use    Smoking status: Former     Packs/day: 1.00     Years: 0.00     Pack years: 0.00     Types: Cigarettes     Quit date: 2002     Years since quittin.7    Smokeless tobacco: Never   Vaping Use    Vaping Use: Never used   Substance and Sexual Activity    Alcohol use: Yes     Comment: beer few times/wk drinks 3-4    Drug use: Yes     Types: Marijuana (Weed)     Comment: uses edibles-once a week, smokes marijuana 1-2 times a week. Sexual activity: Not on file   Other Topics Concern    Not on file   Social History Narrative    Not on file     Social Determinants of Health     Financial Resource Strain: Low Risk     Difficulty of Paying Living Expenses: Not hard at all   Food Insecurity: No Food Insecurity    Worried About Running Out of Food in the Last Year: Never true    Ran Out of Food in the Last Year: Never true   Transportation Needs: Not on file   Physical Activity: Not on file   Stress: Not on file   Social Connections: Not on file   Intimate Partner Violence: Not on file   Housing Stability: Not on file       No current facility-administered medications for this encounter. Objective      Physical Exam  /76   Pulse 67   Temp 98.1 °F (36.7 °C) (Temporal)   Resp 14   Ht 6' 1\" (1.854 m)   Wt 207 lb (93.9 kg)   SpO2 94%   BMI 27.31 kg/m²    Constitutional:  Vital signs are normal. The patient appears well-developed and well-nourished. HEENT:   Head: Normocephalic. Atraumatic  Eyes: pupils are equal and reactive. No scleral icterus is present. Neck: No mass and no thyromegaly present. Cardiovascular: Normal rate, regular rhythm, S1 normal and S2 normal.    Pulmonary/Chest: Effort normal and breath sounds normal.   Abdominal: Soft. Normal appearance. There is no organomegaly. No tenderness. There is no rigidity, no rebound, no guarding and no Kurtz's sign.    Musculoskeletal:        Right lower leg: Normal. No tenderness and

## 2022-09-18 VITALS
OXYGEN SATURATION: 100 % | TEMPERATURE: 98.4 F | HEART RATE: 85 BPM | BODY MASS INDEX: 27.43 KG/M2 | SYSTOLIC BLOOD PRESSURE: 125 MMHG | RESPIRATION RATE: 18 BRPM | DIASTOLIC BLOOD PRESSURE: 77 MMHG | HEIGHT: 73 IN | WEIGHT: 207 LBS

## 2022-09-18 LAB
ANION GAP SERPL CALCULATED.3IONS-SCNC: 9 MMOL/L (ref 9–17)
BUN BLDV-MCNC: 8 MG/DL (ref 8–23)
CALCIUM SERPL-MCNC: 8 MG/DL (ref 8.6–10.4)
CHLORIDE BLD-SCNC: 103 MMOL/L (ref 98–107)
CO2: 24 MMOL/L (ref 20–31)
CREAT SERPL-MCNC: 0.69 MG/DL (ref 0.7–1.2)
CULTURE: NO GROWTH
GFR AFRICAN AMERICAN: >60 ML/MIN
GFR NON-AFRICAN AMERICAN: >60 ML/MIN
GFR SERPL CREATININE-BSD FRML MDRD: ABNORMAL ML/MIN/{1.73_M2}
GLUCOSE BLD-MCNC: 109 MG/DL (ref 70–99)
HCT VFR BLD CALC: 35.8 % (ref 40.7–50.3)
HEMOGLOBIN: 12 G/DL (ref 13–17)
MCH RBC QN AUTO: 30.7 PG (ref 25.2–33.5)
MCHC RBC AUTO-ENTMCNC: 33.5 G/DL (ref 28.4–34.8)
MCV RBC AUTO: 91.6 FL (ref 82.6–102.9)
NRBC AUTOMATED: 0 PER 100 WBC
PDW BLD-RTO: 13.5 % (ref 11.8–14.4)
PLATELET # BLD: 258 K/UL (ref 138–453)
PMV BLD AUTO: 10.9 FL (ref 8.1–13.5)
POTASSIUM SERPL-SCNC: 3.8 MMOL/L (ref 3.7–5.3)
RBC # BLD: 3.91 M/UL (ref 4.21–5.77)
SODIUM BLD-SCNC: 136 MMOL/L (ref 135–144)
SPECIMEN DESCRIPTION: NORMAL
WBC # BLD: 8.9 K/UL (ref 3.5–11.3)

## 2022-09-18 PROCEDURE — 6360000002 HC RX W HCPCS: Performed by: SURGERY

## 2022-09-18 PROCEDURE — 6370000000 HC RX 637 (ALT 250 FOR IP): Performed by: SURGERY

## 2022-09-18 PROCEDURE — G0378 HOSPITAL OBSERVATION PER HR: HCPCS

## 2022-09-18 PROCEDURE — 2700000000 HC OXYGEN THERAPY PER DAY

## 2022-09-18 PROCEDURE — 51702 INSERT TEMP BLADDER CATH: CPT

## 2022-09-18 PROCEDURE — 96372 THER/PROPH/DIAG INJ SC/IM: CPT

## 2022-09-18 PROCEDURE — 85027 COMPLETE CBC AUTOMATED: CPT

## 2022-09-18 PROCEDURE — 94761 N-INVAS EAR/PLS OXIMETRY MLT: CPT

## 2022-09-18 PROCEDURE — 36415 COLL VENOUS BLD VENIPUNCTURE: CPT

## 2022-09-18 PROCEDURE — 51798 US URINE CAPACITY MEASURE: CPT

## 2022-09-18 PROCEDURE — 80048 BASIC METABOLIC PNL TOTAL CA: CPT

## 2022-09-18 PROCEDURE — 94640 AIRWAY INHALATION TREATMENT: CPT

## 2022-09-18 PROCEDURE — 2580000003 HC RX 258: Performed by: SURGERY

## 2022-09-18 RX ORDER — ONDANSETRON 4 MG/1
4 TABLET, ORALLY DISINTEGRATING ORAL 3 TIMES DAILY PRN
Qty: 21 TABLET | Refills: 0 | Status: SHIPPED | OUTPATIENT
Start: 2022-09-18 | End: 2022-10-18 | Stop reason: SDUPTHER

## 2022-09-18 RX ORDER — PROMETHAZINE HYDROCHLORIDE 12.5 MG/1
12.5 TABLET ORAL 4 TIMES DAILY PRN
Qty: 20 TABLET | Refills: 0 | Status: SHIPPED | OUTPATIENT
Start: 2022-09-18 | End: 2022-09-18 | Stop reason: HOSPADM

## 2022-09-18 RX ORDER — OXYCODONE HYDROCHLORIDE AND ACETAMINOPHEN 5; 325 MG/1; MG/1
1 TABLET ORAL EVERY 6 HOURS PRN
Qty: 20 TABLET | Refills: 0 | Status: SHIPPED | OUTPATIENT
Start: 2022-09-18 | End: 2022-09-23

## 2022-09-18 RX ORDER — TAMSULOSIN HYDROCHLORIDE 0.4 MG/1
0.4 CAPSULE ORAL DAILY
Status: DISCONTINUED | OUTPATIENT
Start: 2022-09-18 | End: 2022-09-18 | Stop reason: HOSPADM

## 2022-09-18 RX ORDER — TAMSULOSIN HYDROCHLORIDE 0.4 MG/1
0.4 CAPSULE ORAL DAILY
Qty: 30 CAPSULE | Refills: 3 | Status: SHIPPED | OUTPATIENT
Start: 2022-09-19

## 2022-09-18 RX ADMIN — PANTOPRAZOLE SODIUM 40 MG: 40 TABLET, DELAYED RELEASE ORAL at 08:12

## 2022-09-18 RX ADMIN — IPRATROPIUM BROMIDE AND ALBUTEROL SULFATE 1 AMPULE: .5; 2.5 SOLUTION RESPIRATORY (INHALATION) at 16:13

## 2022-09-18 RX ADMIN — OXYCODONE HYDROCHLORIDE AND ACETAMINOPHEN 2 TABLET: 5; 325 TABLET ORAL at 01:23

## 2022-09-18 RX ADMIN — HEPARIN SODIUM 5000 UNITS: 5000 INJECTION INTRAVENOUS; SUBCUTANEOUS at 05:58

## 2022-09-18 RX ADMIN — OXYCODONE HYDROCHLORIDE AND ACETAMINOPHEN 2 TABLET: 5; 325 TABLET ORAL at 12:18

## 2022-09-18 RX ADMIN — TAMSULOSIN HYDROCHLORIDE 0.4 MG: 0.4 CAPSULE ORAL at 13:14

## 2022-09-18 RX ADMIN — SODIUM CHLORIDE, POTASSIUM CHLORIDE, SODIUM LACTATE AND CALCIUM CHLORIDE: 600; 310; 30; 20 INJECTION, SOLUTION INTRAVENOUS at 04:07

## 2022-09-18 RX ADMIN — HEPARIN SODIUM 5000 UNITS: 5000 INJECTION INTRAVENOUS; SUBCUTANEOUS at 13:14

## 2022-09-18 RX ADMIN — IPRATROPIUM BROMIDE AND ALBUTEROL SULFATE 1 AMPULE: .5; 2.5 SOLUTION RESPIRATORY (INHALATION) at 08:29

## 2022-09-18 ASSESSMENT — PAIN SCALES - GENERAL
PAINLEVEL_OUTOF10: 7
PAINLEVEL_OUTOF10: 7

## 2022-09-18 NOTE — THERAPY EVALUATION
Home Oxygen Evaluation    Home Oxygen Evaluation completed. Patient is on 2.5 liters per minute via NC. Resting SpO2 = 95%  Resting SpO2 on room air = 93%    SpO2 on room air with exercise = 89%  SpO2 on oxygen as above with exercise = 92%    Nocturnal Oximetry with patient on room air is recommended is SpO2 is between 89% and 95% (requires additional order).     Marie Jones RCP  1:37 PM

## 2022-09-18 NOTE — PROGRESS NOTES
Patient given discharge paperwork and all questions answered. Patient discharged with all of his belongings.

## 2022-09-18 NOTE — PROGRESS NOTES
[address] INVASIVE SURGERY  PROGRESS NOTE      Patient Name: Andreas Flores  MRN: [id number]  Date of admission: 2022  Length of Stay: 1  Summary: 75year old male with esophageal cancer    : R IJ Mediport placement and robotic assisted laparoscopic jejunostomy tube placement. Subjective:  Patient seen and chart reviewed. No acute events overnight. Afebrile, normotensive, normal sinus rhythm, and saturating >95% on 1 L NC. Mild abdominal pain is manageable. Denies fever or chills  Denies SOB or cough  Denies palpitations or CP  Denies abdominal pain, nausea, vomiting, or diarrhea    Vitals:                                                                            Temp  Av.6 °F (36.4 °C)  Min: 96.4 °F (35.8 °C)  Max: 98.2 °F (83.5 °C)  Systolic (01KDV), LZD:561 , Min:102 , PFU:641   Diastolic (71CNK), XSZ:57, Min:67, Max:94  Pulse  Av.6  Min: 65  Max: 92  Resp  Avg: 15.3  Min: 11  Max: 19  SpO2: 94 % SpO2  Av.8 %  Min: 93 %  Max: 100 %       I/O's  I/O last 3 completed shifts: In: 800 [I.V.:800]  Out: 2260 [Urine:2250; Blood:10]    Physical exam:  General: NAD, resting comfortably in bed. Neck: R IJ Mediport in place  Lungs: Equal chest rise bilaterally, no audible wheezes or rhonchi. Heart: Regular rate and rhythm. Warm and well perfused. Abdomen: Soft, non-distended, non-tender, laparoscopic port site incisions well approximated without erythema or drainage. J tube in place with bumper at skin.    Extremities: Moves all extremities x4, No edema    Labs:  CBC:  Recent Labs     22  0353   WBC 8.9   RBC 3.91*   HGB 12.0*   HCT 35.8*   MCV 91.6   MCH 30.7   MCHC 33.5   RDW 13.5      MPV 10.9     Chem:  Recent Labs     22  0353      K 3.8      CO2 24   GLUCOSE 109*   BUN 8   CREATININE 0.69*   ANIONGAP 9   LABGLOM >60   GFRAA >60   CALCIUM 8.0*   No results for input(s): PROT, LABALBU, AST, ALT, LDH, GGT, ALKPHOS, LABGGT, BILITOT, BILIDIR, AMMONIA, AMYLASE, LIPASE, LACTATE in the last 72 hours. Glucose:No results for input(s): POCGLU in the last 72 hours. Radiology:  XR CHEST PORTABLE    Result Date: 9/17/2022  No acute process. Right port catheter tip projects at the mid SVC. ASSESSMENT  75year old male with esophageal cancer    9/17: R IJ Mediport placement and robotic assisted laparoscopic jejunostomy tube placement.     Problem List  Patient Active Problem List   Diagnosis    BPH (benign prostatic hyperplasia)    Asbestos exposure    Class 1 obesity due to excess calories without serious comorbidity with body mass index (BMI) of 32.0 to 32.9 in adult    Former smoker    Alcohol use    Epidermoid cyst of finger of left hand    Gastroesophageal reflux disease    Abdominal pain, generalized    Hiatal hernia    Arthritis of lumbar spine    Benign cyst of right kidney    Liver cyst    Esophageal mass    Adenosquamous carcinoma of esophagus (HCC)    Malignant neoplasm of lower third of esophagus ([facility]    Encounter for gastrojejunal tube placement    Esophageal cancer (HCC)     PLAN  -Continue full liquid diet  -Flush Mediport with heparin flush after accessing.   -Continue Protonix  -Pain and nausea control PRN  -Monitor vitals per unit standard  -Encourage IS, pulmonary hygeine  -Encourage ambulation  -Monitor I/O's  -Continue VTE ppx  -Home O2 evaluation if unable to wean from O2.  -Tentative plan for discharge today if successful void trial    Otto Hodges MD  General Surgery PGY5  Available via [address] [place] [address]

## 2022-09-18 NOTE — CARE COORDINATION
09/18/22 0926   Service Assessment   Patient Orientation Alert and Oriented;Person;Place;Situation   Cognition Alert   History Provided By Patient   Primary Florina 1 is: Legal Next of Kin   PCP Verified by CM Yes  Lara Hector DO)   Last Visit to PCP Within last 3 months   Prior Functional Level Independent in ADLs/IADLs   Current Functional Level Independent in ADLs/IADLs   Can patient return to prior living arrangement Yes   Ability to make needs known: Good   Family able to assist with home care needs: Yes   Would you like for me to discuss the discharge plan with any other family members/significant others, and if so, who? No   Financial Resources Medicare   Social/Functional History   Lives With Alone   Type of [address] Two level; Work area in [facility] to enter with [email or web address] - Number of Steps 3   Entrance Stairs - Rails Right   Bathroom Shower/Tub Tub/Shower unit   Bathroom Toilet Standard   Bathroom Accessibility Accessible   Receives Help From [facility] [facility] Responsibilities Yes   Meal Prep Responsibility Primary   [facility] Primary   Bill Paying/Finance Responsibility Primary   [facility] Management Primary   Ambulation Assistance Independent   Transfer Assistance Independent   Active  Yes   Mode of Transportation Car;Truck   Discharge Planning   Type of [address] [place] [address] Prior To Admission None   Potential Assistance Needed N/A   DME Ordered?  No   Potential Assistance Purchasing Medications No   Patient expects to be discharged to: House   One/Two Story Residence Two story   # of Interior Steps 10   Interior Rails Right   Lift Chair Available No   History of falls? 0 Services At/After Discharge   Transition of Care Consult (CM Consult) Discharge Planning   Services At/After Discharge Transport   Confirm Follow Up Transport Family   Condition of Participation: Discharge Planning   The Plan for Transition of Care is related to the following treatment goals: Discomfort improve. Return home   The Patient and/or Patient Representative was provided with a Choice of Provider? Patient   The Patient and/Or Patient Representative agree with the Discharge Plan? Yes   Freedom of Choice list was provided with basic dialogue that supports the patient's individualized plan of care/goals, treatment preferences, and shares the quality data associated with the providers? Yes   Spoke with patient at bedside. Role explained. Patient plan on returning home with assistance from dtr. Patient verbalizes concern getting in and out of his dtrs car due to it's so low to the ground. Dtr may bring his truck and he feels as if he will be able to get in and out. Patient to let CM know if transportation is needed. Patient with new port and J tube. Patient has follow up appt 9-19 to discuss tube feedings with MD.  Discussed home care, list provided and patient to follow up with MD if home care is needed for feedings. Address, telephone numbers, PCP and  Er contacts verified.

## 2022-09-19 ENCOUNTER — HOSPITAL ENCOUNTER (OUTPATIENT)
Dept: RADIATION ONCOLOGY | Facility: MEDICAL CENTER | Age: 67
Discharge: HOME | End: 2022-09-19
Payer: MEDICARE

## 2022-09-19 ENCOUNTER — TELEPHONE (OUTPATIENT)
Dept: ONCOLOGY | Age: 67
End: 2022-09-19

## 2022-09-19 ENCOUNTER — CARE COORDINATION (OUTPATIENT)
Dept: CASE MANAGEMENT | Age: 67
End: 2022-09-19

## 2022-09-19 DIAGNOSIS — K22.89 ESOPHAGEAL MASS: Primary | ICD-10-CM

## 2022-09-19 LAB
CASE NUMBER:: NORMAL
SPECIMEN DESCRIPTION: NORMAL

## 2022-09-19 PROCEDURE — 77334 RADIATION TREATMENT AID(S): CPT | Performed by: RADIOLOGY

## 2022-09-19 PROCEDURE — 77470 SPECIAL RADIATION TREATMENT: CPT | Performed by: RADIOLOGY

## 2022-09-19 PROCEDURE — 1111F DSCHRG MED/CURRENT MED MERGE: CPT | Performed by: FAMILY MEDICINE

## 2022-09-19 PROCEDURE — 77263 THER RADIOLOGY TX PLNG CPLX: CPT | Performed by: RADIOLOGY

## 2022-09-19 NOTE — PROGRESS NOTES
Pt here today for teach and simulation scan. Carlos Garcia, Oncology Dietician, met with patient and reviewed J-tube site care, flushing and feeding. Radiation and You information provided along with additional education regarding radiation therapy and what to expect. Pt verbalizes understanding and all questions answered to the best of my knowledge. Samples of aquaphor and community resource information provided. Pt escorted to CT room without any difficulty.

## 2022-09-19 NOTE — TELEPHONE ENCOUNTER
Chemotherapy orders received:     Ht=73 inches  In=837 lbs  BSA=2.19  Chemotherapy doses verified:   Taxol 50 mg/m2 = 109 mg  Carboplatin AUC 2 to be determined day of treatment // SL

## 2022-09-19 NOTE — TELEPHONE ENCOUNTER
[facility] Radiation Oncology Nutrition Follow Up       Jose Balbuena is a 75 y.o.  male     NUTRITION RECOMMENDATIONS / MONITORING / EVALUATION  Restart moist/soft, high calorie/high protein foods, several small meals throughout the day  2. Clean tube site daily with soap/water, change gauze as  soiled and flush with 60 mL syringe of water BID  3. Start daily oral nutrition supplement drink  4. Will monitor need for EN initiation, po intakes, wts, labs, s/s, care plan     Subjective/Current Data:  Met with pt during teach/sim appointment. Pt had J tube placed Saturday. Assisted pt with changing dressing around tube site. Reviewed steps on cleaning and flushing tube. Provided pt with syringe and gauze. [facility] who will be delivering tube feeding formula, supplies, and pump out tomorrow. Pt is currently able to eat soft/moist foods by mouth. Reviewed high calorie/high protein foods and encouraged pt to start drinking daily oral nutrition supplement. Will follow for when pt needs to start using tube for nutrition and provide further instruction on using pump. Also spoke with daughter as she had some specific questions regarding when to start using tube. Writer will be following closely to determine when EN needs to be initiated. Current wt: 93.9 kg       Goal: Adequate intake to aide in wt maintenaince, signs and symptoms management, and overall wellbeing.     Progress towards goal: gradually worsening    Daphnie Sanchez, MS, RD, LD  Registered Dietitian  [place]  [phone]

## 2022-09-19 NOTE — CARE COORDINATION
CC reviewed discharge instructions, medical action plan and red flags with patient who verbalized understanding. Patient given an opportunity to ask questions and does not have any further questions or concerns at this time. Were discharge instructions available to patient? Yes. Reviewed appropriate site of care based on symptoms and resources available to patient including: PCP  Specialist. The patient agrees to contact the PCP office for questions related to their healthcare. Medication reconciliation was performed with patient, who verbalizes understanding of administration of home medications. Advised obtaining a 90-day supply of all daily and as-needed medications. Was patient discharged with a pulse oximeter? no    LPN CC provided contact information. Plan for follow-up call in 3-5 days based on severity of symptoms and risk factors.   Plan for next call: symptom management-new diet , pain control     Care Transitions 24 Hour Call    Care Transitions Interventions         Follow Up  Future Appointments   Date Time Provider Rik Savage   9/30/2022  3:00 PM Janett Gutierrez DO bariatric [facility]   10/26/2022  1:45 PM MD jean Begum exc [facility]   11/18/2022 10:40 AM DO HELENA Fafran LPN

## 2022-09-19 NOTE — PROGRESS NOTES
CLINICAL PHARMACY NOTE: MEDS TO BEDS    Total # of Prescriptions Filled: 1   The following medications were delivered to the patient:  percocet    Additional Documentation:   $0.80 collected via [employer]

## 2022-09-19 NOTE — DISCHARGE INSTRUCTIONS
What to expect next! Simulation Scan      Prior to your radiation you will need a simulation CT scan. This scan is where they will precisely identify the area on your body where you will receive radiation. Positioning is extremely important, and your body will be positioned carefully. You will be in the same position during every treatment, and you will need to remain still during the treatments. Your doctor may order a mold or a mask (for head and neck treatment only) to help reproduce your treatment set up. You will also receive tattoos during this appointment. These tattoos are very important. The therapists use these tattoos to line your body up on the treatment table. Information from the CT scan is used to precisely locate the treatment fields and create a \"map\" for the physician to design the treatment. The CT scanner is specially designed to work with the other equipment in the department and is not a replacement for other diagnostic scans you may have received. After simulation, details from the procedure are forwarded to medical radiation dosimetrists and medical physicists. These professionals perform highly technical calculations that will be used to set up the treatment machine (linear accelerator). The dosimetrist and physicist work closely with your radiation oncologist to develop the treatment plan, a process that typically takes 7-10 business days. Once the planning is completed, a therapist will call you with a start date. During that call your appointment time will also be determined. Your appointment time will be at the same time each day. Head and Neck Mask                            Body Mold                        Radiation Tattoo  Daily Treatments       You will be placed on the treatment table in the same position as you were when you had your simulation scan.  Once in place, a set of X-ray films will be taken to ensure that the treatment will be delivered the same way as it was simulated. Once the films and positioning are confirmed, a treatment will be delivered. Factors that affect the total length of the treatment include the complexity of your treatment and how quickly you can be positioned properly for treatment. Treatments are usually given once a day, Monday through Friday, for a number of weeks. The number of weeks is determined by national cancer guidelines and your physician. Each treatment generally takes only 10 to 15 minutes; however, you will likely be in the department for 20- 30 minutes each day. If your doctor has recommended SBRT treatments, your schedule will be different than mentioned above. Weekly Visits    During your treatments you will have a weekly appointment with your radiation oncologist. This appointment is to evaluate how you are tolerating your treatments and to address any questions/concerns you may have. Follow Up  We will follow your progress and see you on a regular basis. The duration between appointments vary depending on your tolerance to your treatments and your doctor's discretion. We understand that you may be seeing many other physicians, but it is important for us to participate in this follow-up process so that any radiation-related problems can be addressed if needed. Chest Side Effects  Fatigue  Skin Changes  Throat changes, such as trouble swallowing  Cough  Shortness of breath    Ways to Manage Fatigue    Try to sleep at least 8 hours each night. This may be more sleep than you needed before radiation therapy. One way to sleep better at night is to be active during the day. Another way is to relax right before going to bed. Do calming activities before bedtime, such as reading, working on a jigsaw puzzle, or listening to music. Plan time to rest. Take short naps or rest breaks between activities. Try not to do too much. With fatigue, you ay not have enough energy to do all the things you want to do.  Stay active, but choose the activities that are most important to you. Try to let go of things that don't matter as much now. For example, you might go to work but not do housework. You might watch your children's sprots events but not cook dinner. Exercise. Research shows that most people feel better when they get some exercise each day. Go for a short walk, ride a bike, or do yoga. Talk with your doctor or nurse about types of exercise you can do while having radiation therapy. Relax. Mediatation, prayer, gentle yoga, guided imagery, and visualization are ways you can learn to relax and decrease stress. For relaxation exercises:  Visit Learning to Relax on the National Cancer [employer]'s website at : www.cancer.gov/about-cancer/copingfeelings/relaxation  See Facing Forward: Life After Cancer Treatment at: www.cancer.gov/publications/patient-education/facing-forward  Eat and drink well. It can be easier to eat if you have 5 or 6 small meals each day, rather than 3 large ones. Keep foods around that are easy to fix, such as canned soups, frozen meals, yogurt, and cottage cheese. Drink plenty of fluids each day-about 8 cups of water or juice. Plan a work schedule that is right for you. Fatigue may affect the amount of energy you have for your job. You may feel well enough to work your full schedule, or you may need to work less-maybe just a few hours a day or a few days each week. You may want to talk with your boss about ways to work from home so you do not need to commute. If possible, you may want to think about going on medical leave while you have radiation therapy. Ways to Manage Skin Changes    Skin Care. Take extra good care of your skin during radiation therapy. Be gentle and do not rub, scrub, or scratch in the treatment area. Use creams that your doctor or nurse suggests. Shana / Zafar Garcia / Farida   Apply the recommended lotions to treatment area 2 times a day.  Start this the day you begin radiation. Do not put anything on your skin that is very hot or cold. Do not use heating pads, ice packs or other hot or cold items on the treatment area  Be gentle when you shower or take a bath. You can take a lukewarm shower every day. If you prefer to take a lukewarm bath, so do only every other day and don't soak  For too long. Whether you take a shower or bath, make sure to use a mild soap. Dry yourself with a soft towel by patting, not rubbing, your skin. Be careful not to wash off the ink markings that you need for radiation therapy. Use only those lotions and skin products that your doctor or nurse suggests. If you are using a prescribed cream for a skin problem or acne, tell your doctor or nurse before you begin radiation treatment. Check with your doctor or nurse before using any of the following skin products: Bubble bath     Cornstarch  Cream  Deodorant  Hair removers  Makeup  Oil   Ointment  Perfume  Powder  Soap   Sunscreen  Cool, humid places. Your skin may feel much better when you are in a cool, humid places. You can make rooms more humid by putting a bowl of water on the radiator or using a humidifier. If you use a humidifier, be sure to follow the directions about cleaning it to prevent bacteria. Soft fabrics. Wear clothes and use bed sheets that are made of very soft fabrics. Do not wear clothes in your treatment area that are tight and do not breathe, such as girdles, body shapers, and pantyhose  Protect your skin from the sun every day. The sun can burn you even on cloudy days or when you are outside for just a few minutes. Do not go to the beach or sunbathe. Wear a broad-brimmed hat, long-sleeved shirt, and long pants when you are outside. Talk with your doctor or nurse about sunscreen lotions. He or she may suggest that you use a sunscreen with an SPF of 30 or higher. You will need to protect your skin form the sun even after radiation therapy is over. Do not use tanning beds. Tanning beds expose you to the same harmful effects as the sun. Adhesive tape. Do not put adhesive bandages or other types of sticky tape on your skin in the treatment area. Talk with your doctor or nurse about ways to bandage without tape. Shaving. Ask your doctor or nurse if you can shave the treatment area. If you can shave, use an electric razor, but do not use a pre-shave liquid. Talk with your doctor or nurse. Some skin changes can be very serious. Your treatment team will check for skin changes each time you have radiation therapy. Make sure to report any skin changes that you notice. Medicine. Medicines can help with some skin changes. These include lotions for dry or itchy skin, antibiotics to treat infection, and drug to reduce swelling or itching. Ways to Manage Throat Changes    Be careful what you eat when your throat is sore  Choose foods that are easy to swallow. Cut, blend, or shred foods to make them easier to eat. Eat moist, soft foods such as cooked cereals, mashed potatoes, and scrambled eggs. Wet and soften food with gravy, sauce, broth, yogurt, or other liquids. Drink cool drinks. Sip drinks through a straw. Eat foods that are cool or at room temperature. Eat small meals and snacks. It may be easier to eat a small amount of food at one tie. Instead of eating 3 large meals each day, eat 5 or 6 small meals and snacks. Choose foods and drinks that are high in calories and protein. When it hurts to swallow, you may eat less and lose weight. It is important to maintain your wieght during radiation therapy. Having foods and drinks that are high in calories and protein can help you. Sit upright and bend your head slightly forward when you are eating or drinking. Remain sitting or standing upright for at least 30 minutes after eating  Avoid things that can burn or scrape your throat, such as:   Hot foods and drinks  Spicy foods  Foods and juices that are high in acid, such as

## 2022-09-20 ENCOUNTER — TELEPHONE (OUTPATIENT)
Dept: ONCOLOGY | Age: 67
End: 2022-09-20

## 2022-09-20 ENCOUNTER — TELEPHONE (OUTPATIENT)
Dept: INFUSION THERAPY | Facility: MEDICAL CENTER | Age: 67
End: 2022-09-20

## 2022-09-20 LAB — SURGICAL PATHOLOGY REPORT: NORMAL

## 2022-09-20 NOTE — DISCHARGE SUMMARY
BARIATRIC SURGERY DISCHARGE SUMMARY    Disposition: DISCHARGE TO Home    PATIENT NAME: Jose Balbuena    YOB: 1955  MEDICAL RECORD NO. [id number]  DATE: 9/20/2022  ADMITTING PHYSICIAN: Dr. Johanna Pal: Faye Felipe DO  ADMIT DATE: 9/17/2022   DISCHARGE DATE:  9/18/2022  6:04 PM  DISPOSITION: to home  ADMITTING DIAGNOSIS:   1. Acute post-operative pain    2. Malignant neoplasm of lower third of esophagus ([facility] [address]      DISCHARGE DIAGNOSIS:   Patient Active Problem List   Diagnosis Code    BPH (benign prostatic hyperplasia) N40.0    Asbestos exposure Z77.090    Class 1 obesity due to excess calories without serious comorbidity with body mass index (BMI) of 32.0 to 32.9 in adult E66.09, Z68.32    Former smoker Z87.891    Alcohol use Z72.89    Epidermoid cyst of finger of left hand L72.0    Gastroesophageal reflux disease K21.9    Abdominal pain, generalized R10.84    Hiatal hernia K44.9    Arthritis of lumbar spine M47.816    Benign cyst of right kidney N28.1    Liver cyst K76.89    Esophageal mass K22.89    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 [address] is a 75 y.o. yo male with esophageal cancer who presented for placement of jejunostomy and medi-placement     At time of discharge, Jose Balbuena was tolerating a full liquid diet, ambulating on his own accord, had adequate analgesia, and had no signs of symptoms of complications. He was deemed medically stable and was DC to home on 9/18/22 w/ instructions to f/u in office in 1-2 weeks.   Pt expressed understanding of and agreement w/ DC instructions        DISCHARGE INSTRUCTIONS Discharge Medications:        Medication List        START taking these medications      ondansetron 4 MG disintegrating tablet  Commonly known as: ZOFRAN-ODT  Take 1 tablet by mouth 3 times daily as needed for Nausea or Vomiting     oxyCODONE-acetaminophen 5-325 MG per tablet  Commonly known as: Percocet  Take 1 tablet by mouth every 6 hours as needed for Pain for up to 5 days. Intended supply: 5 days. Take lowest dose possible to manage pain     tamsulosin 0.4 MG capsule  Commonly known as: FLOMAX  Take 1 capsule by mouth daily            CONTINUE taking these medications      Align Prebiotic-Probiotic 5-1.25 MG-GM Chew     [employer] Standard 1.4 Liqd  1 Can by Enteral route 6 times daily [employer] 1.4, 6 cans daily, 121 mL/hr x 16 hours via J tube per pump     omeprazole 20 MG delayed release capsule  Commonly known as: PRILOSEC  Take 1 capsule by mouth 2 times daily (before meals)            ASK your doctor about these medications      fluorouracil 5 % cream  Commonly known as: EFUDEX  Apply twice daily to actinic keratosis for 3-4 weeks. Expect redness and irritation.                Where to Get Your Medications        These medications were sent to [facility] [id number]  [place] [address]      Phone: [phone]   tamsulosin 0.4 MG capsule       These medications were sent to [facility] [address] [facility], [address] [facility]  [address] [facility], [address] [facility] [address]      Phone: [phone]   ondansetron 4 MG disintegrating tablet       You can get these medications from any pharmacy    Bring a paper prescription for each of these medications  oxyCODONE-acetaminophen 5-325 MG per tablet       Diet: No diet orders on file diet as tolerated  Activity: - Avoid strenuous activity or exercise until cleared during follow-up appointment  - No driving or operating heavy machinery while taking narcotics   Wound Care: Daily and as needed  Follow-up:   1. Dr. Pierre Sheets in 1-2 weeks  2.  Follow up in  the next few weeks with PCP: Willy Medina DO,      Electronically signed by Aura Decker MD on 9/20/2022 at 6:41 AM

## 2022-09-20 NOTE — TELEPHONE ENCOUNTER
[facility] TX IS APPROVED AND READY TO SCHEDULE. PT IS CONCURRENT WITH RADIATION. ONCE RAD/ONC HAS COMPLETED THEIR TX PLAN, PT CAN BE SCHEDULED FOR CONCURRENT CHEMO AND RADIATION .

## 2022-09-20 NOTE — TELEPHONE ENCOUNTER
RN check of new chemotherapy orders per Dr. Pili Anderson. 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

## 2022-09-22 ENCOUNTER — CARE COORDINATION (OUTPATIENT)
Dept: CASE MANAGEMENT | Age: 67
End: 2022-09-22

## 2022-09-22 ENCOUNTER — HOSPITAL ENCOUNTER (OUTPATIENT)
Dept: RADIATION ONCOLOGY | Facility: MEDICAL CENTER | Age: 67
Discharge: HOME | End: 2022-09-22
Payer: MEDICARE

## 2022-09-22 ENCOUNTER — TELEPHONE (OUTPATIENT)
Dept: ONCOLOGY | Age: 67
End: 2022-09-22

## 2022-09-22 PROCEDURE — 77300 RADIATION THERAPY DOSE PLAN: CPT | Performed by: RADIOLOGY

## 2022-09-22 PROCEDURE — 77301 RADIOTHERAPY DOSE PLAN IMRT: CPT | Performed by: RADIOLOGY

## 2022-09-22 PROCEDURE — 77338 DESIGN MLC DEVICE FOR IMRT: CPT | Performed by: RADIOLOGY

## 2022-09-22 NOTE — CARE COORDINATION
[facility] Transitions Follow Up Call    2022    Patient: Ted Tong  Patient : 1955   MRN: [id number]  Reason for Admission: Encounter for gastrojejunal tube   Discharge Date: 22 RARS: Readmission Risk Score: 9.5         Spoke with: Didier Nicole he states he is feeling pretty good he denies pain at tube site is flushing as directed. He is still eating soft foods, no nausea denies dizziness, fever,s SOB, is urinating well has some constipation and is drinking prune juice has no concerns today,  Care Transitions Follow Up Call    Needs to be reviewed by the provider   Additional needs identified to be addressed with provider: No  none             Method of communication with provider : none      LPN Care Coordinator contacted the patient by telephone to follow up after admission on . Verified name and  with patient as identifiers. Addressed changes since last contact: none  Discussed follow-up appointments. If no appointment was previously scheduled, appointment scheduling offered: Yes. Is follow up appointment scheduled within 7 days of discharge? Yes. Advance Care Planning:   Does patient have an Advance Directive: not on file. LPN CC reviewed discharge instructions, medical action plan and red flags with patient and discussed any barriers to care and/or understanding of plan of care after discharge. Discussed appropriate site of care based on symptoms and resources available to patient including: PCP  Specialist. The patient agrees to contact the PCP office for questions related to their healthcare. Patients top risk factors for readmission: lack of knowledge about disease  medication management  Interventions to address risk factors: Obtained and reviewed discharge summary and/or continuity of care documents          LPN CC provided contact information for future needs. Plan for follow-up call in 5-7 days based on severity of symptoms and risk factors.   Plan for next call: symptom management-constipation         Care Transitions Subsequent and Final Call    Subsequent and Final Calls  Do you have any ongoing symptoms?: No  Have your medications changed?: No  Do you have any questions related to your medications?: No  Do you currently have any active services?: No  Do you have any needs or concerns that I can assist you with?: No  Identified Barriers: Other  Care Transitions Interventions  Other Interventions:              Follow Up  Future Appointments   Date Time Provider Rik Savage   9/22/2022  6:00 PM SCHEDULE, [facility] [place]   9/30/2022  3:00 PM Bryan Roman DO bariatric g[facility]   10/26/2022  1:45 PM Deisi Munoz MD [facility]   11/18/2022 10:40 AM DO HELENA Hall LPN

## 2022-09-22 NOTE — TELEPHONE ENCOUNTER
Called and spoke with pt after receiving his biopsychosocial. Pt denies needs at this time.     Isabel Boyd MSW, LSW

## 2022-09-23 ENCOUNTER — TELEPHONE (OUTPATIENT)
Dept: BARIATRICS/WEIGHT MGMT | Age: 67
End: 2022-09-23

## 2022-09-23 ENCOUNTER — TELEPHONE (OUTPATIENT)
Dept: RADIATION ONCOLOGY | Facility: MEDICAL CENTER | Age: 67
End: 2022-09-23

## 2022-09-23 PROCEDURE — 77336 RADIATION PHYSICS CONSULT: CPT | Performed by: RADIOLOGY

## 2022-09-23 NOTE — TELEPHONE ENCOUNTER
Yanique from [facility] called stating CPT code 75940 is approved for Inpatient admit date 9/17/22. Auth# [id number] for 1 day (9/17/22). She refused to give me a return call number-she said to call number on back of card.

## 2022-09-23 NOTE — TELEPHONE ENCOUNTER
Insurance approval received for radiation therapy plan. Debi Pagan, chemotherapy  at [facility] notified and appointments scheduled. Aurelio Ye will start treatment on Tuesday, 9/27/22. He will have radiation therapy at 8:45am and will see Medical Oncology at 10am for labs, MD visit, chemo education and treatment. Aurelio Ye was notified of treatment schedule and voices understanding and appreciation for call.

## 2022-09-26 ENCOUNTER — HOSPITAL ENCOUNTER (OUTPATIENT)
Facility: MEDICAL CENTER | Age: 67
End: 2022-09-26
Payer: MEDICARE

## 2022-09-26 ENCOUNTER — TELEPHONE (OUTPATIENT)
Dept: ONCOLOGY | Age: 67
End: 2022-09-26

## 2022-09-27 ENCOUNTER — TELEPHONE (OUTPATIENT)
Dept: ONCOLOGY | Age: 67
End: 2022-09-27

## 2022-09-27 ENCOUNTER — HOSPITAL ENCOUNTER (OUTPATIENT)
Dept: RADIATION ONCOLOGY | Facility: MEDICAL CENTER | Age: 67
Discharge: HOME | End: 2022-09-27
Payer: MEDICARE

## 2022-09-27 ENCOUNTER — OFFICE VISIT (OUTPATIENT)
Dept: ONCOLOGY | Age: 67
End: 2022-09-27
Payer: MEDICARE

## 2022-09-27 ENCOUNTER — HOSPITAL ENCOUNTER (OUTPATIENT)
Dept: INFUSION THERAPY | Facility: MEDICAL CENTER | Age: 67
Discharge: HOME | End: 2022-09-27

## 2022-09-27 ENCOUNTER — HOSPITAL ENCOUNTER (OUTPATIENT)
Dept: INFUSION THERAPY | Facility: MEDICAL CENTER | Age: 67
Discharge: HOME | End: 2022-09-27
Payer: MEDICARE

## 2022-09-27 VITALS
TEMPERATURE: 98.8 F | BODY MASS INDEX: 26.87 KG/M2 | SYSTOLIC BLOOD PRESSURE: 116 MMHG | WEIGHT: 203.7 LBS | HEART RATE: 97 BPM | DIASTOLIC BLOOD PRESSURE: 75 MMHG | RESPIRATION RATE: 18 BRPM

## 2022-09-27 VITALS — OXYGEN SATURATION: 94 % | HEART RATE: 70 BPM | DIASTOLIC BLOOD PRESSURE: 62 MMHG | SYSTOLIC BLOOD PRESSURE: 102 MMHG

## 2022-09-27 DIAGNOSIS — C15.9 ADENOSQUAMOUS CARCINOMA OF ESOPHAGUS (HCC): Primary | ICD-10-CM

## 2022-09-27 DIAGNOSIS — R13.19 ESOPHAGEAL DYSPHAGIA: ICD-10-CM

## 2022-09-27 DIAGNOSIS — C15.5 MALIGNANT NEOPLASM OF LOWER THIRD OF ESOPHAGUS (HCC): ICD-10-CM

## 2022-09-27 DIAGNOSIS — C15.5 MALIGNANT NEOPLASM OF LOWER THIRD OF ESOPHAGUS (HCC): Primary | ICD-10-CM

## 2022-09-27 LAB
ABSOLUTE EOS #: 0.38 K/UL (ref 0–0.44)
ABSOLUTE IMMATURE GRANULOCYTE: 0.02 K/UL (ref 0–0.3)
ABSOLUTE LYMPH #: 0.99 K/UL (ref 1.1–3.7)
ABSOLUTE MONO #: 0.69 K/UL (ref 0.1–1.2)
ALBUMIN SERPL-MCNC: 3.6 G/DL (ref 3.5–5.2)
ALP BLD-CCNC: 87 U/L (ref 40–129)
ALT SERPL-CCNC: 7 U/L (ref 5–41)
ANION GAP SERPL CALCULATED.3IONS-SCNC: 8 MMOL/L (ref 9–17)
AST SERPL-CCNC: 11 U/L
BASOPHILS # BLD: 1 % (ref 0–2)
BASOPHILS ABSOLUTE: 0.05 K/UL (ref 0–0.2)
BILIRUB SERPL-MCNC: 1.6 MG/DL (ref 0.3–1.2)
BUN BLDV-MCNC: 9 MG/DL (ref 8–23)
BUN/CREAT BLD: 14 (ref 9–20)
CALCIUM SERPL-MCNC: 9.2 MG/DL (ref 8.6–10.4)
CHLORIDE BLD-SCNC: 103 MMOL/L (ref 98–107)
CO2: 27 MMOL/L (ref 20–31)
CREAT SERPL-MCNC: 0.63 MG/DL (ref 0.7–1.2)
EOSINOPHILS RELATIVE PERCENT: 5 % (ref 1–4)
GFR AFRICAN AMERICAN: >60 ML/MIN
GFR NON-AFRICAN AMERICAN: >60 ML/MIN
GFR SERPL CREATININE-BSD FRML MDRD: ABNORMAL ML/MIN/{1.73_M2}
GLUCOSE BLD-MCNC: 104 MG/DL (ref 70–99)
HCT VFR BLD CALC: 41.3 % (ref 40.7–50.3)
HEMOGLOBIN: 13 G/DL (ref 13–17)
IMMATURE GRANULOCYTES: 0 %
LYMPHOCYTES # BLD: 13 % (ref 24–43)
MCH RBC QN AUTO: 28.7 PG (ref 25.2–33.5)
MCHC RBC AUTO-ENTMCNC: 31.5 G/DL (ref 28.4–34.8)
MCV RBC AUTO: 91.2 FL (ref 82.6–102.9)
MONOCYTES # BLD: 9 % (ref 3–12)
NRBC AUTOMATED: 0 PER 100 WBC
PDW BLD-RTO: 13.2 % (ref 11.8–14.4)
PLATELET # BLD: 355 K/UL (ref 138–453)
PMV BLD AUTO: 10.2 FL (ref 8.1–13.5)
POTASSIUM SERPL-SCNC: 4.1 MMOL/L (ref 3.7–5.3)
RBC # BLD: 4.53 M/UL (ref 4.21–5.77)
SEG NEUTROPHILS: 72 % (ref 36–65)
SEGMENTED NEUTROPHILS ABSOLUTE COUNT: 5.36 K/UL (ref 1.5–8.1)
SODIUM BLD-SCNC: 138 MMOL/L (ref 135–144)
TOTAL PROTEIN: 6.8 G/DL (ref 6.4–8.3)
WBC # BLD: 7.5 K/UL (ref 3.5–11.3)

## 2022-09-27 PROCEDURE — 1123F ACP DISCUSS/DSCN MKR DOCD: CPT | Performed by: INTERNAL MEDICINE

## 2022-09-27 PROCEDURE — 96417 CHEMO IV INFUS EACH ADDL SEQ: CPT

## 2022-09-27 PROCEDURE — 77386 HC NTSTY MODUL RAD TX DLVR CPLX: CPT | Performed by: RADIOLOGY

## 2022-09-27 PROCEDURE — 96413 CHEMO IV INFUSION 1 HR: CPT

## 2022-09-27 PROCEDURE — 96375 TX/PRO/DX INJ NEW DRUG ADDON: CPT

## 2022-09-27 PROCEDURE — 99211 OFF/OP EST MAY X REQ PHY/QHP: CPT | Performed by: INTERNAL MEDICINE

## 2022-09-27 PROCEDURE — 2580000003 HC RX 258: Performed by: INTERNAL MEDICINE

## 2022-09-27 PROCEDURE — 2500000003 HC RX 250 WO HCPCS: Performed by: INTERNAL MEDICINE

## 2022-09-27 PROCEDURE — 6360000002 HC RX W HCPCS: Performed by: INTERNAL MEDICINE

## 2022-09-27 PROCEDURE — 80053 COMPREHEN METABOLIC PANEL: CPT

## 2022-09-27 PROCEDURE — G6002 STEREOSCOPIC X-RAY GUIDANCE: HCPCS | Performed by: RADIOLOGY

## 2022-09-27 PROCEDURE — 85025 COMPLETE CBC W/AUTO DIFF WBC: CPT

## 2022-09-27 PROCEDURE — 36591 DRAW BLOOD OFF VENOUS DEVICE: CPT

## 2022-09-27 PROCEDURE — 99214 OFFICE O/P EST MOD 30 MIN: CPT | Performed by: INTERNAL MEDICINE

## 2022-09-27 RX ORDER — HEPARIN SODIUM (PORCINE) LOCK FLUSH IV SOLN 100 UNIT/ML 100 UNIT/ML
500 SOLUTION INTRAVENOUS PRN
Status: CANCELLED | OUTPATIENT
Start: 2022-10-04

## 2022-09-27 RX ORDER — SODIUM CHLORIDE 9 MG/ML
5-250 INJECTION, SOLUTION INTRAVENOUS PRN
Status: CANCELLED | OUTPATIENT
Start: 2022-10-04

## 2022-09-27 RX ORDER — DIPHENHYDRAMINE HYDROCHLORIDE 50 MG/ML
50 INJECTION INTRAMUSCULAR; INTRAVENOUS ONCE
Status: CANCELLED | OUTPATIENT
Start: 2022-10-04 | End: 2022-10-04

## 2022-09-27 RX ORDER — HEPARIN SODIUM (PORCINE) LOCK FLUSH IV SOLN 100 UNIT/ML 100 UNIT/ML
500 SOLUTION INTRAVENOUS PRN
Status: DISCONTINUED | OUTPATIENT
Start: 2022-09-27 | End: 2022-09-28 | Stop reason: HOSPADM

## 2022-09-27 RX ORDER — EPINEPHRINE 1 MG/ML
0.3 INJECTION, SOLUTION, CONCENTRATE INTRAVENOUS PRN
Status: CANCELLED | OUTPATIENT
Start: 2022-10-04

## 2022-09-27 RX ORDER — SODIUM CHLORIDE 0.9 % (FLUSH) 0.9 %
5-40 SYRINGE (ML) INJECTION PRN
Status: DISCONTINUED | OUTPATIENT
Start: 2022-09-27 | End: 2022-09-28 | Stop reason: HOSPADM

## 2022-09-27 RX ORDER — SODIUM CHLORIDE 9 MG/ML
INJECTION, SOLUTION INTRAVENOUS CONTINUOUS
Status: CANCELLED | OUTPATIENT
Start: 2022-10-04

## 2022-09-27 RX ORDER — SODIUM CHLORIDE 9 MG/ML
5-40 INJECTION INTRAVENOUS PRN
Status: CANCELLED | OUTPATIENT
Start: 2022-10-04

## 2022-09-27 RX ORDER — PALONOSETRON 0.05 MG/ML
0.25 INJECTION, SOLUTION INTRAVENOUS ONCE
Status: CANCELLED | OUTPATIENT
Start: 2022-10-04 | End: 2022-10-04

## 2022-09-27 RX ORDER — SODIUM CHLORIDE 9 MG/ML
5-250 INJECTION, SOLUTION INTRAVENOUS PRN
Status: DISCONTINUED | OUTPATIENT
Start: 2022-09-27 | End: 2022-09-28 | Stop reason: HOSPADM

## 2022-09-27 RX ORDER — ACETAMINOPHEN 325 MG/1
650 TABLET ORAL
Status: CANCELLED | OUTPATIENT
Start: 2022-10-04

## 2022-09-27 RX ORDER — SODIUM CHLORIDE 0.9 % (FLUSH) 0.9 %
5-40 SYRINGE (ML) INJECTION PRN
Status: CANCELLED | OUTPATIENT
Start: 2022-10-04

## 2022-09-27 RX ORDER — MEPERIDINE HYDROCHLORIDE 50 MG/ML
12.5 INJECTION INTRAMUSCULAR; INTRAVENOUS; SUBCUTANEOUS PRN
Status: CANCELLED | OUTPATIENT
Start: 2022-10-04

## 2022-09-27 RX ORDER — DIPHENHYDRAMINE HYDROCHLORIDE 50 MG/ML
50 INJECTION INTRAMUSCULAR; INTRAVENOUS ONCE
Status: COMPLETED | OUTPATIENT
Start: 2022-09-27 | End: 2022-09-27

## 2022-09-27 RX ORDER — FAMOTIDINE 10 MG/ML
20 INJECTION, SOLUTION INTRAVENOUS
Status: CANCELLED | OUTPATIENT
Start: 2022-10-04

## 2022-09-27 RX ORDER — ALBUTEROL SULFATE 90 UG/1
4 AEROSOL, METERED RESPIRATORY (INHALATION) PRN
Status: CANCELLED | OUTPATIENT
Start: 2022-10-04

## 2022-09-27 RX ORDER — DIPHENHYDRAMINE HYDROCHLORIDE 50 MG/ML
50 INJECTION INTRAMUSCULAR; INTRAVENOUS
Status: CANCELLED | OUTPATIENT
Start: 2022-10-04

## 2022-09-27 RX ORDER — FAMOTIDINE 10 MG/ML
20 INJECTION, SOLUTION INTRAVENOUS ONCE
Status: CANCELLED | OUTPATIENT
Start: 2022-10-04 | End: 2022-10-04

## 2022-09-27 RX ORDER — DEXAMETHASONE SODIUM PHOSPHATE 10 MG/ML
10 INJECTION INTRAMUSCULAR; INTRAVENOUS ONCE
Status: COMPLETED | OUTPATIENT
Start: 2022-09-27 | End: 2022-09-27

## 2022-09-27 RX ORDER — PALONOSETRON 0.05 MG/ML
0.25 INJECTION, SOLUTION INTRAVENOUS ONCE
Status: COMPLETED | OUTPATIENT
Start: 2022-09-27 | End: 2022-09-27

## 2022-09-27 RX ORDER — FAMOTIDINE 10 MG/ML
20 INJECTION, SOLUTION INTRAVENOUS ONCE
Status: COMPLETED | OUTPATIENT
Start: 2022-09-27 | End: 2022-09-27

## 2022-09-27 RX ORDER — ONDANSETRON 2 MG/ML
8 INJECTION INTRAMUSCULAR; INTRAVENOUS
Status: CANCELLED | OUTPATIENT
Start: 2022-10-04

## 2022-09-27 RX ADMIN — DEXAMETHASONE SODIUM PHOSPHATE 10 MG: 10 INJECTION INTRAMUSCULAR; INTRAVENOUS at 13:47

## 2022-09-27 RX ADMIN — SODIUM CHLORIDE 50 ML/HR: 9 INJECTION, SOLUTION INTRAVENOUS at 10:56

## 2022-09-27 RX ADMIN — SODIUM CHLORIDE, PRESERVATIVE FREE 10 ML: 5 INJECTION INTRAVENOUS at 12:45

## 2022-09-27 RX ADMIN — DIPHENHYDRAMINE HYDROCHLORIDE 50 MG: 50 INJECTION INTRAMUSCULAR; INTRAVENOUS at 12:47

## 2022-09-27 RX ADMIN — SODIUM CHLORIDE, PRESERVATIVE FREE 20 ML: 5 INJECTION INTRAVENOUS at 15:03

## 2022-09-27 RX ADMIN — HEPARIN 500 UNITS: 100 SYRINGE at 15:03

## 2022-09-27 RX ADMIN — PACLITAXEL 108 MG: 6 INJECTION, SOLUTION, CONCENTRATE INTRAVENOUS at 13:32

## 2022-09-27 RX ADMIN — CARBOPLATIN 290 MG: 10 INJECTION INTRAVENOUS at 14:13

## 2022-09-27 RX ADMIN — FAMOTIDINE 20 MG: 10 INJECTION INTRAVENOUS at 12:44

## 2022-09-27 RX ADMIN — DEXAMETHASONE SODIUM PHOSPHATE 10 MG: 10 INJECTION INTRAMUSCULAR; INTRAVENOUS at 12:45

## 2022-09-27 RX ADMIN — PALONOSETRON 0.25 MG: 0.05 INJECTION, SOLUTION INTRAVENOUS at 12:42

## 2022-09-27 RX ADMIN — SODIUM CHLORIDE, PRESERVATIVE FREE 30 ML: 5 INJECTION INTRAVENOUS at 10:50

## 2022-09-27 NOTE — TELEPHONE ENCOUNTER
[facility]'s Oncology Nutrition Follow Up       Kenneth Li is a 75 y.o.  male     NUTRITION RECOMMENDATIONS / MONITORING / EVALUATION  Continue with several small high calorie/high protein meals throughout the day  2. As intake decreases start to consume [employer] oral nutrition supplements (1 carton = 1/2 meal)   3. As oral intake decreases initiate EN via J tube (start with 3 cartons at 120 mL/hr and increase to 6 cartons at 120 mL/hr as needed)  4. Will monitor EN initiation, po intakes, wts, labs, s/s, care plan    Subjective/Current Data:  Met with pt during infusion. Provided hands on teaching with aidee enteral pump. Reviewed guidelines for when to initiate feedings, etc. Pt able to demonstrate competence. Recent Weights: Wt Readings from Last 3 Encounters:   09/27/22 203 lb 11.2 oz (92.4 kg)   09/17/22 207 lb (93.9 kg)   09/12/22 206 lb (93.4 kg)         Goal: Adequate intake to aide in wt maintenaince, signs and symptoms management, and overall wellbeing.     Progress towards goal: stable    Samm Benson, MS, RD, LD  Registered Dietitian  [place]  [phone]

## 2022-09-27 NOTE — TELEPHONE ENCOUNTER
Pedro Castro MD VISIT & [id number] [facility] IN TO SEE PATIENT  ORDERS RECEIVED  PROCEED WITH TX TODAY  RV 1 WEEK  MD VISIT 10/04/22 @10:45AM [email or web address] See  AVS PRINTED AND GIVEN TO PATIENT WITH INSTRUCTIONS  PATIENT REMAINS IN [address] [place] [address]

## 2022-09-27 NOTE — PROGRESS NOTES
PT ARRIVES PER AMB PER SELF AND LABS AND ORDERS REVIEWED AND SEE OTHER APPT FOR CHEMO TEACHING AND RECORDED WITH THIS ENCOUNTER. PT SEEN PER MD AND LABS REVIEWED AND T BILI 1.6 AND NO ORDER CHANGES PER MD.  CHEMO TEACHING AS FOLLOWS : DIETICIAN IN TO SEE PT, DUE TO BROUGHT HIS TUBE FEEDING PUMP TO DISCUSS WITH LORRI AND PT HAS TUBE FOR G-TUBE FEEDINGS PLACED AND HAS TUBE FEED TO START IF NEEDED. DISCUSSED [facility] INFORMATION SHEETS FOR TAXOL AND CARBOPLATIN, FOLDER GIVEN AND PSYCHOSOCIAL SHEETS COMPLETED. HOW CHEMO WORKS AND SIDE EFFECTS DISCUSSED AS FOLLOWS: ALSO NOTIFIED TO REFER TO CHEMO TEACHING SHEETS, HAIR LOSS, CAN BE PERMANENT, MOUTH IRRITATION WHICH CAN PROGRESS TO THRUSH OR SORES IF NOT REPORTED, TO REPOT FEVER .4 OR >, PT HAS A THERMOMETER,  BONE MARROW EFFECTS, LEADING TO ANEMIA AND NEUTROPENIA AND THROMBOCYTOPENIA. PT TO TAKE 6- 8 BOTTLES OF WATER DAILY AND PT HAS ZOFRAN SCRIPT, THAT NEEDS PICKED UP FROM [facility] AND HAS OMEPRAZOLE AT HOME. DISCUSSED TO CALL IMMEDIATELY FOR ANY SIGNS OF ALLERGY, CHANGE IN HOW HE FEELS AND ANY CHANGE IN BREATHING, WHICH CAN HAPPEN IMMEDIATELY WITH TAXOL. ANY NAIL CHANGES AND TO KEEP MOISTURIZED. MAY HAVE NAUSEA, VOMITING, DIARRHEA, PERIPHERAL NEUROPATHY, DISCUSSED AT LENGTH AND TO REPORT IF NOTED. CHANGE IN APPETITE, FATIGUE.  AVAILABLE IF NEEDED AND HANDBOOK GIVEN, WITH PORT INFORMATION, AND DISCUSSED PORT CARE AND APPTS AND CYCLES AND PT STATES UNDERSTANDING OF ABOVE INFORMATION. NS INFUSING BEFORE AND AFTER PREMEDS AND CHEMO AND TAXOL STARTED  ML/HR FOR INITIAL INFUSION AND AFTER ABOUT 10 MIN, PT NOTICED LOOKING RESTLESS PER LAZARO RN AND PT STATES WAS JUST ABOUT TO CALL. DR JACOB TO ROOM AND TAXOL OFF AT 1344. PT REPORTS\"RESTLESS , TROUBLE BREATHING, SLIGHT NAUSEA, FLUSHING AND WARMTH. AT 1345 94% ON ROOM AIR /80, 78  AT 1347 DEX 10 MG IVP GIVEN NS INFUSING WITH TAXOL OFF.  SEE FLOWSHEETS FOR ALL VITAL SIGNS.   AT 1405, PT STATES  NAUSEA GONE AND AFTER ABOUT 25 MIN, THEN CARBOPLATIN STARTED AND NO FURTHER REACTIONS OR COMPLAINTS AND BLOOD RETURN PRESENT THROUGHOUT INFUSION. PT OBSERVED FOR 30 MIN POST AND NS INFUSING. THEN PT DISCHARGED PER AMB PER SELF WITH AVS WITH NEXT APPTS FROM .

## 2022-09-28 ENCOUNTER — HOSPITAL ENCOUNTER (OUTPATIENT)
Dept: RADIATION ONCOLOGY | Facility: MEDICAL CENTER | Age: 67
Discharge: HOME | End: 2022-09-28
Payer: MEDICARE

## 2022-09-28 ENCOUNTER — CARE COORDINATION (OUTPATIENT)
Dept: CASE MANAGEMENT | Age: 67
End: 2022-09-28

## 2022-09-28 PROCEDURE — 77386 HC NTSTY MODUL RAD TX DLVR CPLX: CPT | Performed by: RADIOLOGY

## 2022-09-28 PROCEDURE — G6002 STEREOSCOPIC X-RAY GUIDANCE: HCPCS | Performed by: RADIOLOGY

## 2022-09-28 NOTE — PROGRESS NOTES
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 Chemoradiation started 9/27/22. Developed allergy to Taxol. Switched to Abraxane/ carboplatin. BRIEF CASE HISTORY:      Mr. Barb Wick is a very pleasant 75 y.o. male with history of multiple co morbidities as listed. Patient is referred for evaluation and further management of recently diagnosed esophageal adenocarcinoma. The patient was doing fairly well except for mild chronic GERD. It was not significant so he did not pay attention to it and he did not receive any treatment for it. For the last few weeks patient started having difficulty swallowing especially solid food. He was evaluated by gastroenterology and he had EGD which showed suspicious lesion at the distal part of the esophagus at 36 cm. Biopsy from the lesion on August 1, 2022 showed adenocarcinoma. Patient is referred for further management. Patient denies any active bleeding. No melena or hematochezia. No hematemesis. No weight loss or decreased appetite. No fever or night sweats. No other complaints. Patient quit smoking more than 20 years ago. He drinks alcohol socially. INTERIM HISTORY:   Patient seen for follow-up esophageal cancer. He continues to have some difficulty swallowing solids. No weight loss. No GI bleeding. No chest pain. No fever. No other complaints.   PAST MEDICAL HISTORY: has a past medical history of Cancer [facility]), Dental root implant present, Elevated PSA, Hearing loss, Hiatal hernia, Keratosis, Prostate nodule, Snores, Under care of team, Wears dentures, Wears reading eyeglasses, and Wellness examination. PAST SURGICAL HISTORY: has a past surgical history that includes Colonoscopy (09/24/2008); Tonsillectomy; hernia repair; skin biopsy; Prostate biopsy; Prostate biopsy (N/A, 11/22/2021); Colonoscopy (N/A, 02/17/2022); Upper gastrointestinal endoscopy (N/A, 08/01/2022); Upper gastrointestinal endoscopy (N/A, 08/24/2022); Gastrojejunostomy w/ jejunostomy tube; Mediport insertion, single (Right); and Gastrostomy tube placement (N/A, 9/17/2022). CURRENT MEDICATIONS:  has a current medication list which includes the following prescription(s): tamsulosin, omeprazole, align prebiotic-probiotic, ondansetron, pa[employer] standard 1.4, and fluorouracil, and the following Facility-Administered Medications: sodium chloride, heparin flush, and sodium chloride flush. ALLERGIES:  is allergic to paclitaxel. FAMILY HISTORY: Negative for any hematological or oncological conditions. SOCIAL HISTORY:  reports that he quit smoking about 20 years ago. His smoking use included cigarettes. He smoked an average of 1.00 packs per day. He has never used smokeless tobacco. He reports current alcohol use. He reports current drug use. Drug: Marijuana [employer]). REVIEW OF SYSTEMS:     General: Positive for weakness and fatigue. No unanticipated weight loss or decreased appetite. No fever or chills. Eyes: No blurred vision, eye pain or double vision. Ears: No hearing problems or drainage. No tinnitus. Throat: No sore throat, problems with swallowing or dysphagia. Respiratory: No cough, sputum or hemoptysis. No shortness of breath. No pleuritic chest pain. Cardiovascular: No chest pain, orthopnea or PND. No lower extremity edema. No palpitation. Gastrointestinal: As above. Genitourinary: No dysuria, hematuria, frequency or urgency. Musculoskeletal: No muscle aches or pains.  No limitation of movement. No back pain. No gait disturbance, No joint complaints. Dermatologic: No skin rashes or pruritus. No skin lesions or discolorations. Psychiatric: No depression, anxiety, or stress or signs of schizophrenia. No change in mood or affect. Hematologic: No history of bleeding tendency. No bruises or ecchymosis. No history of clotting problems. Infectious disease: No fever, chills or frequent infections. Endocrine: No polydipsia or polyuria. No temperature intolerance. Neurologic: No headaches or dizziness. No weakness or numbness of the extremities. No changes in balance, coordination,  memory, mentation, behavior. Allergic/Immunologic: No nasal congestion or hives. No repeated infections. PHYSICAL EXAM:  The patient is not in acute distress. Vital signs: Blood pressure 116/75, pulse 97, temperature 98.8 °F (37.1 °C), temperature source Temporal, resp. rate 18, weight 203 lb 11.2 oz (92.4 kg).      General appearance - well appearing, not in pain or distress  Mental status - good mood, alert and oriented  Eyes - pupils equal and reactive, extraocular eye movements intact  Ears - bilateral TM's and external ear canals normal  Nose - normal and patent, no erythema, discharge or polyps  Mouth - mucous membranes moist, pharynx normal without lesions  Neck - supple, no significant adenopathy  Lymphatics - no palpable lymphadenopathy, no hepatosplenomegaly  Chest - clear to auscultation, no wheezes, rales or rhonchi, symmetric air entry  Heart - normal rate, regular rhythm, normal S1, S2, no murmurs, rubs, clicks or gallops  Abdomen - soft, nontender, nondistended, no masses or organomegaly  Neurological - alert, oriented, normal speech, no focal findings or movement disorder noted  Musculoskeletal - no joint tenderness, deformity or swelling  Extremities - peripheral pulses normal, no pedal edema, no clubbing or cyanosis  Skin - normal coloration and turgor, no rashes, no suspicious skin lesions noted     Review of Diagnostic data:   Lab Results   Component Value Date    WBC 7.5 09/27/2022    HGB 13.0 09/27/2022    HCT 41.3 09/27/2022    MCV 91.2 09/27/2022     09/27/2022       Chemistry        Component Value Date/Time     09/27/2022 1050    K 4.1 09/27/2022 1050     09/27/2022 1050    CO2 27 09/27/2022 1050    BUN 9 09/27/2022 1050    CREATININE 0.63 (L) 09/27/2022 1050        Component Value Date/Time    CALCIUM 9.2 09/27/2022 1050    ALKPHOS 87 09/27/2022 1050    AST 11 09/27/2022 1050    ALT 7 09/27/2022 1050    BILITOT 1.6 (H) 09/27/2022 1050          XR CHEST PORTABLE  Narrative: EXAMINATION:  ONE XRAY VIEW OF THE CHEST    9/17/2022 11:59 am    COMPARISON:  08/25/2022    HISTORY:  ORDERING SYSTEM PROVIDED HISTORY: central line placed  TECHNOLOGIST PROVIDED HISTORY:  central line placed    FINDINGS:  The lungs are without acute focal process. Scarring versus atelectasis at  the left lung base. There is no effusion or pneumothorax. The  cardiomediastinal silhouette is without acute process. The osseous structures  are without acute process. Right port catheter tip projects at the mid SVC. Impression: No acute process. Right port catheter tip projects at the mid SVC. FLUORO FOR SURGICAL PROCEDURES  Radiology exam is complete. No Radiologist dictation. Please follow up   with ordering provider. 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 I explained to the patient the nature of severe cancer, staging, prognosis and treatment. Explained the results of the PET CT scan and the endoscopic ultrasound. Obviously patient is having locally advanced disease with no evidence of metastasis. My recommendations to give neoadjuvant combined chemoradiation followed by surgery. We will give weekly Taxol/ carboplatin.  I explained the benefits and possible side effects related to chemotherapy, which may include but not limited to nausea, vomiting, hair loss, mouth sores, allergy, neuropathy, fever infection sepsis, anemia and thrombocytopenia. Patient was started on Taxol/ Carbo. He developed allergy to Taxol. Proceed with Carboplatin only today. Will give Abraxane/ carboplatin with radiation. We also discussed nutritional support. We also discussed management of GERD and he had further management by his gastroenterologist for that matter. He will continue PPIs. Patient's questions were answered to the best of his satisfaction and he verbalized full understanding and agreement.

## 2022-09-28 NOTE — CARE COORDINATION
[address] [facility] Follow Up Call    N Care Coordinator contacted the patient by telephone to follow up after admission on . Verified name and  with patient as identifiers. Patient: Wojciech Mas  Patient : 1955   MRN: [id number]  Reason for Admission:  Encounter for gastrojejunal tube   Discharge Date: 22 RARS: Readmission Risk Score: 9.5      Needs to be reviewed by the provider   Additional needs identified to be addressed with provider: No  none             Method of communication with provider: none. Emanuel Yarbrough he states things are going so far so good he has started chemotherapy and has been doing well still has a good appetite constipation has resolved. Pain is manageable he has no current concerns    Addressed changes since last contact:  none  Discussed follow-up appointments. If no appointment was previously scheduled, appointment scheduling offered: Yes. Is follow up appointment scheduled within 7 days of discharge? Yes.     Follow Up  Future Appointments   Date Time Provider Rik Savage   2022  8:45 AM ST[facility] KWABENA VERSA [facility]   2022  3:30 PM DO song Limon gilbert Jeri Clipper   2022  8:45 AM ST[facility] KWABENA VERSA [facility]   10/3/2022  8:45 AM STVZ KWABENA VERSA [facility]   10/4/2022  8:45 AM STVZ KWABENA VERSA [facility]   10/4/2022  9:00 AM Alejandra Cadena MD [facility]   10/4/2022 10:00 AM STV STA CHAIR 16 [facility]Z STA MED [facility]   10/4/2022 10:45 AM [facility]   10/5/2022  8:45 AM STVZ KWABENA VERSA [facility]   10/6/2022  8:45 AM STVZ KWABENA VERSA [facility]   10/7/2022  8:45 AM STVZ KWABENA VERSA [facility]   10/10/2022  8:45 AM STVZ KWABENA VERSA [facility]   10/11/2022  8:45 AM [facility] KWABENA VERSA [facility] RAD [facility]   10/11/2022  9:00 AM SCHEDULE, [facility] ONC NURSE [facility] RAD [facility]   10/12/2022 8:45 AM STVZ KWABENA VERSA [facility]   10/13/2022  8:45 AM STVZ KWABENA VERSA [facility]   10/14/2022  8:45 AM STVZ KWABENA VERSA [facility]   10/17/2022  8:45 AM STVZ KWABENA VERSA [facility]   10/18/2022  8:45 AM STVZ KWABENA VERSA [facility]   10/18/2022  9:00 AM SCHEDULE, [facility]C NURSE [facility]   10/19/2022  8:45 AM STVZ KWABENA VERSA STVZ STA RAD [place]   10/20/2022  8:45 AM STVZ KWABENA VERSA [facility]   10/21/2022  8:45 AM STVZ KWABENA VERSA [facility]   10/24/2022  8:45 AM STVZ KWABENA VERSA [facility]   10/25/2022  8:45 AM STVZ KWABENA VERSA [facility]   10/25/2022  9:00 AM SCHEDULE, [facility] ONC NURSE [facility]   10/26/2022  8:45 AM STVZ KWABENA VERSA [facility]   10/26/2022  1:45 PM Sejal Meek MD [facility]   10/27/2022  8:45 AM STVZ KWABENA VERSA [facility]   10/28/2022  8:45 AM ST[facility] KWABENA VERSA [facility]   10/31/2022  8:45 AM STVZ KWABENA VERSA [facility]   11/1/2022  8:45 AM ST[facility]Z KWABENA VERSA [facility]   11/1/2022  9:00 AM SCHEDULE, [facility] ONC NURSE [facility]   11/2/2022  8:45 AM STVZ KWABENA VERSA [facility] ST[facility] RAD [place]   11/3/2022  8:45 AM STVZ Samantha Duran VERSA [facility]Z ST[facility] RAD [facility]   11/18/2022 10:40 AM [facility] Care Coordinator reviewed medical action plan with patient and discussed any barriers to care and/or understanding of plan of care after discharge. Discussed appropriate site of care based on symptoms and resources available to patient including: PCP  Specialist. The patient agrees to contact the PCP office for questions related to their healthcare. Advance Care Planning:   not on file.      Patients top risk factors for readmission: functional physical ability, ineffective coping, level of motivation, and medication

## 2022-09-29 ENCOUNTER — OFFICE VISIT (OUTPATIENT)
Dept: BARIATRICS/WEIGHT MGMT | Age: 67
End: 2022-09-29

## 2022-09-29 ENCOUNTER — APPOINTMENT (OUTPATIENT)
Dept: CT IMAGING | Age: 67
End: 2022-09-29
Payer: MEDICARE

## 2022-09-29 ENCOUNTER — TELEPHONE (OUTPATIENT)
Dept: ONCOLOGY | Age: 67
End: 2022-09-29

## 2022-09-29 ENCOUNTER — HOSPITAL ENCOUNTER (EMERGENCY)
Age: 67
Discharge: HOME | End: 2022-09-29
Attending: EMERGENCY MEDICINE
Payer: MEDICARE

## 2022-09-29 ENCOUNTER — APPOINTMENT (OUTPATIENT)
Dept: MRI IMAGING | Age: 67
End: 2022-09-29
Payer: MEDICARE

## 2022-09-29 ENCOUNTER — HOSPITAL ENCOUNTER (OUTPATIENT)
Dept: RADIATION ONCOLOGY | Facility: MEDICAL CENTER | Age: 67
Discharge: HOME | End: 2022-09-29
Payer: MEDICARE

## 2022-09-29 VITALS
OXYGEN SATURATION: 98 % | HEART RATE: 88 BPM | SYSTOLIC BLOOD PRESSURE: 126 MMHG | WEIGHT: 205 LBS | TEMPERATURE: 97.2 F | BODY MASS INDEX: 27.05 KG/M2 | DIASTOLIC BLOOD PRESSURE: 72 MMHG | RESPIRATION RATE: 18 BRPM

## 2022-09-29 VITALS
DIASTOLIC BLOOD PRESSURE: 70 MMHG | HEIGHT: 73 IN | WEIGHT: 203 LBS | SYSTOLIC BLOOD PRESSURE: 112 MMHG | RESPIRATION RATE: 20 BRPM | HEART RATE: 86 BPM | BODY MASS INDEX: 26.9 KG/M2

## 2022-09-29 DIAGNOSIS — Z93.4 STATUS POST JEJUNOSTOMY (HCC): Primary | ICD-10-CM

## 2022-09-29 DIAGNOSIS — K14.8 TONGUE DEVIATION: ICD-10-CM

## 2022-09-29 DIAGNOSIS — R29.810 FACIAL DROOP: Primary | ICD-10-CM

## 2022-09-29 LAB
ABSOLUTE EOS #: 0.11 K/UL (ref 0–0.44)
ABSOLUTE IMMATURE GRANULOCYTE: 0.04 K/UL (ref 0–0.3)
ABSOLUTE LYMPH #: 0.86 K/UL (ref 1.1–3.7)
ABSOLUTE MONO #: 0.87 K/UL (ref 0.1–1.2)
ANION GAP SERPL CALCULATED.3IONS-SCNC: 10 MMOL/L (ref 9–17)
ANION GAP: 10 MMOL/L (ref 7–16)
BASOPHILS # BLD: 1 % (ref 0–2)
BASOPHILS ABSOLUTE: 0.06 K/UL (ref 0–0.2)
BUN BLDV-MCNC: 15 MG/DL (ref 8–23)
CALCIUM SERPL-MCNC: 9.1 MG/DL (ref 8.6–10.4)
CHLORIDE BLD-SCNC: 101 MMOL/L (ref 98–107)
CO2: 28 MMOL/L (ref 20–31)
CREAT SERPL-MCNC: 0.79 MG/DL (ref 0.7–1.2)
EOSINOPHILS RELATIVE PERCENT: 1 % (ref 1–4)
GFR AFRICAN AMERICAN: >60 ML/MIN
GFR NON-AFRICAN AMERICAN: >60 ML/MIN
GFR NON-AFRICAN AMERICAN: >60 ML/MIN
GFR SERPL CREATININE-BSD FRML MDRD: >60 ML/MIN
GFR SERPL CREATININE-BSD FRML MDRD: ABNORMAL ML/MIN/{1.73_M2}
GFR SERPL CREATININE-BSD FRML MDRD: NORMAL ML/MIN/{1.73_M2}
GLUCOSE BLD-MCNC: 147 MG/DL (ref 74–100)
GLUCOSE BLD-MCNC: 165 MG/DL (ref 70–99)
HCO3 VENOUS: 30.2 MMOL/L (ref 22–29)
HCT VFR BLD CALC: 41.2 % (ref 40.7–50.3)
HEMOGLOBIN: 13.3 G/DL (ref 13–17)
IMMATURE GRANULOCYTES: 0 %
INR BLD: 1
LYMPHOCYTES # BLD: 9 % (ref 24–43)
MCH RBC QN AUTO: 29.4 PG (ref 25.2–33.5)
MCHC RBC AUTO-ENTMCNC: 32.3 G/DL (ref 28.4–34.8)
MCV RBC AUTO: 91.2 FL (ref 82.6–102.9)
MONOCYTES # BLD: 9 % (ref 3–12)
MYOGLOBIN: 25 NG/ML (ref 28–72)
NRBC AUTOMATED: 0 PER 100 WBC
O2 SAT, VEN: 52 % (ref 60–85)
PARTIAL THROMBOPLASTIN TIME: 25 SEC (ref 20.5–30.5)
PCO2, VEN: 52.1 MM HG (ref 41–51)
PDW BLD-RTO: 13.4 % (ref 11.8–14.4)
PH VENOUS: 7.37 (ref 7.32–7.43)
PLATELET # BLD: 378 K/UL (ref 138–453)
PMV BLD AUTO: 10.6 FL (ref 8.1–13.5)
PO2, VEN: 28.9 MM HG (ref 30–50)
POC BUN: 15 MG/DL (ref 8–26)
POC CHLORIDE: 103 MMOL/L (ref 98–107)
POC CREATININE: 0.89 MG/DL (ref 0.51–1.19)
POC HEMATOCRIT: 44 % (ref 41–53)
POC HEMOGLOBIN: 15 G/DL (ref 13.5–17.5)
POC IONIZED CALCIUM: 1.23 MMOL/L (ref 1.15–1.33)
POC LACTIC ACID: 1.56 MMOL/L (ref 0.56–1.39)
POC POTASSIUM: 3.9 MMOL/L (ref 3.5–4.5)
POC SODIUM: 142 MMOL/L (ref 138–146)
POC TCO2: 31 MMOL/L (ref 22–30)
POSITIVE BASE EXCESS, VEN: 4 (ref 0–3)
POTASSIUM SERPL-SCNC: 4.1 MMOL/L (ref 3.7–5.3)
PROTHROMBIN TIME: 10 SEC (ref 9.1–12.3)
RBC # BLD: 4.52 M/UL (ref 4.21–5.77)
SEG NEUTROPHILS: 80 % (ref 36–65)
SEGMENTED NEUTROPHILS ABSOLUTE COUNT: 7.63 K/UL (ref 1.5–8.1)
SODIUM BLD-SCNC: 139 MMOL/L (ref 135–144)
TOTAL CK: 24 U/L (ref 39–308)
TROPONIN, HIGH SENSITIVITY: 6 NG/L (ref 0–22)
WBC # BLD: 9.6 K/UL (ref 3.5–11.3)

## 2022-09-29 PROCEDURE — 82565 ASSAY OF CREATININE: CPT

## 2022-09-29 PROCEDURE — 84484 ASSAY OF TROPONIN QUANT: CPT

## 2022-09-29 PROCEDURE — 83874 ASSAY OF MYOGLOBIN: CPT

## 2022-09-29 PROCEDURE — 6360000002 HC RX W HCPCS: Performed by: STUDENT IN AN ORGANIZED HEALTH CARE EDUCATION/TRAINING PROGRAM

## 2022-09-29 PROCEDURE — 84520 ASSAY OF UREA NITROGEN: CPT

## 2022-09-29 PROCEDURE — 70450 CT HEAD/BRAIN W/O DYE: CPT

## 2022-09-29 PROCEDURE — 96374 THER/PROPH/DIAG INJ IV PUSH: CPT

## 2022-09-29 PROCEDURE — 82330 ASSAY OF CALCIUM: CPT

## 2022-09-29 PROCEDURE — 85014 HEMATOCRIT: CPT

## 2022-09-29 PROCEDURE — 85730 THROMBOPLASTIN TIME PARTIAL: CPT

## 2022-09-29 PROCEDURE — 80048 BASIC METABOLIC PNL TOTAL CA: CPT

## 2022-09-29 PROCEDURE — 82553 CREATINE MB FRACTION: CPT

## 2022-09-29 PROCEDURE — 93005 ELECTROCARDIOGRAM TRACING: CPT | Performed by: STUDENT IN AN ORGANIZED HEALTH CARE EDUCATION/TRAINING PROGRAM

## 2022-09-29 PROCEDURE — 77386 HC NTSTY MODUL RAD TX DLVR CPLX: CPT | Performed by: RADIOLOGY

## 2022-09-29 PROCEDURE — G6002 STEREOSCOPIC X-RAY GUIDANCE: HCPCS | Performed by: RADIOLOGY

## 2022-09-29 PROCEDURE — 82550 ASSAY OF CK (CPK): CPT

## 2022-09-29 PROCEDURE — 70551 MRI BRAIN STEM W/O DYE: CPT

## 2022-09-29 PROCEDURE — 99284 EMERGENCY DEPT VISIT MOD MDM: CPT

## 2022-09-29 PROCEDURE — 83605 ASSAY OF LACTIC ACID: CPT

## 2022-09-29 PROCEDURE — 82947 ASSAY GLUCOSE BLOOD QUANT: CPT

## 2022-09-29 PROCEDURE — 99284 EMERGENCY DEPT VISIT MOD MDM: CPT | Performed by: PSYCHIATRY & NEUROLOGY

## 2022-09-29 PROCEDURE — 85610 PROTHROMBIN TIME: CPT

## 2022-09-29 PROCEDURE — 82803 BLOOD GASES ANY COMBINATION: CPT

## 2022-09-29 PROCEDURE — 85025 COMPLETE CBC W/AUTO DIFF WBC: CPT

## 2022-09-29 PROCEDURE — 80051 ELECTROLYTE PANEL: CPT

## 2022-09-29 PROCEDURE — 99024 POSTOP FOLLOW-UP VISIT: CPT | Performed by: SURGERY

## 2022-09-29 RX ORDER — LORAZEPAM 2 MG/ML
0.5 INJECTION INTRAMUSCULAR
Status: DISCONTINUED | OUTPATIENT
Start: 2022-09-29 | End: 2022-09-29

## 2022-09-29 RX ORDER — MIDAZOLAM HYDROCHLORIDE 2 MG/2ML
1 INJECTION, SOLUTION INTRAMUSCULAR; INTRAVENOUS
Status: COMPLETED | OUTPATIENT
Start: 2022-09-29 | End: 2022-09-29

## 2022-09-29 RX ADMIN — MIDAZOLAM HYDROCHLORIDE 1 MG: 1 INJECTION, SOLUTION INTRAMUSCULAR; INTRAVENOUS at 17:59

## 2022-09-29 ASSESSMENT — ENCOUNTER SYMPTOMS
SHORTNESS OF BREATH: 0
DIARRHEA: 0
TROUBLE SWALLOWING: 0
ABDOMINAL PAIN: 0
VOICE CHANGE: 0
NAUSEA: 0
VOMITING: 0
COUGH: 0

## 2022-09-29 NOTE — ED PROVIDER NOTES
[facility]  ED  Emergency Department Encounter  Emergency Medicine Resident     Pt Name:Nando Wilburn  MRN: [id number]  Birthdate 1955  Date of evaluation: 9/29/22  PCP:  Prince Kay DO      CHIEF COMPLAINT       Chief Complaint   Patient presents with    Other     Pt states aphasia and L sided facial droop that started on the 19th, went to check up today and Dr said thought he had a TIA, symptoms relieved at this time    Transient Ischemic Attack     Send by  for possible TIA       HISTORY OF PRESENT ILLNESS  (Location/Symptom, Timing/Onset, Context/Setting, Quality, Duration, Modifying Factors, Severity.)      Laura Puga is a 75 y.o. male with PMH including esophageal cancer s/p J-tube placement who presents to the emergency department with mild dysarthria and concerns for stroke. Per patient on 9/17/2022 he underwent a J-tube placement and that day after the placement he struggled with dysarthria and facial droop. Patient said that he did not pay much attention and a couple days after he went to eat corn on the cob and could not eat the corn. His daughter was at the table who confirmed that he did in fact have a right-sided facial droop. On 9/27/2022 seen by physician who confirmed that he did have a facial droop and was slurring his words however the patient went home. He was seen by Dr. Natali Hernandez, the physician who placed the J-tube, earlier today who confirmed that he is still having dysarthria and had a mild right-sided facial droop. Dr. Natali Hernandez urged the patient to come to the emergency room. In Dr. Ziyad Sood clinic he did not have any other neurodeficits including cranial nerve and had good strength and sensation all 4 extremities  On arrival here to the ED, the patient continues to have mild dysarthria without significant facial droop. He does have difficulty moving his tongue to the right, however no other neurodeficits noted.   Denies changes in vision, headaches, loss of sensation, falls, recent trauma. Blood glucose 147 on arrival.  EKG on arrival unremarkable. PAST MEDICAL / SURGICAL / SOCIAL / FAMILY HISTORY      has a past medical history of Cancer ([address] Dental root implant present, Elevated PSA, Hearing loss, Hiatal hernia, Keratosis, Prostate nodule, Snores, Under care of team, Wears dentures, Wears reading eyeglasses, and Wellness examination. has a past surgical history that includes Colonoscopy (2008); Tonsillectomy; hernia repair; skin biopsy; Prostate biopsy; Prostate biopsy (N/A, 2021); Colonoscopy (N/A, 2022); Upper gastrointestinal endoscopy (N/A, 2022); Upper gastrointestinal endoscopy (N/A, 2022); Gastrojejunostomy w/ jejunostomy tube; Mediport insertion, single (Right); and Gastrostomy tube placement (N/A, 2022). Social History     Socioeconomic History    Marital status:      Spouse name: Not on file    Number of children: Not on file    Years of education: Not on file    Highest education level: Not on file   Occupational History    Not on file   Tobacco Use    Smoking status: Former     Packs/day: 1.00     Years: 0.00     Pack years: 0.00     Types: Cigarettes     Quit date: 2002     Years since quittin.7    Smokeless tobacco: Never   Vaping Use    Vaping Use: Never used   Substance and Sexual Activity    Alcohol use: Yes     Comment: beer few times/wk drinks 3-4    Drug use: Yes     Types: Marijuana (Weed)     Comment: uses edibles-once a week, smokes marijuana 1-2 times a week.     Sexual activity: Not on file   Other Topics Concern    Not on file   Social History Narrative    Not on file     Social Determinants of Health     Financial Resource Strain: Low Risk     Difficulty of Paying Living Expenses: Not hard at all   Food Insecurity: No Food Insecurity    Worried About [address] in the Last Year: Never true    [address] [facility] [address] in the Last Year: Never true Transportation Needs: Not on file   Physical Activity: Not on file   Stress: Not on file   Social Connections: Not on file   Intimate Partner Violence: Not on file   Housing Stability: Not on file       History reviewed. No pertinent family history. Allergies:  Paclitaxel    Home Medications:  Prior to Admission medications    Medication Sig Start Date End Date Taking? Authorizing Provider   ondansetron (ZOFRAN-ODT) 4 MG disintegrating tablet Take 1 tablet by mouth 3 times daily as needed for Nausea or Vomiting  Patient not taking: No sig reported 9/18/22   Elle Longoria MD   tamsulosin (FLOMAX) 0.4 MG capsule Take 1 capsule by mouth daily 9/19/22   Elle Longoria MD   Nutritional Supplements ([employer] STANDARD 1.4) LIQD 1 Can by Enteral route 6 times daily Needcheck 1.4, 6 cans daily, 121 mL/hr x 16 hours via J tube per pump  Patient not taking: No sig reported 9/16/22 9/15/23  Tawny Chu MD   omeprazole (PRILOSEC) 20 MG delayed release capsule Take 1 capsule by mouth 2 times daily (before meals)  Patient not taking: Reported on 9/29/2022 8/17/22   Nanci Rodriguez MD   Bacillus Coagulans-Inulin (ALIGN PREBIOTIC-PROBIOTIC) 5-1.25 MG-GM CHEW Take by mouth    Historical Provider, MD   fluorouracil (EFUDEX) 5 % cream Apply twice daily to actinic keratosis for 3-4 weeks. Expect redness and irritation. Patient not taking: No sig reported 11/1/18   Coco Bowman MD       REVIEW OF SYSTEMS    (2-9 systems for level 4, 10 or more for level 5)      Review of Systems   Constitutional:  Negative for appetite change, chills, fatigue and fever. HENT:  Negative for congestion, trouble swallowing and voice change. Eyes:  Negative for visual disturbance. Respiratory:  Negative for cough and shortness of breath. Gastrointestinal:  Negative for abdominal pain, diarrhea, nausea and vomiting. Musculoskeletal:  Negative for arthralgias, joint swelling and myalgias.    Skin:  Negative for pallor, rash and wound.   Neurological:  Positive for facial asymmetry and speech difficulty. Negative for tremors, seizures, syncope, weakness, light-headedness, numbness and headaches. Psychiatric/Behavioral:  Negative for confusion. The patient is not nervous/anxious. PHYSICAL EXAM   (up to 7 for level 4, 8 or more for level 5)      INITIAL VITALS:   /72   Pulse 88   Temp 97.2 °F (36.2 °C) (Oral)   Resp 18   Wt 205 lb (93 kg)   SpO2 98%   BMI 27.05 kg/m²     Physical Exam  Constitutional:       General: He is not in acute distress. Appearance: Normal appearance. He is normal weight. He is not ill-appearing or toxic-appearing. HENT:      Head: Normocephalic and atraumatic. Right Ear: External ear normal.      Left Ear: External ear normal.      Nose: Nose normal. No congestion. Mouth/Throat:      Mouth: Mucous membranes are moist.      Pharynx: Oropharynx is clear. Eyes:      General: No scleral icterus. Extraocular Movements: Extraocular movements intact. Cardiovascular:      Rate and Rhythm: Normal rate and regular rhythm. Pulses: Normal pulses. Heart sounds: Normal heart sounds. No murmur heard. Pulmonary:      Effort: Pulmonary effort is normal. No respiratory distress. Breath sounds: Normal breath sounds. Abdominal:      General: Abdomen is flat. There is no distension. Palpations: Abdomen is soft. Tenderness: There is no abdominal tenderness. There is no guarding. Musculoskeletal:         General: No swelling, tenderness, deformity or signs of injury. Cervical back: Neck supple. Skin:     General: Skin is warm and dry. Findings: No erythema. Neurological:      Mental Status: He is alert and oriented to person, place, and time. Mental status is at baseline. Motor: No weakness. Comments: Mild dysarthria with mild right-sided facial droop. Difficulty moving tongue to the right. No other cranial nerve deficits.   5/5 strength all 4 extremities. Normal sensation all 4 extremities   Psychiatric:         Mood and Affect: Mood normal.         Behavior: Behavior normal.         Thought Content:  Thought content normal.         Judgment: Judgment normal.       DIFFERENTIAL  DIAGNOSIS     PLAN (LABS / IMAGING / EKG):  Orders Placed This Encounter   Procedures    CT HEAD WO CONTRAST    MRI BRAIN WO CONTRAST    ELECTROLYTES PLUS    STROKE PANEL    Hemoglobin and hematocrit, blood    CALCIUM, IONIC (POC)    LAB SCANNED REPORT    Inpatient consult to Neurology    Venous Blood Gas, POC    Creatinine W/GFR Point of Care    POCT urea (BUN)    Lactic Acid, POC    POCT Glucose       MEDICATIONS ORDERED:  Orders Placed This Encounter   Medications    DISCONTD: LORazepam (ATIVAN) injection 0.5 mg    midazolam PF (VERSED) injection 1 mg       DDX: Stroke, facial droop/dysarthria    DIAGNOSTIC RESULTS / EMERGENCY DEPARTMENT COURSE / MDM   LAB RESULTS:  Results for orders placed or performed during the hospital encounter of 09/29/22   ELECTROLYTES PLUS   Result Value Ref Range    POC Sodium 142 138 - 146 mmol/L    POC Potassium 3.9 3.5 - 4.5 mmol/L    POC Chloride 103 98 - 107 mmol/L    POC TCO2 31 (H) 22 - 30 mmol/L    Anion Gap 10 7 - 16 mmol/L   Hemoglobin and hematocrit, blood   Result Value Ref Range    POC Hemoglobin 15.0 13.5 - 17.5 g/dL    POC Hematocrit 44 41 - 53 %   CALCIUM, IONIC (POC)   Result Value Ref Range    POC Ionized Calcium 1.23 1.15 - 1.33 mmol/L   STROKE PANEL   Result Value Ref Range    Glucose 165 (H) 70 - 99 mg/dL    BUN 15 8 - 23 mg/dL    Creatinine 0.79 0.70 - 1.20 mg/dL    Calcium 9.1 8.6 - 10.4 mg/dL    Sodium 139 135 - 144 mmol/L    Potassium 4.1 3.7 - 5.3 mmol/L    Chloride 101 98 - 107 mmol/L    CO2 28 20 - 31 mmol/L    Anion Gap 10 9 - 17 mmol/L    GFR Non-African American >60 >60 mL/min    GFR African American >60 >60 mL/min    GFR Comment          WBC 9.6 3.5 - 11.3 k/uL    RBC 4.52 4.21 - 5.77 m/uL    Hemoglobin 13.3 13.0 - 17.0 g/dL    Hematocrit 41.2 40.7 - 50.3 %    MCV 91.2 82.6 - 102.9 fL    MCH 29.4 25.2 - 33.5 pg    MCHC 32.3 28.4 - 34.8 g/dL    RDW 13.4 11.8 - 14.4 %    Platelets 100 388 - 411 k/uL    MPV 10.6 8.1 - 13.5 fL    NRBC Automated 0.0 0.0 per 100 WBC    Total CK 24 (L) 39 - 308 U/L    Seg Neutrophils 80 (H) 36 - 65 %    Lymphocytes 9 (L) 24 - 43 %    Monocytes 9 3 - 12 %    Eosinophils % 1 1 - 4 %    Basophils 1 0 - 2 %    Immature Granulocytes 0 0 %    Segs Absolute 7.63 1.50 - 8.10 k/uL    Absolute Lymph # 0.86 (L) 1.10 - 3.70 k/uL    Absolute Mono # 0.87 0.10 - 1.20 k/uL    Absolute Eos # 0.11 0.00 - 0.44 k/uL    Basophils Absolute 0.06 0.00 - 0.20 k/uL    Absolute Immature Granulocyte 0.04 0.00 - 0.30 k/uL    Myoglobin 25 (L) 28 - 72 ng/mL    Protime 10.0 9.1 - 12.3 sec    INR 1.0     PTT 25.0 20.5 - 30.5 sec    Troponin, High Sensitivity 6 0 - 22 ng/L   Venous Blood Gas, POC   Result Value Ref Range    pH, José Manuel 7.371 7.320 - 7.430    pCO2, José Manuel 52.1 (H) 41.0 - 51.0 mm Hg    pO2, José Manuel 28.9 (L) 30.0 - 50.0 mm Hg    HCO3, Venous 30.2 (H) 22.0 - 29.0 mmol/L    Positive Base Excess, José Manuel 4 (H) 0.0 - 3.0    O2 Sat, José Manuel 52 (L) 60.0 - 85.0 %   Creatinine W/GFR Point of Care   Result Value Ref Range    POC Creatinine 0.89 0.51 - 1.19 mg/dL    GFR Comment >60 >60 mL/min    GFR Non-African American >60 >60 mL/min    GFR Comment         POCT urea (BUN)   Result Value Ref Range    POC BUN 15 8 - 26 mg/dL   Lactic Acid, POC   Result Value Ref Range    POC Lactic Acid 1.56 (H) 0.56 - 1.39 mmol/L   POCT Glucose   Result Value Ref Range    POC Glucose 147 (H) 74 - 100 mg/dL       IMPRESSION: Glucose 147, lactic acid 1.56, hemoglobin stable, electrolytes unremarkable. VBG 7.37/52.1/28.9    RADIOLOGY:  MRI BRAIN WO CONTRAST   Final Result      There is no acute infarction, intracranial hemorrhage, or intracranial mass   lesion. Mild chronic microangiopathic ischemic disease. Mild generalized volume loss.       Mild mucosal thickening of left mastoid air cells      RECOMMENDATIONS:   Unavailable         CT HEAD WO CONTRAST   Final Result   No acute intracranial abnormality. EKG  EKG Interpretation    Interpreted by emergency department physician    Rhythm: normal sinus   Rate: normal  Axis: left  Ectopy: none  Conduction: normal  ST Segments: nonspecific changes  T Waves: non specific changes  Q Waves: none    Clinical Impression: non-specific EKG    Yaya Hardy DO      All EKG's are interpreted by the Emergency Department Physician who either signs or Co-signs this chart in the absence of a cardiologist.    EMERGENCY DEPARTMENT COURSE:    ED Course as of 09/30/22 1036   Thu Sep 29, 2022   1708 75 y.o. male with PMH including esophageal cancer s/p J-tube placement who presents to the emergency department with mild dysarthria and concerns for stroke [GC]   1708 CT head ordered. Stroke panel ordered. Neurology consulted for stroke. No code stroke called due to the initial event being 12 days prior [GC]   1739 CT HEAD WO CONTRAST  \"IMPRESSION:  No acute intracranial abnormality. \" [GC]   1 Spoke with neurology who is recommending brain MRI given the timeline, which is pending [GC]   1938 MRI BRAIN WO CONTRAST  IMPRESSION:     There is no acute infarction, intracranial hemorrhage, or intracranial mass  lesion. Mild chronic microangiopathic ischemic disease. Mild generalized volume loss. Mild mucosal thickening of left mastoid air cells [JS]   2020 Spoke with neuro/stroke team who is okay with discharge if patient is feeling well and there are no other medical concerns. Discussed this with the patient and he would like to go home as he has radiation the morning. And discharge. Currently in stable condition. [JS]      ED Course User Index  [GC] Yaya Hardy DO  [JS] Klarissa Girard DO       No notes of EC Admission Criteria type on file.     PROCEDURES:  N/a    CONSULTS:  IP CONSULT TO NEUROLOGY    CRITICAL CARE:  N/a    FINAL IMPRESSION      1. Facial droop          DISPOSITION / PLAN     DISPOSITION Decision To Discharge 09/29/2022 08:21:18 PM.  Patient signed out to senior resident. Awaiting brain MRI without contrast.  Neurology following.   Anticipate being discharged home      PATIENT REFERRED TO:  Greg Alejandre DO  [address]  [place] [address] [place] [address] [address]    Schedule an appointment as soon as possible for a visit in 1 day      [facility] ED  [address] [address]  [phone]    If symptoms worsen    Perez [address]  [phone]    Schedule an appointment as soon as possible for a visit in 1 week      DISCHARGE MEDICATIONS:  Discharge Medication List as of 9/29/2022  8:22 PM          Ashleigh Gomez DO  Emergency Medicine Resident    (Please note that portions of thisnote were completed with a voice recognition program.  Efforts were made to edit the dictations but occasionally words are mis-transcribed.)       Ashleigh Gomez DO  Resident  09/29/22 Rik Herman DO  Resident  09/30/22 1036

## 2022-09-29 NOTE — ED PROVIDER NOTES
[address] ED  Emergency Department  Emergency Medicine Resident Sign-out     Care of Tammy Master was assumed from Dr. Barbara Rodriguez and is being seen for Other (Pt states aphasia and L sided facial droop that started on the 19th, went to check up today and Dr said thought he had a TIA, symptoms relieved at this time) and Transient Ischemic Attack (Send by  for possible TIA)  . The patient's initial evaluation and plan have been discussed with the prior provider who initially evaluated the patient. EMERGENCY DEPARTMENT COURSE / MEDICAL DECISION MAKING:       MEDICATIONS GIVEN:  Orders Placed This Encounter   Medications    DISCONTD: LORazepam (ATIVAN) injection 0.5 mg    midazolam PF (VERSED) injection 1 mg       LABS / RADIOLOGY:     Labs Reviewed   ELECTROLYTES PLUS - Abnormal; Notable for the following components:       Result Value    POC TCO2 31 (*)     All other components within normal limits   STROKE PANEL - Abnormal; Notable for the following components:    Glucose 165 (*)     Total CK 24 (*)     Seg Neutrophils 80 (*)     Lymphocytes 9 (*)     Absolute Lymph # 0.86 (*)     Myoglobin 25 (*)     All other components within normal limits   VENOUS BLOOD GAS, POINT OF CARE - Abnormal; Notable for the following components:    pCO2, José Manuel 52.1 (*)     pO2, José Manuel 28.9 (*)     HCO3, Venous 30.2 (*)     Positive Base Excess, José Manuel 4 (*)     O2 Sat, José Manuel 52 (*)     All other components within normal limits   LACTIC ACID,POINT OF CARE - Abnormal; Notable for the following components:    POC Lactic Acid 1.56 (*)     All other components within normal limits   POCT GLUCOSE - Abnormal; Notable for the following components:    POC Glucose 147 (*)     All other components within normal limits   HGB/HCT   CALCIUM, IONIC (POC)   CREATININE W/GFR POINT OF CARE   UREA N (POC)       MRI BRAIN WO CONTRAST   Final Result      There is no acute infarction, intracranial hemorrhage, or intracranial mass   lesion. Mild chronic microangiopathic ischemic disease. Mild generalized volume loss. Mild mucosal thickening of left mastoid air cells      RECOMMENDATIONS:   Unavailable         CT HEAD WO CONTRAST   Final Result   No acute intracranial abnormality. RECENT VITALS:     Temp: 97.2 °F (36.2 °C),  Heart Rate: 88, Resp: 18, BP: 126/72, SpO2: 98 %    This patient is a 75 y.o. Male with facial droop and dysarthria. Patient has a history of esophageal stricture and had a G-tube placed. He saw his physician today who noted this facial droop. It is apparently been present for almost 2 weeks. He also has mild tongue deviation to the right. He was sent here for further work-up. Stroke team was consulted, MRI pending. Will need to follow-up MRI results and stroke team plans. ED Course as of 09/30/22 0347   Thu Sep 29, 2022   1708 75 y.o. male with PMH including esophageal cancer s/p J-tube placement who presents to the emergency department with mild dysarthria and concerns for stroke [GC]   1708 CT head ordered. Stroke panel ordered. Neurology consulted for stroke. No code stroke called due to the initial event being 12 days prior [GC]   1739 CT HEAD WO CONTRAST  \"IMPRESSION:  No acute intracranial abnormality. \" [GC]   1 Spoke with neurology who is recommending brain MRI given the timeline, which is pending [GC]   1938 MRI BRAIN WO CONTRAST  IMPRESSION:     There is no acute infarction, intracranial hemorrhage, or intracranial mass  lesion. Mild chronic microangiopathic ischemic disease. Mild generalized volume loss. Mild mucosal thickening of left mastoid air cells [JS]   2020 Spoke with neuro/stroke team who is okay with discharge if patient is feeling well and there are no other medical concerns. Discussed this with the patient and he would like to go home as he has radiation the morning. And discharge. Currently in stable condition.  [JS]      ED Course User Index  [GC] Karol Fischer DO [place]  [JS] Judi Marrufo DO           OUTSTANDING TASKS / RECOMMENDATIONS:      Follow up mri  Follow up stroke team recs  dispo     FINAL IMPRESSION:     1.  Facial droop        DISPOSITION:       DISPOSITION:  [x]  Discharge   []  Transfer -    []  Admission -     []  Against Medical Advice   []  Eloped   FOLLOW-UP: Greg Alejandre DO  [address]  [place] [address] [facility]  [phone]    Schedule an appointment as soon as possible for a visit in 1 day      [facility]  [address] [address]  [phone]    If symptoms worsen    Perez Antonio, [address] [place] [address]  [phone]    Schedule an appointment as soon as possible for a visit in 1 week     DISCHARGE MEDICATIONS: Discharge Medication List as of 9/29/2022  8:22 PM             Judi Marrufo DO  Emergency Medicine Resident  [address] Judi Marrufo, [address] [facility]  Resident  09/30/22 0020

## 2022-09-29 NOTE — ED TRIAGE NOTES
Pt arrived to ED 47 via triage. Pt states that he was sent by Dr. Nathalie Castellanos for possible TIA. Pt started having Lt sided facial droop and aphasia on 9/19. Pt said that he had a Dr appointment that day and was told that he may have had a TIA but was not told to seem emergency medical treatment. Pt states that he has didn't notice the facial droop but states that his speech had gotten better since the 19th. Pt denies any CP or SOB. Pt is resting on stretcher with call light within reach. Breathing is non labored and no acute distress is noted.    Will continue to follow plan of care

## 2022-09-29 NOTE — CONSULTS
[facility]   IN-PATIENT SERVICE    STROKE ALERT NOTE            Date:   9/29/2022  Patient name:  Radha Post  Date of admission:  9/29/2022  YOB: 1955    Stroke consult paged at: 4:48 PM  ED room #: 52  Arrived at bedside at: 5:20 PM    Chief Complaint:     Chief Complaint   Patient presents with    Other     Pt states aphasia and L sided facial droop that started on the 19th, went to check up today and Dr said thought he had a TIA, symptoms relieved at this time    Transient Ischemic Attack     Send by  for possible TIA       Reason for Consult:      Dysarthria, mild left facial asymmetry    History of Present Illness: The patient is a 75 y.o. male with PMH of esophageal cancer undergoing chemotherapy, hearing loss, hiatal hernia, prostate nodule, who presents with Other (Pt states aphasia and L sided facial droop that started on the 19th, went to check up today and Dr said thought he had a TIA, symptoms relieved at this time) and Transient Ischemic Attack (Send by Dr for possible TIA)   and he is admitted to the hospital on 9/29/2022 for the management of strokelike symptoms. Patient underwent G-tube placement and port placement on 9/18/2022 was discharged the next day after surgery. Patient states he noticed some speech changes and some facial droop initially and chalked it up to side effect of his pain medication. He subsequently discontinued the pain medication but continued to have the dysarthria. A few days earlier his doctor brought corn on the cob and he was having difficulty eating it which was unusual for him. Daughter also endorsed seeing left-sided facial droop at the time.   Patient was seen by his oncologist on 9/27/2022 who confirmed that he was still having slight facial droop and dysarthria with the patient went home from the office visit and subsequently saw his surgeon Dr. Nara Claudio today who recommended that he come to the ED and get evaluated for the symptoms. On arrival to the ED patient was noted to have mild dysarthria with no significant facial droop, CT head was obtained which was unremarkable. Stroke team was consulted for evaluation, upon evaluation patient appeared to have an NIH of 2 for mild dysarthria and mild left nasolabial fold flattening. Patient was hemodynamically stable. MRI brain without contrast was unremarkable for any acute abnormality. Patient endorses remote history of smoking and quit around 20 years ago. He denies any significant alcohol or other drug use, does endorse cannabinoid use. He denies any significant change in lifestyle, diet or other activities recently. He underwent his first treatment of chemotherapy on 9/27/2022 and tolerated it well.       Prior to arrival patient was on  Antiplatelets/anticoagulants: None  Statins: None    Smoking history: former smoker, quit 20 years ago      Past Medical History:     Past Medical History:   Diagnosis Date    Cancer ([address]     esophageal    Dental root implant present     2 metal implants in lower jaw    Elevated PSA      [facility]    Hearing loss     Hiatal hernia     Keratosis     uses Effudex prn    Prostate nodule     on MRI per patient    Snores     no sleep apnea diagnosis    Under care of team 09/12/2022    Oncology: Tiffanie Roth, last visit 9/2022    Wears dentures     full dentures    Wears reading eyeglasses     Wellness examination     PCP:Dr. Drake Sia [facility], last visit 8/2022        Past Surgical History:     Past Surgical History:   Procedure Laterality Date    COLONOSCOPY  09/24/2008    [facility] Dr. Phoenix Landis     COLONOSCOPY N/A 02/17/2022    COLONOSCOPY POLYPECTOMY HOT BIOPSY performed by Maryellen Villa MD at [address] [place] [address] 2 9/17/2022    XI ROBOTIC 260 [address] [place] [address], RIGHT INTERNAL [facility]  (C-ARM) performed by Kei Cardozo DO at [facility]   rt inguinal w/ mesh at [address] [facility], SINGLE Right     PROSTATE BIOPSY      PROSTATE BIOPSY N/A 11/22/2021    FUSION PROSTATE BIOPSY WITH ULTRASOUND, OFFICE NOTIFIED ULTRASOUND performed by Annia Newell MD at [address]      head noncancerous. Just keratosis. TONSILLECTOMY      UPPER GASTROINTESTINAL ENDOSCOPY N/A 08/01/2022    EGD BIOPSY performed by Christian Juarez MD at [facility]. [facility] N/A 08/24/2022    ENDOSCOPIC ULTRASOUND WITH RADIO SCOPE performed by Chintan Ramirez MD at [facility]        Medications Prior to Admission:     Prior to Admission medications    Medication Sig Start Date End Date Taking? Authorizing Provider   ondansetron (ZOFRAN-ODT) 4 MG disintegrating tablet Take 1 tablet by mouth 3 times daily as needed for Nausea or Vomiting  Patient not taking: No sig reported 9/18/22   Jessica Salcedo MD   tamsulosin (FLOMAX) 0.4 MG capsule Take 1 capsule by mouth daily 9/19/22   Jessica Salcedo MD   Nutritional Supplements (QBInternational STANDARD 1.4) LIQD 1 Can by Enteral route 6 times daily Real [employer] 1.4, 6 cans daily, 121 mL/hr x 16 hours via J tube per pump  Patient not taking: No sig reported 9/16/22 9/15/23  Hanna Garcia MD   omeprazole (PRILOSEC) 20 MG delayed release capsule Take 1 capsule by mouth 2 times daily (before meals)  Patient not taking: Reported on 9/29/2022 8/17/22   Christian Juarez MD   Bacillus Coagulans-Inulin (ALIGN PREBIOTIC-PROBIOTIC) 5-1.25 MG-GM CHEW Take by mouth    Historical Provider, MD   fluorouracil (EFUDEX) 5 % cream Apply twice daily to actinic keratosis for 3-4 weeks. Expect redness and irritation. Patient not taking: No sig reported 11/1/18   Ollie Heard MD        Allergies:     Paclitaxel    Social History:     Tobacco:    reports that he quit smoking about 20 years ago. His smoking use included cigarettes.  He VII - normal facial symmetry                                                             VIII - intact hearing                                                                             IX, X - symmetrical palate elevation                                               XI - symmetrical shoulder shrug                                                       XII - midline tongue without atrophy or fasciculation     Motor function  Strength:   5/5 RUE, 5/5 RLE  5/5 LUE, 5/5  LLE  Normal bulk and tone. Sensory function Intact to touch, pin, vibration, proprioception throughout     Cerebellar Intact finger-nose-finger testing. Intact heel-shin testing. No dysdiadochokinesia present. No tremors                        Reflex function 2/4 symmetric throughout . Downgoing plantar response bilaterally. (-)Suarez's sign bilaterally      Gait                  Deferred         NIH STROKE SCALE:    Time Performed: 5:30 PM      1a. Level of consciousness:  0 - alert; keenly responsive  1b. Level of consciousness questions:  0 - answers both questions correctly  1c. Level of consciousness questions:  0 - performs both tasks correctly  2. Best Gaze:  0 - normal  3. Visual:  0 - no visual loss  4. Facial Palsy:  1 - minor paralysis (flattened nasolabial fold, asymmetric on smiling)  5a. Motor left arm:  0 - no drift, limb holds 90 (or 45) degrees for full 10 seconds  5b. Motor right arm:  0 - no drift, limb holds 90 (or 45) degrees for full 10 seconds  6a. Motor left le - no drift; leg holds 30 degree position for full 5 seconds  6b. Motor right le - no drift; leg holds 30 degree position for full 5 seconds  7. Limb Ataxia:  0 - absent  8. Sensory:  0 - normal; no sensory loss  9. Best Language:  0 - no aphasia, normal  10.   Dysarthria:  1 - mild to moderate, patient slurs at least some words and at worst, can be understood with some difficulty  11.   Extinction and Inattention:  0 - no abnormality    TOTAL:  2      Modified Juana Score Scale:      [x] Zero: No symptoms at all   [] 1: No significant disability despite symptoms; able to carry out all usual duties and activities   [] 2: Slight disability; unable to carry out all previous activities, but able to look after own affairs without assistance   [] 3:Moderate disability; requiring some help, but able to walk without assistance   [] 4: Moderately severe disability; unable to walk and attend to bodily needs without assistance   [] 5:Severe disability; bedridden, incontinent and requiring constant nursing care and attention      Diagnostics:      Laboratory Testing:    CBC:   Recent Labs     09/27/22  1050 09/29/22  1642   WBC 7.5 9.6   HGB 13.0 13.3    378       BMP:    Recent Labs     09/27/22  1050 09/29/22  1636 09/29/22  1642     --  139   K 4.1  --  4.1     --  101   CO2 27  --  28   BUN 9  --  15   CREATININE 0.63* 0.89 0.79   GLUCOSE 104*  --  165*         Lab Results   Component Value Date    CHOL 189 08/13/2021    LDLCHOLESTEROL 111 08/13/2021    HDL 34 (L) 08/13/2021    TRIG 219 (H) 08/13/2021    ALT 7 09/27/2022    AST 11 09/27/2022    INR 1.0 09/29/2022           Imaging/Diagnostics:      CT head       CTA head and neck       Assessment and plan:       Patient Active Problem List   Diagnosis    BPH (benign prostatic hyperplasia)    Asbestos exposure    Class 1 obesity due to excess calories without serious comorbidity with body mass index (BMI) of 32.0 to 32.9 in adult    Former smoker    Alcohol use    Epidermoid cyst of finger of left hand    Gastroesophageal reflux disease    Abdominal pain, generalized    Hiatal hernia    Arthritis of lumbar spine    Benign cyst of right kidney    Liver cyst    Esophageal mass    Adenosquamous carcinoma of esophagus (HCC)    Malignant neoplasm of lower third of esophagus ([address]    Encounter for gastrojejunal tube placement Esophageal cancer ([facility]                      75 y.o. male who presents with 10-day history of dysarthria and mild left facial asymmetry. Patient underwent J-tube and port placement about 10 days ago and states he was discharged the next day, has been feeling mildly dysarthric since then. Followed up with some physicians with similar symptoms noted, saw the surgeon today and was advised to come in for evaluation. CT head unremarkable, MRI brain negative for stroke. Symptoms likely secondary to metabolic etiology vs J tube placement procedure related vs medication side effect. No further work-up from stroke standpoint. Last Known Well (date and time): 10 days ago    2. Candidate for IV tPA therapy     Yes []     No  [x] due to the following exclusion criteria: Out of window    3. Candidate for Thrombectomy    Yes []      No [x] due to the following exclusion criteria: No concern for LVO    - Discussed with Dr. Arturo Kirkland : Unremarkable  - MRI Brain WO : No acute intracranial abnormality    Medications   - Continue home meds    Labs  - No further lab workup      - PT, OT, Speech eval   - Telemetry   - Neuro checks per protocol  - We recommend SBP normotensive  - Blood glucose goal less than 180  - Please avoid dextrose containing solutions    Additional recommendations may follow    Please contact [facility] with any changes in patients neurologic status. Thank you for your consult.       Aric Matthews MD   PGY-3 Neurology Resident  9/29/2022 at 7:29 PM

## 2022-09-29 NOTE — ED PROVIDER NOTES
[address]  ED  eMERGENCY dEPARTMENT eNCOUnter   Attending Attestation     Pt Name: Debbie Alvarado  MRN: [id number]  [place] 1955  Date of evaluation: 9/29/22       Debbie Alvarado is a 75 y.o. male who presents with Other (Pt states aphasia and L sided facial droop that started on the 19th, went to check up today and Dr said thought he had a TIA, symptoms relieved at this time) and Transient Ischemic Attack (Send by  for possible TIA)      History: Patient presents with 10 days worth of facial droop and slurring of his speech. Patient sent by his surgeon who recently placed a J-tube secondary to his esophageal cancer. Exam: Heart rate and rhythm are regular. Lungs are clear to station bilaterally. Abdomen is soft and nontender except around the insertion site of his J-tube. Patient awake alert and acting appropriatlty patient does have mild facial droop and slurring of his S sounds      Plan for CT of his head without, neuro consult for stroke. We will follow the recommendations. I performed a history and physical examination of the patient and discussed management with the resident. I reviewed the residents note and agree with the documented findings and plan of care. Any areas of disagreement are noted on the chart. I was personally present for the key portions of any procedures. I have documented in the chart those procedures where I was not present during the key portions. I have personally reviewed all images and agree with the resident's interpretation. I have reviewed the emergency nurses triage note. I agree with the chief complaint, past medical history, past surgical history, allergies, medications, social and family history as documented unless otherwise noted below. Documentation of the HPI, Physical Exam and Medical Decision Making performed by medical students or scribes is based on my personal performance of the HPI, PE and MDM.  For Phys Assistant/ Nurse Practitioner cases/documentation I have had a face to face evaluation of this patient and have completed at least one if not all key elements of the E/M (history, physical exam, and MDM). Additional findings are as noted. For APC cases I have personally evaluated and examined the patient in conjunction with the APC and agree with the treatment plan and disposition of the patient as recorded by the APC.     Ashely Martinez MD  Attending Emergency  Physician       Mike Neri MD  09/29/22 8739

## 2022-09-29 NOTE — TELEPHONE ENCOUNTER
[facility] - Dr. Keena Contreras      Pathology - esophageal adenocarcinoma 8/1/22        7 day cycle  Carbo/ Abraxane for 7 cycles         Ht = 185.4 cm     Wt =92.5 kg     BSA =  2.18     Carboplatin  292.6mg  AUC 2  IV , weekly Cycle 1-7 ,  Abraxane 40mg/m2 = 87.2 round to 87mg , IV C2-C7     Chart to  for scheduling , note to pool for 2nd RN check.

## 2022-09-29 NOTE — TELEPHONE ENCOUNTER
2nd RN check of new chemotherapy orders. Abraxane/Carbo weekly for 7 cycles; concurrent with radiation. (Abraxane beginning on cycle 2 due to reaction and discontinuation of Taxol on cycle 1)    Ht = 185.4 cm  Wt = 92.5 kg  BSA = 2.18    Abraxane 40 mg/m2 = 87.2 mg - rounded to 87 mg IV on days 1  Carboplatin - AUC 2 on day 1    Agree with above calculations/information.

## 2022-09-29 NOTE — PROGRESS NOTES
[address] [facility] MIN INVASIVE BARIATRIC SURG  [address] [place] [address] [address] [facility] [address]  [address]  [facility] [address]  Dept: [phone]    ROBOTIC AND MINIMALLY INVASIVE SURGERY  PROGRESS NOTE     CC: General Surgery Follow Up    Patient: Alondra Varghese      Service Date: 9/29/2022  Visit:  1 week   Medical Record #: [id number]  Date of Surgery:   9/17/22    History: 75 y.o. male who presents today for post op follow up for jejunostomy tube placement. Patient reports regular bowel movements. He does note a change in his voice and difficulty with moving his tongue to the right side of his mouth a for the past 7 days. He states that he thought it was related to taking Percocet, and he has since stopped taking it. Patient also reports difficulty with eating corn. He denies dysphagia, nausea, vomiting, fever, and chills. He denies decreased strength and sensations of his extremities. He also follows with oncology. Patient is a smoker in remission.     Pathology:    [] NA    [] No Gross path    []    [x] Discussed w/ pt   [] Referred to GI      Review of Systems:     General  Negative for: [] Weight Change   [x] Fatigue   [x] Fevers & Chills    [] Appetite change [] Other:    Positive for: [x] Weight Change   [] Fatigue   [] Fevers & Chills    [] Appetite change [] Other:   Cardiac  Negative for: [x] Chest Pain   [] Difficulty Breathing   [] Leg Cramps [x] Edema of Lower Extremeties    [] Left   [] Right      Positive for: [] Chest Pain   [] Difficulty Breathing   [] Leg Cramps [] Edema of Lower Extremeties    [] Left   [] Right   Pulmonary  Negative for: [x] Shortness of Breath [] Wheeze [x] Cough  [x] Calf Pain     Positive for: [] Shortness of Breath [] Wheeze [] Cough  [] Calf Pain   Gastro-Intestinal Negative for: [] Heartburn   [] Reflux   [] Dysphagia   [] Melena   [] BRBPR  [x] Vomiting   [x] Abdominal Pain   [] Diarrhea     [] Constipation  [] Other:     Positive for: [] Heartburn   [] Reflux   [] Dysphagia   [] Melena   [] BRBPR  [] Vomiting   [] Abdominal Pain   [] Diarrhea     [] Constipation  [] Other:    Muskuloskeletal Negative for: [] Joint pain   [] Back pain   [] Knee Pain   [] Muscle weakness [x] Hernia   [x] Edema [] Other:     Positive for: [] Joint pain   [] Back pain   [] Knee Pain   [] Muscle weakness [] Hernia   [] Edema [] Other:    Neurologic Negative for: [x] Syncope   [] Insomnia   [x] Being treated for depression  [] Other:     Positive for: [] Syncope   [] Insomnia   [] Being treated for depression  [] Other:    Skin Negative for: [x] Wound:   [] Open   [] Draining   [] Incisional     [x] Rash   [] Hair Loss  [] Other:     Positive for: [] Wound:   [] Open   [] Draining    [] Incisional  [] Rash   [] Hair Loss  [] Other:          Physical Assessment:     /70 (Site: Right Upper Arm, Position: Sitting, Cuff Size: Medium Adult)   Pulse 86   Resp 20   Ht 6' 1\" (1.854 m)   Wt 203 lb (92.1 kg)   BMI 26.78 kg/m²   General Negative for:  [x] Lapses in memory   [x] Unusual Stress   [x] Diffic Concen [] Unable to sleep   [] Eating in response to stress   [] Other:    Positive for:  [] Lapses in memory   [] Unusual Stress   [] Diffic Concen [] Unable to sleep   [] Eating in response to stress   [x] Other:left facial drooping, tongue deviation, slurred speech   Cardio-Pulmonary   [x] Heart RRR   [x] No murmurs   [] Pulses nl x4 extrem    [x] Good inspiratory effort   [x] No wheezing     [x] Lungs clear to auscultation bilaterally    [] Other:    Gastro-Intestinal   [x] Abd S/NT/ND/Benign   [] No abdominal mass/hernia    [x] No Splenomegaly    [x] Other: jejunostomy tube noted on the left upper abdomen   Muskuloskeletal   [x] Good muscle strength x4 extremities   [x] nl gait and ambul    [x] Nl ROM x4 extremities    [] Other:    Neurologic   [x] Alert and oriented x3    [] Other:   Skin   [x] Intact w/ no open wounds   [x] Incisions C/D/I    [] Steri strips removed   [x] No drainage or Infection    [] Other:      Assessment & Plan:      1. Status post jejunostomy ([address]    2. Tongue deviation         I advised the patient to go to [facility]'s ER due to facial drooping, tongue deviation, and slurred speech to evaluate for a possible TIA. Continue applying dressings to the wound daily  Wound healing well  Follow up with oncology    Follow up: 1 month    Orders placed this encounter:   No orders of the defined types were placed in this encounter. New Prescriptions:   No orders of the defined types were placed in this encounter. Scribe Attestation:  arlette Nobleibed on behalf of Omar Miller DO. Electronically signed by Omar Miller DO on 10/5/2022 at 8:39 PM    Please note that this chart was generated using voice recognition Dragon dictation software. Although every effort was made to ensure the accuracy of this automated transcription, some errors in transcription may have occurred. Gloria Block DO DO, personally performed the services described in this documentation. All medical record entries made by the scribe were at my direction and in my presence. I have reviewed the chart and discharge instructions (if applicable) and agree that the record reflects my personal performance and is accurate and complete.     Electronically Signed: Omar Miller DO. 10/05/22. 8:39 PM.

## 2022-09-29 NOTE — ED PROVIDER NOTES
[address] HANDOFF       Handoff taken on the following patient from prior Attending Physician:  Pt Name: Jacklyn Diaz  PCP:  Ulysses Hollow, DO    Attestation  I was available and discussed any additional care issues that arose and coordinated the management plans with the resident(s) caring for the patient during my duty period. Any areas of disagreement with resident's documentation of care or procedures are noted on the chart. I was personally present for the key portions of any/all procedures during my duty period. I have documented in the chart those procedures where I was not present during the key portions.            Nesha Rose MD  09/29/22 [id number]

## 2022-09-30 ENCOUNTER — HOSPITAL ENCOUNTER (OUTPATIENT)
Dept: RADIATION ONCOLOGY | Facility: MEDICAL CENTER | Age: 67
Discharge: HOME | End: 2022-09-30
Payer: MEDICARE

## 2022-09-30 PROCEDURE — G6002 STEREOSCOPIC X-RAY GUIDANCE: HCPCS | Performed by: RADIOLOGY

## 2022-09-30 PROCEDURE — 77386 HC NTSTY MODUL RAD TX DLVR CPLX: CPT | Performed by: RADIOLOGY

## 2022-09-30 NOTE — DISCHARGE INSTRUCTIONS
You were seen in the emergency department today for facial droop and concerns of stroke. Your MRI is negative. Please follow-up with your PCP and neuro physicians. If you have any new or worsening symptoms, please return to the ED for reevaluation. Thank you for visiting [facility] Emergency Department. You need to call Eder Garces DO to make an appointment as directed for follow up. Should you have any questions regarding your care or further treatment, please call [facility] Emergency Department at [phone]. Take any medications as prescribed, if given any, otherwise for pain Use ibuprofen or Tylenol (unless prescribed medications that have Tylenol in it). You can take over the counter Ibuprofen (advil) tablets (4 tablets every 8 hours or 3 tablets every 6 hours or 2 tablets every 4 hours)    If given narcotics during this ED visit, please do not drive or operate heavy machinery for at least 4-6 hours. PLEASE RETURN TO THE ED IMMEDIATELY for worsening symptoms, or if you develop any concerning symptoms such as: high fever not relieved by tylenol and/or motrin, chills, shortness of breath, chest pain, persistent nausea and/or vomiting, numbness, weakness or tingling in the arms or legs or change in color of the extremities, changes in mental status, persistent headache, blurry vision, inability to urinate, unable to follow up with your physician, or other any other  Care or concern.

## 2022-10-03 ENCOUNTER — HOSPITAL ENCOUNTER (OUTPATIENT)
Dept: RADIATION ONCOLOGY | Facility: MEDICAL CENTER | Age: 67
Discharge: HOME | End: 2022-10-03
Payer: MEDICARE

## 2022-10-03 ENCOUNTER — TELEPHONE (OUTPATIENT)
Dept: ONCOLOGY | Age: 67
End: 2022-10-03

## 2022-10-03 ENCOUNTER — HOSPITAL ENCOUNTER (OUTPATIENT)
Facility: MEDICAL CENTER | Age: 67
End: 2022-10-03
Payer: MEDICARE

## 2022-10-03 LAB
EKG ATRIAL RATE: 85 BPM
EKG P AXIS: 46 DEGREES
EKG P-R INTERVAL: 122 MS
EKG Q-T INTERVAL: 372 MS
EKG QRS DURATION: 94 MS
EKG QTC CALCULATION (BAZETT): 442 MS
EKG R AXIS: -8 DEGREES
EKG T AXIS: 55 DEGREES
EKG VENTRICULAR RATE: 85 BPM

## 2022-10-03 PROCEDURE — 93010 ELECTROCARDIOGRAM REPORT: CPT | Performed by: INTERNAL MEDICINE

## 2022-10-03 PROCEDURE — 77386 HC NTSTY MODUL RAD TX DLVR CPLX: CPT | Performed by: RADIOLOGY

## 2022-10-03 PROCEDURE — G6002 STEREOSCOPIC X-RAY GUIDANCE: HCPCS | Performed by: RADIOLOGY

## 2022-10-04 ENCOUNTER — HOSPITAL ENCOUNTER (OUTPATIENT)
Dept: RADIATION ONCOLOGY | Facility: MEDICAL CENTER | Age: 67
Discharge: HOME | End: 2022-10-04
Payer: MEDICARE

## 2022-10-04 ENCOUNTER — OFFICE VISIT (OUTPATIENT)
Dept: ONCOLOGY | Age: 67
End: 2022-10-04
Payer: MEDICARE

## 2022-10-04 ENCOUNTER — TELEPHONE (OUTPATIENT)
Dept: ONCOLOGY | Age: 67
End: 2022-10-04

## 2022-10-04 ENCOUNTER — HOSPITAL ENCOUNTER (OUTPATIENT)
Dept: INFUSION THERAPY | Facility: MEDICAL CENTER | Age: 67
Discharge: HOME | End: 2022-10-04
Payer: MEDICARE

## 2022-10-04 VITALS
WEIGHT: 199.8 LBS | TEMPERATURE: 97.8 F | DIASTOLIC BLOOD PRESSURE: 78 MMHG | HEART RATE: 98 BPM | BODY MASS INDEX: 26.36 KG/M2 | RESPIRATION RATE: 16 BRPM | SYSTOLIC BLOOD PRESSURE: 131 MMHG

## 2022-10-04 VITALS
HEART RATE: 80 BPM | TEMPERATURE: 98.1 F | BODY MASS INDEX: 26.41 KG/M2 | SYSTOLIC BLOOD PRESSURE: 102 MMHG | DIASTOLIC BLOOD PRESSURE: 63 MMHG | WEIGHT: 200.2 LBS

## 2022-10-04 DIAGNOSIS — C15.9 ADENOSQUAMOUS CARCINOMA OF ESOPHAGUS (HCC): Primary | ICD-10-CM

## 2022-10-04 DIAGNOSIS — C15.5 MALIGNANT NEOPLASM OF LOWER THIRD OF ESOPHAGUS (HCC): Primary | ICD-10-CM

## 2022-10-04 DIAGNOSIS — C15.5 MALIGNANT NEOPLASM OF LOWER THIRD OF ESOPHAGUS (HCC): ICD-10-CM

## 2022-10-04 LAB
ABSOLUTE EOS #: 0.14 K/UL (ref 0–0.44)
ABSOLUTE IMMATURE GRANULOCYTE: 0.07 K/UL (ref 0–0.3)
ABSOLUTE LYMPH #: 0.48 K/UL (ref 1.1–3.7)
ABSOLUTE MONO #: 0.68 K/UL (ref 0.1–1.2)
ALBUMIN SERPL-MCNC: 4 G/DL (ref 3.5–5.2)
ALP BLD-CCNC: 86 U/L (ref 40–129)
ALT SERPL-CCNC: 9 U/L (ref 5–41)
ANION GAP SERPL CALCULATED.3IONS-SCNC: 9 MMOL/L (ref 9–17)
AST SERPL-CCNC: 10 U/L
BASOPHILS # BLD: 1 % (ref 0–2)
BASOPHILS ABSOLUTE: 0.07 K/UL (ref 0–0.2)
BILIRUB SERPL-MCNC: 3.4 MG/DL (ref 0.3–1.2)
BUN BLDV-MCNC: 10 MG/DL (ref 8–23)
BUN/CREAT BLD: 16 (ref 9–20)
CALCIUM SERPL-MCNC: 9.2 MG/DL (ref 8.6–10.4)
CHLORIDE BLD-SCNC: 104 MMOL/L (ref 98–107)
CO2: 29 MMOL/L (ref 20–31)
CREAT SERPL-MCNC: 0.63 MG/DL (ref 0.7–1.2)
EOSINOPHILS RELATIVE PERCENT: 2 % (ref 1–4)
GFR SERPL CREATININE-BSD FRML MDRD: >60 ML/MIN/1.73M2
GLUCOSE BLD-MCNC: 103 MG/DL (ref 70–99)
HCT VFR BLD CALC: 40 % (ref 40.7–50.3)
HEMOGLOBIN: 12.8 G/DL (ref 13–17)
IMMATURE GRANULOCYTES: 1 %
LYMPHOCYTES # BLD: 7 % (ref 24–43)
MCH RBC QN AUTO: 29.2 PG (ref 25.2–33.5)
MCHC RBC AUTO-ENTMCNC: 32 G/DL (ref 28.4–34.8)
MCV RBC AUTO: 91.3 FL (ref 82.6–102.9)
MONOCYTES # BLD: 10 % (ref 3–12)
NRBC AUTOMATED: 0 PER 100 WBC
PDW BLD-RTO: 13.2 % (ref 11.8–14.4)
PLATELET # BLD: 326 K/UL (ref 138–453)
PMV BLD AUTO: 10.4 FL (ref 8.1–13.5)
POTASSIUM SERPL-SCNC: 4 MMOL/L (ref 3.7–5.3)
RBC # BLD: 4.38 M/UL (ref 4.21–5.77)
SEG NEUTROPHILS: 79 % (ref 36–65)
SEGMENTED NEUTROPHILS ABSOLUTE COUNT: 5.36 K/UL (ref 1.5–8.1)
SODIUM BLD-SCNC: 142 MMOL/L (ref 135–144)
TOTAL PROTEIN: 6.8 G/DL (ref 6.4–8.3)
WBC # BLD: 6.8 K/UL (ref 3.5–11.3)

## 2022-10-04 PROCEDURE — 36591 DRAW BLOOD OFF VENOUS DEVICE: CPT

## 2022-10-04 PROCEDURE — 96417 CHEMO IV INFUS EACH ADDL SEQ: CPT

## 2022-10-04 PROCEDURE — 96413 CHEMO IV INFUSION 1 HR: CPT

## 2022-10-04 PROCEDURE — 80053 COMPREHEN METABOLIC PANEL: CPT

## 2022-10-04 PROCEDURE — 85025 COMPLETE CBC W/AUTO DIFF WBC: CPT

## 2022-10-04 PROCEDURE — 2500000003 HC RX 250 WO HCPCS: Performed by: INTERNAL MEDICINE

## 2022-10-04 PROCEDURE — 77427 RADIATION TX MANAGEMENT X5: CPT | Performed by: RADIOLOGY

## 2022-10-04 PROCEDURE — G6002 STEREOSCOPIC X-RAY GUIDANCE: HCPCS | Performed by: RADIOLOGY

## 2022-10-04 PROCEDURE — 99211 OFF/OP EST MAY X REQ PHY/QHP: CPT | Performed by: INTERNAL MEDICINE

## 2022-10-04 PROCEDURE — 77386 HC NTSTY MODUL RAD TX DLVR CPLX: CPT | Performed by: RADIOLOGY

## 2022-10-04 PROCEDURE — 96375 TX/PRO/DX INJ NEW DRUG ADDON: CPT

## 2022-10-04 PROCEDURE — 96415 CHEMO IV INFUSION ADDL HR: CPT

## 2022-10-04 PROCEDURE — 99214 OFFICE O/P EST MOD 30 MIN: CPT | Performed by: INTERNAL MEDICINE

## 2022-10-04 PROCEDURE — 2580000003 HC RX 258: Performed by: INTERNAL MEDICINE

## 2022-10-04 PROCEDURE — 6360000002 HC RX W HCPCS: Performed by: INTERNAL MEDICINE

## 2022-10-04 PROCEDURE — 1123F ACP DISCUSS/DSCN MKR DOCD: CPT | Performed by: INTERNAL MEDICINE

## 2022-10-04 RX ORDER — FAMOTIDINE 10 MG/ML
20 INJECTION, SOLUTION INTRAVENOUS ONCE
Status: CANCELLED | OUTPATIENT
Start: 2022-10-11 | End: 2022-10-11

## 2022-10-04 RX ORDER — ONDANSETRON 2 MG/ML
8 INJECTION INTRAMUSCULAR; INTRAVENOUS
Status: CANCELLED | OUTPATIENT
Start: 2022-10-11

## 2022-10-04 RX ORDER — ACETAMINOPHEN 325 MG/1
650 TABLET ORAL
Status: CANCELLED | OUTPATIENT
Start: 2022-10-11

## 2022-10-04 RX ORDER — SODIUM CHLORIDE 0.9 % (FLUSH) 0.9 %
5-40 SYRINGE (ML) INJECTION PRN
Status: CANCELLED | OUTPATIENT
Start: 2022-10-11

## 2022-10-04 RX ORDER — HEPARIN SODIUM (PORCINE) LOCK FLUSH IV SOLN 100 UNIT/ML 100 UNIT/ML
500 SOLUTION INTRAVENOUS PRN
Status: DISCONTINUED | OUTPATIENT
Start: 2022-10-04 | End: 2022-10-05 | Stop reason: HOSPADM

## 2022-10-04 RX ORDER — FAMOTIDINE 10 MG/ML
20 INJECTION, SOLUTION INTRAVENOUS ONCE
Status: COMPLETED | OUTPATIENT
Start: 2022-10-04 | End: 2022-10-04

## 2022-10-04 RX ORDER — SODIUM CHLORIDE 9 MG/ML
INJECTION, SOLUTION INTRAVENOUS CONTINUOUS
Status: CANCELLED | OUTPATIENT
Start: 2022-10-11

## 2022-10-04 RX ORDER — DIPHENHYDRAMINE HYDROCHLORIDE 50 MG/ML
50 INJECTION INTRAMUSCULAR; INTRAVENOUS ONCE
Status: COMPLETED | OUTPATIENT
Start: 2022-10-04 | End: 2022-10-04

## 2022-10-04 RX ORDER — ALBUTEROL SULFATE 90 UG/1
4 AEROSOL, METERED RESPIRATORY (INHALATION) PRN
Status: CANCELLED | OUTPATIENT
Start: 2022-10-11

## 2022-10-04 RX ORDER — DIPHENHYDRAMINE HYDROCHLORIDE 50 MG/ML
50 INJECTION INTRAMUSCULAR; INTRAVENOUS
Status: CANCELLED | OUTPATIENT
Start: 2022-10-11

## 2022-10-04 RX ORDER — SODIUM CHLORIDE 9 MG/ML
5-250 INJECTION, SOLUTION INTRAVENOUS PRN
Status: DISCONTINUED | OUTPATIENT
Start: 2022-10-04 | End: 2022-10-05 | Stop reason: HOSPADM

## 2022-10-04 RX ORDER — PALONOSETRON 0.05 MG/ML
0.25 INJECTION, SOLUTION INTRAVENOUS ONCE
Status: COMPLETED | OUTPATIENT
Start: 2022-10-04 | End: 2022-10-04

## 2022-10-04 RX ORDER — MEPERIDINE HYDROCHLORIDE 50 MG/ML
12.5 INJECTION INTRAMUSCULAR; INTRAVENOUS; SUBCUTANEOUS PRN
Status: CANCELLED | OUTPATIENT
Start: 2022-10-11

## 2022-10-04 RX ORDER — DIPHENHYDRAMINE HYDROCHLORIDE 50 MG/ML
50 INJECTION INTRAMUSCULAR; INTRAVENOUS ONCE
Status: CANCELLED | OUTPATIENT
Start: 2022-10-11 | End: 2022-10-11

## 2022-10-04 RX ORDER — PACLITAXEL 100 MG/20ML
40 INJECTION, POWDER, LYOPHILIZED, FOR SUSPENSION INTRAVENOUS ONCE
Status: CANCELLED
Start: 2022-10-11 | End: 2022-10-11

## 2022-10-04 RX ORDER — DEXAMETHASONE SODIUM PHOSPHATE 10 MG/ML
10 INJECTION INTRAMUSCULAR; INTRAVENOUS ONCE
Status: COMPLETED | OUTPATIENT
Start: 2022-10-04 | End: 2022-10-04

## 2022-10-04 RX ORDER — SODIUM CHLORIDE 9 MG/ML
5-250 INJECTION, SOLUTION INTRAVENOUS PRN
Status: CANCELLED | OUTPATIENT
Start: 2022-10-11

## 2022-10-04 RX ORDER — PACLITAXEL 100 MG/20ML
40 INJECTION, POWDER, LYOPHILIZED, FOR SUSPENSION INTRAVENOUS ONCE
Status: CANCELLED
Start: 2022-10-04 | End: 2022-10-04

## 2022-10-04 RX ORDER — HEPARIN SODIUM (PORCINE) LOCK FLUSH IV SOLN 100 UNIT/ML 100 UNIT/ML
500 SOLUTION INTRAVENOUS PRN
Status: CANCELLED | OUTPATIENT
Start: 2022-10-11

## 2022-10-04 RX ORDER — PACLITAXEL 100 MG/20ML
40 INJECTION, POWDER, LYOPHILIZED, FOR SUSPENSION INTRAVENOUS ONCE
Status: COMPLETED | OUTPATIENT
Start: 2022-10-04 | End: 2022-10-04

## 2022-10-04 RX ORDER — EPINEPHRINE 1 MG/ML
0.3 INJECTION, SOLUTION, CONCENTRATE INTRAVENOUS PRN
Status: CANCELLED | OUTPATIENT
Start: 2022-10-11

## 2022-10-04 RX ORDER — SODIUM CHLORIDE 0.9 % (FLUSH) 0.9 %
5-40 SYRINGE (ML) INJECTION PRN
Status: DISCONTINUED | OUTPATIENT
Start: 2022-10-04 | End: 2022-10-05 | Stop reason: HOSPADM

## 2022-10-04 RX ORDER — SODIUM CHLORIDE 9 MG/ML
5-40 INJECTION INTRAVENOUS PRN
Status: CANCELLED | OUTPATIENT
Start: 2022-10-11

## 2022-10-04 RX ORDER — FAMOTIDINE 10 MG/ML
20 INJECTION, SOLUTION INTRAVENOUS
Status: CANCELLED | OUTPATIENT
Start: 2022-10-11

## 2022-10-04 RX ORDER — PALONOSETRON 0.05 MG/ML
0.25 INJECTION, SOLUTION INTRAVENOUS ONCE
Status: CANCELLED | OUTPATIENT
Start: 2022-10-11 | End: 2022-10-11

## 2022-10-04 RX ADMIN — DEXAMETHASONE SODIUM PHOSPHATE 10 MG: 10 INJECTION INTRAMUSCULAR; INTRAVENOUS at 11:46

## 2022-10-04 RX ADMIN — CARBOPLATIN 290 MG: 10 INJECTION INTRAVENOUS at 13:26

## 2022-10-04 RX ADMIN — HEPARIN 500 UNITS: 100 SYRINGE at 14:21

## 2022-10-04 RX ADMIN — SODIUM CHLORIDE, PRESERVATIVE FREE 10 ML: 5 INJECTION INTRAVENOUS at 10:05

## 2022-10-04 RX ADMIN — FAMOTIDINE 20 MG: 10 INJECTION INTRAVENOUS at 11:46

## 2022-10-04 RX ADMIN — PALONOSETRON HYDROCHLORIDE 0.25 MG: 0.25 INJECTION, SOLUTION INTRAVENOUS at 11:46

## 2022-10-04 RX ADMIN — SODIUM CHLORIDE 20 ML/HR: 9 INJECTION, SOLUTION INTRAVENOUS at 10:05

## 2022-10-04 RX ADMIN — SODIUM CHLORIDE, PRESERVATIVE FREE 10 ML: 5 INJECTION INTRAVENOUS at 14:21

## 2022-10-04 RX ADMIN — DIPHENHYDRAMINE HYDROCHLORIDE 50 MG: 50 INJECTION, SOLUTION INTRAMUSCULAR; INTRAVENOUS at 11:46

## 2022-10-04 RX ADMIN — PACLITAXEL 85 MG: 100 INJECTION, POWDER, LYOPHILIZED, FOR SUSPENSION INTRAVENOUS at 12:31

## 2022-10-04 NOTE — TELEPHONE ENCOUNTER
Sheryl Burroughs MD VISIT & TX  DR WALSH IN TO SEE PATIENT  ORDERS RECEIVED  PROCEED WITH CHEMO TODAY  RV 1 WEEK  MD VISIT 10/11/22 @10:45AM [email or web address]  AVS PRINTED AND GIVEN TO PATIENT WITH INSTRUCTIONS  PATIENT REMAINS IN [address] [place] [address]

## 2022-10-04 NOTE — PROGRESS NOTES
[address] [facility], [address] [facility]  Fax [phone]  Office [phone] [facility] WEEKLY PROGRESS NOTE  Patient ID:   Jeanine Varma  : 1955   MRN: [id number]    DIAGNOSIS:  Cancer Staging  Malignant neoplasm of lower third of esophagus ([facility] [address]  Staging form: Esophagus - Adenocarcinoma, AJCC 8th Edition  - Clinical stage from 2022: Stage RED (cT3, cN2, cM0, GX) - Signed by Karen Whitley MD on 2022      TREATMENT DETAILS:  Treatment Site: esophagus and regional nodes   Actual Dose: 1080 cGy  Total Planned Dose: 5040cGy  Treatment Technique: IMRT  Fraction Technique: Daily  Therapy imaging monitoring: CBCT daily  Concurrent Chemotherapy: yes weekly     SUBJECTIVE:   Patient seen for their weekly on treatment evaluation today. Doing okay just started. Had reaction to first chemo carbo/taxol and so was switched to abraxane/carbo. OBJECTIVE:   CHAPERONE: Not Required    ECO Asymptomatic    VITAL SIGNS: There were no vitals taken for this visit. Wt Readings from Last 5 Encounters:   10/04/22 199 lb 12.8 oz (90.6 kg)   22 205 lb (93 kg)   22 203 lb (92.1 kg)   22 203 lb 11.2 oz (92.4 kg)   22 207 lb (93.9 kg)     GENERAL:  General appearance is that of a well-nourished, well-developed in no apparent distress. HEENT: Normocephalic, atraumatic, EOMI, moist mucosa, no erythema. Indirect exam .  NECK:  No adenopathy or a palpable thyroid mass, trachea is midline. ASSESSMENT PLAN:     Continue radiation therapy. Chemotherapy per med onc. Treatment setup and plan reviewed. Port images/CBCT images reviewed. Appropriate laboratory work was reviewed. Treatment side effects and toxicities reviewed with the patient, and appropriate management was advised. Will continue radiation treatment as planned, and recommend patient contact us if they have any questions or concerns.     Electronically signed by Car Rodríguez MD on 10/4/2022 at 9:19 AM      Drugs Prescribed:  New Prescriptions    No medications on file       Other Orders Placed:  No orders of the defined types were placed in this encounter.

## 2022-10-04 NOTE — PROGRESS NOTES
Pt arrives ambulatory for C2 D1 treatment and MD visit. Vitals as charted. Pt denies complaint or concern. Port accessed, specimens sent. Labs reviewed, MD visit complete, Okay to treat. Pt premedicated. Abraxane infused with no sign of adverse reaction, line flushed. Carboplatin infused with no sign of adverse reaction, Line flushed. Port flushed and heparinized with intact morillo needle removed per protocol, Band-Aid applied to site.    Pt discharged, to stop at  for AVS.

## 2022-10-04 NOTE — FLOWSHEET NOTE
SPIRITUAL CARE PROGRESS NOTE: Outpatient Oncology Care    Spiritual Assessment: Patient was in the treatment cubicle of the infusion clinic. Writer brought Pt a condiment for his sandwich. Patient shared how he was doing. He noted that this was his second treatment. He spoke of having an allergic reaction to the medicine during his first chemotherapy. He expressed hopes about the new medication he is going to receive. Patient affirmed that he has a positive attitude, \"I love life,\" and that he wants to continue to do what he has enjoyed during senior care, including motorcycling. He acknowledged that he is \"not Sabianism\" but stated again his love of life. He named his children and grandchildren as his support system. Intervention: Writer provided supportive presence and active listening. Writer inquired about Pt's sources of support and strength. Writer offered words of encouragement and support. Outcome: Patient talked about what matters to him and what keeps him positive. Patient voiced his intention to remain positive and focus on living. He thanked writer. Plan: Chaplains will remain available to provide emotional and spiritual support as needed. 10/04/22 1358   Encounter Summary   Service Provided For: Patient   Referral/Consult From: [facility]   Last Encounter  10/04/22   Complexity of Encounter Moderate   Begin Time 1020   End Time  1030   Total Time Calculated 10 min   Encounter    Type Initial Screen/Assessment   Assessment/Intervention/Outcome   Assessment Calm;Coping   Intervention Sustaining Presence/Ministry of presence; Explored/Affirmed feelings, thoughts, concerns;Explored Coping Skills/Resources; Active listening   Outcome Engaged in conversation;Expressed feelings, needs, and concerns;Expressed Gratitude   Plan and Referrals   Plan/Referrals Continue to visit, (comment)     Electronically signed by Merton Galeazzi, Oncology Outpatient   Spiritual [address] [facility] Radiation Oncology  [phone]  10/4/2022  2:00 PM

## 2022-10-04 NOTE — PROGRESS NOTES
Oscar Lebron  10/4/2022  Wt Readings from Last 3 Encounters:   10/04/22 199 lb 12.8 oz (90.6 kg)   09/29/22 205 lb (93 kg)   09/29/22 203 lb (92.1 kg)     Body mass index is 26.36 kg/m². Treatment Area:esophagus, treatment # 6/28    Patient was seen today for weekly visit. Denies any treatment related symptoms. States he has some nausea post treatment last Friday but relieved with medication and did not reoccur. Dr. Getachew Forbes notified of assessment and examined patient. Continue current treatment plan. Comfort Alteration  Fatigue: None    Ventilation Alterations  Cough: No  Hemoptysis: No  Mucus Color: none  Dyspnea: No      Nutritional Alteration  Anorexia: No  Nausea: No   Vomiting: No     Mucous Membrane Alteration  Voice Changes/ Stridor/Larynx: no  Pharynx & Esophagus: intact    Elimination Alterations  Constipation: no  Diarrhea:  no    Skin Alteration   Sensation:intact    Radiation Dermatitis:  Intact [x]     Erythema  []     Discoloration  []     Rash []     Dry desquamation  []     Moist desquamation []       Emotional  Coping: effective      Injury, potential bleeding or infection: none    Lab Results   Component Value Date    WBC 9.6 09/29/2022     09/29/2022         /78   Pulse 98   Temp 97.8 °F (36.6 °C) (Temporal)   Resp 16   Wt 199 lb 12.8 oz (90.6 kg)   BMI 26.36 kg/m²      Pain Assessment: None - Denies Pain            Assessment/Plan: Patient was seen today for weekly visit.       Audrey Esparza RN

## 2022-10-05 ENCOUNTER — CARE COORDINATION (OUTPATIENT)
Dept: CASE MANAGEMENT | Age: 67
End: 2022-10-05

## 2022-10-05 ENCOUNTER — HOSPITAL ENCOUNTER (OUTPATIENT)
Dept: RADIATION ONCOLOGY | Facility: MEDICAL CENTER | Age: 67
Discharge: HOME | End: 2022-10-05
Payer: MEDICARE

## 2022-10-05 PROCEDURE — 77336 RADIATION PHYSICS CONSULT: CPT | Performed by: RADIOLOGY

## 2022-10-05 PROCEDURE — 77386 HC NTSTY MODUL RAD TX DLVR CPLX: CPT | Performed by: RADIOLOGY

## 2022-10-05 PROCEDURE — G6002 STEREOSCOPIC X-RAY GUIDANCE: HCPCS | Performed by: RADIOLOGY

## 2022-10-05 NOTE — CARE COORDINATION
Care Transitions Outreach Attempt    Attempted to reach patient for transitions of care follow up. Unable to reach patient. Patient: Lima Swan Patient : 1955 MRN: [id number]    Last Discharge  Street       Date Complaint Diagnosis Description Type Department Provider    22 Other; Transient Ischemic Attack Facial droop ED (DISCHARGE) [facility] Jeo Luo MD; Audrey Vega MD          Called to speak with patient for update with transition of care. Left HIPPA compliant voice message with contact information [phone] for a call  Back with an update.        Noted following upcoming appointments from discharge chart review:   [facility] follow up appointment(s):   Future Appointments   Date Time Provider Rik Savage   10/6/2022  8:45 AM [facility] KWABENA VERSA [facility]VZ STA RAD [facility]   10/7/2022  8:45 AM [facility] KWABENA VERSA [facility]Z STA RAD [facility]   10/10/2022  8:45 AM [facility] KWABENA VERSA [facility] STA RAD [facility]   10/11/2022  8:45 AM [facility] KWABENA VERSA [facility]Z STA RAD [facility]   10/11/2022  9:00 AM Hanna Burroughs MD [facility] ST[facility] RAD [facility]   10/11/2022 10:00 AM STV STA CHAIR 02 [facility] STA MED [facility]   10/11/2022 10:45 AM Sp Kuo MD [facility]   10/12/2022  8:45 AM [facility] KWABENA VERSA [facility] STA RAD [facility]   10/13/2022  8:45 AM [facility] KWABENA VERSA [facility]VZ STA RAD [facility]   10/14/2022  8:45 AM [facility] KWABENA VERSA [facility]VZ STA RAD [facility]   10/17/2022  8:45 AM [facility] KWABENA VERSA [facility] STA RAD [facility]   10/18/2022  8:45 AM [facility] KWABENA VERSA [facility] STA RAD [facility]   10/18/2022  9:00 AM Hanna Burroughs MD [facility] STA RAD [facility]   10/18/2022 10:00 AM STV STA CHAIR 03 [facility] STA MED [facility]   10/19/2022  8:45 AM [facility] KWABENA VERSA [facility] STA RAD [facility]   10/20/2022  8:45 AM [facility] KWABENA VERSA STVZ STA RAD [place]   10/21/2022  8:45 AM [facility] KWABENA VERSA [facility]VZ STA RAD [facility]   10/24/2022  8:45 AM [facility] KWABENA VERSA [facility]VZ STA RAD [place]   10/25/2022  8:45 AM [facility] KWABENA VERSA [facility] STA RAD [facility] 10/25/2022  9:00 AM Bhanu Payton MD [facility] STA RAD [facility]   10/25/2022 10:00 AM STV STA CHAIR 06 [facility] STA MED [facility]   10/26/2022  8:45 AM [facility] KWABENA VERSA [facility]VZ STA RAD [facility]   10/26/2022  1:45 PM [facility]y Osler[facility]   10/27/2022  8:45 AM [facility] KWABENA VERSA [facility]VZ STA RAD [facility]   10/28/2022  8:45 AM STV[facility] KWABENA VERSA STVZ STA RAD [facility]   10/31/2022  8:45 AM [facility] KWABENA VERSA [facility]   11/1/2022  8:45 AM [facility] KWABENA VERSA [facility]   11/1/2022  9:00 AM Bhanu Payton MD [facility] STA RAD [facility]   11/1/2022 10:00 AM STV STA CHAIR 06 [facility] STA MED [facility]   11/2/2022  8:45 AM ST[facility] KWABENA VERSA STVZ STA RAD [place]   11/3/2022  8:45 AM ST[facility] KWABENA VERSA STVZ STA RAD [place]   11/8/2022 10:00 AM STV STA CHAIR 18 [facility] STA MED [place]   11/18/2022 10:40 AM DO HELENA Hebert

## 2022-10-06 ENCOUNTER — HOSPITAL ENCOUNTER (OUTPATIENT)
Facility: MEDICAL CENTER | Age: 67
End: 2022-10-06
Payer: MEDICARE

## 2022-10-06 ENCOUNTER — TELEPHONE (OUTPATIENT)
Dept: ONCOLOGY | Age: 67
End: 2022-10-06

## 2022-10-06 ENCOUNTER — HOSPITAL ENCOUNTER (OUTPATIENT)
Dept: RADIATION ONCOLOGY | Facility: MEDICAL CENTER | Age: 67
Discharge: HOME | End: 2022-10-06
Payer: MEDICARE

## 2022-10-06 PROCEDURE — G6002 STEREOSCOPIC X-RAY GUIDANCE: HCPCS | Performed by: RADIOLOGY

## 2022-10-06 PROCEDURE — 77386 HC NTSTY MODUL RAD TX DLVR CPLX: CPT | Performed by: RADIOLOGY

## 2022-10-06 NOTE — TELEPHONE ENCOUNTER
[facility] Radiation Oncology Nutrition Note    PG-SGA screening tool reviewed with score of 14. Pt reports unintentional weight loss, no appetite/no interest in eating, problems swallowing, dry mouth, and consuming less than normal; only liquids. Full nutrition assessment for scores > 4. Will follow.     Jordy Lozano, MS, RD, LD  Registered Dietitian  [facility]  [phone]

## 2022-10-07 ENCOUNTER — HOSPITAL ENCOUNTER (OUTPATIENT)
Dept: RADIATION ONCOLOGY | Facility: MEDICAL CENTER | Age: 67
Discharge: HOME | End: 2022-10-07
Payer: MEDICARE

## 2022-10-07 PROCEDURE — 77386 HC NTSTY MODUL RAD TX DLVR CPLX: CPT | Performed by: RADIOLOGY

## 2022-10-07 PROCEDURE — G6002 STEREOSCOPIC X-RAY GUIDANCE: HCPCS | Performed by: RADIOLOGY

## 2022-10-10 ENCOUNTER — HOSPITAL ENCOUNTER (OUTPATIENT)
Dept: RADIATION ONCOLOGY | Facility: MEDICAL CENTER | Age: 67
Discharge: HOME | End: 2022-10-10
Payer: MEDICARE

## 2022-10-10 PROCEDURE — G6002 STEREOSCOPIC X-RAY GUIDANCE: HCPCS | Performed by: RADIOLOGY

## 2022-10-10 PROCEDURE — 77386 HC NTSTY MODUL RAD TX DLVR CPLX: CPT | Performed by: RADIOLOGY

## 2022-10-10 NOTE — PROGRESS NOTES
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 Chemoradiation started 9/27/22. Developed allergy to Taxol. Switched to Abraxane/ carboplatin. BRIEF CASE HISTORY:      Mr. Shahrzad Hernández is a very pleasant 75 y.o. male with history of multiple co morbidities as listed. Patient is referred for evaluation and further management of recently diagnosed esophageal adenocarcinoma. The patient was doing fairly well except for mild chronic GERD. It was not significant so he did not pay attention to it and he did not receive any treatment for it. For the last few weeks patient started having difficulty swallowing especially solid food. He was evaluated by gastroenterology and he had EGD which showed suspicious lesion at the distal part of the esophagus at 36 cm. Biopsy from the lesion on August 1, 2022 showed adenocarcinoma. Patient is referred for further management. Patient denies any active bleeding. No melena or hematochezia. No hematemesis. No weight loss or decreased appetite. No fever or night sweats. No other complaints. Patient quit smoking more than 20 years ago. He drinks alcohol socially. INTERIM HISTORY:   Patient seen for follow-up esophageal cancer. He continues to have some difficulty swallowing solids. No weight loss. No GI bleeding. No chest pain. No fever. No other complaints.   PAST MEDICAL HISTORY: has a past medical history of Cancer [facility]), Dental root implant present, Elevated PSA, Hearing loss, Hiatal hernia, Keratosis, Prostate nodule, Snores, Under care of team, Wears dentures, Wears reading eyeglasses, and Wellness examination. PAST SURGICAL HISTORY: has a past surgical history that includes Colonoscopy (09/24/2008); Tonsillectomy; hernia repair; skin biopsy; Prostate biopsy; Prostate biopsy (N/A, 11/22/2021); Colonoscopy (N/A, 02/17/2022); Upper gastrointestinal endoscopy (N/A, 08/01/2022); Upper gastrointestinal endoscopy (N/A, 08/24/2022); Gastrojejunostomy w/ jejunostomy tube; Mediport insertion, single (Right); and Gastrostomy tube placement (N/A, 9/17/2022). CURRENT MEDICATIONS:  has a current medication list which includes the following prescription(s): ondansetron, tamsulosin, omeprazole, align prebiotic-probiotic, fluorouracil, and [place] Interactive standard 1.4. ALLERGIES:  is allergic to paclitaxel. FAMILY HISTORY: Negative for any hematological or oncological conditions. SOCIAL HISTORY:  reports that he quit smoking about 20 years ago. His smoking use included cigarettes. He smoked an average of 1.00 packs per day. He has never used smokeless tobacco. He reports current alcohol use. He reports current drug use. Drug: Marijuana Kenard Snooks). REVIEW OF SYSTEMS:     General: Positive for weakness and fatigue. No unanticipated weight loss or decreased appetite. No fever or chills. Eyes: No blurred vision, eye pain or double vision. Ears: No hearing problems or drainage. No tinnitus. Throat: No sore throat, problems with swallowing or dysphagia. Respiratory: No cough, sputum or hemoptysis. No shortness of breath. No pleuritic chest pain. Cardiovascular: No chest pain, orthopnea or PND. No lower extremity edema. No palpitation. Gastrointestinal: As above. Genitourinary: No dysuria, hematuria, frequency or urgency. Musculoskeletal: No muscle aches or pains. No limitation of movement. No back pain. No gait disturbance, No joint complaints. Dermatologic: No skin rashes or pruritus. No skin lesions or discolorations. Psychiatric: No depression, anxiety, or stress or signs of schizophrenia.  No change in mood or affect. Hematologic: No history of bleeding tendency. No bruises or ecchymosis. No history of clotting problems. Infectious disease: No fever, chills or frequent infections. Endocrine: No polydipsia or polyuria. No temperature intolerance. Neurologic: No headaches or dizziness. No weakness or numbness of the extremities. No changes in balance, coordination,  memory, mentation, behavior. Allergic/Immunologic: No nasal congestion or hives. No repeated infections. PHYSICAL EXAM:  The patient is not in acute distress. Vital signs: Blood pressure 102/63, pulse 80, temperature 98.1 °F (36.7 °C), temperature source Oral, weight 200 lb 3.2 oz (90.8 kg).      General appearance - well appearing, not in pain or distress  Mental status - good mood, alert and oriented  Eyes - pupils equal and reactive, extraocular eye movements intact  Ears - bilateral TM's and external ear canals normal  Nose - normal and patent, no erythema, discharge or polyps  Mouth - mucous membranes moist, pharynx normal without lesions  Neck - supple, no significant adenopathy  Lymphatics - no palpable lymphadenopathy, no hepatosplenomegaly  Chest - clear to auscultation, no wheezes, rales or rhonchi, symmetric air entry  Heart - normal rate, regular rhythm, normal S1, S2, no murmurs, rubs, clicks or gallops  Abdomen - soft, nontender, nondistended, no masses or organomegaly  Neurological - alert, oriented, normal speech, no focal findings or movement disorder noted  Musculoskeletal - no joint tenderness, deformity or swelling  Extremities - peripheral pulses normal, no pedal edema, no clubbing or cyanosis  Skin - normal coloration and turgor, no rashes, no suspicious skin lesions noted     Review of Diagnostic data:   Lab Results   Component Value Date    WBC 6.8 10/04/2022    HGB 12.8 (L) 10/04/2022    HCT 40.0 (L) 10/04/2022    MCV 91.3 10/04/2022     10/04/2022       Chemistry        Component Value Date/Time  10/04/2022 1000    K 4.0 10/04/2022 1000     10/04/2022 1000    CO2 29 10/04/2022 1000    BUN 10 10/04/2022 1000    CREATININE 0.63 (L) 10/04/2022 1000        Component Value Date/Time    CALCIUM 9.2 10/04/2022 1000    ALKPHOS 86 10/04/2022 1000    AST 10 10/04/2022 1000    ALT 9 10/04/2022 1000    BILITOT 3.4 (H) 10/04/2022 1000          MRI BRAIN WO CONTRAST  Narrative: EXAMINATION:  MRI OF THE BRAIN WITHOUT CONTRAST  9/29/2022 6:16 pm    TECHNIQUE:  Multiplanar multisequence MRI of the brain was performed without the  administration of intravenous contrast.    COMPARISON:  None. HISTORY:  ORDERING SYSTEM PROVIDED HISTORY: Facial droop, dysarthria  TECHNOLOGIST PROVIDED HISTORY:  Facial droop, dysarthria  What is the sedation requirement?->None  Decision Support Exception - unselect if not a suspected or confirmed  emergency medical condition->Emergency Medical Condition (MA)  Reason for Exam: Facial droop, dysarthria    FINDINGS:  There is no acute infarction, intracranial hemorrhage, or intracranial mass  lesion. No mass effect, midline shift, or extra-axial collection is noted. There are mild nonspecific foci of periventricular and subcortical cerebral  white matter T2/FLAIR hyperintensity, most likely representing chronic  microangiopathic disease in this age group. The brain parenchyma is otherwise  normal. The pituitary gland is normal in appearance. The cerebellar tonsils  are in normal position. The ventricles, sulci, and cisterns are prominent suggestive of generalized  volume loss. The intracranial flow voids are preserved. The globes and orbits are within normal limits. Mild mucosal thickening of  left mastoid air cells. The visualized extracranial structures including  paranasal sinuses and mastoid air cells are otherwise unremarkable. Impression: There is no acute infarction, intracranial hemorrhage, or intracranial mass  lesion.     Mild chronic microangiopathic ischemic disease. Mild generalized volume loss. Mild mucosal thickening of left mastoid air cells    RECOMMENDATIONS:  Unavailable  CT HEAD WO CONTRAST  Narrative: EXAMINATION:  CT OF THE HEAD WITHOUT CONTRAST  9/29/2022 5:05 pm    TECHNIQUE:  CT of the head was performed without the administration of intravenous  contrast. Automated exposure control, iterative reconstruction, and/or weight  based adjustment of the mA/kV was utilized to reduce the radiation dose to as  low as reasonably achievable. COMPARISON:  None. HISTORY:  ORDERING SYSTEM PROVIDED HISTORY: stroke r/o  TECHNOLOGIST PROVIDED HISTORY:    stroke r/o  Decision Support Exception - unselect if not a suspected or confirmed  emergency medical condition->Emergency Medical Condition (MA)    Transient ischemic attack. FINDINGS:  BRAIN/VENTRICLES: There is no acute intracranial hemorrhage, mass effect or  midline shift. No abnormal extra-axial fluid collection. The gray-white  differentiation is maintained without evidence of an acute infarct. There is  no evidence of hydrocephalus. ORBITS: The visualized portion of the orbits demonstrate no acute abnormality. SINUSES: The visualized paranasal sinuses and mastoid air cells demonstrate  no acute abnormality. SOFT TISSUES/SKULL:  No acute abnormality of the visualized skull or soft  tissues. Impression: No acute intracranial abnormality. 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 I explained to the patient the nature of severe cancer, staging, prognosis and treatment. Explained the results of the PET CT scan and the endoscopic ultrasound. Obviously patient is having locally advanced disease with no evidence of metastasis. My recommendations to give neoadjuvant combined chemoradiation followed by surgery.   We will give weekly Taxol/ carboplatin. I explained the benefits and possible side effects related to chemotherapy, which may include but not limited to nausea, vomiting, hair loss, mouth sores, allergy, neuropathy, fever infection sepsis, anemia and thrombocytopenia. Patient was started on Taxol/ Carbo. He developed allergy to Taxol. Will give Abraxane/ carboplatin with radiation. We also discussed nutritional support. We also discussed management of GERD and he had further management by his gastroenterologist for that matter. He will continue PPIs. Patient's questions were answered to the best of his satisfaction and he verbalized full understanding and agreement.

## 2022-10-11 ENCOUNTER — HOSPITAL ENCOUNTER (OUTPATIENT)
Dept: RADIATION ONCOLOGY | Facility: MEDICAL CENTER | Age: 67
Discharge: HOME | End: 2022-10-11
Payer: MEDICARE

## 2022-10-11 ENCOUNTER — HOSPITAL ENCOUNTER (OUTPATIENT)
Dept: INFUSION THERAPY | Facility: MEDICAL CENTER | Age: 67
Discharge: HOME | End: 2022-10-11
Payer: MEDICARE

## 2022-10-11 ENCOUNTER — TELEPHONE (OUTPATIENT)
Dept: ONCOLOGY | Age: 67
End: 2022-10-11

## 2022-10-11 ENCOUNTER — OFFICE VISIT (OUTPATIENT)
Dept: ONCOLOGY | Age: 67
End: 2022-10-11
Payer: MEDICARE

## 2022-10-11 VITALS
TEMPERATURE: 98 F | SYSTOLIC BLOOD PRESSURE: 101 MMHG | HEART RATE: 76 BPM | WEIGHT: 194.5 LBS | DIASTOLIC BLOOD PRESSURE: 67 MMHG | RESPIRATION RATE: 16 BRPM | BODY MASS INDEX: 25.66 KG/M2

## 2022-10-11 VITALS
WEIGHT: 196.2 LBS | BODY MASS INDEX: 25.89 KG/M2 | TEMPERATURE: 98.2 F | SYSTOLIC BLOOD PRESSURE: 115 MMHG | HEART RATE: 76 BPM | OXYGEN SATURATION: 98 % | RESPIRATION RATE: 16 BRPM | DIASTOLIC BLOOD PRESSURE: 77 MMHG

## 2022-10-11 DIAGNOSIS — C15.9 ADENOSQUAMOUS CARCINOMA OF ESOPHAGUS (HCC): Primary | ICD-10-CM

## 2022-10-11 DIAGNOSIS — C15.5 MALIGNANT NEOPLASM OF LOWER THIRD OF ESOPHAGUS (HCC): ICD-10-CM

## 2022-10-11 LAB
ABSOLUTE EOS #: 0.19 K/UL (ref 0–0.44)
ABSOLUTE IMMATURE GRANULOCYTE: 0.06 K/UL (ref 0–0.3)
ABSOLUTE LYMPH #: 0.32 K/UL (ref 1.1–3.7)
ABSOLUTE MONO #: 0.58 K/UL (ref 0.1–1.2)
ALBUMIN SERPL-MCNC: 3.8 G/DL (ref 3.5–5.2)
ALP BLD-CCNC: 84 U/L (ref 40–129)
ALT SERPL-CCNC: <5 U/L (ref 5–41)
ANION GAP SERPL CALCULATED.3IONS-SCNC: 9 MMOL/L (ref 9–17)
AST SERPL-CCNC: 12 U/L
BASOPHILS # BLD: 1 % (ref 0–2)
BASOPHILS ABSOLUTE: 0.06 K/UL (ref 0–0.2)
BILIRUB SERPL-MCNC: 3.1 MG/DL (ref 0.3–1.2)
BUN BLDV-MCNC: 12 MG/DL (ref 8–23)
BUN/CREAT BLD: 20 (ref 9–20)
CALCIUM SERPL-MCNC: 9.4 MG/DL (ref 8.6–10.4)
CHLORIDE BLD-SCNC: 106 MMOL/L (ref 98–107)
CO2: 28 MMOL/L (ref 20–31)
CREAT SERPL-MCNC: 0.6 MG/DL (ref 0.7–1.2)
EOSINOPHILS RELATIVE PERCENT: 3 % (ref 1–4)
GFR SERPL CREATININE-BSD FRML MDRD: >60 ML/MIN/1.73M2
GLUCOSE BLD-MCNC: 104 MG/DL (ref 70–99)
HCT VFR BLD CALC: 38.3 % (ref 40.7–50.3)
HEMOGLOBIN: 12 G/DL (ref 13–17)
IMMATURE GRANULOCYTES: 1 %
LYMPHOCYTES # BLD: 5 % (ref 24–43)
MCH RBC QN AUTO: 29.3 PG (ref 25.2–33.5)
MCHC RBC AUTO-ENTMCNC: 31.3 G/DL (ref 28.4–34.8)
MCV RBC AUTO: 93.6 FL (ref 82.6–102.9)
MONOCYTES # BLD: 9 % (ref 3–12)
NRBC AUTOMATED: 0 PER 100 WBC
PDW BLD-RTO: 13.8 % (ref 11.8–14.4)
PLATELET # BLD: 242 K/UL (ref 138–453)
PMV BLD AUTO: 10.5 FL (ref 8.1–13.5)
POTASSIUM SERPL-SCNC: 4 MMOL/L (ref 3.7–5.3)
RBC # BLD: 4.09 M/UL (ref 4.21–5.77)
SEG NEUTROPHILS: 81 % (ref 36–65)
SEGMENTED NEUTROPHILS ABSOLUTE COUNT: 5.19 K/UL (ref 1.5–8.1)
SODIUM BLD-SCNC: 143 MMOL/L (ref 135–144)
TOTAL PROTEIN: 6.6 G/DL (ref 6.4–8.3)
WBC # BLD: 6.4 K/UL (ref 3.5–11.3)

## 2022-10-11 PROCEDURE — 2580000003 HC RX 258: Performed by: INTERNAL MEDICINE

## 2022-10-11 PROCEDURE — 2500000003 HC RX 250 WO HCPCS: Performed by: INTERNAL MEDICINE

## 2022-10-11 PROCEDURE — 80053 COMPREHEN METABOLIC PANEL: CPT

## 2022-10-11 PROCEDURE — 99211 OFF/OP EST MAY X REQ PHY/QHP: CPT | Performed by: INTERNAL MEDICINE

## 2022-10-11 PROCEDURE — 36591 DRAW BLOOD OFF VENOUS DEVICE: CPT

## 2022-10-11 PROCEDURE — 85025 COMPLETE CBC W/AUTO DIFF WBC: CPT

## 2022-10-11 PROCEDURE — 99214 OFFICE O/P EST MOD 30 MIN: CPT | Performed by: INTERNAL MEDICINE

## 2022-10-11 PROCEDURE — 77427 RADIATION TX MANAGEMENT X5: CPT | Performed by: RADIOLOGY

## 2022-10-11 PROCEDURE — 6360000002 HC RX W HCPCS: Performed by: INTERNAL MEDICINE

## 2022-10-11 PROCEDURE — 1123F ACP DISCUSS/DSCN MKR DOCD: CPT | Performed by: INTERNAL MEDICINE

## 2022-10-11 PROCEDURE — 96417 CHEMO IV INFUS EACH ADDL SEQ: CPT

## 2022-10-11 PROCEDURE — 96375 TX/PRO/DX INJ NEW DRUG ADDON: CPT

## 2022-10-11 PROCEDURE — G6002 STEREOSCOPIC X-RAY GUIDANCE: HCPCS | Performed by: RADIOLOGY

## 2022-10-11 PROCEDURE — 77386 HC NTSTY MODUL RAD TX DLVR CPLX: CPT | Performed by: RADIOLOGY

## 2022-10-11 PROCEDURE — 96413 CHEMO IV INFUSION 1 HR: CPT

## 2022-10-11 RX ORDER — DEXAMETHASONE SODIUM PHOSPHATE 10 MG/ML
10 INJECTION INTRAMUSCULAR; INTRAVENOUS ONCE
Status: COMPLETED | OUTPATIENT
Start: 2022-10-11 | End: 2022-10-11

## 2022-10-11 RX ORDER — SODIUM CHLORIDE 0.9 % (FLUSH) 0.9 %
5-40 SYRINGE (ML) INJECTION PRN
Status: DISCONTINUED | OUTPATIENT
Start: 2022-10-11 | End: 2022-10-12 | Stop reason: HOSPADM

## 2022-10-11 RX ORDER — PALONOSETRON 0.05 MG/ML
0.25 INJECTION, SOLUTION INTRAVENOUS ONCE
Status: COMPLETED | OUTPATIENT
Start: 2022-10-11 | End: 2022-10-11

## 2022-10-11 RX ORDER — PACLITAXEL 100 MG/20ML
40 INJECTION, POWDER, LYOPHILIZED, FOR SUSPENSION INTRAVENOUS ONCE
Status: COMPLETED | OUTPATIENT
Start: 2022-10-11 | End: 2022-10-11

## 2022-10-11 RX ORDER — FAMOTIDINE 10 MG/ML
20 INJECTION, SOLUTION INTRAVENOUS ONCE
Status: COMPLETED | OUTPATIENT
Start: 2022-10-11 | End: 2022-10-11

## 2022-10-11 RX ORDER — DIPHENHYDRAMINE HYDROCHLORIDE 50 MG/ML
50 INJECTION INTRAMUSCULAR; INTRAVENOUS ONCE
Status: COMPLETED | OUTPATIENT
Start: 2022-10-11 | End: 2022-10-11

## 2022-10-11 RX ORDER — SODIUM CHLORIDE 9 MG/ML
5-250 INJECTION, SOLUTION INTRAVENOUS PRN
Status: DISCONTINUED | OUTPATIENT
Start: 2022-10-11 | End: 2022-10-12 | Stop reason: HOSPADM

## 2022-10-11 RX ORDER — HEPARIN SODIUM (PORCINE) LOCK FLUSH IV SOLN 100 UNIT/ML 100 UNIT/ML
500 SOLUTION INTRAVENOUS PRN
Status: DISCONTINUED | OUTPATIENT
Start: 2022-10-11 | End: 2022-10-12 | Stop reason: HOSPADM

## 2022-10-11 RX ADMIN — SODIUM CHLORIDE, PRESERVATIVE FREE 20 ML: 5 INJECTION INTRAVENOUS at 13:41

## 2022-10-11 RX ADMIN — HEPARIN 500 UNITS: 100 SYRINGE at 13:41

## 2022-10-11 RX ADMIN — CARBOPLATIN 290 MG: 10 INJECTION INTRAVENOUS at 12:58

## 2022-10-11 RX ADMIN — DIPHENHYDRAMINE HYDROCHLORIDE 50 MG: 50 INJECTION, SOLUTION INTRAMUSCULAR; INTRAVENOUS at 11:17

## 2022-10-11 RX ADMIN — SODIUM CHLORIDE 20 ML/HR: 9 INJECTION, SOLUTION INTRAVENOUS at 10:25

## 2022-10-11 RX ADMIN — PACLITAXEL 85 MG: 100 INJECTION, POWDER, LYOPHILIZED, FOR SUSPENSION INTRAVENOUS at 12:00

## 2022-10-11 RX ADMIN — SODIUM CHLORIDE, PRESERVATIVE FREE 10 ML: 5 INJECTION INTRAVENOUS at 10:25

## 2022-10-11 RX ADMIN — FAMOTIDINE 20 MG: 10 INJECTION INTRAVENOUS at 11:17

## 2022-10-11 RX ADMIN — DEXAMETHASONE SODIUM PHOSPHATE 10 MG: 10 INJECTION INTRAMUSCULAR; INTRAVENOUS at 11:17

## 2022-10-11 RX ADMIN — PALONOSETRON 0.25 MG: 0.05 INJECTION, SOLUTION INTRAVENOUS at 11:17

## 2022-10-11 NOTE — TELEPHONE ENCOUNTER
[facility]'s Oncology Nutrition Follow Up       Derrell Pineda is a 75 y.o.  male     NUTRITION RECOMMENDATIONS / MONITORING / EVALUATION  1. Initiate EN via J tube (start with 3 cartons at 120 mL/hr and increase to 6 cartons at 120 mL/hr as needed)  2. Clean around tube site with soap and water daily  3. Will monitor EN initiation, po intakes, wts, labs, s/s, care plan       Subjective/Current Data:  Met with pt during infusion. Pt has lost weight over the past week. States eating has all the sudden become more difficult and is not able to eat much at any one time. Knows  he needs to start using his tube and states his daughter is coming over tomorrow evening to help him get everything set up. Observed pt tube site - yellow drainage and raised area under bolster - pt has been using binder still at times. Discussed possibility that binder is pushing tube up one side causing irritation. Reviewed importance of daily soap and water and keeping area dry in order to heal. Pt receptive. Number provided to patient if he has questions during initial tube feeding set up. Recent Weights: Wt Readings from Last 3 Encounters:   10/11/22 194 lb 8 oz (88.2 kg)   10/11/22 196 lb 3.2 oz (89 kg)   10/04/22 199 lb 12.8 oz (90.6 kg)         Goal: Adequate intake to aide in wt maintenaince, signs and symptoms management, and overall wellbeing.     Progress towards goal: rapidly worsening    Lauren Rao, MS, RD, LD  Registered Dietitian  [place]  [phone]

## 2022-10-11 NOTE — TELEPHONE ENCOUNTER
Apolinar Murphy MD VISIT & TX  Proceed with chemo today  RV one week  MD VISIT 10/18/22 @ 10:30AM TX @ 10AM  AVS PRINTED W/ INSTRUCTIONS AND GIVEN TO PT ON EXIT

## 2022-10-11 NOTE — PROGRESS NOTES
Pt arrives ambulatory for C3 D1 treatment and MD visit. Vitals as charted. Pt denies complaint or concern. Port accessed, specimens sent. Labs reviewed, MD visit complete, Okay to treat. Pt premedicated. Abraxane infused with no sign of adverse reaction, line flushed. Carboplatin infused with no sign of adverse reaction, Line flushed. Port flushed and heparinized with intact morillo needle removed per protocol, Band-Aid applied to site.    Pt discharged, to stop at  for AVS.

## 2022-10-11 NOTE — PROGRESS NOTES
Jacklyn Buys  10/11/2022  Wt Readings from Last 3 Encounters:   10/11/22 196 lb 3.2 oz (89 kg)   10/04/22 199 lb 12.8 oz (90.6 kg)   10/04/22 200 lb 3.2 oz (90.8 kg)     Body mass index is 25.89 kg/m². Treatment Area:esophagus, treatment 11/28    Patient was seen today for weekly visit. Comfort Alteration  Fatigue: Mild    Ventilation Alterations  Cough: No  Hemoptysis: No  Mucus Color: none  Dyspnea: No      Nutritional Alteration  Anorexia: No  Nausea: No   Vomiting: No     Mucous Membrane Alteration  Voice Changes/ Stridor/Larynx: no  Pharynx & Esophagus: intact    Elimination Alterations  Constipation: no  Diarrhea:  no    Skin Alteration   Sensation:intacr    Radiation Dermatitis:  Intact [x]     Erythema  []     Discoloration  []     Rash []     Dry desquamation  []     Moist desquamation []       Emotional  Coping: effective      Injury, potential bleeding or infection: none    Lab Results   Component Value Date    WBC 6.8 10/04/2022     10/04/2022         /77   Pulse 76   Temp 98.2 °F (36.8 °C) (Temporal)   Resp 16   Wt 196 lb 3.2 oz (89 kg)   SpO2 98%   BMI 25.89 kg/m²      Pain Assessment: None - Denies Pain            Assessment/Plan: Patient was seen today for weekly visit. Tolerating treatment well. States eating only soft foods. Weight loss noted. States has not been using PEG tube and does not recall all of his instructions for use and feedings. Writer reinforced the need for increased calorie intake during treatment to heal and maintain energy. He voices understanding and states he has been more fatigued lately. States he has a family member who is a MA and is going to set up his feeding pump. Writer contacted oncology dietician, Kaylah Tay, and she will meet with patient at [facility] to day while he is there for chemotherapy to reinforce PEG care and feedings. He voices understanding and appreciation. Dr. Margaret Kowalski notified of assessment and examined patient.   No change in treatment plan.     Love Lee RN

## 2022-10-12 ENCOUNTER — HOSPITAL ENCOUNTER (OUTPATIENT)
Dept: RADIATION ONCOLOGY | Facility: MEDICAL CENTER | Age: 67
Discharge: HOME | End: 2022-10-12
Payer: MEDICARE

## 2022-10-12 PROCEDURE — 77336 RADIATION PHYSICS CONSULT: CPT | Performed by: RADIOLOGY

## 2022-10-12 PROCEDURE — G6002 STEREOSCOPIC X-RAY GUIDANCE: HCPCS | Performed by: RADIOLOGY

## 2022-10-12 PROCEDURE — 77386 HC NTSTY MODUL RAD TX DLVR CPLX: CPT | Performed by: RADIOLOGY

## 2022-10-12 NOTE — PROGRESS NOTES
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 Chemoradiation started 9/27/22. Developed allergy to Taxol. Switched to Abraxane/ carboplatin. BRIEF CASE HISTORY:      Mr. Libertad Steven is a very pleasant 75 y.o. male with history of multiple co morbidities as listed. Patient is referred for evaluation and further management of recently diagnosed esophageal adenocarcinoma. The patient was doing fairly well except for mild chronic GERD. It was not significant so he did not pay attention to it and he did not receive any treatment for it. For the last few weeks patient started having difficulty swallowing especially solid food. He was evaluated by gastroenterology and he had EGD which showed suspicious lesion at the distal part of the esophagus at 36 cm. Biopsy from the lesion on August 1, 2022 showed adenocarcinoma. Patient is referred for further management. Patient denies any active bleeding. No melena or hematochezia. No hematemesis. No weight loss or decreased appetite. No fever or night sweats. No other complaints. Patient quit smoking more than 20 years ago. He drinks alcohol socially. INTERIM HISTORY:   Patient seen for follow-up esophageal cancer. He continues to have some difficulty swallowing solids. No weight loss. No GI bleeding. No chest pain. No fever. No other complaints.   PAST MEDICAL HISTORY: has a past medical history of Cancer [facility]), Dental root implant present, Elevated PSA, Hearing loss, Hiatal hernia, Keratosis, Prostate nodule, Snores, Under care of team, Wears dentures, Wears reading eyeglasses, and Wellness examination. PAST SURGICAL HISTORY: has a past surgical history that includes Colonoscopy (09/24/2008); Tonsillectomy; hernia repair; skin biopsy; Prostate biopsy; Prostate biopsy (N/A, 11/22/2021); Colonoscopy (N/A, 02/17/2022); Upper gastrointestinal endoscopy (N/A, 08/01/2022); Upper gastrointestinal endoscopy (N/A, 08/24/2022); Gastrojejunostomy w/ jejunostomy tube; Mediport insertion, single (Right); and Gastrostomy tube placement (N/A, 9/17/2022). CURRENT MEDICATIONS:  has a current medication list which includes the following prescription(s): ondansetron, tamsulosin, omeprazole, align prebiotic-probiotic, fluorouracil, and [employer] standard 1.4, and the following Facility-Administered Medications: sodium chloride, sodium chloride flush, and heparin flush. ALLERGIES:  is allergic to paclitaxel. FAMILY HISTORY: Negative for any hematological or oncological conditions. SOCIAL HISTORY:  reports that he quit smoking about 20 years ago. His smoking use included cigarettes. He smoked an average of 1.00 packs per day. He has never used smokeless tobacco. He reports current alcohol use. He reports current drug use. Drug: Marijuana Garrel Calender). REVIEW OF SYSTEMS:     General: Positive for weakness and fatigue. No unanticipated weight loss or decreased appetite. No fever or chills. Eyes: No blurred vision, eye pain or double vision. Ears: No hearing problems or drainage. No tinnitus. Throat: No sore throat, problems with swallowing or dysphagia. Respiratory: No cough, sputum or hemoptysis. No shortness of breath. No pleuritic chest pain. Cardiovascular: No chest pain, orthopnea or PND. No lower extremity edema. No palpitation. Gastrointestinal: As above. Genitourinary: No dysuria, hematuria, frequency or urgency. Musculoskeletal: No muscle aches or pains. No limitation of movement. No back pain. No gait disturbance, No joint complaints.   Dermatologic: No skin rashes or pruritus. No skin lesions or discolorations. Psychiatric: No depression, anxiety, or stress or signs of schizophrenia. No change in mood or affect. Hematologic: No history of bleeding tendency. No bruises or ecchymosis. No history of clotting problems. Infectious disease: No fever, chills or frequent infections. Endocrine: No polydipsia or polyuria. No temperature intolerance. Neurologic: No headaches or dizziness. No weakness or numbness of the extremities. No changes in balance, coordination,  memory, mentation, behavior. Allergic/Immunologic: No nasal congestion or hives. No repeated infections. PHYSICAL EXAM:  The patient is not in acute distress. Vital signs: Blood pressure 101/67, pulse 76, temperature 98 °F (36.7 °C), temperature source Temporal, resp. rate 16, weight 194 lb 8 oz (88.2 kg).      General appearance - well appearing, not in pain or distress  Mental status - good mood, alert and oriented  Eyes - pupils equal and reactive, extraocular eye movements intact  Ears - bilateral TM's and external ear canals normal  Nose - normal and patent, no erythema, discharge or polyps  Mouth - mucous membranes moist, pharynx normal without lesions  Neck - supple, no significant adenopathy  Lymphatics - no palpable lymphadenopathy, no hepatosplenomegaly  Chest - clear to auscultation, no wheezes, rales or rhonchi, symmetric air entry  Heart - normal rate, regular rhythm, normal S1, S2, no murmurs, rubs, clicks or gallops  Abdomen - soft, nontender, nondistended, no masses or organomegaly  Neurological - alert, oriented, normal speech, no focal findings or movement disorder noted  Musculoskeletal - no joint tenderness, deformity or swelling  Extremities - peripheral pulses normal, no pedal edema, no clubbing or cyanosis  Skin - normal coloration and turgor, no rashes, no suspicious skin lesions noted     Review of Diagnostic data:   Lab Results   Component Value Date    WBC 6.4 10/11/2022    HGB 12.0 (L) 10/11/2022    HCT 38.3 (L) 10/11/2022    MCV 93.6 10/11/2022     10/11/2022       Chemistry        Component Value Date/Time     10/11/2022 1025    K 4.0 10/11/2022 1025     10/11/2022 1025    CO2 28 10/11/2022 1025    BUN 12 10/11/2022 1025    CREATININE 0.60 (L) 10/11/2022 1025        Component Value Date/Time    CALCIUM 9.4 10/11/2022 1025    ALKPHOS 84 10/11/2022 1025    AST 12 10/11/2022 1025    ALT <5 (L) 10/11/2022 1025    BILITOT 3.1 (H) 10/11/2022 1025          MRI BRAIN WO CONTRAST  Narrative: EXAMINATION:  MRI OF THE BRAIN WITHOUT CONTRAST  9/29/2022 6:16 pm    TECHNIQUE:  Multiplanar multisequence MRI of the brain was performed without the  administration of intravenous contrast.    COMPARISON:  None. HISTORY:  ORDERING SYSTEM PROVIDED HISTORY: Facial droop, dysarthria  TECHNOLOGIST PROVIDED HISTORY:  Facial droop, dysarthria  What is the sedation requirement?->None  Decision Support Exception - unselect if not a suspected or confirmed  emergency medical condition->Emergency Medical Condition (MA)  Reason for Exam: Facial droop, dysarthria    FINDINGS:  There is no acute infarction, intracranial hemorrhage, or intracranial mass  lesion. No mass effect, midline shift, or extra-axial collection is noted. There are mild nonspecific foci of periventricular and subcortical cerebral  white matter T2/FLAIR hyperintensity, most likely representing chronic  microangiopathic disease in this age group. The brain parenchyma is otherwise  normal. The pituitary gland is normal in appearance. The cerebellar tonsils  are in normal position. The ventricles, sulci, and cisterns are prominent suggestive of generalized  volume loss. The intracranial flow voids are preserved. The globes and orbits are within normal limits. Mild mucosal thickening of  left mastoid air cells.   The visualized extracranial structures including  paranasal sinuses and mastoid air cells are otherwise unremarkable. Impression: There is no acute infarction, intracranial hemorrhage, or intracranial mass  lesion. Mild chronic microangiopathic ischemic disease. Mild generalized volume loss. Mild mucosal thickening of left mastoid air cells    RECOMMENDATIONS:  Unavailable  CT HEAD WO CONTRAST  Narrative: EXAMINATION:  CT OF THE HEAD WITHOUT CONTRAST  9/29/2022 5:05 pm    TECHNIQUE:  CT of the head was performed without the administration of intravenous  contrast. Automated exposure control, iterative reconstruction, and/or weight  based adjustment of the mA/kV was utilized to reduce the radiation dose to as  low as reasonably achievable. COMPARISON:  None. HISTORY:  ORDERING SYSTEM PROVIDED HISTORY: stroke r/o  TECHNOLOGIST PROVIDED HISTORY:    stroke r/o  Decision Support Exception - unselect if not a suspected or confirmed  emergency medical condition->Emergency Medical Condition (MA)    Transient ischemic attack. FINDINGS:  BRAIN/VENTRICLES: There is no acute intracranial hemorrhage, mass effect or  midline shift. No abnormal extra-axial fluid collection. The gray-white  differentiation is maintained without evidence of an acute infarct. There is  no evidence of hydrocephalus. ORBITS: The visualized portion of the orbits demonstrate no acute abnormality. SINUSES: The visualized paranasal sinuses and mastoid air cells demonstrate  no acute abnormality. SOFT TISSUES/SKULL:  No acute abnormality of the visualized skull or soft  tissues. Impression: No acute intracranial abnormality. 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 I explained to the patient the nature of severe cancer, staging, prognosis and treatment. Explained the results of the PET CT scan and the endoscopic ultrasound.     Obviously patient is having locally advanced disease with no evidence of metastasis. My recommendations to give neoadjuvant combined chemoradiation followed by surgery. We will give weekly Taxol/ carboplatin. I explained the benefits and possible side effects related to chemotherapy, which may include but not limited to nausea, vomiting, hair loss, mouth sores, allergy, neuropathy, fever infection sepsis, anemia and thrombocytopenia. Patient was started on Taxol/ Carbo. He developed allergy to Taxol. Switched to Abraxane/ carboplatin with radiation. Tolerated well so far. We also discussed nutritional support. We also discussed management of GERD and he had further management by his gastroenterologist for that matter. He will continue PPIs. Patient's questions were answered to the best of his satisfaction and he verbalized full understanding and agreement.

## 2022-10-13 ENCOUNTER — TELEPHONE (OUTPATIENT)
Dept: ONCOLOGY | Age: 67
End: 2022-10-13

## 2022-10-13 ENCOUNTER — HOSPITAL ENCOUNTER (OUTPATIENT)
Dept: RADIATION ONCOLOGY | Facility: MEDICAL CENTER | Age: 67
Discharge: HOME | End: 2022-10-13
Payer: MEDICARE

## 2022-10-13 PROCEDURE — G6002 STEREOSCOPIC X-RAY GUIDANCE: HCPCS | Performed by: RADIOLOGY

## 2022-10-13 PROCEDURE — 77386 HC NTSTY MODUL RAD TX DLVR CPLX: CPT | Performed by: RADIOLOGY

## 2022-10-13 NOTE — TELEPHONE ENCOUNTER
[facility] Nutrition Note:    Phone call last night from patient. Is getting tube feeding set up and questioning how many cartons to use. Instructed pt to start with 3 cartons tonight, run at 120 mL/hr, to increase 1 carton per day until goal of 6 cartons/day reached. Pt questioning how long he needs to be on pump for. Writer informed that if he is tolerating well we can increase the rate to shorten the duration. Will follow.      Cali Bradley, MS, RD, LD  Registered Dietitian  [facility]  [phone]

## 2022-10-14 ENCOUNTER — HOSPITAL ENCOUNTER (OUTPATIENT)
Dept: RADIATION ONCOLOGY | Facility: MEDICAL CENTER | Age: 67
Discharge: HOME | End: 2022-10-14
Payer: MEDICARE

## 2022-10-14 PROCEDURE — G6002 STEREOSCOPIC X-RAY GUIDANCE: HCPCS | Performed by: RADIOLOGY

## 2022-10-14 PROCEDURE — 77386 HC NTSTY MODUL RAD TX DLVR CPLX: CPT | Performed by: RADIOLOGY

## 2022-10-14 NOTE — PROGRESS NOTES
[facility]       Radiation Oncology          [address] [facility], [address]        [address]: [phone]        F: [phone]       [facility] [address] [employer]       Radiation Oncology   [address] [facility], [address] [facility]       [address]: [phone]       F: [phone]       [email or web address]          RADIATION ONCOLOGY WEEKLY PROGRESS NOTE  Patient ID:   Shakir Cespedes  : 1955   MRN: [id number]    Location:  [facility] Radiation Oncology [address], [address], [facility], [address] [facility]  [phone]     DIAGNOSIS:  Cancer Staging  Malignant neoplasm of lower third of esophagus [facility])  Staging form: Esophagus - Adenocarcinoma, AJCC 8th Edition  - Clinical stage from 2022: Stage RED (cT3, cN2, cM0, GX) - Signed by Arcelia Falcon MD on 2022      TREATMENT DETAILS:  Treatment Site: Distal Esophagus  Actual Dose: 1980cGy  Total Planned Dose:5040cGy  Treatment Technique: IMRT  Fraction Technique: Daily  Therapy imaging monitoring: cbct daily  Concurrent Chemotherapy: 5FU    SUBJECTIVE:   Patient seen for their weekly on treatment evaluation today. He has noticed increased difficulty swallowing especially dry meat. He also has early satiety. He is advised about how he can modify his diet to improve his ability to eat and swallow. He is scheduled to talk to the dietician.     OBJECTIVE:   CHAPERONE: not required    ECO Symptomatic but completely ambulatory    VITAL SIGNS: /77   Pulse 76   Temp 98.2 °F (36.8 °C) (Temporal)   Resp 16   Wt 196 lb 3.2 oz (89 kg)   SpO2 98%   BMI 25.89 kg/m²   Wt Readings from Last 5 Encounters:   10/11/22 196 lb 3.2 oz (89 kg)   10/11/22 194 lb 8 oz (88.2 kg)   10/04/22 199 lb 12.8 oz (90.6 kg)   10/04/22 200 lb 3.2 oz (90.8 kg)   22 205 lb (93 kg)     [unfilled]    LABS:  WBC   Date Value Ref Range Status   10/11/2022 6.4 3.5 - 11.3 k/uL Final 10/04/2022 6.8 3.5 - 11.3 k/uL Final   09/29/2022 9.6 3.5 - 11.3 k/uL Final     Segs Absolute   Date Value Ref Range Status   10/11/2022 5.19 1.50 - 8.10 k/uL Final   10/04/2022 5.36 1.50 - 8.10 k/uL Final   09/29/2022 7.63 1.50 - 8.10 k/uL Final     Hemoglobin   Date Value Ref Range Status   10/11/2022 12.0 (L) 13.0 - 17.0 g/dL Final   10/04/2022 12.8 (L) 13.0 - 17.0 g/dL Final   09/29/2022 13.3 13.0 - 17.0 g/dL Final     Platelets   Date Value Ref Range Status   10/11/2022 242 138 - 453 k/uL Final   10/04/2022 326 138 - 453 k/uL Final   09/29/2022 378 138 - 453 k/uL Final     Creatinine   Date Value Ref Range Status   10/11/2022 0.60 (L) 0.70 - 1.20 mg/dL Final     Comment:     ICTERIC SPECIMEN   10/04/2022 0.63 (L) 0.70 - 1.20 mg/dL Final     Comment:     ICTERIC SPECIMEN   09/29/2022 0.79 0.70 - 1.20 mg/dL Final     POC Creatinine   Date Value Ref Range Status   09/29/2022 0.89 0.51 - 1.19 mg/dL Final     No results found for: , CEA  PSA   Date Value Ref Range Status   08/13/2021 18.86 (H) <4.1 ug/L Final     Comment:     The Roche \"ECLIA\" assay is used. Results obtained with different assay methods cannot be   used interchangeably. 06/07/2016 6.83 (H) <4.1 ug/L Final     Comment:     The Roche \"ECLIA\" assay is used. Results obtained with different assay methods   cannot be used interchangeably. Performed at [facility] [address] [place] [address], [address] [place] [address] [phone]     03/16/2015 6.01 (H) <4.1 ug/L Final     Comment:     The Roche \"ECLIA\" assay is used. Results obtained with different assay methods   cannot be used interchangeably. Performed at [address] [facility], [address] [place] [address] [phone]     05/23/2013 6.8 (H) 0.0 - 4.0 ug/L Final     Comment:     Siemens Immulite 2000 immunometric chemiluminescent assay is used. Results   obtained with different assay methods or instruments cannot be used   interchangeably.    05/20/2013 5.13 (H) 0 - 4 ug/L Final     Comment: Performed at [facility] [address] [facility] [phone]       MEDICATIONS:    Current Outpatient Medications:     ondansetron (ZOFRAN-ODT) 4 MG disintegrating tablet, Take 1 tablet by mouth 3 times daily as needed for Nausea or Vomiting, Disp: 21 tablet, Rfl: 0    tamsulosin (FLOMAX) 0.4 MG capsule, Take 1 capsule by mouth daily, Disp: 30 capsule, Rfl: 3    Nutritional Supplements ([employer] STANDARD 1.4) LIQD, 1 Can by Enteral route 6 times daily [employer] 1.4, 6 cans daily, 121 mL/hr x 16 hours via J tube per pump (Patient not taking: No sig reported), Disp: 00080 mL, Rfl: 12    omeprazole (PRILOSEC) 20 MG delayed release capsule, Take 1 capsule by mouth 2 times daily (before meals), Disp: 180 capsule, Rfl: 1    Bacillus Coagulans-Inulin (ALIGN PREBIOTIC-PROBIOTIC) 5-1.25 MG-GM CHEW, Take by mouth Takes when he remembers, Disp: , Rfl:     fluorouracil (EFUDEX) 5 % cream, Apply twice daily to actinic keratosis for 3-4 weeks. Expect redness and irritation. , Disp: 40 g, Rfl: 1      ASSESSMENT PLAN:   Treatment setup and plan reviewed. /CBCT images reviewed. Appropriate laboratory work was reviewed. Treatment side effects and toxicities reviewed with the patient, and appropriate management was advised. Will continue radiation treatment as planned, and recommend patient contact us if they have any questions or concerns. He is scheduled to see the dietician. Electronically signed by Brandyn Carter MD on 10/14/2022 at 8:38 AM      Drugs Prescribed:  New Prescriptions    No medications on file       Other Orders Placed:  No orders of the defined types were placed in this encounter. [facility] [address]       Radiation Oncology          [address]          [facility], \[facility]\[address]        [place]: [phone]        F: [phone]       [facility]. [address] [facility]       Radiation Oncology   [facility] Final   10/04/2022 326 138 - 453 k/uL Final   09/29/2022 378 138 - 453 k/uL Final     Creatinine   Date Value Ref Range Status   10/11/2022 0.60 (L) 0.70 - 1.20 mg/dL Final     Comment:     ICTERIC SPECIMEN   10/04/2022 0.63 (L) 0.70 - 1.20 mg/dL Final     Comment:     ICTERIC SPECIMEN   09/29/2022 0.79 0.70 - 1.20 mg/dL Final     POC Creatinine   Date Value Ref Range Status   09/29/2022 0.89 0.51 - 1.19 mg/dL Final     No results found for: , CEA  PSA   Date Value Ref Range Status   08/13/2021 18.86 (H) <4.1 ug/L Final     Comment:     The Roche \"ECLIA\" assay is used. Results obtained with different assay methods cannot be   used interchangeably. 06/07/2016 6.83 (H) <4.1 ug/L Final     Comment:     The Roche \"ECLIA\" assay is used. Results obtained with different assay methods   cannot be used interchangeably. Performed at [facility] [address] [address] [phone]     03/16/2015 6.01 (H) <4.1 ug/L Final     Comment:     The Roche \"ECLIA\" assay is used. Results obtained with different assay methods   cannot be used interchangeably. Performed at [address] [place] [address] [phone]     05/23/2013 6.8 (H) 0.0 - 4.0 ug/L Final     Comment:     S[employer]ens Immulite 2000 immunometric chemiluminescent assay is used. Results   obtained with different assay methods or instruments cannot be used   interchangeably.    05/20/2013 5.13 (H) 0 - 4 ug/L Final     Comment:     Performed at [address] [place] [address] 3 [phone]       MEDICATIONS:    Current Outpatient Medications:     ondansetron (ZOFRAN-ODT) 4 MG disintegrating tablet, Take 1 tablet by mouth 3 times daily as needed for Nausea or Vomiting, Disp: 21 tablet, Rfl: 0    tamsulosin (FLOMAX) 0.4 MG capsule, Take 1 capsule by mouth daily, Disp: 30 capsule, Rfl: 3    Nutritional Supplements ([employer] STANDARD 1.4) LIQD, 1 Can by Enteral route 6 times daily [facility] 1.4, 6 cans daily, 121 mL/hr x 16 hours via J tube per pump (Patient not taking: No sig reported), Disp: 25024 mL, Rfl: 12    omeprazole (PRILOSEC) 20 MG delayed release capsule, Take 1 capsule by mouth 2 times daily (before meals), Disp: 180 capsule, Rfl: 1    Bacillus Coagulans-Inulin (ALIGN PREBIOTIC-PROBIOTIC) 5-1.25 MG-GM CHEW, Take by mouth Takes when he remembers, Disp: , Rfl:     fluorouracil (EFUDEX) 5 % cream, Apply twice daily to actinic keratosis for 3-4 weeks. Expect redness and irritation. , Disp: 40 g, Rfl: 1      ASSESSMENT PLAN:   Treatment setup and plan reviewed. Port images/CBCT images reviewed. Appropriate laboratory work was reviewed. Treatment side effects and toxicities reviewed with the patient, and appropriate management was advised. Will continue radiation treatment as planned, and recommend patient contact us if they have any questions or concerns. He is scheduled to see the dietician. Electronically signed by Samantha Carvajal MD on 10/14/2022 at 8:40 AM      Drugs Prescribed:  New Prescriptions    No medications on file       Other Orders Placed:  No orders of the defined types were placed in this encounter.

## 2022-10-17 ENCOUNTER — HOSPITAL ENCOUNTER (OUTPATIENT)
Facility: MEDICAL CENTER | Age: 67
End: 2022-10-17
Payer: MEDICARE

## 2022-10-17 ENCOUNTER — HOSPITAL ENCOUNTER (OUTPATIENT)
Dept: RADIATION ONCOLOGY | Facility: MEDICAL CENTER | Age: 67
Discharge: HOME | End: 2022-10-17
Payer: MEDICARE

## 2022-10-17 PROCEDURE — G6002 STEREOSCOPIC X-RAY GUIDANCE: HCPCS | Performed by: RADIOLOGY

## 2022-10-17 PROCEDURE — 77386 HC NTSTY MODUL RAD TX DLVR CPLX: CPT | Performed by: RADIOLOGY

## 2022-10-18 ENCOUNTER — HOSPITAL ENCOUNTER (OUTPATIENT)
Dept: RADIATION ONCOLOGY | Facility: MEDICAL CENTER | Age: 67
Discharge: HOME | End: 2022-10-18
Payer: MEDICARE

## 2022-10-18 ENCOUNTER — TELEPHONE (OUTPATIENT)
Dept: ONCOLOGY | Age: 67
End: 2022-10-18

## 2022-10-18 ENCOUNTER — HOSPITAL ENCOUNTER (OUTPATIENT)
Dept: INFUSION THERAPY | Facility: MEDICAL CENTER | Age: 67
Discharge: HOME | End: 2022-10-18
Payer: MEDICARE

## 2022-10-18 ENCOUNTER — OFFICE VISIT (OUTPATIENT)
Dept: ONCOLOGY | Age: 67
End: 2022-10-18
Payer: MEDICARE

## 2022-10-18 VITALS
SYSTOLIC BLOOD PRESSURE: 100 MMHG | RESPIRATION RATE: 16 BRPM | BODY MASS INDEX: 25.62 KG/M2 | TEMPERATURE: 98 F | DIASTOLIC BLOOD PRESSURE: 63 MMHG | HEART RATE: 79 BPM | OXYGEN SATURATION: 99 % | WEIGHT: 194.2 LBS

## 2022-10-18 VITALS
WEIGHT: 195.6 LBS | BODY MASS INDEX: 25.81 KG/M2 | RESPIRATION RATE: 18 BRPM | TEMPERATURE: 98.6 F | SYSTOLIC BLOOD PRESSURE: 102 MMHG | DIASTOLIC BLOOD PRESSURE: 64 MMHG | HEART RATE: 69 BPM

## 2022-10-18 DIAGNOSIS — C15.9 ADENOSQUAMOUS CARCINOMA OF ESOPHAGUS (HCC): Primary | ICD-10-CM

## 2022-10-18 DIAGNOSIS — C15.5 MALIGNANT NEOPLASM OF LOWER THIRD OF ESOPHAGUS (HCC): ICD-10-CM

## 2022-10-18 LAB
ABSOLUTE EOS #: 0.24 K/UL (ref 0–0.44)
ABSOLUTE IMMATURE GRANULOCYTE: 0.06 K/UL (ref 0–0.3)
ABSOLUTE LYMPH #: 0.3 K/UL (ref 1.1–3.7)
ABSOLUTE MONO #: 0.59 K/UL (ref 0.1–1.2)
ALBUMIN SERPL-MCNC: 3.8 G/DL (ref 3.5–5.2)
ALP BLD-CCNC: 86 U/L (ref 40–129)
ALT SERPL-CCNC: 8 U/L (ref 5–41)
ANION GAP SERPL CALCULATED.3IONS-SCNC: 7 MMOL/L (ref 9–17)
AST SERPL-CCNC: 15 U/L
BASOPHILS # BLD: 1 % (ref 0–2)
BASOPHILS ABSOLUTE: 0.06 K/UL (ref 0–0.2)
BILIRUB SERPL-MCNC: 2.8 MG/DL (ref 0.3–1.2)
BUN BLDV-MCNC: 10 MG/DL (ref 8–23)
BUN/CREAT BLD: 16 (ref 9–20)
CALCIUM SERPL-MCNC: 9.4 MG/DL (ref 8.6–10.4)
CHLORIDE BLD-SCNC: 104 MMOL/L (ref 98–107)
CO2: 28 MMOL/L (ref 20–31)
CREAT SERPL-MCNC: 0.61 MG/DL (ref 0.7–1.2)
EOSINOPHILS RELATIVE PERCENT: 4 % (ref 1–4)
GFR SERPL CREATININE-BSD FRML MDRD: >60 ML/MIN/1.73M2
GLUCOSE BLD-MCNC: 105 MG/DL (ref 70–99)
HCT VFR BLD CALC: 38.7 % (ref 40.7–50.3)
HEMOGLOBIN: 12.1 G/DL (ref 13–17)
IMMATURE GRANULOCYTES: 1 %
LYMPHOCYTES # BLD: 5 % (ref 24–43)
MCH RBC QN AUTO: 30 PG (ref 25.2–33.5)
MCHC RBC AUTO-ENTMCNC: 31.3 G/DL (ref 28.4–34.8)
MCV RBC AUTO: 95.8 FL (ref 82.6–102.9)
MONOCYTES # BLD: 10 % (ref 3–12)
NRBC AUTOMATED: 0 PER 100 WBC
PDW BLD-RTO: 15.3 % (ref 11.8–14.4)
PLATELET # BLD: 216 K/UL (ref 138–453)
PMV BLD AUTO: 10.4 FL (ref 8.1–13.5)
POTASSIUM SERPL-SCNC: 4.3 MMOL/L (ref 3.7–5.3)
RBC # BLD: 4.04 M/UL (ref 4.21–5.77)
SEG NEUTROPHILS: 79 % (ref 36–65)
SEGMENTED NEUTROPHILS ABSOLUTE COUNT: 4.65 K/UL (ref 1.5–8.1)
SODIUM BLD-SCNC: 139 MMOL/L (ref 135–144)
TOTAL PROTEIN: 6.6 G/DL (ref 6.4–8.3)
WBC # BLD: 5.9 K/UL (ref 3.5–11.3)

## 2022-10-18 PROCEDURE — 96413 CHEMO IV INFUSION 1 HR: CPT

## 2022-10-18 PROCEDURE — 96375 TX/PRO/DX INJ NEW DRUG ADDON: CPT

## 2022-10-18 PROCEDURE — 2500000003 HC RX 250 WO HCPCS: Performed by: INTERNAL MEDICINE

## 2022-10-18 PROCEDURE — 77386 HC NTSTY MODUL RAD TX DLVR CPLX: CPT | Performed by: RADIOLOGY

## 2022-10-18 PROCEDURE — G6002 STEREOSCOPIC X-RAY GUIDANCE: HCPCS | Performed by: RADIOLOGY

## 2022-10-18 PROCEDURE — 80053 COMPREHEN METABOLIC PANEL: CPT

## 2022-10-18 PROCEDURE — 6360000002 HC RX W HCPCS: Performed by: INTERNAL MEDICINE

## 2022-10-18 PROCEDURE — 36591 DRAW BLOOD OFF VENOUS DEVICE: CPT

## 2022-10-18 PROCEDURE — 99214 OFFICE O/P EST MOD 30 MIN: CPT | Performed by: INTERNAL MEDICINE

## 2022-10-18 PROCEDURE — 85025 COMPLETE CBC W/AUTO DIFF WBC: CPT

## 2022-10-18 PROCEDURE — 2580000003 HC RX 258: Performed by: INTERNAL MEDICINE

## 2022-10-18 PROCEDURE — 96417 CHEMO IV INFUS EACH ADDL SEQ: CPT

## 2022-10-18 PROCEDURE — 99211 OFF/OP EST MAY X REQ PHY/QHP: CPT | Performed by: INTERNAL MEDICINE

## 2022-10-18 PROCEDURE — 1123F ACP DISCUSS/DSCN MKR DOCD: CPT | Performed by: INTERNAL MEDICINE

## 2022-10-18 PROCEDURE — 77427 RADIATION TX MANAGEMENT X5: CPT | Performed by: RADIOLOGY

## 2022-10-18 RX ORDER — SODIUM CHLORIDE 9 MG/ML
INJECTION, SOLUTION INTRAVENOUS CONTINUOUS
Status: CANCELLED | OUTPATIENT
Start: 2022-10-25

## 2022-10-18 RX ORDER — ACETAMINOPHEN 325 MG/1
650 TABLET ORAL
Status: CANCELLED | OUTPATIENT
Start: 2022-10-18

## 2022-10-18 RX ORDER — SODIUM CHLORIDE 9 MG/ML
5-250 INJECTION, SOLUTION INTRAVENOUS PRN
Status: DISCONTINUED | OUTPATIENT
Start: 2022-10-18 | End: 2022-10-19 | Stop reason: HOSPADM

## 2022-10-18 RX ORDER — DIPHENHYDRAMINE HYDROCHLORIDE 50 MG/ML
50 INJECTION INTRAMUSCULAR; INTRAVENOUS ONCE
Status: CANCELLED | OUTPATIENT
Start: 2022-10-25 | End: 2022-10-25

## 2022-10-18 RX ORDER — MEPERIDINE HYDROCHLORIDE 50 MG/ML
12.5 INJECTION INTRAMUSCULAR; INTRAVENOUS; SUBCUTANEOUS PRN
Status: CANCELLED | OUTPATIENT
Start: 2022-10-18

## 2022-10-18 RX ORDER — DIPHENHYDRAMINE HYDROCHLORIDE 50 MG/ML
50 INJECTION INTRAMUSCULAR; INTRAVENOUS
Status: CANCELLED | OUTPATIENT
Start: 2022-10-18

## 2022-10-18 RX ORDER — SODIUM CHLORIDE 9 MG/ML
INJECTION, SOLUTION INTRAVENOUS CONTINUOUS
Status: CANCELLED | OUTPATIENT
Start: 2022-10-18

## 2022-10-18 RX ORDER — SODIUM CHLORIDE 9 MG/ML
5-250 INJECTION, SOLUTION INTRAVENOUS PRN
Status: CANCELLED | OUTPATIENT
Start: 2022-10-18

## 2022-10-18 RX ORDER — ONDANSETRON 4 MG/1
4 TABLET, ORALLY DISINTEGRATING ORAL 3 TIMES DAILY PRN
Qty: 90 TABLET | Refills: 0 | Status: SHIPPED | OUTPATIENT
Start: 2022-10-18

## 2022-10-18 RX ORDER — ALBUTEROL SULFATE 90 UG/1
4 AEROSOL, METERED RESPIRATORY (INHALATION) PRN
Status: CANCELLED | OUTPATIENT
Start: 2022-10-18

## 2022-10-18 RX ORDER — EPINEPHRINE 1 MG/ML
0.3 INJECTION, SOLUTION, CONCENTRATE INTRAVENOUS PRN
Status: CANCELLED | OUTPATIENT
Start: 2022-10-18

## 2022-10-18 RX ORDER — SODIUM CHLORIDE 0.9 % (FLUSH) 0.9 %
5-40 SYRINGE (ML) INJECTION PRN
Status: CANCELLED | OUTPATIENT
Start: 2022-10-25

## 2022-10-18 RX ORDER — ONDANSETRON 2 MG/ML
8 INJECTION INTRAMUSCULAR; INTRAVENOUS
Status: CANCELLED | OUTPATIENT
Start: 2022-10-25

## 2022-10-18 RX ORDER — SODIUM CHLORIDE 0.9 % (FLUSH) 0.9 %
5-40 SYRINGE (ML) INJECTION PRN
Status: CANCELLED | OUTPATIENT
Start: 2022-10-18

## 2022-10-18 RX ORDER — FAMOTIDINE 10 MG/ML
20 INJECTION, SOLUTION INTRAVENOUS ONCE
Status: CANCELLED | OUTPATIENT
Start: 2022-10-25 | End: 2022-10-25

## 2022-10-18 RX ORDER — PACLITAXEL 100 MG/20ML
40 INJECTION, POWDER, LYOPHILIZED, FOR SUSPENSION INTRAVENOUS ONCE
Status: CANCELLED
Start: 2022-10-18 | End: 2022-10-18

## 2022-10-18 RX ORDER — DIPHENHYDRAMINE HYDROCHLORIDE 50 MG/ML
50 INJECTION INTRAMUSCULAR; INTRAVENOUS ONCE
Status: COMPLETED | OUTPATIENT
Start: 2022-10-18 | End: 2022-10-18

## 2022-10-18 RX ORDER — FAMOTIDINE 10 MG/ML
20 INJECTION, SOLUTION INTRAVENOUS ONCE
Status: CANCELLED | OUTPATIENT
Start: 2022-10-18 | End: 2022-10-18

## 2022-10-18 RX ORDER — MEPERIDINE HYDROCHLORIDE 50 MG/ML
12.5 INJECTION INTRAMUSCULAR; INTRAVENOUS; SUBCUTANEOUS PRN
Status: CANCELLED | OUTPATIENT
Start: 2022-10-25

## 2022-10-18 RX ORDER — SODIUM CHLORIDE 9 MG/ML
5-250 INJECTION, SOLUTION INTRAVENOUS PRN
Status: CANCELLED | OUTPATIENT
Start: 2022-10-25

## 2022-10-18 RX ORDER — FAMOTIDINE 10 MG/ML
20 INJECTION, SOLUTION INTRAVENOUS
Status: CANCELLED | OUTPATIENT
Start: 2022-10-18

## 2022-10-18 RX ORDER — SODIUM CHLORIDE 0.9 % (FLUSH) 0.9 %
5-40 SYRINGE (ML) INJECTION PRN
Status: DISCONTINUED | OUTPATIENT
Start: 2022-10-18 | End: 2022-10-19 | Stop reason: HOSPADM

## 2022-10-18 RX ORDER — DIPHENHYDRAMINE HYDROCHLORIDE 50 MG/ML
50 INJECTION INTRAMUSCULAR; INTRAVENOUS
Status: CANCELLED | OUTPATIENT
Start: 2022-10-25

## 2022-10-18 RX ORDER — SODIUM CHLORIDE 9 MG/ML
5-40 INJECTION INTRAVENOUS PRN
Status: CANCELLED | OUTPATIENT
Start: 2022-10-18

## 2022-10-18 RX ORDER — HEPARIN SODIUM (PORCINE) LOCK FLUSH IV SOLN 100 UNIT/ML 100 UNIT/ML
500 SOLUTION INTRAVENOUS PRN
Status: CANCELLED | OUTPATIENT
Start: 2022-10-25

## 2022-10-18 RX ORDER — PALONOSETRON 0.05 MG/ML
0.25 INJECTION, SOLUTION INTRAVENOUS ONCE
Status: CANCELLED | OUTPATIENT
Start: 2022-10-25 | End: 2022-10-25

## 2022-10-18 RX ORDER — FAMOTIDINE 10 MG/ML
20 INJECTION, SOLUTION INTRAVENOUS ONCE
Status: COMPLETED | OUTPATIENT
Start: 2022-10-18 | End: 2022-10-18

## 2022-10-18 RX ORDER — DIPHENHYDRAMINE HYDROCHLORIDE 50 MG/ML
50 INJECTION INTRAMUSCULAR; INTRAVENOUS ONCE
Status: CANCELLED | OUTPATIENT
Start: 2022-10-18 | End: 2022-10-18

## 2022-10-18 RX ORDER — FAMOTIDINE 10 MG/ML
20 INJECTION, SOLUTION INTRAVENOUS
Status: CANCELLED | OUTPATIENT
Start: 2022-10-25

## 2022-10-18 RX ORDER — ONDANSETRON 2 MG/ML
8 INJECTION INTRAMUSCULAR; INTRAVENOUS
Status: CANCELLED | OUTPATIENT
Start: 2022-10-18

## 2022-10-18 RX ORDER — PACLITAXEL 100 MG/20ML
40 INJECTION, POWDER, LYOPHILIZED, FOR SUSPENSION INTRAVENOUS ONCE
Status: COMPLETED | OUTPATIENT
Start: 2022-10-18 | End: 2022-10-18

## 2022-10-18 RX ORDER — HEPARIN SODIUM (PORCINE) LOCK FLUSH IV SOLN 100 UNIT/ML 100 UNIT/ML
500 SOLUTION INTRAVENOUS PRN
Status: CANCELLED | OUTPATIENT
Start: 2022-10-18

## 2022-10-18 RX ORDER — HEPARIN SODIUM (PORCINE) LOCK FLUSH IV SOLN 100 UNIT/ML 100 UNIT/ML
500 SOLUTION INTRAVENOUS PRN
Status: DISCONTINUED | OUTPATIENT
Start: 2022-10-18 | End: 2022-10-19 | Stop reason: HOSPADM

## 2022-10-18 RX ORDER — ALBUTEROL SULFATE 90 UG/1
4 AEROSOL, METERED RESPIRATORY (INHALATION) PRN
Status: CANCELLED | OUTPATIENT
Start: 2022-10-25

## 2022-10-18 RX ORDER — PALONOSETRON 0.05 MG/ML
0.25 INJECTION, SOLUTION INTRAVENOUS ONCE
Status: COMPLETED | OUTPATIENT
Start: 2022-10-18 | End: 2022-10-18

## 2022-10-18 RX ORDER — ACETAMINOPHEN 325 MG/1
650 TABLET ORAL
Status: CANCELLED | OUTPATIENT
Start: 2022-10-25

## 2022-10-18 RX ORDER — DEXAMETHASONE SODIUM PHOSPHATE 10 MG/ML
10 INJECTION INTRAMUSCULAR; INTRAVENOUS ONCE
Status: COMPLETED | OUTPATIENT
Start: 2022-10-18 | End: 2022-10-18

## 2022-10-18 RX ORDER — EPINEPHRINE 1 MG/ML
0.3 INJECTION, SOLUTION, CONCENTRATE INTRAVENOUS PRN
Status: CANCELLED | OUTPATIENT
Start: 2022-10-25

## 2022-10-18 RX ORDER — PALONOSETRON 0.05 MG/ML
0.25 INJECTION, SOLUTION INTRAVENOUS ONCE
Status: CANCELLED | OUTPATIENT
Start: 2022-10-18 | End: 2022-10-18

## 2022-10-18 RX ORDER — SODIUM CHLORIDE 9 MG/ML
5-40 INJECTION INTRAVENOUS PRN
Status: CANCELLED | OUTPATIENT
Start: 2022-10-25

## 2022-10-18 RX ORDER — PACLITAXEL 100 MG/20ML
40 INJECTION, POWDER, LYOPHILIZED, FOR SUSPENSION INTRAVENOUS ONCE
Status: CANCELLED
Start: 2022-10-25 | End: 2022-10-25

## 2022-10-18 RX ADMIN — DEXAMETHASONE SODIUM PHOSPHATE 10 MG: 10 INJECTION INTRAMUSCULAR; INTRAVENOUS at 11:33

## 2022-10-18 RX ADMIN — FAMOTIDINE 20 MG: 10 INJECTION, SOLUTION INTRAVENOUS at 11:33

## 2022-10-18 RX ADMIN — SODIUM CHLORIDE 20 ML/HR: 9 INJECTION, SOLUTION INTRAVENOUS at 11:32

## 2022-10-18 RX ADMIN — SODIUM CHLORIDE, PRESERVATIVE FREE 10 ML: 5 INJECTION INTRAVENOUS at 13:30

## 2022-10-18 RX ADMIN — CARBOPLATIN 290 MG: 10 INJECTION INTRAVENOUS at 12:48

## 2022-10-18 RX ADMIN — SODIUM CHLORIDE, PRESERVATIVE FREE 10 ML: 5 INJECTION INTRAVENOUS at 10:00

## 2022-10-18 RX ADMIN — HEPARIN 500 UNITS: 100 SYRINGE at 13:30

## 2022-10-18 RX ADMIN — SODIUM CHLORIDE, PRESERVATIVE FREE 10 ML: 5 INJECTION INTRAVENOUS at 11:33

## 2022-10-18 RX ADMIN — DIPHENHYDRAMINE HYDROCHLORIDE 50 MG: 50 INJECTION, SOLUTION INTRAMUSCULAR; INTRAVENOUS at 11:33

## 2022-10-18 RX ADMIN — PACLITAXEL 85 MG: 100 INJECTION, POWDER, LYOPHILIZED, FOR SUSPENSION INTRAVENOUS at 12:09

## 2022-10-18 RX ADMIN — PALONOSETRON 0.25 MG: 0.05 INJECTION, SOLUTION INTRAVENOUS at 11:33

## 2022-10-18 NOTE — TELEPHONE ENCOUNTER
Seth Cancino MD VISIT & TX  Proceed with chemo today  RV one week  MD VISIT 10/25/22 @ 11:15AM TX @ 10AM  AVS PRINTED W/ INSTRUCTIONS AND GIVEN TO PT ON EXIT

## 2022-10-18 NOTE — PLAN OF CARE
Problem: Safety - Adult  Goal: Free from fall injury  10/18/2022 1028 by Rosa Isela Brown, RN  Outcome: Completed  10/18/2022 1028 by Rosa Isela Brown, RN  Outcome: Progressing

## 2022-10-18 NOTE — PROGRESS NOTES
Rambo Herbert  10/18/2022  Wt Readings from Last 3 Encounters:   10/18/22 194 lb 3.2 oz (88.1 kg)   10/11/22 196 lb 3.2 oz (89 kg)   10/11/22 194 lb 8 oz (88.2 kg)     Body mass index is 25.62 kg/m². Treatment Area:esophagus, treatment 16/28    Patient was seen today for weekly visit. Comfort Alteration  Fatigue: Mild    Ventilation Alterations  Cough: No  Hemoptysis: No  Mucus Color: none  Dyspnea: No      Nutritional Alteration  Anorexia: No- using PEG tube, taking 3 cartons/day  Nausea: Yes - after chemo  Vomiting: No     Mucous Membrane Alteration  Voice Changes/ Stridor/Larynx: no  Pharynx & Esophagus: intact    Elimination Alterations  Constipation: no  Diarrhea:  no    Skin Alteration   Sensation:intact    Radiation Dermatitis:  Intact [x]     Erythema  []     Discoloration  []     Rash []     Dry desquamation  []     Moist desquamation []       Emotional  Coping: effective      Injury, potential bleeding or infection: encouraged soft, moist foods and small frequent meals    Lab Results   Component Value Date    WBC 6.4 10/11/2022     10/11/2022         /63   Pulse 79   Temp 98 °F (36.7 °C) (Temporal)   Resp 16   Wt 194 lb 3.2 oz (88.1 kg)   SpO2 99%   BMI 25.62 kg/m²      Pain Assessment: None - Denies Pain            Assessment/Plan: Patient was seen today for weekly visit. States taking 3 cartons of supplement daily via PEG and it is getting more difficult to swallow. States increased salivation. Dr. Kristina Poole notified of assessment and examined patient. Continue current plan.     Arleen Aleman RN

## 2022-10-18 NOTE — PROGRESS NOTES
[address]       Radiation Oncology          [address] [facility]          [place], [address]        Joie Singh: [phone]        F: [phone]       [address]       Radiation Oncology   [address] [facility], [address] [facility]       Joie Singh: [phone]       F: [phone]       [address]          RADIATION ONCOLOGY WEEKLY PROGRESS NOTE  Patient ID:   Delilah Trevino  : 1955   MRN: [id number]    Location:  [facility] Radiation Oncology [address], [address], [facility], [address] [facility]  [phone]     DIAGNOSIS:  Cancer Staging  Malignant neoplasm of lower third of esophagus [facility])  Staging form: Esophagus - Adenocarcinoma, AJCC 8th Edition  - Clinical stage from 2022: Stage RED (cT3, cN2, cM0, GX) - Signed by Ainsley Jalloh MD on 2022      TREATMENT DETAILS:  Treatment Site: Esophagus  Actual Dose: 2880 cGy  Total Planned Dose:  5040 cGy  Treatment Technique: IMRT  Fraction Technique: Daily  Therapy imaging monitoring: CBCT daily  Concurrent Chemotherapy: Yes    SUBJECTIVE:   Patient seen for their weekly on treatment evaluation today. He is having more difficulty swallowing, so he is only eating soft food from above.     OBJECTIVE:   CHAPERONE: Not Required    ECO Symptomatic but completely ambulatory    VITAL SIGNS: /63   Pulse 79   Temp 98 °F (36.7 °C) (Temporal)   Resp 16   Wt 194 lb 3.2 oz (88.1 kg)   SpO2 99%   BMI 25.62 kg/m²   Wt Readings from Last 5 Encounters:   10/18/22 194 lb 3.2 oz (88.1 kg)   10/11/22 196 lb 3.2 oz (89 kg)   10/11/22 194 lb 8 oz (88.2 kg)   10/04/22 199 lb 12.8 oz (90.6 kg)   10/04/22 200 lb 3.2 oz (90.8 kg)     [unfilled]    LABS:  WBC   Date Value Ref Range Status   10/11/2022 6.4 3.5 - 11.3 k/uL Final   10/04/2022 6.8 3.5 - 11.3 k/uL Final   2022 9.6 3.5 - 11.3 k/uL Final     Segs Absolute   Date Value Ref Range Status   10/11/2022 5.19 1.50 - 8.10 k/uL Final   10/04/2022 5.36 1.50 - 8.10 k/uL Final   09/29/2022 7.63 1.50 - 8.10 k/uL Final     Hemoglobin   Date Value Ref Range Status   10/11/2022 12.0 (L) 13.0 - 17.0 g/dL Final   10/04/2022 12.8 (L) 13.0 - 17.0 g/dL Final   09/29/2022 13.3 13.0 - 17.0 g/dL Final     Platelets   Date Value Ref Range Status   10/11/2022 242 138 - 453 k/uL Final   10/04/2022 326 138 - 453 k/uL Final   09/29/2022 378 138 - 453 k/uL Final     Creatinine   Date Value Ref Range Status   10/11/2022 0.60 (L) 0.70 - 1.20 mg/dL Final     Comment:     ICTERIC SPECIMEN   10/04/2022 0.63 (L) 0.70 - 1.20 mg/dL Final     Comment:     ICTERIC SPECIMEN   09/29/2022 0.79 0.70 - 1.20 mg/dL Final     POC Creatinine   Date Value Ref Range Status   09/29/2022 0.89 0.51 - 1.19 mg/dL Final     No results found for: , CEA  PSA   Date Value Ref Range Status   08/13/2021 18.86 (H) <4.1 ug/L Final     Comment:     The Roche \"ECLIA\" assay is used. Results obtained with different assay methods cannot be   used interchangeably. 06/07/2016 6.83 (H) <4.1 ug/L Final     Comment:     The Roche \"ECLIA\" assay is used. Results obtained with different assay methods   cannot be used interchangeably. Performed at [facility] [address] [address] [phone]     03/16/2015 6.01 (H) <4.1 ug/L Final     Comment:     The Roche \"ECLIA\" assay is used. Results obtained with different assay methods   cannot be used interchangeably. Performed at [address] [place] [address] [phone]     05/23/2013 6.8 (H) 0.0 - 4.0 ug/L Final     Comment:     Siemens Immulite 2000 immunometric chemiluminescent assay is used. Results   obtained with different assay methods or instruments cannot be used   interchangeably.    05/20/2013 5.13 (H) 0 - 4 ug/L Final     Comment:     Performed at [address] [place] [address] [phone]       MEDICATIONS:    Current Outpatient Medications: ondansetron (ZOFRAN-ODT) 4 MG disintegrating tablet, Take 1 tablet by mouth 3 times daily as needed for Nausea or Vomiting, Disp: 21 tablet, Rfl: 0    tamsulosin (FLOMAX) 0.4 MG capsule, Take 1 capsule by mouth daily, Disp: 30 capsule, Rfl: 3    Nutritional Supplements (Orad STANDARD 1.4) LIQD, 1 Can by Enteral route 6 times daily [employer] 1.4, 6 cans daily, 121 mL/hr x 16 hours via J tube per pump (Patient not taking: No sig reported), Disp: 00627 mL, Rfl: 12    omeprazole (PRILOSEC) 20 MG delayed release capsule, Take 1 capsule by mouth 2 times daily (before meals), Disp: 180 capsule, Rfl: 1    Bacillus Coagulans-Inulin (ALIGN PREBIOTIC-PROBIOTIC) 5-1.25 MG-GM CHEW, Take by mouth Takes when he remembers, Disp: , Rfl:     fluorouracil (EFUDEX) 5 % cream, Apply twice daily to actinic keratosis for 3-4 weeks. Expect redness and irritation. , Disp: 40 g, Rfl: 1      ASSESSMENT PLAN:   Treatment setup and plan reviewed. Port images/CBCT images reviewed. Appropriate laboratory work was reviewed. Treatment side effects and toxicities reviewed with the patient, and appropriate management was advised. Will continue radiation treatment as planned, and recommend patient contact us if they have any questions or concerns. He asked why there seems to be more cancer lately, and I explained to him that people are living longer and fewer people die from infectious disease because of antibiotics and better alida care. Electronically signed by Brenden Alvares MD on 10/18/2022 at 9:24 AM      Drugs Prescribed:  New Prescriptions    No medications on file       Other Orders Placed:  No orders of the defined types were placed in this encounter.

## 2022-10-19 ENCOUNTER — HOSPITAL ENCOUNTER (OUTPATIENT)
Dept: RADIATION ONCOLOGY | Facility: MEDICAL CENTER | Age: 67
Discharge: HOME | End: 2022-10-19
Payer: MEDICARE

## 2022-10-19 PROCEDURE — G6002 STEREOSCOPIC X-RAY GUIDANCE: HCPCS | Performed by: RADIOLOGY

## 2022-10-19 PROCEDURE — 77386 HC NTSTY MODUL RAD TX DLVR CPLX: CPT | Performed by: RADIOLOGY

## 2022-10-20 ENCOUNTER — HOSPITAL ENCOUNTER (OUTPATIENT)
Dept: RADIATION ONCOLOGY | Facility: MEDICAL CENTER | Age: 67
Discharge: HOME | End: 2022-10-20
Payer: MEDICARE

## 2022-10-20 PROCEDURE — 77301 RADIOTHERAPY DOSE PLAN IMRT: CPT | Performed by: RADIOLOGY

## 2022-10-20 PROCEDURE — 77338 DESIGN MLC DEVICE FOR IMRT: CPT | Performed by: RADIOLOGY

## 2022-10-20 PROCEDURE — 77300 RADIATION THERAPY DOSE PLAN: CPT | Performed by: RADIOLOGY

## 2022-10-20 PROCEDURE — 77386 HC NTSTY MODUL RAD TX DLVR CPLX: CPT | Performed by: RADIOLOGY

## 2022-10-20 PROCEDURE — G6002 STEREOSCOPIC X-RAY GUIDANCE: HCPCS | Performed by: RADIOLOGY

## 2022-10-21 ENCOUNTER — HOSPITAL ENCOUNTER (OUTPATIENT)
Dept: RADIATION ONCOLOGY | Facility: MEDICAL CENTER | Age: 67
Discharge: HOME | End: 2022-10-21
Payer: MEDICARE

## 2022-10-21 ENCOUNTER — TELEPHONE (OUTPATIENT)
Dept: RADIATION ONCOLOGY | Facility: MEDICAL CENTER | Age: 67
End: 2022-10-21

## 2022-10-21 ENCOUNTER — TELEPHONE (OUTPATIENT)
Dept: ONCOLOGY | Age: 67
End: 2022-10-21

## 2022-10-21 PROCEDURE — 77336 RADIATION PHYSICS CONSULT: CPT

## 2022-10-21 PROCEDURE — G6002 STEREOSCOPIC X-RAY GUIDANCE: HCPCS | Performed by: RADIOLOGY

## 2022-10-21 PROCEDURE — 77386 HC NTSTY MODUL RAD TX DLVR CPLX: CPT | Performed by: RADIOLOGY

## 2022-10-21 RX ORDER — AMOXICILLIN AND CLAVULANATE POTASSIUM 875; 125 MG/1; MG/1
1 TABLET, FILM COATED ORAL 2 TIMES DAILY
Qty: 14 TABLET | Refills: 0 | Status: SHIPPED | OUTPATIENT
Start: 2022-10-21 | End: 2022-10-28

## 2022-10-21 NOTE — TELEPHONE ENCOUNTER
Spoke with Dr. Radha Finnegan office, Kasey, regarding the tube leaking, multiple dressing changes in AM, redness under the J- tube. Per Kasey Ramirez will send in an antibiotic for the patient to start. He is unable to see the patient until next week Friday 10/28 at 1:45pm.  Called pt and he is aware of the above information. RN also called and left a message for Alvarez Lozano to check in with the patient. RN will be back in the office on Monday and will assess the tube site at that time. Pt verbalized understanding of information given and will call back if he has further concerns or issues.

## 2022-10-21 NOTE — TELEPHONE ENCOUNTER
[facility] Nutrition Follow Up       Lima Swan is a 75 y.o.  male     NUTRITION RECOMMENDATIONS / MONITORING / EVALUATION  Continue with Jose Olive 1.4, 6 cartons daily to provide 2730 kcal and 120 g protein daily, run via pump at 121 mL/hr x 16 hours  2. Will monitor EN tolerance, tube site, po intakes, wts, labs, s/s, care plan     Subjective/Current Data:  Spoke with radiation nurse who states that pt is still having large amounts of bright yellow drainage around tube site as well as purulent drainage and some tenderness. Note weight continues to trend down. Antibiotics called in for patients and follow up visit with surgeon scheduled for next week. Continue to encourage consistency of adequate tube feeding for patient via pump. Pt is changing gauze pad regularly as soiled and proper tube site care reviewed. Recent Weights: Wt Readings from Last 3 Encounters:   10/18/22 195 lb 9.6 oz (88.7 kg)   10/18/22 194 lb 3.2 oz (88.1 kg)   10/11/22 196 lb 3.2 oz (89 kg)         Goal: Adequate intake to aide in wt maintenaince, signs and symptoms management, and overall wellbeing.     Progress towards goal: gradually worsening    Sigifredo Cunningham MS, RD, LD  Registered Dietitian  [facility]  [phone]

## 2022-10-24 ENCOUNTER — HOSPITAL ENCOUNTER (OUTPATIENT)
Facility: MEDICAL CENTER | Age: 67
End: 2022-10-24
Payer: MEDICARE

## 2022-10-24 ENCOUNTER — HOSPITAL ENCOUNTER (OUTPATIENT)
Dept: RADIATION ONCOLOGY | Facility: MEDICAL CENTER | Age: 67
Discharge: HOME | End: 2022-10-24
Payer: MEDICARE

## 2022-10-24 PROCEDURE — 77386 HC NTSTY MODUL RAD TX DLVR CPLX: CPT | Performed by: RADIOLOGY

## 2022-10-24 PROCEDURE — G6002 STEREOSCOPIC X-RAY GUIDANCE: HCPCS | Performed by: RADIOLOGY

## 2022-10-24 NOTE — PROGRESS NOTES
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 Chemoradiation started 9/27/22. Developed allergy to Taxol. Switched to Abraxane/ carboplatin. BRIEF CASE HISTORY:      Mr. Merissa Swan is a very pleasant 75 y.o. male with history of multiple co morbidities as listed. Patient is referred for evaluation and further management of recently diagnosed esophageal adenocarcinoma. The patient was doing fairly well except for mild chronic GERD. It was not significant so he did not pay attention to it and he did not receive any treatment for it. For the last few weeks patient started having difficulty swallowing especially solid food. He was evaluated by gastroenterology and he had EGD which showed suspicious lesion at the distal part of the esophagus at 36 cm. Biopsy from the lesion on August 1, 2022 showed adenocarcinoma. Patient is referred for further management. Patient denies any active bleeding. No melena or hematochezia. No hematemesis. No weight loss or decreased appetite. No fever or night sweats. No other complaints. Patient quit smoking more than 20 years ago. He drinks alcohol socially. INTERIM HISTORY:   Patient seen for follow-up esophageal cancer. He continues to have some difficulty swallowing solids. No weight loss. No GI bleeding. No chest pain. No fever. No other complaints.   PAST MEDICAL HISTORY: has a past medical history of Cancer [facility]), Dental root implant present, Elevated PSA, Hearing loss, Hiatal hernia, Keratosis, Prostate nodule, Snores, Under care of team, Wears dentures, Wears reading eyeglasses, and Wellness examination. PAST SURGICAL HISTORY: has a past surgical history that includes Colonoscopy (09/24/2008); Tonsillectomy; hernia repair; skin biopsy; Prostate biopsy; Prostate biopsy (N/A, 11/22/2021); Colonoscopy (N/A, 02/17/2022); Upper gastrointestinal endoscopy (N/A, 08/01/2022); Upper gastrointestinal endoscopy (N/A, 08/24/2022); Gastrojejunostomy w/ jejunostomy tube; Mediport insertion, single (Right); and Gastrostomy tube placement (N/A, 9/17/2022). CURRENT MEDICATIONS:  has a current medication list which includes the following prescription(s): ondansetron, tamsulosin, [employer] standard 1.4, omeprazole, align prebiotic-probiotic, fluorouracil, and amoxicillin-clavulanate. ALLERGIES:  is allergic to paclitaxel. FAMILY HISTORY: Negative for any hematological or oncological conditions. SOCIAL HISTORY:  reports that he quit smoking about 20 years ago. His smoking use included cigarettes. He smoked an average of 1.00 packs per day. He has never used smokeless tobacco. He reports current alcohol use. He reports current drug use. Drug: Marijuana Ben Calamity). REVIEW OF SYSTEMS:     General: Positive for weakness and fatigue. No unanticipated weight loss or decreased appetite. No fever or chills. Eyes: No blurred vision, eye pain or double vision. Ears: No hearing problems or drainage. No tinnitus. Throat: No sore throat, problems with swallowing or dysphagia. Respiratory: No cough, sputum or hemoptysis. No shortness of breath. No pleuritic chest pain. Cardiovascular: No chest pain, orthopnea or PND. No lower extremity edema. No palpitation. Gastrointestinal: As above. Genitourinary: No dysuria, hematuria, frequency or urgency. Musculoskeletal: No muscle aches or pains. No limitation of movement. No back pain. No gait disturbance, No joint complaints. Dermatologic: No skin rashes or pruritus.  No skin lesions or discolorations. Psychiatric: No depression, anxiety, or stress or signs of schizophrenia. No change in mood or affect. Hematologic: No history of bleeding tendency. No bruises or ecchymosis. No history of clotting problems. Infectious disease: No fever, chills or frequent infections. Endocrine: No polydipsia or polyuria. No temperature intolerance. Neurologic: No headaches or dizziness. No weakness or numbness of the extremities. No changes in balance, coordination,  memory, mentation, behavior. Allergic/Immunologic: No nasal congestion or hives. No repeated infections. PHYSICAL EXAM:  The patient is not in acute distress. Vital signs: Blood pressure 102/64, pulse 69, temperature 98.6 °F (37 °C), temperature source Temporal, resp. rate 18, weight 195 lb 9.6 oz (88.7 kg).      General appearance - well appearing, not in pain or distress  Mental status - good mood, alert and oriented  Eyes - pupils equal and reactive, extraocular eye movements intact  Ears - bilateral TM's and external ear canals normal  Nose - normal and patent, no erythema, discharge or polyps  Mouth - mucous membranes moist, pharynx normal without lesions  Neck - supple, no significant adenopathy  Lymphatics - no palpable lymphadenopathy, no hepatosplenomegaly  Chest - clear to auscultation, no wheezes, rales or rhonchi, symmetric air entry  Heart - normal rate, regular rhythm, normal S1, S2, no murmurs, rubs, clicks or gallops  Abdomen - soft, nontender, nondistended, no masses or organomegaly  Neurological - alert, oriented, normal speech, no focal findings or movement disorder noted  Musculoskeletal - no joint tenderness, deformity or swelling  Extremities - peripheral pulses normal, no pedal edema, no clubbing or cyanosis  Skin - normal coloration and turgor, no rashes, no suspicious skin lesions noted     Review of Diagnostic data:   Lab Results   Component Value Date    WBC 5.9 10/18/2022    HGB 12.1 (L) 10/18/2022    HCT 38.7 (L) 10/18/2022    MCV 95.8 10/18/2022     10/18/2022       Chemistry        Component Value Date/Time     10/18/2022 1000    K 4.3 10/18/2022 1000     10/18/2022 1000    CO2 28 10/18/2022 1000    BUN 10 10/18/2022 1000    CREATININE 0.61 (L) 10/18/2022 1000        Component Value Date/Time    CALCIUM 9.4 10/18/2022 1000    ALKPHOS 86 10/18/2022 1000    AST 15 10/18/2022 1000    ALT 8 10/18/2022 1000    BILITOT 2.8 (H) 10/18/2022 1000          MRI BRAIN WO CONTRAST  Narrative: EXAMINATION:  MRI OF THE BRAIN WITHOUT CONTRAST  9/29/2022 6:16 pm    TECHNIQUE:  Multiplanar multisequence MRI of the brain was performed without the  administration of intravenous contrast.    COMPARISON:  None. HISTORY:  ORDERING SYSTEM PROVIDED HISTORY: Facial droop, dysarthria  TECHNOLOGIST PROVIDED HISTORY:  Facial droop, dysarthria  What is the sedation requirement?->None  Decision Support Exception - unselect if not a suspected or confirmed  emergency medical condition->Emergency Medical Condition ([place])  Reason for Exam: Facial droop, dysarthria    FINDINGS:  There is no acute infarction, intracranial hemorrhage, or intracranial mass  lesion. No mass effect, midline shift, or extra-axial collection is noted. There are mild nonspecific foci of periventricular and subcortical cerebral  white matter T2/FLAIR hyperintensity, most likely representing chronic  microangiopathic disease in this age group. The brain parenchyma is otherwise  normal. The pituitary gland is normal in appearance. The cerebellar tonsils  are in normal position. The ventricles, sulci, and cisterns are prominent suggestive of generalized  volume loss. The intracranial flow voids are preserved. The globes and orbits are within normal limits. Mild mucosal thickening of  left mastoid air cells. The visualized extracranial structures including  paranasal sinuses and mastoid air cells are otherwise unremarkable.   Impression: There is no acute infarction, intracranial hemorrhage, or intracranial mass  lesion. Mild chronic microangiopathic ischemic disease. Mild generalized volume loss. Mild mucosal thickening of left mastoid air cells    RECOMMENDATIONS:  Unavailable  CT HEAD WO CONTRAST  Narrative: EXAMINATION:  CT OF THE HEAD WITHOUT CONTRAST  9/29/2022 5:05 pm    TECHNIQUE:  CT of the head was performed without the administration of intravenous  contrast. Automated exposure control, iterative reconstruction, and/or weight  based adjustment of the mA/kV was utilized to reduce the radiation dose to as  low as reasonably achievable. COMPARISON:  None. HISTORY:  ORDERING SYSTEM PROVIDED HISTORY: stroke r/o  TECHNOLOGIST PROVIDED HISTORY:    stroke r/o  Decision Support Exception - unselect if not a suspected or confirmed  emergency medical condition->Emergency Medical Condition ([place])    Transient ischemic attack. FINDINGS:  BRAIN/VENTRICLES: There is no acute intracranial hemorrhage, mass effect or  midline shift. No abnormal extra-axial fluid collection. The gray-white  differentiation is maintained without evidence of an acute infarct. There is  no evidence of hydrocephalus. ORBITS: The visualized portion of the orbits demonstrate no acute abnormality. SINUSES: The visualized paranasal sinuses and mastoid air cells demonstrate  no acute abnormality. SOFT TISSUES/SKULL:  No acute abnormality of the visualized skull or soft  tissues. Impression: No acute intracranial abnormality. 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 I explained to the patient the nature of severe cancer, staging, prognosis and treatment. Explained the results of the PET CT scan and the endoscopic ultrasound.     Obviously patient is having locally advanced disease with no evidence of metastasis. My recommendations to give neoadjuvant combined chemoradiation followed by surgery. We will give weekly Taxol/ carboplatin. I explained the benefits and possible side effects related to chemotherapy, which may include but not limited to nausea, vomiting, hair loss, mouth sores, allergy, neuropathy, fever infection sepsis, anemia and thrombocytopenia. Patient was started on Taxol/ Carbo. He developed allergy to Taxol. Switched to Abraxane/ carboplatin with radiation. Tolerated well so far. We also discussed nutritional support. We also discussed management of GERD and he had further management by his gastroenterologist for that matter. He will continue PPIs. Patient's questions were answered to the best of his satisfaction and he verbalized full understanding and agreement.

## 2022-10-25 ENCOUNTER — HOSPITAL ENCOUNTER (OUTPATIENT)
Dept: RADIATION ONCOLOGY | Facility: MEDICAL CENTER | Age: 67
Discharge: HOME | End: 2022-10-25
Payer: MEDICARE

## 2022-10-25 ENCOUNTER — TELEPHONE (OUTPATIENT)
Dept: ONCOLOGY | Age: 67
End: 2022-10-25

## 2022-10-25 ENCOUNTER — OFFICE VISIT (OUTPATIENT)
Dept: ONCOLOGY | Age: 67
End: 2022-10-25
Payer: MEDICARE

## 2022-10-25 ENCOUNTER — HOSPITAL ENCOUNTER (OUTPATIENT)
Dept: INFUSION THERAPY | Facility: MEDICAL CENTER | Age: 67
Discharge: HOME | End: 2022-10-25
Payer: MEDICARE

## 2022-10-25 VITALS
DIASTOLIC BLOOD PRESSURE: 67 MMHG | BODY MASS INDEX: 25.3 KG/M2 | TEMPERATURE: 98.6 F | WEIGHT: 191.8 LBS | SYSTOLIC BLOOD PRESSURE: 100 MMHG | SYSTOLIC BLOOD PRESSURE: 100 MMHG | RESPIRATION RATE: 16 BRPM | RESPIRATION RATE: 16 BRPM | TEMPERATURE: 98.6 F | HEART RATE: 77 BPM | HEART RATE: 77 BPM | DIASTOLIC BLOOD PRESSURE: 67 MMHG

## 2022-10-25 DIAGNOSIS — C15.9 ADENOSQUAMOUS CARCINOMA OF ESOPHAGUS (HCC): Primary | ICD-10-CM

## 2022-10-25 DIAGNOSIS — C15.5 MALIGNANT NEOPLASM OF LOWER THIRD OF ESOPHAGUS (HCC): ICD-10-CM

## 2022-10-25 LAB
ABSOLUTE EOS #: 0.09 K/UL (ref 0–0.4)
ABSOLUTE IMMATURE GRANULOCYTE: 0.04 K/UL (ref 0–0.3)
ABSOLUTE LYMPH #: 0.26 K/UL (ref 1–4.8)
ABSOLUTE MONO #: 0.57 K/UL (ref 0.2–0.8)
ALBUMIN SERPL-MCNC: 3.5 G/DL (ref 3.5–5.2)
ALP BLD-CCNC: 83 U/L (ref 40–129)
ALT SERPL-CCNC: 43 U/L (ref 5–41)
ANION GAP SERPL CALCULATED.3IONS-SCNC: 9 MMOL/L (ref 9–17)
AST SERPL-CCNC: 23 U/L
BASOPHILS # BLD: 1 %
BASOPHILS ABSOLUTE: 0.04 K/UL (ref 0–0.2)
BILIRUB SERPL-MCNC: 2 MG/DL (ref 0.3–1.2)
BUN BLDV-MCNC: 8 MG/DL (ref 8–23)
BUN/CREAT BLD: 13 (ref 9–20)
CALCIUM SERPL-MCNC: 9.1 MG/DL (ref 8.6–10.4)
CHLORIDE BLD-SCNC: 105 MMOL/L (ref 98–107)
CO2: 27 MMOL/L (ref 20–31)
CREAT SERPL-MCNC: 0.62 MG/DL (ref 0.7–1.2)
EOSINOPHILS RELATIVE PERCENT: 2 % (ref 1–4)
GFR SERPL CREATININE-BSD FRML MDRD: >60 ML/MIN/1.73M2
GLUCOSE BLD-MCNC: 105 MG/DL (ref 70–99)
HCT VFR BLD CALC: 34.7 % (ref 40.7–50.3)
HEMOGLOBIN: 10.7 G/DL (ref 13–17)
IMMATURE GRANULOCYTES: 1 %
LYMPHOCYTES # BLD: 6 % (ref 24–44)
MCH RBC QN AUTO: 29.5 PG (ref 25.2–33.5)
MCHC RBC AUTO-ENTMCNC: 30.8 G/DL (ref 28.4–34.8)
MCV RBC AUTO: 95.6 FL (ref 82.6–102.9)
MONOCYTES # BLD: 13 % (ref 1–7)
NRBC AUTOMATED: 0 PER 100 WBC
PDW BLD-RTO: 15.7 % (ref 11.8–14.4)
PLATELET # BLD: 176 K/UL (ref 138–453)
PMV BLD AUTO: 10.3 FL (ref 8.1–13.5)
POTASSIUM SERPL-SCNC: 3.9 MMOL/L (ref 3.7–5.3)
RBC # BLD: 3.63 M/UL (ref 4.21–5.77)
SEG NEUTROPHILS: 77 % (ref 36–66)
SEGMENTED NEUTROPHILS ABSOLUTE COUNT: 3.4 K/UL (ref 1.8–7.7)
SODIUM BLD-SCNC: 141 MMOL/L (ref 135–144)
TOTAL PROTEIN: 6.2 G/DL (ref 6.4–8.3)
WBC # BLD: 4.4 K/UL (ref 3.5–11.3)

## 2022-10-25 PROCEDURE — 1123F ACP DISCUSS/DSCN MKR DOCD: CPT | Performed by: INTERNAL MEDICINE

## 2022-10-25 PROCEDURE — 80053 COMPREHEN METABOLIC PANEL: CPT

## 2022-10-25 PROCEDURE — 77386 HC NTSTY MODUL RAD TX DLVR CPLX: CPT | Performed by: RADIOLOGY

## 2022-10-25 PROCEDURE — 85025 COMPLETE CBC W/AUTO DIFF WBC: CPT

## 2022-10-25 PROCEDURE — 96413 CHEMO IV INFUSION 1 HR: CPT

## 2022-10-25 PROCEDURE — 36591 DRAW BLOOD OFF VENOUS DEVICE: CPT

## 2022-10-25 PROCEDURE — 2500000003 HC RX 250 WO HCPCS: Performed by: INTERNAL MEDICINE

## 2022-10-25 PROCEDURE — 2580000003 HC RX 258: Performed by: INTERNAL MEDICINE

## 2022-10-25 PROCEDURE — 96417 CHEMO IV INFUS EACH ADDL SEQ: CPT

## 2022-10-25 PROCEDURE — 96415 CHEMO IV INFUSION ADDL HR: CPT

## 2022-10-25 PROCEDURE — 99214 OFFICE O/P EST MOD 30 MIN: CPT | Performed by: INTERNAL MEDICINE

## 2022-10-25 PROCEDURE — 96375 TX/PRO/DX INJ NEW DRUG ADDON: CPT

## 2022-10-25 PROCEDURE — 99211 OFF/OP EST MAY X REQ PHY/QHP: CPT | Performed by: INTERNAL MEDICINE

## 2022-10-25 PROCEDURE — G6002 STEREOSCOPIC X-RAY GUIDANCE: HCPCS | Performed by: RADIOLOGY

## 2022-10-25 PROCEDURE — 6360000002 HC RX W HCPCS: Performed by: INTERNAL MEDICINE

## 2022-10-25 RX ORDER — MEPERIDINE HYDROCHLORIDE 50 MG/ML
12.5 INJECTION INTRAMUSCULAR; INTRAVENOUS; SUBCUTANEOUS PRN
Status: CANCELLED | OUTPATIENT
Start: 2022-11-01

## 2022-10-25 RX ORDER — PALONOSETRON 0.05 MG/ML
0.25 INJECTION, SOLUTION INTRAVENOUS ONCE
Status: CANCELLED | OUTPATIENT
Start: 2022-11-01 | End: 2022-11-01

## 2022-10-25 RX ORDER — SODIUM CHLORIDE 9 MG/ML
5-40 INJECTION INTRAVENOUS PRN
Status: CANCELLED | OUTPATIENT
Start: 2022-11-01

## 2022-10-25 RX ORDER — PACLITAXEL 100 MG/20ML
40 INJECTION, POWDER, LYOPHILIZED, FOR SUSPENSION INTRAVENOUS ONCE
Status: COMPLETED | OUTPATIENT
Start: 2022-10-25 | End: 2022-10-25

## 2022-10-25 RX ORDER — FAMOTIDINE 10 MG/ML
20 INJECTION, SOLUTION INTRAVENOUS
Status: CANCELLED | OUTPATIENT
Start: 2022-11-01

## 2022-10-25 RX ORDER — DEXAMETHASONE SODIUM PHOSPHATE 10 MG/ML
10 INJECTION INTRAMUSCULAR; INTRAVENOUS ONCE
Status: COMPLETED | OUTPATIENT
Start: 2022-10-25 | End: 2022-10-25

## 2022-10-25 RX ORDER — ALBUTEROL SULFATE 90 UG/1
4 AEROSOL, METERED RESPIRATORY (INHALATION) PRN
Status: CANCELLED | OUTPATIENT
Start: 2022-11-01

## 2022-10-25 RX ORDER — PACLITAXEL 100 MG/20ML
40 INJECTION, POWDER, LYOPHILIZED, FOR SUSPENSION INTRAVENOUS ONCE
Status: CANCELLED
Start: 2022-11-01 | End: 2022-11-01

## 2022-10-25 RX ORDER — DIPHENHYDRAMINE HYDROCHLORIDE 50 MG/ML
50 INJECTION INTRAMUSCULAR; INTRAVENOUS
Status: CANCELLED | OUTPATIENT
Start: 2022-11-01

## 2022-10-25 RX ORDER — ACETAMINOPHEN 325 MG/1
650 TABLET ORAL
Status: CANCELLED | OUTPATIENT
Start: 2022-11-01

## 2022-10-25 RX ORDER — HEPARIN SODIUM (PORCINE) LOCK FLUSH IV SOLN 100 UNIT/ML 100 UNIT/ML
500 SOLUTION INTRAVENOUS PRN
Status: CANCELLED | OUTPATIENT
Start: 2022-11-01

## 2022-10-25 RX ORDER — HEPARIN SODIUM (PORCINE) LOCK FLUSH IV SOLN 100 UNIT/ML 100 UNIT/ML
500 SOLUTION INTRAVENOUS PRN
Status: DISCONTINUED | OUTPATIENT
Start: 2022-10-25 | End: 2022-10-26 | Stop reason: HOSPADM

## 2022-10-25 RX ORDER — DIPHENHYDRAMINE HYDROCHLORIDE 50 MG/ML
50 INJECTION INTRAMUSCULAR; INTRAVENOUS ONCE
Status: CANCELLED | OUTPATIENT
Start: 2022-11-01 | End: 2022-11-01

## 2022-10-25 RX ORDER — FAMOTIDINE 10 MG/ML
20 INJECTION, SOLUTION INTRAVENOUS ONCE
Status: COMPLETED | OUTPATIENT
Start: 2022-10-25 | End: 2022-10-25

## 2022-10-25 RX ORDER — FAMOTIDINE 10 MG/ML
20 INJECTION, SOLUTION INTRAVENOUS ONCE
Status: CANCELLED | OUTPATIENT
Start: 2022-11-01 | End: 2022-11-01

## 2022-10-25 RX ORDER — ONDANSETRON 2 MG/ML
8 INJECTION INTRAMUSCULAR; INTRAVENOUS
Status: CANCELLED | OUTPATIENT
Start: 2022-11-01

## 2022-10-25 RX ORDER — SODIUM CHLORIDE 9 MG/ML
5-250 INJECTION, SOLUTION INTRAVENOUS PRN
Status: CANCELLED | OUTPATIENT
Start: 2022-11-01

## 2022-10-25 RX ORDER — DIPHENHYDRAMINE HYDROCHLORIDE 50 MG/ML
50 INJECTION INTRAMUSCULAR; INTRAVENOUS ONCE
Status: COMPLETED | OUTPATIENT
Start: 2022-10-25 | End: 2022-10-25

## 2022-10-25 RX ORDER — PALONOSETRON 0.05 MG/ML
0.25 INJECTION, SOLUTION INTRAVENOUS ONCE
Status: COMPLETED | OUTPATIENT
Start: 2022-10-25 | End: 2022-10-25

## 2022-10-25 RX ORDER — SODIUM CHLORIDE 0.9 % (FLUSH) 0.9 %
5-40 SYRINGE (ML) INJECTION PRN
Status: CANCELLED | OUTPATIENT
Start: 2022-11-01

## 2022-10-25 RX ORDER — SODIUM CHLORIDE 9 MG/ML
5-250 INJECTION, SOLUTION INTRAVENOUS PRN
Status: DISCONTINUED | OUTPATIENT
Start: 2022-10-25 | End: 2022-10-26 | Stop reason: HOSPADM

## 2022-10-25 RX ORDER — SODIUM CHLORIDE 9 MG/ML
INJECTION, SOLUTION INTRAVENOUS CONTINUOUS
Status: CANCELLED | OUTPATIENT
Start: 2022-11-01

## 2022-10-25 RX ORDER — SODIUM CHLORIDE 0.9 % (FLUSH) 0.9 %
5-40 SYRINGE (ML) INJECTION PRN
Status: DISCONTINUED | OUTPATIENT
Start: 2022-10-25 | End: 2022-10-26 | Stop reason: HOSPADM

## 2022-10-25 RX ORDER — EPINEPHRINE 1 MG/ML
0.3 INJECTION, SOLUTION, CONCENTRATE INTRAVENOUS PRN
Status: CANCELLED | OUTPATIENT
Start: 2022-11-01

## 2022-10-25 RX ADMIN — CARBOPLATIN 290 MG: 10 INJECTION INTRAVENOUS at 12:09

## 2022-10-25 RX ADMIN — DEXAMETHASONE SODIUM PHOSPHATE 10 MG: 10 INJECTION INTRAMUSCULAR; INTRAVENOUS at 10:39

## 2022-10-25 RX ADMIN — SODIUM CHLORIDE 20 ML/HR: 9 INJECTION, SOLUTION INTRAVENOUS at 10:03

## 2022-10-25 RX ADMIN — HEPARIN 500 UNITS: 100 SYRINGE at 12:55

## 2022-10-25 RX ADMIN — DIPHENHYDRAMINE HYDROCHLORIDE 50 MG: 50 INJECTION INTRAMUSCULAR; INTRAVENOUS at 10:39

## 2022-10-25 RX ADMIN — FAMOTIDINE 20 MG: 10 INJECTION, SOLUTION INTRAVENOUS at 10:39

## 2022-10-25 RX ADMIN — SODIUM CHLORIDE, PRESERVATIVE FREE 20 ML: 5 INJECTION INTRAVENOUS at 12:55

## 2022-10-25 RX ADMIN — SODIUM CHLORIDE, PRESERVATIVE FREE 10 ML: 5 INJECTION INTRAVENOUS at 10:03

## 2022-10-25 RX ADMIN — PALONOSETRON 0.25 MG: 0.05 INJECTION, SOLUTION INTRAVENOUS at 10:38

## 2022-10-25 RX ADMIN — PACLITAXEL 85 MG: 100 INJECTION, POWDER, LYOPHILIZED, FOR SUSPENSION INTRAVENOUS at 11:20

## 2022-10-25 NOTE — PROGRESS NOTES
Pt arrives ambulatory for C5 D1 treatment and MD visit. Vitals as charted. Pt states he has had acid reflux. Pt denies any other complaint or concern. Port accessed, specimens sent. Labs reviewed, MD visit complete, Okay to treat. Pt premedicated. Abraxane infused with no sign of adverse reaction, line flushed. Carboplatin infused with no sign of adverse reaction, Line flushed. Port flushed and heparinized with intact morillo needle removed per protocol, Band-Aid applied to site.    Pt discharged, to stop at  for AVS.

## 2022-10-25 NOTE — PROGRESS NOTES
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 Chemoradiation started 9/27/22. Developed allergy to Taxol. Switched to Abraxane/ carboplatin. BRIEF CASE HISTORY:      Mr. Tj Salgado is a very pleasant 75 y.o. male with history of multiple co morbidities as listed. Patient is referred for evaluation and further management of recently diagnosed esophageal adenocarcinoma. The patient was doing fairly well except for mild chronic GERD. It was not significant so he did not pay attention to it and he did not receive any treatment for it. For the last few weeks patient started having difficulty swallowing especially solid food. He was evaluated by gastroenterology and he had EGD which showed suspicious lesion at the distal part of the esophagus at 36 cm. Biopsy from the lesion on August 1, 2022 showed adenocarcinoma. Patient is referred for further management. Patient denies any active bleeding. No melena or hematochezia. No hematemesis. No weight loss or decreased appetite. No fever or night sweats. No other complaints. Patient quit smoking more than 20 years ago. He drinks alcohol socially. INTERIM HISTORY:   Patient seen for follow-up esophageal cancer. He continues to have some difficulty swallowing solids. No weight loss. No GI bleeding. No chest pain. No fever. No other complaints.   PAST MEDICAL HISTORY: has a past medical history of Cancer [facility]), Dental root implant present, Elevated PSA, Hearing loss, Hiatal hernia, Keratosis, Prostate nodule, Snores, Under care of team, Wears dentures, Wears reading eyeglasses, and Wellness examination. PAST SURGICAL HISTORY: has a past surgical history that includes Colonoscopy (09/24/2008); Tonsillectomy; hernia repair; skin biopsy; Prostate biopsy; Prostate biopsy (N/A, 11/22/2021); Colonoscopy (N/A, 02/17/2022); Upper gastrointestinal endoscopy (N/A, 08/01/2022); Upper gastrointestinal endoscopy (N/A, 08/24/2022); Gastrojejunostomy w/ jejunostomy tube; Mediport insertion, single (Right); and Gastrostomy tube placement (N/A, 9/17/2022). CURRENT MEDICATIONS:  has a current medication list which includes the following prescription(s): amoxicillin-clavulanate, ondansetron, tamsulosin, pa[employer] standard 1.4, omeprazole, align prebiotic-probiotic, and fluorouracil, and the following Facility-Administered Medications: sodium chloride, sodium chloride flush, and heparin flush. ALLERGIES:  is allergic to paclitaxel. FAMILY HISTORY: Negative for any hematological or oncological conditions. SOCIAL HISTORY:  reports that he quit smoking about 20 years ago. His smoking use included cigarettes. He smoked an average of 1.00 packs per day. He has never used smokeless tobacco. He reports current alcohol use. He reports current drug use. Drug: Marijuana Shannon Orr). REVIEW OF SYSTEMS:     General: Positive for weakness and fatigue. No unanticipated weight loss or decreased appetite. No fever or chills. Eyes: No blurred vision, eye pain or double vision. Ears: No hearing problems or drainage. No tinnitus. Throat: No sore throat, problems with swallowing or dysphagia. Respiratory: No cough, sputum or hemoptysis. No shortness of breath. No pleuritic chest pain. Cardiovascular: No chest pain, orthopnea or PND. No lower extremity edema. No palpitation. Gastrointestinal: As above. Genitourinary: No dysuria, hematuria, frequency or urgency. Musculoskeletal: No muscle aches or pains. No limitation of movement. No back pain. No gait disturbance, No joint complaints.   Dermatologic: No skin rashes or pruritus. No skin lesions or discolorations. Psychiatric: No depression, anxiety, or stress or signs of schizophrenia. No change in mood or affect. Hematologic: No history of bleeding tendency. No bruises or ecchymosis. No history of clotting problems. Infectious disease: No fever, chills or frequent infections. Endocrine: No polydipsia or polyuria. No temperature intolerance. Neurologic: No headaches or dizziness. No weakness or numbness of the extremities. No changes in balance, coordination,  memory, mentation, behavior. Allergic/Immunologic: No nasal congestion or hives. No repeated infections. PHYSICAL EXAM:  The patient is not in acute distress. Vital signs: Blood pressure 100/67, pulse 77, temperature 98.6 °F (37 °C), temperature source Oral, resp. rate 16, weight 191 lb 12.8 oz (87 kg).      General appearance - well appearing, not in pain or distress  Mental status - good mood, alert and oriented  Eyes - pupils equal and reactive, extraocular eye movements intact  Ears - bilateral TM's and external ear canals normal  Nose - normal and patent, no erythema, discharge or polyps  Mouth - mucous membranes moist, pharynx normal without lesions  Neck - supple, no significant adenopathy  Lymphatics - no palpable lymphadenopathy, no hepatosplenomegaly  Chest - clear to auscultation, no wheezes, rales or rhonchi, symmetric air entry  Heart - normal rate, regular rhythm, normal S1, S2, no murmurs, rubs, clicks or gallops  Abdomen - soft, nontender, nondistended, no masses or organomegaly  Neurological - alert, oriented, normal speech, no focal findings or movement disorder noted  Musculoskeletal - no joint tenderness, deformity or swelling  Extremities - peripheral pulses normal, no pedal edema, no clubbing or cyanosis  Skin - normal coloration and turgor, no rashes, no suspicious skin lesions noted     Review of Diagnostic data:   Lab Results   Component Value Date    WBC 4.4 10/25/2022    HGB 10.7 (L) 10/25/2022    HCT 34.7 (L) 10/25/2022    MCV 95.6 10/25/2022     10/25/2022       Chemistry        Component Value Date/Time     10/25/2022 1000    K 3.9 10/25/2022 1000     10/25/2022 1000    CO2 27 10/25/2022 1000    BUN 8 10/25/2022 1000    CREATININE 0.62 (L) 10/25/2022 1000        Component Value Date/Time    CALCIUM 9.1 10/25/2022 1000    ALKPHOS 83 10/25/2022 1000    AST 23 10/25/2022 1000    ALT 43 (H) 10/25/2022 1000    BILITOT 2.0 (H) 10/25/2022 1000          MRI BRAIN WO CONTRAST  Narrative: EXAMINATION:  MRI OF THE BRAIN WITHOUT CONTRAST  9/29/2022 6:16 pm    TECHNIQUE:  Multiplanar multisequence MRI of the brain was performed without the  administration of intravenous contrast.    COMPARISON:  None. HISTORY:  ORDERING SYSTEM PROVIDED HISTORY: Facial droop, dysarthria  TECHNOLOGIST PROVIDED HISTORY:  Facial droop, dysarthria  What is the sedation requirement?->None  Decision Support Exception - unselect if not a suspected or confirmed  emergency medical condition->Emergency Medical Condition (MA)  Reason for Exam: Facial droop, dysarthria    FINDINGS:  There is no acute infarction, intracranial hemorrhage, or intracranial mass  lesion. No mass effect, midline shift, or extra-axial collection is noted. There are mild nonspecific foci of periventricular and subcortical cerebral  white matter T2/FLAIR hyperintensity, most likely representing chronic  microangiopathic disease in this age group. The brain parenchyma is otherwise  normal. The pituitary gland is normal in appearance. The cerebellar tonsils  are in normal position. The ventricles, sulci, and cisterns are prominent suggestive of generalized  volume loss. The intracranial flow voids are preserved. The globes and orbits are within normal limits. Mild mucosal thickening of  left mastoid air cells.   The visualized extracranial structures including  paranasal sinuses and mastoid air cells are otherwise unremarkable. Impression: There is no acute infarction, intracranial hemorrhage, or intracranial mass  lesion. Mild chronic microangiopathic ischemic disease. Mild generalized volume loss. Mild mucosal thickening of left mastoid air cells    RECOMMENDATIONS:  Unavailable  CT HEAD WO CONTRAST  Narrative: EXAMINATION:  CT OF THE HEAD WITHOUT CONTRAST  9/29/2022 5:05 pm    TECHNIQUE:  CT of the head was performed without the administration of intravenous  contrast. Automated exposure control, iterative reconstruction, and/or weight  based adjustment of the mA/kV was utilized to reduce the radiation dose to as  low as reasonably achievable. COMPARISON:  None. HISTORY:  ORDERING SYSTEM PROVIDED HISTORY: stroke r/o  TECHNOLOGIST PROVIDED HISTORY:    stroke r/o  Decision Support Exception - unselect if not a suspected or confirmed  emergency medical condition->Emergency Medical Condition (MA)    Transient ischemic attack. FINDINGS:  BRAIN/VENTRICLES: There is no acute intracranial hemorrhage, mass effect or  midline shift. No abnormal extra-axial fluid collection. The gray-white  differentiation is maintained without evidence of an acute infarct. There is  no evidence of hydrocephalus. ORBITS: The visualized portion of the orbits demonstrate no acute abnormality. SINUSES: The visualized paranasal sinuses and mastoid air cells demonstrate  no acute abnormality. SOFT TISSUES/SKULL:  No acute abnormality of the visualized skull or soft  tissues. Impression: No acute intracranial abnormality. 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 I explained to the patient the nature of severe cancer, staging, prognosis and treatment. Explained the results of the PET CT scan and the endoscopic ultrasound.     Obviously patient is having locally advanced disease with no evidence of metastasis. My recommendations to give neoadjuvant combined chemoradiation followed by surgery. We will give weekly Taxol/ carboplatin. I explained the benefits and possible side effects related to chemotherapy, which may include but not limited to nausea, vomiting, hair loss, mouth sores, allergy, neuropathy, fever infection sepsis, anemia and thrombocytopenia. Patient was started on Taxol/ Carbo. He developed allergy to Taxol. Switched to Abraxane/ carboplatin with radiation. Tolerated well so far. I reviewed labs as stated above and discussed them with the patient. Labs are adequate for chemotherapy treatment. We will proceed with chemotherapy as planned with no adjustment. Patient was reminded about potential side effects to treatment. We will continue to monitor labs. We also discussed nutritional support. We also discussed management of GERD and he had further management by his gastroenterologist for that matter. He will continue PPIs. Patient's questions were answered to the best of his satisfaction and he verbalized full understanding and agreement.

## 2022-10-25 NOTE — TELEPHONE ENCOUNTER
Priscilla Jo MD VISIT & TX  Proceed with chemo today  RV one week  MD VISIT 11/1/22 @ 10:30AM TX @ 10AM  AVS PRINTED W/ INSTRUCTIONS AND GIVEN TO PT ON EXIT

## 2022-10-26 ENCOUNTER — HOSPITAL ENCOUNTER (OUTPATIENT)
Dept: RADIATION ONCOLOGY | Facility: MEDICAL CENTER | Age: 67
Discharge: HOME | End: 2022-10-26
Payer: MEDICARE

## 2022-10-26 PROCEDURE — 77386 HC NTSTY MODUL RAD TX DLVR CPLX: CPT | Performed by: RADIOLOGY

## 2022-10-26 PROCEDURE — 77336 RADIATION PHYSICS CONSULT: CPT

## 2022-10-26 PROCEDURE — G6002 STEREOSCOPIC X-RAY GUIDANCE: HCPCS | Performed by: RADIOLOGY

## 2022-10-27 ENCOUNTER — TELEPHONE (OUTPATIENT)
Dept: ONCOLOGY | Age: 67
End: 2022-10-27

## 2022-10-27 ENCOUNTER — HOSPITAL ENCOUNTER (OUTPATIENT)
Dept: RADIATION ONCOLOGY | Facility: MEDICAL CENTER | Age: 67
Discharge: HOME | End: 2022-10-27
Payer: MEDICARE

## 2022-10-27 VITALS
WEIGHT: 191 LBS | OXYGEN SATURATION: 98 % | HEART RATE: 61 BPM | BODY MASS INDEX: 25.2 KG/M2 | SYSTOLIC BLOOD PRESSURE: 92 MMHG | DIASTOLIC BLOOD PRESSURE: 50 MMHG | RESPIRATION RATE: 16 BRPM | TEMPERATURE: 98 F

## 2022-10-27 PROCEDURE — 77386 HC NTSTY MODUL RAD TX DLVR CPLX: CPT

## 2022-10-27 PROCEDURE — G6002 STEREOSCOPIC X-RAY GUIDANCE: HCPCS | Performed by: RADIOLOGY

## 2022-10-27 PROCEDURE — 77427 RADIATION TX MANAGEMENT X5: CPT | Performed by: RADIOLOGY

## 2022-10-27 NOTE — TELEPHONE ENCOUNTER
[facility] Nutrition Note:    NUTRITION RECOMMENDATIONS / MONITORING / EVALUATION  Continue with Lauro Jayme 1.4, 6 cartons daily to provide 2730 kcal and 120 g protein daily, run via pump at 121 mL/hr x 16 hours  2. Will monitor EN tolerance, tube site, po intakes, wts, labs, s/s, care plan     Subjective/Current Data:  Called pt on listed phone number. No answer. Detailed message left. Chart reviewed. Noted pt reported J tube leaks with high volume infusions, so modifies with lower volumes. Will continue to follow. ADDENDUM: Pt returned phone call. Reports chemotherapy has \"really thrown him for a loop\" and having a hard time with symptoms & fatigue. Pt reports he is able to drink some liquids by mouth but relying on J tube for feedings. Pt states he is only tolerating 2 paulette farms per tube and is going to work his way up to goal.    Recent Weights: Wt Readings from Last 3 Encounters:   10/27/22 191 lb (86.6 kg)   10/25/22 191 lb 12.8 oz (87 kg)   10/18/22 195 lb 9.6 oz (88.7 kg)         Goal: Adequate intake to aide in wt maintenaince, signs and symptoms management, and overall wellbeing. Progress towards goal: gradually worsening.     Sabine Faith, MYRA, RD, LD  Registered Dietitian   [place]  [phone]

## 2022-10-27 NOTE — PROGRESS NOTES
Michael Biggs  10/27/2022  Wt Readings from Last 3 Encounters:   10/27/22 191 lb (86.6 kg)   10/25/22 191 lb 12.8 oz (87 kg)   10/18/22 195 lb 9.6 oz (88.7 kg)     Body mass index is 25.2 kg/m². Treatment Area:esophagus, treatment 23/28    Patient was seen today for weekly visit. States feeding tube leaks after he infuses large volumes of fluid. States he is modifying how he gives fluids through tube and changes dressing more often if needed. No further interventions required. He does not want to follow up with wound care clinic. States fatigued from chemo. Dr. Adam Salas notified of assessment and examined patient. No change in treatment plan. Comfort Alteration  Fatigue: Moderate    Ventilation Alterations  Cough: No  Hemoptysis: No  Mucus Color: none  Dyspnea: No      Nutritional Alteration  Anorexia: Yes  Nausea: Yes   Vomiting: No     Mucous Membrane Alteration  Voice Changes/ Stridor/Larynx: no  Pharynx & Esophagus: intact    Elimination Alterations  Constipation: no  Diarrhea:  no    Skin Alteration   Sensation:intact    Radiation Dermatitis:  Intact [x]     Erythema  []     Discoloration  []     Rash []     Dry desquamation  []     Moist desquamation []       Emotional  Coping: effective      Injury, potential bleeding or infection: feeding tube site care reinforced    Lab Results   Component Value Date    WBC 4.4 10/25/2022     10/25/2022         BP (!) 92/50   Pulse 61   Temp 98 °F (36.7 °C) (Temporal)   Resp 16   Wt 191 lb (86.6 kg)   SpO2 98%   BMI 25.20 kg/m²      Pain Assessment: None - Denies Pain            Assessment/Plan: Patient was seen today for weekly visit.       Carol Pope RN

## 2022-10-28 ENCOUNTER — HOSPITAL ENCOUNTER (OUTPATIENT)
Dept: RADIATION ONCOLOGY | Facility: MEDICAL CENTER | Age: 67
Discharge: HOME | End: 2022-10-28
Payer: MEDICARE

## 2022-10-28 PROCEDURE — G6002 STEREOSCOPIC X-RAY GUIDANCE: HCPCS | Performed by: RADIOLOGY

## 2022-10-28 PROCEDURE — 77386 HC NTSTY MODUL RAD TX DLVR CPLX: CPT

## 2022-10-31 ENCOUNTER — HOSPITAL ENCOUNTER (OUTPATIENT)
Dept: RADIATION ONCOLOGY | Facility: MEDICAL CENTER | Age: 67
Discharge: HOME | End: 2022-10-31
Payer: MEDICARE

## 2022-10-31 ENCOUNTER — HOSPITAL ENCOUNTER (OUTPATIENT)
Facility: MEDICAL CENTER | Age: 67
End: 2022-10-31
Payer: MEDICARE

## 2022-10-31 ENCOUNTER — TELEPHONE (OUTPATIENT)
Dept: RADIATION ONCOLOGY | Facility: MEDICAL CENTER | Age: 67
End: 2022-10-31

## 2022-10-31 ENCOUNTER — TELEPHONE (OUTPATIENT)
Dept: ONCOLOGY | Age: 67
End: 2022-10-31

## 2022-10-31 PROCEDURE — G6002 STEREOSCOPIC X-RAY GUIDANCE: HCPCS | Performed by: RADIOLOGY

## 2022-10-31 PROCEDURE — 77386 HC NTSTY MODUL RAD TX DLVR CPLX: CPT

## 2022-10-31 NOTE — PROGRESS NOTES
[facility]            Radiation Oncology          [facility], [facility]       O: [phone]       [employer]             RADIATION ONCOLOGY WEEKLY PROGRESS NOTE  Patient ID:   Dory Hopkins  : 1955   MRN: [id number]    Location:  [facility]iation [facility],   [address] [place]   [phone]    DIAGNOSIS:  Cancer Staging  Malignant neoplasm of lower third of esophagus ([facility] [address]  Staging form: Esophagus - Adenocarcinoma, AJCC 8th Edition  - Clinical stage from 2022: Stage RED (cT3, cN2, cM0, GX) - Signed by Jolynn Cheadle, MD on 2022      TREATMENT DETAILS:  Treatment Site: Lower esophagus    Actual Dose: 5750 cGy  Total Planned Dose: 7000 cGy  Treatment Technique: IMRT  Fraction Technique: Daily  Therapy imaging monitoring: CBCT daily  Concurrent Chemotherapy: yes    SUBJECTIVE:   Patient seen for their weekly on treatment evaluation today. With nurse    OBJECTIVE:   CHAPERONE: Declined    ECO Dead    VITAL SIGNS: There were no vitals taken for this visit. Wt Readings from Last 5 Encounters:   10/27/22 191 lb (86.6 kg)   10/25/22 191 lb 12.8 oz (87 kg)   10/18/22 195 lb 9.6 oz (88.7 kg)   10/18/22 194 lb 3.2 oz (88.1 kg)   10/11/22 196 lb 3.2 oz (89 kg)     GENERAL:  General appearance is that of a well-nourished, well-developed in no apparent distress. HEENT: Normocephalic, atraumatic, EOMI, moist mucosa, no erythema. Indirect exam noncontributory. NECK:  No adenopathy or a palpable thyroid mass, trachea is midline. LYMPHATICS: No cervical, supraclavicular, or axillary no adenopathy. HEART:  Normal rate and regular rhythm, normal heart sounds. LUNGS:  Pulmonary effort normal. Normal breath sounds. ABDOMEN:  Soft, nontender, non distended, and no hepatosplenomegaly no. EXTREMITIES:  No edema. No calf tenderness. MSK:  No spinal tenderness. Normal ROM. NEUROLOGICAL: Alert and oriented. Strength and sensation intact bilaterally. No focal deficits. PSYCH: Mood normal, behavior normal.  SKIN: No erythema, no desquamation. RECTAL: Deferred deferred  GYN: Deferred deferred  BREAST: Deferred deferred    LABS:  WBC   Date Value Ref Range Status   10/25/2022 4.4 3.5 - 11.3 k/uL Final   10/18/2022 5.9 3.5 - 11.3 k/uL Final   10/11/2022 6.4 3.5 - 11.3 k/uL Final     Segs Absolute   Date Value Ref Range Status   10/25/2022 3.40 1.8 - 7.7 k/uL Final   10/18/2022 4.65 1.50 - 8.10 k/uL Final   10/11/2022 5.19 1.50 - 8.10 k/uL Final     Hemoglobin   Date Value Ref Range Status   10/25/2022 10.7 (L) 13.0 - 17.0 g/dL Final   10/18/2022 12.1 (L) 13.0 - 17.0 g/dL Final   10/11/2022 12.0 (L) 13.0 - 17.0 g/dL Final     Platelets   Date Value Ref Range Status   10/25/2022 176 138 - 453 k/uL Final   10/18/2022 216 138 - 453 k/uL Final   10/11/2022 242 138 - 453 k/uL Final     Creatinine   Date Value Ref Range Status   10/25/2022 0.62 (L) 0.70 - 1.20 mg/dL Final     Comment:     ICTERIC SPECIMEN   10/18/2022 0.61 (L) 0.70 - 1.20 mg/dL Final     Comment:     ICTERIC SPECIMEN   10/11/2022 0.60 (L) 0.70 - 1.20 mg/dL Final     Comment:     ICTERIC SPECIMEN     No results found for: , CEA  PSA   Date Value Ref Range Status   08/13/2021 18.86 (H) <4.1 ug/L Final     Comment:     The Roche \"ECLIA\" assay is used. Results obtained with different assay methods cannot be   used interchangeably. 06/07/2016 6.83 (H) <4.1 ug/L Final     Comment:     The Roche \"ECLIA\" assay is used. Results obtained with different assay methods   cannot be used interchangeably. Performed at [address] [phone]     03/16/2015 6.01 (H) <4.1 ug/L Final     Comment:     The Roche \"ECLIA\" assay is used. Results obtained with different assay methods   cannot be used interchangeably.   Performed at [address], [address] [place] [address] [phone]     05/23/2013 6.8 (H) 0.0 - 4.0 ug/L Final     Comment:     Siemens Immulite 2000 immunometric chemiluminescent assay is used. Results   obtained with different assay methods or instruments cannot be used   interchangeably. 05/20/2013 5.13 (H) 0 - 4 ug/L Final     Comment:     Performed at [address] [place] [address] [phone]       MEDICATIONS:    Current Outpatient Medications:     ondansetron (ZOFRAN-ODT) 4 MG disintegrating tablet, Take 1 tablet by mouth 3 times daily as needed for Nausea or Vomiting, Disp: 90 tablet, Rfl: 0    tamsulosin (FLOMAX) 0.4 MG capsule, Take 1 capsule by mouth daily, Disp: 30 capsule, Rfl: 3    Nutritional Supplements ([employer] STANDARD 1.4) LIQD, 1 Can by Enteral route 6 times daily [facility] 1.4, 6 cans daily, 121 mL/hr x 16 hours via J tube per pump, Disp: 77061 mL, Rfl: 12    omeprazole (PRILOSEC) 20 MG delayed release capsule, Take 1 capsule by mouth 2 times daily (before meals), Disp: 180 capsule, Rfl: 1    Bacillus Coagulans-Inulin (ALIGN PREBIOTIC-PROBIOTIC) 5-1.25 MG-GM CHEW, Take by mouth Takes when he remembers, Disp: , Rfl:     fluorouracil (EFUDEX) 5 % cream, Apply twice daily to actinic keratosis for 3-4 weeks. Expect redness and irritation. , Disp: 40 g, Rfl: 1      ASSESSMENT PLAN:   Treatment setup and plan reviewed. Port images/CBCT images reviewed. Appropriate laboratory work was reviewed. Treatment side effects and toxicities reviewed with the patient, and appropriate management was advised. Will continue radiation treatment as planned, and recommend patient contact us if they have any questions or concerns. Patient is status quo we will continue as per previously outlined plans. We advised him to let us know if he has any further problems.     Electronically signed by Jerome Collado MD on 10/31/2022 at 9:31 AM      Drugs Prescribed:  New Prescriptions    No medications on file       Other Orders Placed:  No orders of the defined types were placed in this encounter.

## 2022-10-31 NOTE — TELEPHONE ENCOUNTER
Writer met with patient after radiation treatment. He states the feeding he has been using \"is tearing me up\". States having increased diarrhea. Requesting different nutritional supplement. Was using [address] 1.4 plain. Writer provided him with 2 each of Qu[employer] 1.4 Vanilla and 1.0 chocolate to try. Also states running low on dressings for his J tube as his tube still leaks. States dietician had orders his dressings in the past.  Writer notified Bradley Guerrero, Oncology Dietician of above. Will re-evaluate patient for better tolerance of product tomorrow at his treatment visit.

## 2022-10-31 NOTE — TELEPHONE ENCOUNTER
[facility] Radiation Oncology Nutrition Note:    Received message from BUDDY reyna that pt requesting a different flavor of Jhon Radha 1.4 and is nearly out of gauze bandages. RD called Yanique on his behalf to reorder. RD called pt to inform that I reordered with vanilla and pt had requested I call Bronson back and postpone the shipment until tomorrow so he can trial both the vanilla and the chocolate flavors. RD called Yanique to inquire about a stop order and it isn't possible. RD called pt back to let him know that order has been processed. Pt is understanding. Pt states diarrhea persists, however MD wrote for antidiarrheal meds.     Scooby Quinn, MYRA, RD, LD  Registered Dietitian   [facility]  [phone]

## 2022-11-01 ENCOUNTER — OFFICE VISIT (OUTPATIENT)
Dept: ONCOLOGY | Age: 67
End: 2022-11-01
Payer: MEDICARE

## 2022-11-01 ENCOUNTER — HOSPITAL ENCOUNTER (OUTPATIENT)
Dept: RADIATION ONCOLOGY | Facility: MEDICAL CENTER | Age: 67
Discharge: HOME | End: 2022-11-01
Payer: MEDICARE

## 2022-11-01 ENCOUNTER — TELEPHONE (OUTPATIENT)
Dept: RADIATION ONCOLOGY | Facility: MEDICAL CENTER | Age: 67
End: 2022-11-01

## 2022-11-01 ENCOUNTER — TELEPHONE (OUTPATIENT)
Dept: ONCOLOGY | Age: 67
End: 2022-11-01

## 2022-11-01 ENCOUNTER — HOSPITAL ENCOUNTER (OUTPATIENT)
Dept: INFUSION THERAPY | Facility: MEDICAL CENTER | Age: 67
Discharge: HOME | End: 2022-11-01
Payer: MEDICARE

## 2022-11-01 VITALS
WEIGHT: 184.1 LBS | HEART RATE: 101 BPM | TEMPERATURE: 98 F | DIASTOLIC BLOOD PRESSURE: 66 MMHG | RESPIRATION RATE: 16 BRPM | BODY MASS INDEX: 24.29 KG/M2 | SYSTOLIC BLOOD PRESSURE: 100 MMHG

## 2022-11-01 VITALS
SYSTOLIC BLOOD PRESSURE: 99 MMHG | OXYGEN SATURATION: 99 % | DIASTOLIC BLOOD PRESSURE: 70 MMHG | WEIGHT: 183 LBS | TEMPERATURE: 98 F | BODY MASS INDEX: 24.14 KG/M2 | RESPIRATION RATE: 18 BRPM | HEART RATE: 92 BPM

## 2022-11-01 DIAGNOSIS — C15.9 ADENOSQUAMOUS CARCINOMA OF ESOPHAGUS (HCC): Primary | ICD-10-CM

## 2022-11-01 DIAGNOSIS — C15.5 MALIGNANT NEOPLASM OF LOWER THIRD OF ESOPHAGUS (HCC): ICD-10-CM

## 2022-11-01 LAB
ABSOLUTE EOS #: 0.13 K/UL (ref 0–0.4)
ABSOLUTE IMMATURE GRANULOCYTE: 0 K/UL (ref 0–0.3)
ABSOLUTE LYMPH #: 0.13 K/UL (ref 1–4.8)
ABSOLUTE MONO #: 0.46 K/UL (ref 0.2–0.8)
ALBUMIN SERPL-MCNC: 3.6 G/DL (ref 3.5–5.2)
ALP BLD-CCNC: 86 U/L (ref 40–129)
ALT SERPL-CCNC: 23 U/L (ref 5–41)
ANION GAP SERPL CALCULATED.3IONS-SCNC: 10 MMOL/L (ref 9–17)
AST SERPL-CCNC: 21 U/L
BASOPHILS # BLD: 0 %
BASOPHILS ABSOLUTE: 0 K/UL (ref 0–0.2)
BILIRUB SERPL-MCNC: 3.5 MG/DL (ref 0.3–1.2)
BUN BLDV-MCNC: 12 MG/DL (ref 8–23)
BUN/CREAT BLD: 17 (ref 9–20)
CALCIUM SERPL-MCNC: 9.4 MG/DL (ref 8.6–10.4)
CHLORIDE BLD-SCNC: 103 MMOL/L (ref 98–107)
CO2: 27 MMOL/L (ref 20–31)
CREAT SERPL-MCNC: 0.69 MG/DL (ref 0.7–1.2)
EOSINOPHILS RELATIVE PERCENT: 2 % (ref 1–4)
GFR SERPL CREATININE-BSD FRML MDRD: >60 ML/MIN/1.73M2
GLUCOSE BLD-MCNC: 100 MG/DL (ref 70–99)
HCT VFR BLD CALC: 36.4 % (ref 40.7–50.3)
HEMOGLOBIN: 11.5 G/DL (ref 13–17)
IMMATURE GRANULOCYTES: 0 %
LYMPHOCYTES # BLD: 2 % (ref 24–44)
MCH RBC QN AUTO: 30.1 PG (ref 25.2–33.5)
MCHC RBC AUTO-ENTMCNC: 31.6 G/DL (ref 28.4–34.8)
MCV RBC AUTO: 95.3 FL (ref 82.6–102.9)
MONOCYTES # BLD: 7 % (ref 1–7)
NRBC AUTOMATED: 0 PER 100 WBC
PDW BLD-RTO: 16.4 % (ref 11.8–14.4)
PLATELET # BLD: 269 K/UL (ref 138–453)
PMV BLD AUTO: 10.3 FL (ref 8.1–13.5)
POTASSIUM SERPL-SCNC: 3.9 MMOL/L (ref 3.7–5.3)
RBC # BLD: 3.82 M/UL (ref 4.21–5.77)
SEG NEUTROPHILS: 89 % (ref 36–66)
SEGMENTED NEUTROPHILS ABSOLUTE COUNT: 5.88 K/UL (ref 1.8–7.7)
SODIUM BLD-SCNC: 140 MMOL/L (ref 135–144)
TOTAL PROTEIN: 6.7 G/DL (ref 6.4–8.3)
WBC # BLD: 6.6 K/UL (ref 3.5–11.3)

## 2022-11-01 PROCEDURE — 96361 HYDRATE IV INFUSION ADD-ON: CPT

## 2022-11-01 PROCEDURE — 85025 COMPLETE CBC W/AUTO DIFF WBC: CPT

## 2022-11-01 PROCEDURE — G6002 STEREOSCOPIC X-RAY GUIDANCE: HCPCS | Performed by: RADIOLOGY

## 2022-11-01 PROCEDURE — 1123F ACP DISCUSS/DSCN MKR DOCD: CPT | Performed by: INTERNAL MEDICINE

## 2022-11-01 PROCEDURE — 36591 DRAW BLOOD OFF VENOUS DEVICE: CPT

## 2022-11-01 PROCEDURE — 80053 COMPREHEN METABOLIC PANEL: CPT

## 2022-11-01 PROCEDURE — 96375 TX/PRO/DX INJ NEW DRUG ADDON: CPT

## 2022-11-01 PROCEDURE — 96360 HYDRATION IV INFUSION INIT: CPT

## 2022-11-01 PROCEDURE — 99211 OFF/OP EST MAY X REQ PHY/QHP: CPT | Performed by: INTERNAL MEDICINE

## 2022-11-01 PROCEDURE — 99214 OFFICE O/P EST MOD 30 MIN: CPT | Performed by: INTERNAL MEDICINE

## 2022-11-01 PROCEDURE — 2580000003 HC RX 258: Performed by: INTERNAL MEDICINE

## 2022-11-01 PROCEDURE — 6360000002 HC RX W HCPCS: Performed by: INTERNAL MEDICINE

## 2022-11-01 PROCEDURE — 96413 CHEMO IV INFUSION 1 HR: CPT

## 2022-11-01 PROCEDURE — 77386 HC NTSTY MODUL RAD TX DLVR CPLX: CPT

## 2022-11-01 RX ORDER — PALONOSETRON 0.05 MG/ML
0.25 INJECTION, SOLUTION INTRAVENOUS ONCE
Status: COMPLETED | OUTPATIENT
Start: 2022-11-01 | End: 2022-11-01

## 2022-11-01 RX ORDER — SODIUM CHLORIDE 9 MG/ML
5-250 INJECTION, SOLUTION INTRAVENOUS PRN
Status: CANCELLED | OUTPATIENT
Start: 2022-11-01

## 2022-11-01 RX ORDER — SODIUM CHLORIDE 0.9 % (FLUSH) 0.9 %
5-40 SYRINGE (ML) INJECTION PRN
Status: CANCELLED | OUTPATIENT
Start: 2022-11-01

## 2022-11-01 RX ORDER — HEPARIN SODIUM (PORCINE) LOCK FLUSH IV SOLN 100 UNIT/ML 100 UNIT/ML
500 SOLUTION INTRAVENOUS PRN
Status: DISCONTINUED | OUTPATIENT
Start: 2022-11-01 | End: 2022-11-02 | Stop reason: HOSPADM

## 2022-11-01 RX ORDER — 0.9 % SODIUM CHLORIDE 0.9 %
1000 INTRAVENOUS SOLUTION INTRAVENOUS ONCE
Status: CANCELLED | OUTPATIENT
Start: 2022-11-01 | End: 2022-11-01

## 2022-11-01 RX ORDER — SODIUM CHLORIDE 9 MG/ML
5-250 INJECTION, SOLUTION INTRAVENOUS PRN
Status: DISCONTINUED | OUTPATIENT
Start: 2022-11-01 | End: 2022-11-02 | Stop reason: HOSPADM

## 2022-11-01 RX ORDER — HEPARIN SODIUM (PORCINE) LOCK FLUSH IV SOLN 100 UNIT/ML 100 UNIT/ML
500 SOLUTION INTRAVENOUS PRN
Status: CANCELLED | OUTPATIENT
Start: 2022-11-01

## 2022-11-01 RX ORDER — 0.9 % SODIUM CHLORIDE 0.9 %
1000 INTRAVENOUS SOLUTION INTRAVENOUS ONCE
Status: COMPLETED | OUTPATIENT
Start: 2022-11-01 | End: 2022-11-01

## 2022-11-01 RX ORDER — SODIUM CHLORIDE 0.9 % (FLUSH) 0.9 %
5-40 SYRINGE (ML) INJECTION PRN
Status: DISCONTINUED | OUTPATIENT
Start: 2022-11-01 | End: 2022-11-02 | Stop reason: HOSPADM

## 2022-11-01 RX ORDER — DEXAMETHASONE SODIUM PHOSPHATE 10 MG/ML
10 INJECTION INTRAMUSCULAR; INTRAVENOUS ONCE
Status: COMPLETED | OUTPATIENT
Start: 2022-11-01 | End: 2022-11-01

## 2022-11-01 RX ORDER — ACYCLOVIR 50 MG/G
OINTMENT TOPICAL
Qty: 15 G | Refills: 1 | Status: SHIPPED | OUTPATIENT
Start: 2022-11-01 | End: 2022-11-08

## 2022-11-01 RX ADMIN — PALONOSETRON 0.25 MG: 0.05 INJECTION, SOLUTION INTRAVENOUS at 11:58

## 2022-11-01 RX ADMIN — SODIUM CHLORIDE, PRESERVATIVE FREE 20 ML: 5 INJECTION INTRAVENOUS at 14:33

## 2022-11-01 RX ADMIN — CARBOPLATIN 290 MG: 10 INJECTION INTRAVENOUS at 12:42

## 2022-11-01 RX ADMIN — HEPARIN 500 UNITS: 100 SYRINGE at 14:33

## 2022-11-01 RX ADMIN — DEXAMETHASONE SODIUM PHOSPHATE 10 MG: 10 INJECTION INTRAMUSCULAR; INTRAVENOUS at 11:58

## 2022-11-01 RX ADMIN — SODIUM CHLORIDE, PRESERVATIVE FREE 20 ML: 5 INJECTION INTRAVENOUS at 09:56

## 2022-11-01 RX ADMIN — SODIUM CHLORIDE 20 ML/HR: 9 INJECTION, SOLUTION INTRAVENOUS at 09:56

## 2022-11-01 RX ADMIN — SODIUM CHLORIDE 1000 ML: 9 INJECTION, SOLUTION INTRAVENOUS at 11:57

## 2022-11-01 NOTE — PROGRESS NOTES
[facility]            Radiation Oncology          [facility], [facility]0 [facility]       O: [phone]       [email or web address]             RADIATION ONCOLOGY WEEKLY PROGRESS NOTE  Patient ID:   Radha Post  : 1955   MRN: [id number]    Location:  [facility] Radiation Oncology,   [address] [facility], [facility]   [phone]    DIAGNOSIS:  Cancer Staging  Malignant neoplasm of lower third of esophagus ([facility] [address]  Staging form: Esophagus - Adenocarcinoma, AJCC 8th Edition  - Clinical stage from 2022: Stage RED (cT3, cN2, cM0, GX) - Signed by Pelon Mcintosh MD on 2022      TREATMENT DETAILS:  Treatment Site: esophagus  Actual Dose: 4680cGy  Total Planned Dose: 5040cGy  Treatment Technique: IMRT  Fraction Technique: Daily  Therapy imaging monitoring: CBCT daily  Concurrent Chemotherapy: previous    SUBJECTIVE:   Patient seen for their weekly on treatment evaluation today. Patient complains of nausea. On Zofran 4 mg TID prn with some relief. OBJECTIVE:   CHAPERONE: Nurse/MA Present    ECO Asymptomatic    VITAL SIGNS: BP 99/70   Pulse 92   Temp 98 °F (36.7 °C) (Temporal)   Resp 18   Wt 183 lb (83 kg)   SpO2 99%   BMI 24.14 kg/m²   Wt Readings from Last 5 Encounters:   22 183 lb (83 kg)   10/27/22 191 lb (86.6 kg)   10/25/22 191 lb 12.8 oz (87 kg)   10/18/22 195 lb 9.6 oz (88.7 kg)   10/18/22 194 lb 3.2 oz (88.1 kg)     GENERAL:  General appearance is that of a well-nourished, well-developed in no apparent distress. HEENT: Normocephalic, atraumatic, EOMI, moist mucosa, no erythema. Indirect exam .  NECK:  No adenopathy or a palpable thyroid mass, trachea is midline. LYMPHATICS: No cervical, supraclavicular, or axillary adenopathy. HEART:  Normal rate and regular rhythm, normal heart sounds. LUNGS:  Pulmonary effort normal. Normal breath sounds.    ABDOMEN:  Soft, nontender, non distended, and no interchangeably. Performed at [address] [place] [address], [address] [place] [address] [phone]     05/23/2013 6.8 (H) 0.0 - 4.0 ug/L Final     Comment:     Siemens Immulite 2000 immunometric chemiluminescent assay is used. Results   obtained with different assay methods or instruments cannot be used   interchangeably. 05/20/2013 5.13 (H) 0 - 4 ug/L Final     Comment:     Performed at [address] [place] [address], [facility] [phone]       MEDICATIONS:    Current Outpatient Medications:     ondansetron (ZOFRAN-ODT) 4 MG disintegrating tablet, Take 1 tablet by mouth 3 times daily as needed for Nausea or Vomiting, Disp: 90 tablet, Rfl: 0    tamsulosin (FLOMAX) 0.4 MG capsule, Take 1 capsule by mouth daily, Disp: 30 capsule, Rfl: 3    Nutritional Supplements (I Just Shared STANDARD 1.4) LIQD, 1 Can by Enteral route 6 times daily [employer] 1.4, 6 cans daily, 121 mL/hr x 16 hours via J tube per pump, Disp: 02012 mL, Rfl: 12    omeprazole (PRILOSEC) 20 MG delayed release capsule, Take 1 capsule by mouth 2 times daily (before meals), Disp: 180 capsule, Rfl: 1    Bacillus Coagulans-Inulin (ALIGN PREBIOTIC-PROBIOTIC) 5-1.25 MG-GM CHEW, Take by mouth Takes when he remembers, Disp: , Rfl:     fluorouracil (EFUDEX) 5 % cream, Apply twice daily to actinic keratosis for 3-4 weeks. Expect redness and irritation. , Disp: 40 g, Rfl: 1      ASSESSMENT PLAN:   Treatment setup and plan reviewed. Port images/CBCT images reviewed. Appropriate laboratory work was reviewed. Treatment side effects and toxicities reviewed with the patient, and appropriate management was advised. Will continue radiation treatment as planned, and recommend patient contact us if they have any questions or concerns. The patient is experiencing nausea which was pre-existing. It started before he began treatment. We advised him that he can take 8 mg of Zofran 3 times a day.   He will be finishing soon and we will see him in a month for a follow-up    Electronically signed by Polina Encinas MD on 11/1/2022 at 9:26 AM      Drugs Prescribed:  New Prescriptions    No medications on file       Other Orders Placed:  No orders of the defined types were placed in this encounter.

## 2022-11-01 NOTE — PROGRESS NOTES
Tammy Master  11/1/2022  Wt Readings from Last 3 Encounters:   11/01/22 183 lb (83 kg)   10/27/22 191 lb (86.6 kg)   10/25/22 191 lb 12.8 oz (87 kg)     Body mass index is 24.14 kg/m². Treatment Area:esophagus, treatment 26/28    Patient was seen today for weekly visit. He complains of continuous nausea despite anti-emetics. No vomiting. He is using J-tube for nutritional supplements. Continues to eat soft foods and drink fluids in small amounts. States having loose stools. Writer recommended imodium. . States getting chemotherapy today. Writer suggested he request extra IV hydration. Writer also left message on Nurse Triage Line at [place] with this information. Dr. Rohini Tovar notified of assessment and examined patient. No change in treatment plan. Comfort Alteration  Fatigue: Moderate    Ventilation Alterations  Cough: No  Hemoptysis: No  Mucus Color: none  Dyspnea: No      Nutritional Alteration  Anorexia: No  Nausea: Yes   Vomiting: No     Mucous Membrane Alteration  Voice Changes/ Stridor/Larynx: no  Pharynx & Esophagus: intact    Elimination Alterations  Constipation: no  Diarrhea:  yes    Skin Alteration   Sensation:intact    Radiation Dermatitis:  Intact [x]     Erythema  []     Discoloration  [x]     Rash []     Dry desquamation  []     Moist desquamation []       Emotional  Coping: effective      Injury, potential bleeding or infection: Skin care reinforced. Lab Results   Component Value Date    WBC 4.4 10/25/2022     10/25/2022         BP 99/70   Pulse 92   Temp 98 °F (36.7 °C) (Temporal)   Resp 18   Wt 183 lb (83 kg)   SpO2 99%   BMI 24.14 kg/m²      Pain Assessment: None - Denies Pain            Assessment/Plan: Patient was seen today for weekly visit.       Tamra Leong RN

## 2022-11-01 NOTE — PROGRESS NOTES
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 Chemoradiation started 9/27/22. Developed allergy to Taxol. Switched to Abraxane/ carboplatin. BRIEF CASE HISTORY:      Mr. Mani Limon is a very pleasant 75 y.o. male with history of multiple co morbidities as listed. Patient is referred for evaluation and further management of recently diagnosed esophageal adenocarcinoma. The patient was doing fairly well except for mild chronic GERD. It was not significant so he did not pay attention to it and he did not receive any treatment for it. For the last few weeks patient started having difficulty swallowing especially solid food. He was evaluated by gastroenterology and he had EGD which showed suspicious lesion at the distal part of the esophagus at 36 cm. Biopsy from the lesion on August 1, 2022 showed adenocarcinoma. Patient is referred for further management. Patient denies any active bleeding. No melena or hematochezia. No hematemesis. No weight loss or decreased appetite. No fever or night sweats. No other complaints. Patient quit smoking more than 20 years ago. He drinks alcohol socially. INTERIM HISTORY:   Patient seen for follow-up esophageal cancer. He continues to have some difficulty swallowing solids. No weight loss. No GI bleeding. No chest pain. No fever. He had diarrhea and dehydration.    PAST MEDICAL HISTORY: has a past medical history of Cancer [facility]), Dental root implant present, Elevated PSA, Hearing loss, Hiatal hernia, Keratosis, Prostate nodule, Snores, Under care of team, Wears dentures, Wears reading eyeglasses, and Wellness examination. PAST SURGICAL HISTORY: has a past surgical history that includes Colonoscopy (09/24/2008); Tonsillectomy; hernia repair; skin biopsy; Prostate biopsy; Prostate biopsy (N/A, 11/22/2021); Colonoscopy (N/A, 02/17/2022); Upper gastrointestinal endoscopy (N/A, 08/01/2022); Upper gastrointestinal endoscopy (N/A, 08/24/2022); Gastrojejunostomy w/ jejunostomy tube; Mediport insertion, single (Right); and Gastrostomy tube placement (N/A, 9/17/2022). CURRENT MEDICATIONS:  has a current medication list which includes the following prescription(s): acyclovir, ondansetron, [employer] standard 1.4, tamsulosin, omeprazole, align prebiotic-probiotic, and fluorouracil, and the following Facility-Administered Medications: sodium chloride, sodium chloride flush, and heparin flush. ALLERGIES:  is allergic to paclitaxel. FAMILY HISTORY: Negative for any hematological or oncological conditions. SOCIAL HISTORY:  reports that he quit smoking about 20 years ago. His smoking use included cigarettes. He smoked an average of 1.00 packs per day. He has never used smokeless tobacco. He reports current alcohol use. He reports current drug use. Drug: Marijuana Cem Radon). REVIEW OF SYSTEMS:     General: Positive for weakness and fatigue. No unanticipated weight loss or decreased appetite. No fever or chills. Eyes: No blurred vision, eye pain or double vision. Ears: No hearing problems or drainage. No tinnitus. Throat: No sore throat, problems with swallowing or dysphagia. Respiratory: No cough, sputum or hemoptysis. No shortness of breath. No pleuritic chest pain. Cardiovascular: No chest pain, orthopnea or PND. No lower extremity edema. No palpitation. Gastrointestinal: As above.     Genitourinary: No dysuria, hematuria, frequency or urgency. Musculoskeletal: No muscle aches or pains. No limitation of movement. No back pain. No gait disturbance, No joint complaints. Dermatologic: No skin rashes or pruritus. No skin lesions or discolorations. Psychiatric: No depression, anxiety, or stress or signs of schizophrenia. No change in mood or affect. Hematologic: No history of bleeding tendency. No bruises or ecchymosis. No history of clotting problems. Infectious disease: No fever, chills or frequent infections. Endocrine: No polydipsia or polyuria. No temperature intolerance. Neurologic: No headaches or dizziness. No weakness or numbness of the extremities. No changes in balance, coordination,  memory, mentation, behavior. Allergic/Immunologic: No nasal congestion or hives. No repeated infections. PHYSICAL EXAM:  The patient is not in acute distress. Vital signs: Blood pressure 100/66, pulse (!) 101, temperature 98 °F (36.7 °C), temperature source Oral, resp. rate 16, weight 184 lb 1.6 oz (83.5 kg).      General appearance - well appearing, not in pain or distress  Mental status - good mood, alert and oriented  Eyes - pupils equal and reactive, extraocular eye movements intact  Ears - bilateral TM's and external ear canals normal  Nose - normal and patent, no erythema, discharge or polyps  Mouth - mucous membranes moist, pharynx normal without lesions  Neck - supple, no significant adenopathy  Lymphatics - no palpable lymphadenopathy, no hepatosplenomegaly  Chest - clear to auscultation, no wheezes, rales or rhonchi, symmetric air entry  Heart - normal rate, regular rhythm, normal S1, S2, no murmurs, rubs, clicks or gallops  Abdomen - soft, nontender, nondistended, no masses or organomegaly  Neurological - alert, oriented, normal speech, no focal findings or movement disorder noted  Musculoskeletal - no joint tenderness, deformity or swelling  Extremities - peripheral pulses normal, no pedal edema, no clubbing or cyanosis  Skin - normal coloration and turgor, no rashes, no suspicious skin lesions noted     Review of Diagnostic data:   Lab Results   Component Value Date    WBC 6.6 11/01/2022    HGB 11.5 (L) 11/01/2022    HCT 36.4 (L) 11/01/2022    MCV 95.3 11/01/2022     11/01/2022       Chemistry        Component Value Date/Time     11/01/2022 0955    K 3.9 11/01/2022 0955     11/01/2022 0955    CO2 27 11/01/2022 0955    BUN 12 11/01/2022 0955    CREATININE 0.69 (L) 11/01/2022 0955        Component Value Date/Time    CALCIUM 9.4 11/01/2022 0955    ALKPHOS 86 11/01/2022 0955    AST 21 11/01/2022 0955    ALT 23 11/01/2022 0955    BILITOT 3.5 (H) 11/01/2022 0955          MRI BRAIN WO CONTRAST  Narrative: EXAMINATION:  MRI OF THE BRAIN WITHOUT CONTRAST  9/29/2022 6:16 pm    TECHNIQUE:  Multiplanar multisequence MRI of the brain was performed without the  administration of intravenous contrast.    COMPARISON:  None. HISTORY:  ORDERING SYSTEM PROVIDED HISTORY: Facial droop, dysarthria  TECHNOLOGIST PROVIDED HISTORY:  Facial droop, dysarthria  What is the sedation requirement?->None  Decision Support Exception - unselect if not a suspected or confirmed  emergency medical condition->Emergency Medical Condition (MA)  Reason for Exam: Facial droop, dysarthria    FINDINGS:  There is no acute infarction, intracranial hemorrhage, or intracranial mass  lesion. No mass effect, midline shift, or extra-axial collection is noted. There are mild nonspecific foci of periventricular and subcortical cerebral  white matter T2/FLAIR hyperintensity, most likely representing chronic  microangiopathic disease in this age group. The brain parenchyma is otherwise  normal. The pituitary gland is normal in appearance. The cerebellar tonsils  are in normal position. The ventricles, sulci, and cisterns are prominent suggestive of generalized  volume loss. The intracranial flow voids are preserved.     The globes and orbits are within normal limits. Mild mucosal thickening of  left mastoid air cells. The visualized extracranial structures including  paranasal sinuses and mastoid air cells are otherwise unremarkable. Impression: There is no acute infarction, intracranial hemorrhage, or intracranial mass  lesion. Mild chronic microangiopathic ischemic disease. Mild generalized volume loss. Mild mucosal thickening of left mastoid air cells    RECOMMENDATIONS:  Unavailable  CT HEAD WO CONTRAST  Narrative: EXAMINATION:  CT OF THE HEAD WITHOUT CONTRAST  9/29/2022 5:05 pm    TECHNIQUE:  CT of the head was performed without the administration of intravenous  contrast. Automated exposure control, iterative reconstruction, and/or weight  based adjustment of the mA/kV was utilized to reduce the radiation dose to as  low as reasonably achievable. COMPARISON:  None. HISTORY:  ORDERING SYSTEM PROVIDED HISTORY: stroke r/o  TECHNOLOGIST PROVIDED HISTORY:    stroke r/o  Decision Support Exception - unselect if not a suspected or confirmed  emergency medical condition->Emergency Medical Condition (MA)    Transient ischemic attack. FINDINGS:  BRAIN/VENTRICLES: There is no acute intracranial hemorrhage, mass effect or  midline shift. No abnormal extra-axial fluid collection. The gray-white  differentiation is maintained without evidence of an acute infarct. There is  no evidence of hydrocephalus. ORBITS: The visualized portion of the orbits demonstrate no acute abnormality. SINUSES: The visualized paranasal sinuses and mastoid air cells demonstrate  no acute abnormality. SOFT TISSUES/SKULL:  No acute abnormality of the visualized skull or soft  tissues. Impression: No acute intracranial abnormality. 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  I explained to the patient the nature of severe cancer, staging, prognosis and treatment. Explained the results of the PET CT scan and the endoscopic ultrasound. Obviously patient is having locally advanced disease with no evidence of metastasis. My recommendations to give neoadjuvant combined chemoradiation followed by surgery. We will give weekly Taxol/ carboplatin. I explained the benefits and possible side effects related to chemotherapy, which may include but not limited to nausea, vomiting, hair loss, mouth sores, allergy, neuropathy, fever infection sepsis, anemia and thrombocytopenia. Patient was started on Taxol/ Carbo. He developed allergy to Taxol. Switched to Abraxane/ carboplatin with radiation. Tolerated well so far. However, bilirubin is rising. Will stop Abraxane and will proceed with carboplatin. Radiation will be completed 11/3/22. Use imodium for diarrhea. Will give IVF later this week. Will arrange for MRI in about 4 weeks. We also discussed nutritional support. We also discussed management of GERD and he had further management by his gastroenterologist for that matter. He will continue PPIs. Patient's questions were answered to the best of his satisfaction and he verbalized full understanding and agreement.

## 2022-11-01 NOTE — PROGRESS NOTES
Pt arrives ambulatory for C6 D1 treatment and MD visit. Vitals as charted. Pt states he has had diarrhea, nausea and vomiting over the past couple of days. Pt denies any other complaint or concern. Port accessed, specimens sent. Labs reviewed, MD visit complete, Okay to treat. Per MD hold abraxane and give 1L NS. Pt premedicated. 1L normal saline infused with no sign of adverse reaction. Carboplatin infused with no sign of adverse reaction, Line flushed. Port flushed and heparinized with intact morillo needle removed per protocol, Band-Aid applied to site.    Pt discharged, to stop at  for AVS.

## 2022-11-01 NOTE — TELEPHONE ENCOUNTER
Patient seen for on treatment visit today for Radiation Oncology. Tracy Sin has two radiation treatments remaining. States feels continuously nauseated despite anti-emetics. No vomiting. Complains of diarrhea. Writer recommended imodium. States getting chemo today. Writer left message on triage line at [address] Oncology regarding treatment status and possibly giving Tracy Sin extra IV fluids with his treatment. He voices appreciation regarding information for symptom management.

## 2022-11-01 NOTE — PATIENT INSTRUCTIONS
Proceed with Carbo.  No Abraxane  IVF 1 liter o.9 NS later this week  Zovirax  RV about 4 weeks with MRI before RV

## 2022-11-01 NOTE — TELEPHONE ENCOUNTER
TEDDY ARRIVES AMBULATORY FOR MD VISIT & TX  DR WALSH IN TO SEE PATIENT  ORDERS RECEIVED  PROCEED WITH CARBO NO ABRAXANE  IVF 1 LITER 0.9 NS LATER THIS WEEK   ZOVIRAX  RV 4 WEEKS WITH MRI BEFORE  HYDRATION SCHEDULED FOR 11/4/22 @8AM  [address] WITH ADARSH FOR 11/21/22 @1130AM ARRIVE BY 11AM NEW \[facility]"" OF [facility]  MD VISIT 11/29/22 @8:15AM  SCRIPT SENT TO PATIENT'S PHARMACY  AVS PRINTED AND GIVEN TO PATIENT WITH INSTRUCTIONS  PATIENT REMAINS IN [address] [place] [address]

## 2022-11-02 ENCOUNTER — HOSPITAL ENCOUNTER (OUTPATIENT)
Dept: RADIATION ONCOLOGY | Facility: MEDICAL CENTER | Age: 67
Discharge: HOME | End: 2022-11-02
Payer: MEDICARE

## 2022-11-02 PROCEDURE — G6002 STEREOSCOPIC X-RAY GUIDANCE: HCPCS | Performed by: RADIOLOGY

## 2022-11-02 PROCEDURE — 77386 HC NTSTY MODUL RAD TX DLVR CPLX: CPT

## 2022-11-02 RX ORDER — 0.9 % SODIUM CHLORIDE 0.9 %
1000 INTRAVENOUS SOLUTION INTRAVENOUS ONCE
Status: CANCELLED | OUTPATIENT
Start: 2022-11-02 | End: 2022-11-02

## 2022-11-03 ENCOUNTER — HOSPITAL ENCOUNTER (OUTPATIENT)
Dept: RADIATION ONCOLOGY | Facility: MEDICAL CENTER | Age: 67
Discharge: HOME | End: 2022-11-03
Payer: MEDICARE

## 2022-11-03 PROCEDURE — 77386 HC NTSTY MODUL RAD TX DLVR CPLX: CPT

## 2022-11-03 PROCEDURE — G6002 STEREOSCOPIC X-RAY GUIDANCE: HCPCS | Performed by: RADIOLOGY

## 2022-11-03 PROCEDURE — 77427 RADIATION TX MANAGEMENT X5: CPT | Performed by: RADIOLOGY

## 2022-11-04 ENCOUNTER — HOSPITAL ENCOUNTER (OUTPATIENT)
Dept: INFUSION THERAPY | Facility: MEDICAL CENTER | Age: 67
Discharge: HOME | End: 2022-11-04
Payer: MEDICARE

## 2022-11-04 VITALS
SYSTOLIC BLOOD PRESSURE: 92 MMHG | HEART RATE: 76 BPM | DIASTOLIC BLOOD PRESSURE: 60 MMHG | TEMPERATURE: 98.3 F | RESPIRATION RATE: 18 BRPM

## 2022-11-04 DIAGNOSIS — C15.9 ADENOSQUAMOUS CARCINOMA OF ESOPHAGUS (HCC): Primary | ICD-10-CM

## 2022-11-04 LAB
ABSOLUTE EOS #: 0 K/UL (ref 0–0.4)
ABSOLUTE IMMATURE GRANULOCYTE: 0.04 K/UL (ref 0–0.3)
ABSOLUTE LYMPH #: 0.14 K/UL (ref 1–4.8)
ABSOLUTE MONO #: 0.74 K/UL (ref 0.2–0.8)
ALBUMIN SERPL-MCNC: 3.5 G/DL (ref 3.5–5.2)
ALP BLD-CCNC: 75 U/L (ref 40–129)
ALT SERPL-CCNC: 19 U/L (ref 5–41)
ANION GAP SERPL CALCULATED.3IONS-SCNC: 10 MMOL/L (ref 9–17)
AST SERPL-CCNC: 16 U/L
BASOPHILS # BLD: 0 %
BASOPHILS ABSOLUTE: 0 K/UL (ref 0–0.2)
BILIRUB SERPL-MCNC: 2.8 MG/DL (ref 0.3–1.2)
BUN BLDV-MCNC: 10 MG/DL (ref 8–23)
BUN/CREAT BLD: 15 (ref 9–20)
CALCIUM SERPL-MCNC: 9.2 MG/DL (ref 8.6–10.4)
CHLORIDE BLD-SCNC: 100 MMOL/L (ref 98–107)
CO2: 28 MMOL/L (ref 20–31)
CREAT SERPL-MCNC: 0.67 MG/DL (ref 0.7–1.2)
EOSINOPHILS RELATIVE PERCENT: 0 % (ref 1–4)
GFR SERPL CREATININE-BSD FRML MDRD: >60 ML/MIN/1.73M2
GLUCOSE BLD-MCNC: 99 MG/DL (ref 70–99)
HCT VFR BLD CALC: 33.7 % (ref 40.7–50.3)
HEMOGLOBIN: 10.6 G/DL (ref 13–17)
IMMATURE GRANULOCYTES: 1 %
LYMPHOCYTES # BLD: 4 % (ref 24–44)
MAGNESIUM: 1.8 MG/DL (ref 1.6–2.6)
MCH RBC QN AUTO: 30.4 PG (ref 25.2–33.5)
MCHC RBC AUTO-ENTMCNC: 31.5 G/DL (ref 28.4–34.8)
MCV RBC AUTO: 96.6 FL (ref 82.6–102.9)
MONOCYTES # BLD: 21 % (ref 1–7)
NRBC AUTOMATED: 0 PER 100 WBC
PDW BLD-RTO: 17.3 % (ref 11.8–14.4)
PLATELET # BLD: 192 K/UL (ref 138–453)
PMV BLD AUTO: 10.1 FL (ref 8.1–13.5)
POTASSIUM SERPL-SCNC: 3.4 MMOL/L (ref 3.7–5.3)
RBC # BLD: 3.49 M/UL (ref 4.21–5.77)
SEG NEUTROPHILS: 74 % (ref 36–66)
SEGMENTED NEUTROPHILS ABSOLUTE COUNT: 2.58 K/UL (ref 1.8–7.7)
SODIUM BLD-SCNC: 138 MMOL/L (ref 135–144)
TOTAL PROTEIN: 6.1 G/DL (ref 6.4–8.3)
WBC # BLD: 3.5 K/UL (ref 3.5–11.3)

## 2022-11-04 PROCEDURE — 96361 HYDRATE IV INFUSION ADD-ON: CPT

## 2022-11-04 PROCEDURE — 36591 DRAW BLOOD OFF VENOUS DEVICE: CPT

## 2022-11-04 PROCEDURE — 83735 ASSAY OF MAGNESIUM: CPT

## 2022-11-04 PROCEDURE — 80053 COMPREHEN METABOLIC PANEL: CPT

## 2022-11-04 PROCEDURE — 96360 HYDRATION IV INFUSION INIT: CPT

## 2022-11-04 PROCEDURE — 6360000002 HC RX W HCPCS: Performed by: INTERNAL MEDICINE

## 2022-11-04 PROCEDURE — 85025 COMPLETE CBC W/AUTO DIFF WBC: CPT

## 2022-11-04 PROCEDURE — 2580000003 HC RX 258: Performed by: INTERNAL MEDICINE

## 2022-11-04 RX ORDER — SODIUM CHLORIDE 9 MG/ML
5-250 INJECTION, SOLUTION INTRAVENOUS PRN
OUTPATIENT
Start: 2022-11-04

## 2022-11-04 RX ORDER — HEPARIN SODIUM (PORCINE) LOCK FLUSH IV SOLN 100 UNIT/ML 100 UNIT/ML
500 SOLUTION INTRAVENOUS PRN
OUTPATIENT
Start: 2022-11-04

## 2022-11-04 RX ORDER — SODIUM CHLORIDE 0.9 % (FLUSH) 0.9 %
5-40 SYRINGE (ML) INJECTION PRN
OUTPATIENT
Start: 2022-11-04

## 2022-11-04 RX ORDER — 0.9 % SODIUM CHLORIDE 0.9 %
1000 INTRAVENOUS SOLUTION INTRAVENOUS ONCE
Status: CANCELLED | OUTPATIENT
Start: 2022-11-04 | End: 2022-11-04

## 2022-11-04 RX ORDER — HEPARIN SODIUM (PORCINE) LOCK FLUSH IV SOLN 100 UNIT/ML 100 UNIT/ML
500 SOLUTION INTRAVENOUS PRN
Status: DISCONTINUED | OUTPATIENT
Start: 2022-11-04 | End: 2022-11-05 | Stop reason: HOSPADM

## 2022-11-04 RX ORDER — SODIUM CHLORIDE 0.9 % (FLUSH) 0.9 %
5-40 SYRINGE (ML) INJECTION PRN
Status: DISCONTINUED | OUTPATIENT
Start: 2022-11-04 | End: 2022-11-05 | Stop reason: HOSPADM

## 2022-11-04 RX ORDER — 0.9 % SODIUM CHLORIDE 0.9 %
1000 INTRAVENOUS SOLUTION INTRAVENOUS ONCE
Status: COMPLETED | OUTPATIENT
Start: 2022-11-04 | End: 2022-11-04

## 2022-11-04 RX ADMIN — SODIUM CHLORIDE 1000 ML: 9 INJECTION, SOLUTION INTRAVENOUS at 08:37

## 2022-11-04 RX ADMIN — HEPARIN 500 UNITS: 100 SYRINGE at 10:42

## 2022-11-04 RX ADMIN — SODIUM CHLORIDE, PRESERVATIVE FREE 30 ML: 5 INJECTION INTRAVENOUS at 08:30

## 2022-11-04 RX ADMIN — SODIUM CHLORIDE, PRESERVATIVE FREE 20 ML: 5 INJECTION INTRAVENOUS at 10:42

## 2022-11-04 NOTE — PROGRESS NOTES
PT ARRIVES PER AMB PER SELF AND LABS AND ORDERS REVIEWED AND NOTIFIED DR JACOB K 3.4 AND NO ACTION NEEDED. PT IS TAKING TUBE FEEDING REGULARLY, OCCAS NAUSEA AND VOMITING. PT PLACING CREAM TO SORE OUTSIDE OF MOUTH GIVEN PER DR JACOB AND NOTIFIED TO KEEP SIPPING WATER TO KEEP MOUTH MOIST AND MAY RINSE AM AND PM WITH H2O2 DILUTED WITH WATER TO KEEP MOUTH CLEAN. PT TOLERATED HYDRATION WELL AND NO REACTIONS OR COMPLAINTS AND BLOOD RETURN PRESENT THROUGHOUT INFUSION. PT DISCHARGED PER AMB PER SELF AND HAS NEXT APPTS.

## 2022-11-08 ENCOUNTER — TELEPHONE (OUTPATIENT)
Dept: ONCOLOGY | Age: 67
End: 2022-11-08

## 2022-11-08 NOTE — TELEPHONE ENCOUNTER
Name: Becky Laura  : 1955  MRN: [id number]    Oncology Navigation Follow-Up Note    Contact Type:  Telephone    Notes: Writer called patient to f/u with him regarding needing more sponge like gauzes for his PEG tube. Pt states he did received more dressings but they were the wrong ones. He states he received gauzes pads without slits in them. [facility] detailed VM regarding pt needs and will route this note to her. Pt informed and appreciative.     Electronically signed by Sanchez Martines RN on 2022 at 1:16 PM

## 2022-11-09 ENCOUNTER — TELEPHONE (OUTPATIENT)
Dept: ONCOLOGY | Age: 67
End: 2022-11-09

## 2022-11-09 NOTE — TELEPHONE ENCOUNTER
[facility] Nutrition Note:      RD received message from [facility], [address] [facility] that pt did not receive split gauze bandages with PEG supplies order. RD called [facility] on pt's behalf and spoke with rep. Representative confirmed pt sent wrong bandage type, updated order, and verified shipment of split bandages as appropriate. RD called pt to inform.      Loni Sexton, MYRA, RD, LD  Registered Dietitian   [place] [phone]

## 2022-11-15 ENCOUNTER — TELEPHONE (OUTPATIENT)
Dept: ONCOLOGY | Age: 67
End: 2022-11-15

## 2022-11-15 NOTE — TELEPHONE ENCOUNTER
Name: Jeanine Varma  : 1955  MRN: [id number]    Oncology Navigation Follow-Up Note    Contact Type:  Telephone    Notes: Writer called patient to f/u with him regarding his dressings for his Jtube. No answer, detailed VM asking for a return call.     Electronically signed by Cheryl Pemberton RN on 11/15/2022 at 3:49 PM

## 2022-11-17 DIAGNOSIS — C15.5 MALIGNANT NEOPLASM OF LOWER THIRD OF ESOPHAGUS (HCC): Primary | ICD-10-CM

## 2022-11-17 RX ORDER — ONDANSETRON 4 MG/1
TABLET, ORALLY DISINTEGRATING ORAL
Qty: 90 TABLET | Refills: 0 | Status: SHIPPED | OUTPATIENT
Start: 2022-11-17

## 2022-11-18 ENCOUNTER — TELEPHONE (OUTPATIENT)
Dept: ONCOLOGY | Age: 67
End: 2022-11-18

## 2022-11-18 ENCOUNTER — OFFICE VISIT (OUTPATIENT)
Dept: BARIATRICS/WEIGHT MGMT | Age: 67
End: 2022-11-18

## 2022-11-18 VITALS
HEIGHT: 73 IN | DIASTOLIC BLOOD PRESSURE: 72 MMHG | WEIGHT: 181 LBS | SYSTOLIC BLOOD PRESSURE: 108 MMHG | BODY MASS INDEX: 23.99 KG/M2 | HEART RATE: 69 BPM

## 2022-11-18 DIAGNOSIS — Z93.4 STATUS POST JEJUNOSTOMY (HCC): Primary | ICD-10-CM

## 2022-11-18 PROCEDURE — 99024 POSTOP FOLLOW-UP VISIT: CPT | Performed by: SURGERY

## 2022-11-18 NOTE — TELEPHONE ENCOUNTER
WRITER CALLED AND SPOKE TO TEDDY TO GIVE HIM HIS APPT DATE AND TIME FOR A CT CHEST THAT WAS ORDERED BY DR [facility] YOGI MEDINA HIS APPT IS ON 11/23/22 @ 5PM [facility] [address] [facility] @ 4PM PT IS AWARE OF APPT DATE AND TIME .

## 2022-11-18 NOTE — PROGRESS NOTES
[address] [facility] MIN INVASIVE BARIATRIC SURG  [address] [place] [address] [address] [facility] [address]  [address] [place] [address]  Dept: [phone]    ROBOTIC AND MINIMALLY INVASIVE SURGERY  PROGRESS NOTE     CC: General Surgery Follow Up    Patient: Becky Laura      Service Date: 11/18/2022  Visit:   2 month    Medical Record #: [id number]  Date of Surgery:   9/17/22    History: 75 y.o. male who presents today for post op follow up for jejunostomy tube placement. He notes that his J tube is retracting into his intestine. He states that he is able to eat soft foods in small portions. He denies nausea, vomiting, fever, and chills. Patient states that he has finished receiving chemotherapy for malignant neoplasm of the esophagus, and he continues to follow with oncology.     Pathology:    [] NA    [] No Gross path    []    [x] Discussed w/ pt   [] Referred to GI      Review of Systems:     General  Negative for: [] Weight Change   [x] Fatigue   [x] Fevers & Chills    [] Appetite change [] Other:    Positive for: [x] Weight Change   [] Fatigue   [] Fevers & Chills    [] Appetite change [] Other:   Cardiac  Negative for: [x] Chest Pain   [] Difficulty Breathing   [] Leg Cramps [x] Edema of Lower Extremeties    [] Left   [] Right      Positive for: [] Chest Pain   [] Difficulty Breathing   [] Leg Cramps [] Edema of Lower Extremeties    [] Left   [] Right   Pulmonary  Negative for: [x] Shortness of Breath [] Wheeze [x] Cough  [x] Calf Pain     Positive for: [] Shortness of Breath [] Wheeze [] Cough  [] Calf Pain   Gastro-Intestinal Negative for: [] Heartburn   [] Reflux   [] Dysphagia   [] Melena   [] BRBPR  [x] Vomiting   [x] Abdominal Pain   [] Diarrhea     [] Constipation  [] Other:     Positive for: [] Heartburn   [] Reflux   [] Dysphagia   [] Melena   [] BRBPR  [] Vomiting   [] Abdominal Pain   [] Diarrhea     [] Constipation  [] Other:    Muskuloskeletal Negative for: [] Joint pain   [] Back pain [] Knee Pain   [] Muscle weakness [x] Hernia   [x] Edema [] Other:     Positive for: [] Joint pain   [] Back pain   [] Knee Pain   [] Muscle weakness [] Hernia   [] Edema [] Other:    Neurologic Negative for: [x] Syncope   [] Insomnia   [x] Being treated for depression  [] Other:     Positive for: [] Syncope   [] Insomnia   [] Being treated for depression  [] Other:    Skin Negative for: [x] Wound:   [] Open   [] Draining   [] Incisional     [x] Rash   [] Hair Loss  [] Other:     Positive for: [] Wound:   [] Open   [] Draining    [] Incisional  [] Rash   [] Hair Loss  [] Other:          Physical Assessment:     /72 (Site: Right Upper Arm, Position: Sitting, Cuff Size: Large Adult)   Pulse 69   Ht 6' 1\" (1.854 m)   Wt 181 lb (82.1 kg)   BMI 23.88 kg/m²   General Negative for:  [x] Lapses in memory   [x] Unusual Stress   [x] Diffic Concen [] Unable to sleep   [] Eating in response to stress   [] Other:    Positive for:  [] Lapses in memory   [] Unusual Stress   [] Diffic Concen [] Unable to sleep   [] Eating in response to stress   [] Other:   Cardio-Pulmonary   [x] Heart RRR   [x] No murmurs   [] Pulses nl x4 extrem    [x] Good inspiratory effort   [x] No wheezing     [x] Lungs clear to auscultation bilaterally    [] Other:    Gastro-Intestinal   [x] Abd S/NT/ND/Benign   [x] No abdominal mass/hernia    [x] No Splenomegaly    [] Other:    Muskuloskeletal   [x] Good muscle strength x4 extremities   [x] nl gait and ambul    [x] Nl ROM x4 extremities    [] Other:    Neurologic   [x] Alert and oriented x3    [] Other:   Skin   [x] Intact w/ no open wounds   [x] Incisions C/D/I    [] Steri strips removed   [x] No drainage or Infection    [] Other:      Assessment & Plan:      1. Status post jejunostomy (HCC)       S/P JEJUNOSTOMY  Continue applying dressings to the wound daily  Continue following with Dr. Yomaira Iqbal, oncology  CT of abdomen and pelvis ordered  Plan pending results    Follow up:  After CT    Orders placed this encounter:   Orders Placed This Encounter   Procedures    CT ABDOMEN PELVIS W IV CONTRAST Additional Contrast? Oral       New Prescriptions:   No orders of the defined types were placed in this encounter. Scribe Attestation:  lanre Galeanaed on behalf of Keysha Carlson DO. Electronically signed by Keysha Carlson DO on 11/18/2022 at 12:55 PM    Please note that this chart was generated using voice recognition Dragon dictation software. Although every effort was made to ensure the accuracy of this automated transcription, some errors in transcription may have occurred. Jarek Coombs DO DO, personally performed the services described in this documentation. All medical record entries made by the scribe were at my direction and in my presence. I have reviewed the chart and discharge instructions (if applicable) and agree that the record reflects my personal performance and is accurate and complete.     Electronically Signed: Keysha Carlson DO. 11/25/22. 10:44 AM.

## 2022-11-21 ENCOUNTER — TELEPHONE (OUTPATIENT)
Dept: ONCOLOGY | Age: 67
End: 2022-11-21

## 2022-11-21 NOTE — TELEPHONE ENCOUNTER
[facility] Nutrition Note:    Pt called RD indicating he has been trying to call to reorder [employer] bags. He states he has had difficulty with using the website for reorder.  RD provided [facility] reorder hotline [phone]    MYRA Garcia, PANCHITO, LD  Registered Dietitian   [facility]  [phone]

## 2022-11-21 NOTE — TELEPHONE ENCOUNTER
Name: Saul Segura  : 1955  MRN: [id number]    Oncology Navigation Follow-Up Note    Contact Type:  Telephone    Notes: Writer received a call from patient asking how to order more supplies through dietician and missed pt call. WRiter returned pt call and he states he was able to get [employer] contact number and his order has been placed. Pt appreciative for return call.     Electronically signed by Jed Anderson RN on 2022 at 3:11 PM

## 2022-11-22 NOTE — TELEPHONE ENCOUNTER
Writer received paperwork back from  yesterday with Dr. Cande Barrosoing directions. We had previously received word form MRI that they had no protocol for MRI chest built. At that time physician was notified and would let me know how to proceed. He placed order on 11/17 for CT to replace MRI. CT scheduled on 11/18/22. Writer received response with plan from  on 11/21/22. Call today to confirm with MRI that the test will not be done as the CT was in place of MRI as they contacted us yesterday because no one informed patient of cancellation and needed an update.

## 2022-11-23 ENCOUNTER — HOSPITAL ENCOUNTER (OUTPATIENT)
Dept: CT IMAGING | Age: 67
Discharge: HOME | End: 2022-11-25
Payer: MEDICARE

## 2022-11-23 DIAGNOSIS — Z93.4 STATUS POST JEJUNOSTOMY (HCC): ICD-10-CM

## 2022-11-23 DIAGNOSIS — C15.5 MALIGNANT NEOPLASM OF LOWER THIRD OF ESOPHAGUS (HCC): ICD-10-CM

## 2022-11-23 PROCEDURE — A4641 RADIOPHARM DX AGENT NOC: HCPCS | Performed by: INTERNAL MEDICINE

## 2022-11-23 PROCEDURE — 74177 CT ABD & PELVIS W/CONTRAST: CPT

## 2022-11-23 PROCEDURE — 6360000004 HC RX CONTRAST MEDICATION: Performed by: INTERNAL MEDICINE

## 2022-11-23 PROCEDURE — 71260 CT THORAX DX C+: CPT

## 2022-11-23 PROCEDURE — 2580000003 HC RX 258: Performed by: INTERNAL MEDICINE

## 2022-11-23 RX ORDER — 0.9 % SODIUM CHLORIDE 0.9 %
80 INTRAVENOUS SOLUTION INTRAVENOUS ONCE
Status: COMPLETED | OUTPATIENT
Start: 2022-11-23 | End: 2022-11-23

## 2022-11-23 RX ORDER — SODIUM CHLORIDE 0.9 % (FLUSH) 0.9 %
10 SYRINGE (ML) INJECTION ONCE
Status: COMPLETED | OUTPATIENT
Start: 2022-11-23 | End: 2022-11-23

## 2022-11-23 RX ADMIN — SODIUM CHLORIDE, PRESERVATIVE FREE 10 ML: 5 INJECTION INTRAVENOUS at 17:01

## 2022-11-23 RX ADMIN — SODIUM CHLORIDE 80 ML: 9 INJECTION, SOLUTION INTRAVENOUS at 17:01

## 2022-11-23 RX ADMIN — IOPAMIDOL 100 ML: 755 INJECTION, SOLUTION INTRAVENOUS at 17:01

## 2022-11-23 RX ADMIN — BARIUM SULFATE 450 ML: 20 SUSPENSION ORAL at 17:01

## 2022-11-29 ENCOUNTER — TELEPHONE (OUTPATIENT)
Dept: ONCOLOGY | Age: 67
End: 2022-11-29

## 2022-11-29 ENCOUNTER — OFFICE VISIT (OUTPATIENT)
Dept: ONCOLOGY | Age: 67
End: 2022-11-29
Payer: MEDICARE

## 2022-11-29 VITALS
TEMPERATURE: 98.7 F | HEART RATE: 67 BPM | SYSTOLIC BLOOD PRESSURE: 112 MMHG | BODY MASS INDEX: 23.67 KG/M2 | RESPIRATION RATE: 16 BRPM | WEIGHT: 179.4 LBS | DIASTOLIC BLOOD PRESSURE: 62 MMHG

## 2022-11-29 DIAGNOSIS — C15.9 ADENOSQUAMOUS CARCINOMA OF ESOPHAGUS (HCC): Primary | ICD-10-CM

## 2022-11-29 PROCEDURE — 1123F ACP DISCUSS/DSCN MKR DOCD: CPT | Performed by: INTERNAL MEDICINE

## 2022-11-29 PROCEDURE — 99211 OFF/OP EST MAY X REQ PHY/QHP: CPT | Performed by: INTERNAL MEDICINE

## 2022-11-29 PROCEDURE — 99214 OFFICE O/P EST MOD 30 MIN: CPT | Performed by: INTERNAL MEDICINE

## 2022-11-29 RX ORDER — TOBRAMYCIN 3 MG/ML
1 SOLUTION/ DROPS OPHTHALMIC EVERY 4 HOURS
Qty: 5 ML | Refills: 0 | Status: SHIPPED | OUTPATIENT
Start: 2022-11-29 | End: 2022-12-09

## 2022-11-29 NOTE — TELEPHONE ENCOUNTER
Terrance Jaffe MD VISIT  Refer to [facility] cardiothoracic Dr Tamiko Peres  Please have radiology department to push images of scans and PET/CT to [facility]  RV after surgery  REFERRAL TO CARDIOTHORACIC SURGERY DR Deysi Stein WAS FAXED W/ DEMOGRAPHICS TO [phone] AND SCANNED UNDER MEDIA  RADIOLOGY WAS CALLED FOR IMAGES TO BE PUSHED TO [facility]  MD VISIT TBD AFTER SURGERY  AVS PRINTED W/ INSTRUCTIONS AND GIVEN TO PT ON EXIT

## 2022-11-29 NOTE — PATIENT INSTRUCTIONS
Refer to [facility] cardiothoracic Dr Charo Malik  Please have radiology department to push images of scans and PET/CT to [facility]  RV after surgery

## 2022-11-29 NOTE — PROGRESS NOTES
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 Chemoradiation started 9/27/22. Developed allergy to Taxol. Switched to Abraxane/ carboplatin. Chemoradiation completed November 3, 2022. BRIEF CASE HISTORY:      Mr. Dinah Anthony is a very pleasant 75 y.o. male with history of multiple co morbidities as listed. Patient is referred for evaluation and further management of recently diagnosed esophageal adenocarcinoma. The patient was doing fairly well except for mild chronic GERD. It was not significant so he did not pay attention to it and he did not receive any treatment for it. For the last few weeks patient started having difficulty swallowing especially solid food. He was evaluated by gastroenterology and he had EGD which showed suspicious lesion at the distal part of the esophagus at 36 cm. Biopsy from the lesion on August 1, 2022 showed adenocarcinoma. Patient is referred for further management. Patient denies any active bleeding. No melena or hematochezia. No hematemesis. No weight loss or decreased appetite. No fever or night sweats. No other complaints. Patient quit smoking more than 20 years ago. He drinks alcohol socially. INTERIM HISTORY:   Patient seen for follow-up esophageal cancer. He continues to have some difficulty swallowing solids. No weight loss. No GI bleeding. No chest pain. No fever.       PAST MEDICAL HISTORY: has a past medical history of Cancer [facility]), Dental root implant present, Elevated PSA, Hearing loss, Hiatal hernia, Keratosis, Prostate nodule, Snores, Under care of team, Wears dentures, Wears reading eyeglasses, and Wellness examination. PAST SURGICAL HISTORY: has a past surgical history that includes Colonoscopy (09/24/2008); Tonsillectomy; hernia repair; skin biopsy; Prostate biopsy; Prostate biopsy (N/A, 11/22/2021); Colonoscopy (N/A, 02/17/2022); Upper gastrointestinal endoscopy (N/A, 08/01/2022); Upper gastrointestinal endoscopy (N/A, 08/24/2022); Gastrojejunostomy w/ jejunostomy tube; Mediport insertion, single (Right); and Gastrostomy tube placement (N/A, 9/17/2022). CURRENT MEDICATIONS:  has a current medication list which includes the following prescription(s): tobramycin, ondansetron, [employer] standard 1.4, fluorouracil, tamsulosin, and omeprazole. ALLERGIES:  is allergic to paclitaxel. FAMILY HISTORY: Negative for any hematological or oncological conditions. SOCIAL HISTORY:  reports that he quit smoking about 20 years ago. His smoking use included cigarettes. He smoked an average of 1.00 packs per day. He has never used smokeless tobacco. He reports current alcohol use. He reports current drug use. Drug: Marijuana Del[facility] Alvarado). REVIEW OF SYSTEMS:     General: Positive for weakness and fatigue. No unanticipated weight loss or decreased appetite. No fever or chills. Eyes: No blurred vision, eye pain or double vision. Ears: No hearing problems or drainage. No tinnitus. Throat: No sore throat, problems with swallowing or dysphagia. Respiratory: No cough, sputum or hemoptysis. No shortness of breath. No pleuritic chest pain. Cardiovascular: No chest pain, orthopnea or PND. No lower extremity edema. No palpitation. Gastrointestinal: As above. Genitourinary: No dysuria, hematuria, frequency or urgency. Musculoskeletal: No muscle aches or pains. No limitation of movement. No back pain. No gait disturbance, No joint complaints. Dermatologic: No skin rashes or pruritus.  No skin lesions or discolorations. Psychiatric: No depression, anxiety, or stress or signs of schizophrenia. No change in mood or affect. Hematologic: No history of bleeding tendency. No bruises or ecchymosis. No history of clotting problems. Infectious disease: No fever, chills or frequent infections. Endocrine: No polydipsia or polyuria. No temperature intolerance. Neurologic: No headaches or dizziness. No weakness or numbness of the extremities. No changes in balance, coordination,  memory, mentation, behavior. Allergic/Immunologic: No nasal congestion or hives. No repeated infections. PHYSICAL EXAM:  The patient is not in acute distress. Vital signs: Blood pressure 112/62, pulse 67, temperature 98.7 °F (37.1 °C), temperature source Temporal, resp. rate 16, weight 179 lb 6.4 oz (81.4 kg).      General appearance - well appearing, not in pain or distress  Mental status - good mood, alert and oriented  Eyes - pupils equal and reactive, extraocular eye movements intact  Ears - bilateral TM's and external ear canals normal  Nose - normal and patent, no erythema, discharge or polyps  Mouth - mucous membranes moist, pharynx normal without lesions  Neck - supple, no significant adenopathy  Lymphatics - no palpable lymphadenopathy, no hepatosplenomegaly  Chest - clear to auscultation, no wheezes, rales or rhonchi, symmetric air entry  Heart - normal rate, regular rhythm, normal S1, S2, no murmurs, rubs, clicks or gallops  Abdomen - soft, nontender, nondistended, no masses or organomegaly  Neurological - alert, oriented, normal speech, no focal findings or movement disorder noted  Musculoskeletal - no joint tenderness, deformity or swelling  Extremities - peripheral pulses normal, no pedal edema, no clubbing or cyanosis  Skin - normal coloration and turgor, no rashes, no suspicious skin lesions noted     Review of Diagnostic data:   Lab Results   Component Value Date    WBC 3.5 11/04/2022    HGB 10.6 (L) 11/04/2022    HCT 33.7 (L) 11/04/2022    MCV 96.6 11/04/2022     11/04/2022       Chemistry        Component Value Date/Time     11/04/2022 0832    K 3.4 (L) 11/04/2022 0832     11/04/2022 0832    CO2 28 11/04/2022 0832    BUN 10 11/04/2022 0832    CREATININE 0.67 (L) 11/04/2022 0832        Component Value Date/Time    CALCIUM 9.2 11/04/2022 0832    ALKPHOS 75 11/04/2022 0832    AST 16 11/04/2022 0832    ALT 19 11/04/2022 0832    BILITOT 2.8 (H) 11/04/2022 0832          CT ABDOMEN PELVIS W IV CONTRAST Additional Contrast? Oral  Narrative: EXAMINATION:  CT OF THE CHEST WITH CONTRAST; CT OF THE ABDOMEN AND PELVIS WITH CONTRAST  11/23/2022 4:55 pm    TECHNIQUE:  CT of the chest was performed with the administration of intravenous  contrast. Multiplanar reformatted images are provided for review. Automated  exposure control, iterative reconstruction, and/or weight based adjustment of  the mA/kV was utilized to reduce the radiation dose to as low as reasonably  achievable.; CT of the abdomen and pelvis was performed with the  administration of intravenous contrast. Multiplanar reformatted images are  provided for review. Automated exposure control, iterative reconstruction,  and/or weight based adjustment of the mA/kV was utilized to reduce the  radiation dose to as low as reasonably achievable. COMPARISON:  08/25/2022 PET-CT    HISTORY:  ORDERING SYSTEM PROVIDED HISTORY: Malignant neoplasm of lower third of  esophagus [facility])  TECHNOLOGIST PROVIDED HISTORY:  STAT Creatinine as needed:->Yes  Reason for Exam: H/O esophageal CA, pt on chemo.; ORDERING SYSTEM PROVIDED  HISTORY: Status post jejunostomy ([address]  TECHNOLOGIST PROVIDED HISTORY:    STAT Creatinine as needed:->Yes  j tube placement  Reason for Exam: H/O esophageal CA, pt on chemo. FINDINGS:    Chest:    Mediastinum:  Thyroid is grossly unremarkable within the limits of CT.   Right  chest wall Port-A-Cath line with tip at the cavoatrial junction. Heart and  pericardium are unremarkable. No evidence of mediastinal, hilar or axillary  lymphadenopathy. The central airways are clear. There is a distal  esophageal/gastroesophageal junction soft tissue mass consistent with known  esophageal cancer without significant change in appearance since prior PET-CT. Lungs/pleura: Bibasilar subsegmental atelectasis. No acute consolidation or  pulmonary edema. No pleural effusion or pneumothorax. Soft Tissues/Bones:  No acute abnormality of the visualized osseous  structures. Abdomen/Pelvis:    Organs: Multiple scattered hepatic cysts stable since previous PET-CT. The  largest in the right hepatic lobe measuring approximately 7 cm in size. Liver otherwise enhances normally without evidence of hepatic mass. No  biliary ductal dilatation. Portal vein is patent. The gallbladder is  unremarkable. The spleen, pancreas and adrenal glands are unremarkable. The  kidneys enhance symmetrically without evidence of hydronephrosis. GI/Bowel: Large gastroesophageal junction mass consistent with known  esophageal cancer. The distal stomach is unremarkable. There is a  percutaneous jejunostomy tube present. No bowel obstruction. There is  diverticulosis without evidence of diverticulitis. Pelvis: No pelvic mass or lymphadenopathy. Enlarged prostate is again noted. Peritoneum/Retroperitoneum: No evidence of ascites or free air. There is a  small gastrohepatic ligament lymph node measuring approximately 9 mm on image  41 of series 3, stable compared to prior examination. No pathologically  enlarged lymphadenopathy. Aorta is normal in caliber. Bones/Soft Tissues:  No acute abnormality of the visualized osseous  structures. Impression: Distal esophageal/gastroesophageal junction mass consistent with known  esophageal cancer. No specific CT evidence of distant metastatic disease.   Borderline size  gastrohepatic ligament lymph node measuring 9 mm in short axis dimension is  stable since prior PET-CT where it demonstrated no FDG uptake. Enlarged prostate gland. Recommend correlation with PSA values. Extensive diverticulosis without evidence of acute diverticulitis. CT CHEST W CONTRAST  Narrative: EXAMINATION:  CT OF THE CHEST WITH CONTRAST; CT OF THE ABDOMEN AND PELVIS WITH CONTRAST  11/23/2022 4:55 pm    TECHNIQUE:  CT of the chest was performed with the administration of intravenous  contrast. Multiplanar reformatted images are provided for review. Automated  exposure control, iterative reconstruction, and/or weight based adjustment of  the mA/kV was utilized to reduce the radiation dose to as low as reasonably  achievable.; CT of the abdomen and pelvis was performed with the  administration of intravenous contrast. Multiplanar reformatted images are  provided for review. Automated exposure control, iterative reconstruction,  and/or weight based adjustment of the mA/kV was utilized to reduce the  radiation dose to as low as reasonably achievable. COMPARISON:  08/25/2022 PET-CT    HISTORY:  ORDERING SYSTEM PROVIDED HISTORY: Malignant neoplasm of lower third of  esophagus [facility])  TECHNOLOGIST PROVIDED HISTORY:  STAT Creatinine as needed:->Yes  Reason for Exam: H/O esophageal CA, pt on chemo.; ORDERING SYSTEM PROVIDED  HISTORY: Status post jejunostomy ([address]  TECHNOLOGIST PROVIDED HISTORY:    STAT Creatinine as needed:->Yes  j tube placement  Reason for Exam: H/O esophageal CA, pt on chemo. FINDINGS:    Chest:    Mediastinum:  Thyroid is grossly unremarkable within the limits of CT. Right  chest wall Port-A-Cath line with tip at the cavoatrial junction. Heart and  pericardium are unremarkable. No evidence of mediastinal, hilar or axillary  lymphadenopathy. The central airways are clear.   There is a distal  esophageal/gastroesophageal junction soft tissue mass consistent with known  esophageal cancer without significant change in appearance since prior PET-CT. Lungs/pleura: Bibasilar subsegmental atelectasis. No acute consolidation or  pulmonary edema. No pleural effusion or pneumothorax. Soft Tissues/Bones:  No acute abnormality of the visualized osseous  structures. Abdomen/Pelvis:    Organs: Multiple scattered hepatic cysts stable since previous PET-CT. The  largest in the right hepatic lobe measuring approximately 7 cm in size. Liver otherwise enhances normally without evidence of hepatic mass. No  biliary ductal dilatation. Portal vein is patent. The gallbladder is  unremarkable. The spleen, pancreas and adrenal glands are unremarkable. The  kidneys enhance symmetrically without evidence of hydronephrosis. GI/Bowel: Large gastroesophageal junction mass consistent with known  esophageal cancer. The distal stomach is unremarkable. There is a  percutaneous jejunostomy tube present. No bowel obstruction. There is  diverticulosis without evidence of diverticulitis. Pelvis: No pelvic mass or lymphadenopathy. Enlarged prostate is again noted. Peritoneum/Retroperitoneum: No evidence of ascites or free air. There is a  small gastrohepatic ligament lymph node measuring approximately 9 mm on image  41 of series 3, stable compared to prior examination. No pathologically  enlarged lymphadenopathy. Aorta is normal in caliber. Bones/Soft Tissues:  No acute abnormality of the visualized osseous  structures. Impression: Distal esophageal/gastroesophageal junction mass consistent with known  esophageal cancer. No specific CT evidence of distant metastatic disease. Borderline size  gastrohepatic ligament lymph node measuring 9 mm in short axis dimension is  stable since prior PET-CT where it demonstrated no FDG uptake. Enlarged prostate gland. Recommend correlation with PSA values. Extensive diverticulosis without evidence of acute diverticulitis.       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 I explained to the patient the nature of severe cancer, staging, prognosis and treatment. Explained the results of the PET CT scan and the endoscopic ultrasound. Obviously patient is having locally advanced disease with no evidence of metastasis. My recommendations to give neoadjuvant combined chemoradiation followed by surgery. Patient was started on Taxol/ Carbo. He developed allergy to Taxol. Switched to Abraxane/ carboplatin with radiation. Chemoradiation was completed 11/3/22. We tried to do chest MRI for evaluation of the esophagus but it could not be done in [place]. CT scan of the chest abdomen pelvis were reviewed. Persistent mass in the lower esophagus with edema and radiation effect. Discussed further care. We will refer the patient for evaluation by cardiothoracic surgery at [facility] clinic for possible cancer resection. We also discussed nutritional support. We also discussed management of GERD and he had further management by his gastroenterologist for that matter. He will continue PPIs. Patient's questions were answered to the best of his satisfaction and he verbalized full understanding and agreement.

## 2022-12-02 ENCOUNTER — HOSPITAL ENCOUNTER (OUTPATIENT)
Dept: RADIATION ONCOLOGY | Facility: MEDICAL CENTER | Age: 67
Discharge: HOME | End: 2022-12-02
Payer: MEDICARE

## 2022-12-02 VITALS
DIASTOLIC BLOOD PRESSURE: 76 MMHG | HEART RATE: 78 BPM | TEMPERATURE: 98 F | WEIGHT: 180.2 LBS | SYSTOLIC BLOOD PRESSURE: 114 MMHG | RESPIRATION RATE: 16 BRPM | OXYGEN SATURATION: 99 % | BODY MASS INDEX: 23.77 KG/M2

## 2022-12-02 PROCEDURE — 99212 OFFICE O/P EST SF 10 MIN: CPT | Performed by: RADIOLOGY

## 2022-12-02 NOTE — PROGRESS NOTES
Konstantin Grady  12/2/2022  10:21 AM      Vitals:    12/02/22 1015   BP: 114/76   Pulse: 78   Resp: 16   Temp: 98 °F (36.7 °C)   SpO2: 99%    :     Pain Assessment: None - Denies Pain          Wt Readings from Last 1 Encounters:   12/02/22 180 lb 3.2 oz (81.7 kg)                Current Outpatient Medications:     tobramycin (TOBREX) 0.3 % ophthalmic solution, Place 1 drop into the left eye every 4 hours for 10 days, Disp: 5 mL, Rfl: 0    ondansetron (ZOFRAN-ODT) 4 MG disintegrating tablet, DISSOLVE ONE TABLET BY MOUTH THREE TIMES A DAY AS NEEDED FOR NAUSEA AND VOMITING, Disp: 90 tablet, Rfl: 0    tamsulosin (FLOMAX) 0.4 MG capsule, Take 1 capsule by mouth daily (Patient not taking: No sig reported), Disp: 30 capsule, Rfl: 3    Nutritional Supplements ([employer]re[employer] STANDARD 1.4) LIQD, 1 Can by Enteral route 6 times daily [employer] 1.4, 6 cans daily, 121 mL/hr x 16 hours via J tube per pump (Patient taking differently: 2 Cans by Enteral route 6 times daily [employer] 1.4, 6 cans daily, 121 mL/hr x 16 hours via J tube per pump / Tying new samples from radiation today 11/01/22), Disp: 46181 mL, Rfl: 12    omeprazole (PRILOSEC) 20 MG delayed release capsule, Take 1 capsule by mouth 2 times daily (before meals) (Patient not taking: No sig reported), Disp: 180 capsule, Rfl: 1    fluorouracil (EFUDEX) 5 % cream, Apply twice daily to actinic keratosis for 3-4 weeks. Expect redness and irritation. , Disp: 40 g, Rfl: 1    Immunizations:    Influenza status:    []   Current   [x]   Patient declined    Pneumococcal status:  []   Current  [x]   Patient declined  Covid status:   [x]  Dose #1:                     [x]  Dose #2:               []   Patient declined    Smoking Status:    [] Smoker - PPD:   [] Nonsmoker - Quit Date:               [x] Never a smoker      Cancer Screening:  Colonoscopy   [x] Current       [] Not current   [] Not current, but scheduled   [] NA  Mammogram   [] Current       [] Not current   [] Not current, but scheduled   [x] NA  Prostate           [x] Current       [] Not current   [] Not current, but scheduled   [] NA  PAP/Pelvic      [] Current       [] Not current   [] Not current, but scheduled   [x] NA  Skin                 [] Current       [] Not current   [] Not current, but scheduled   [x] NA     Hormone:  Lupron []   Last dose given:           Next dose due:   Eligard []   Last dose given:           Next dose due:   Aromatase Inhibitors []   Medication name:   N/A:  [x]           FALLS RISK SCREEN  Instructions:  Assess the patient and enter the appropriate indicators that are present for fall risk identification. Total the numbers entered and assign a fall risk score from Table 2.  Reassess patient at a minimum every 12 weeks or with status change. Assessment   Date  12/2/2022     1. Mental Ability: confusion/cognitively impaired 0     2. Elimination Issues: incontinence, frequency 0       3. Ambulatory: use of assistive devices (walker, cane, off-loading devices),        attached to equipment (IV pole, oxygen) 0     4. Sensory Limitations: dizziness, vertigo, impaired vision 0     5. Age less than 65        0     6. Age 72 or greater 1     7. Medication: diuretics, strong analgesics, hypnotics, sedatives,        antihypertensive agents 0   8. Falls:  recent history of falls within the last 3 months (not to include slipping or        tripping) 0   TOTAL 1    If score of 4 or greater was education given? No           TABLE 2   Risk Score Risk Level Plan of Care   0-3 Little or  No Risk 1. Provide assistance as indicated for ambulation activities  2. Reorient confused/cognitively impaired patient  3. Chair/bed in low position, stretcher/bed with siderails up except when performing patient care activities  5. Educate patient/family/caregiver on falls prevention  6.  Reassess in 12 weeks or with any noted change in patient condition which places them at a risk for a fall   4-6 Moderate Risk 1. Provide assistance as indicated for ambulation activities  2. Reorient confused/cognitively impaired patient  3. Chair/bed in low position, stretcher/bed with siderails up except when performing patient care activities  4. Educate patient/family/caregiver on falls prevention     7 or   Higher High Risk 1. Place patient in easily observable treatment room  2. Patient attended at all times by family member or staff  3. Provide assistance as indicated for ambulation activities  4. Reorient confused/cognitively impaired patient  5. Chair/bed in low position, stretcher/bed with siderails up except when performing patient care activities  6. Educate patient/family/caregiver on falls prevention         PLAN: Patient is seen today for one month follow up from radiation therapy for esophageal cancer. He arrives ambulatory with steady gait. Denies any lingering side effects from radiation therapy. Using J tube and taking one container of nutritional supplement via J tube per day in addition to eating. States some discomfort to j tube site when using for supplement. This is not new. States preparing for surgery at [facility] but will have MRI there first.  He states appointments there not scheduled yet. Dr. Richy Padilla notified of assessment and examined patient. He will continue follow up with Medical Oncology at [address] [place] [address] and with [facility]. He will follow up with Radiation Oncology as needed.         Nelida Mendoza RN

## 2022-12-06 ENCOUNTER — TELEPHONE (OUTPATIENT)
Dept: INFUSION THERAPY | Age: 67
End: 2022-12-06

## 2022-12-06 NOTE — TELEPHONE ENCOUNTER
[facility] Nutrition Follow Up       Konstantin Grady is a 75 y.o.  male     NUTRITION RECOMMENDATIONS / MONITORING / EVALUATION  Continue with [employer] 1.4, as tolerated. Rx is written for  6 cartons daily to provide 2730 kcal and 120 g protein daily, run via pump at 121 mL/hr x 16 hours, though pt reports he is able to tolerate 2 cartons daily  (910 kcal, 48g protein)  2. Will monitor EN tolerance, tube site, po intakes, wts, labs, s/s, care plan      Subjective/Current Data:  RD called pt on listed phone number r/t nutrition follow up. Pt reports he is using J tube primarily for sole nutrition. Pt reports he is able to tolerate soups and soft foods by mouth and has been using 2 Sharlett Walter cartons daily. Reports he can't tolerate more than that d/t time constraints. Pt reports he has been trying to eat as much by mouth as possible to aid in weight maintenance. Pt states he can't tolerate consuming Sharlett Walter 1.4 by mouth d/t taste. Plans for surg eval at [facility] later this month. Recent Weights: Wt Readings from Last 3 Encounters:   12/02/22 180 lb 3.2 oz (81.7 kg)   11/29/22 179 lb 6.4 oz (81.4 kg)   11/18/22 181 lb (82.1 kg)       Goal: Adequate intake to aide in wt maintenaince, signs and symptoms management, and overall wellbeing.     Progress towards goal: stable    MYRA Santiago, RD, LD  Registered Dietitian   [facility]  [phone]

## 2022-12-07 ENCOUNTER — TELEPHONE (OUTPATIENT)
Dept: ONCOLOGY | Age: 67
End: 2022-12-07

## 2022-12-07 NOTE — TELEPHONE ENCOUNTER
Name: Oscar Lebron  : 1955  MRN: [id number]    Oncology Navigation Follow-Up Note    Contact Type:  Telephone    Notes: Writer called patient to check on him and to see how he's feeling. He states overall he feels good but his Jtube causes him some issues at times but he states its more irritating than anything. Pt is awaiting a call from [facility] for his preop testing and consult with Dr. Suzette Franco. Writer did update him that Tammy Lindsey from [facility] did call writer yesterday and to report that his testing is ordered and they would get him in this month and surgery will like be early . Pt informed and appreciative of information. Will continue to follow.     Electronically signed by Claudette Grim, RN on 2022 at 8:43 AM

## 2022-12-27 ENCOUNTER — TELEPHONE (OUTPATIENT)
Dept: ONCOLOGY | Age: 67
End: 2022-12-27

## 2022-12-27 NOTE — TELEPHONE ENCOUNTER
[facility] Nutrition Follow Up       Konstantin Grady is a 75 y.o.  male     NUTRITION RECOMMENDATIONS / MONITORING / EVALUATION  Continue with Shar[employer] 1.4, as tolerated. Rx is written for  6 cartons daily to provide 2730 kcal and 120 g protein daily, run via pump at 121 mL/hr x 16 hours, though pt reports he is able to tolerate 2 cartons daily  (910 kcal, 48g protein)  2. Will monitor EN tolerance, tube site, po intakes, wts, labs, s/s, care plan     Subjective/Current Data:  Pt reports still tolerating small amounts of PO intakes and utilizing 2 cartons of J tube daily. Weight has been stable. Pt reports he had follow up at [facility] last week and will have his surgery \"sometime in January. \" Pt reports after surgery, he will require sole EN. Will continue to follow. Recent Weights: Wt Readings from Last 3 Encounters:   12/02/22 180 lb 3.2 oz (81.7 kg)   11/29/22 179 lb 6.4 oz (81.4 kg)   11/18/22 181 lb (82.1 kg)       Goal: Adequate intake to aide in wt maintenaince, signs and symptoms management, and overall wellbeing.     Progress towards goal: stable    MYRA Santiago, RD, LD  Registered Dietitian   [facility]  [phone]

## 2023-01-04 ENCOUNTER — TELEPHONE (OUTPATIENT)
Dept: ONCOLOGY | Age: 68
End: 2023-01-04

## 2023-01-04 NOTE — TELEPHONE ENCOUNTER
Name: Bahman Villafana  : 1955  MRN: [id number]    Oncology Navigation Follow-Up Note    Contact Type:  Telephone    Notes: Writer called patient to f/u with him regarding plan for esophagectomy at [facility]. Pt confirms his surgery is scheduled for 2023. Writer will obtain post hospital f/u with Dr. Geremias Mckeon tomorrow when clinic reopens. Writer will send pt f/u time/date via text per pt request. Pt appreciative of f/u call, writer informed him I would check on him after surgery. Pt appreciative.      Electronically signed by Kei Ley RN on 2023 at 1:49 PM

## 2023-01-20 ENCOUNTER — TELEPHONE (OUTPATIENT)
Dept: ONCOLOGY | Age: 68
End: 2023-01-20

## 2023-01-20 NOTE — TELEPHONE ENCOUNTER
Name: Eliza Cortez  : 1955  MRN: [id number]    Oncology Navigation Follow-Up Note    Contact Type:  Telephone    Notes: Writer called patient to check on him since having his esophagectomy in [facility]. No answer, detailed VM left regarding above and also informing him of his post surgical f/u with Dr. Junior Wilcox on 2023. Will continue to follow.     Electronically signed by Thelma Jiang RN on 2023 at 3:45 PM

## 2023-01-20 NOTE — TELEPHONE ENCOUNTER
[facility] Oncology Nutrition Note:    Called pt on listed phone number r/t EN follow up. Chart indicates pt had sx at [facility] on 1/9. No answer; detailed message provided with return number. Addendum:  Pt returned RD phone call, reported that he got home from the hospital last night and is getting re-established at home. Pt states surgery went well and is tolerating J tube feedings. No concerns or questions noted at this time.       MYRA Galloway, RD, LD  Registered Dietitian   [facility]  [phone]

## 2023-01-31 ENCOUNTER — TELEPHONE (OUTPATIENT)
Dept: ONCOLOGY | Age: 68
End: 2023-01-31

## 2023-01-31 ENCOUNTER — OFFICE VISIT (OUTPATIENT)
Dept: ONCOLOGY | Age: 68
End: 2023-01-31
Payer: MEDICARE

## 2023-01-31 VITALS
WEIGHT: 172.5 LBS | DIASTOLIC BLOOD PRESSURE: 61 MMHG | RESPIRATION RATE: 18 BRPM | SYSTOLIC BLOOD PRESSURE: 99 MMHG | TEMPERATURE: 97.8 F | BODY MASS INDEX: 22.76 KG/M2 | HEART RATE: 72 BPM

## 2023-01-31 DIAGNOSIS — C15.9 ADENOSQUAMOUS CARCINOMA OF ESOPHAGUS (HCC): Primary | ICD-10-CM

## 2023-01-31 PROCEDURE — 99211 OFF/OP EST MAY X REQ PHY/QHP: CPT | Performed by: INTERNAL MEDICINE

## 2023-01-31 PROCEDURE — 1123F ACP DISCUSS/DSCN MKR DOCD: CPT | Performed by: INTERNAL MEDICINE

## 2023-01-31 PROCEDURE — 99214 OFFICE O/P EST MOD 30 MIN: CPT | Performed by: INTERNAL MEDICINE

## 2023-01-31 RX ORDER — OXYCODONE HCL 5 MG/5 ML
SOLUTION, ORAL ORAL
COMMUNITY
Start: 2023-01-27

## 2023-01-31 RX ORDER — AMIODARONE HYDROCHLORIDE 200 MG/1
TABLET ORAL
COMMUNITY
Start: 2023-01-19 | End: 2023-02-18

## 2023-01-31 NOTE — TELEPHONE ENCOUNTER
Nani Abreu MD VISIT  RV 6-8 weeks with CBC, CMP at RV  MD VISIT 3/14/23 @ [address] [place] [address] Drive @ 2:30PM  AVS PRINTED W/ INSTRUCTIONS AND GIVEN TO PT ON EXIT

## 2023-02-01 NOTE — PROGRESS NOTES
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 Complete response to induction chemoradiation. GERD  Past history of tobacco abuse   CURRENT THERAPY:         started combined chemoradiation using weekly Taxol/ Carboplatin to be followed by surgery. Chemoradiation started 9/27/22. Developed allergy to Taxol. Switched to Abraxane/ carboplatin. Chemoradiation completed November 3, 2022. 1/9/2023: Thoracotomy, Labelle Deyvi esophagectomy, pyloromyotomy, flap coverage of anastomosis (pedicled omentum), mediastinal lymph node sampling, j-tube exchange, left chest tube   BRIEF CASE HISTORY:      Mr. Aura Wong is a very pleasant 75 y.o. male with history of multiple co morbidities as listed. Patient is referred for evaluation and further management of recently diagnosed esophageal adenocarcinoma. The patient was doing fairly well except for mild chronic GERD. It was not significant so he did not pay attention to it and he did not receive any treatment for it. For the last few weeks patient started having difficulty swallowing especially solid food. He was evaluated by gastroenterology and he had EGD which showed suspicious lesion at the distal part of the esophagus at 36 cm. Biopsy from the lesion on August 1, 2022 showed adenocarcinoma. Patient is referred for further management. Patient denies any active bleeding. No melena or hematochezia. No hematemesis. No weight loss or decreased appetite. No fever or night sweats. No other complaints. Patient quit smoking more than 20 years ago. He drinks alcohol socially. INTERIM HISTORY:   Patient seen for follow-up esophageal cancer. Patient had surgical resection January 9, 2023. He was hospitalized for 9 days. He is recovering well.   He is having feeding tube. He is not allowed to eat yet. No fever. Pain is under control. PAST MEDICAL HISTORY: has a past medical history of Cancer [facility]), Dental root implant present, Elevated PSA, Hearing loss, Hiatal hernia, Keratosis, Prostate nodule, Snores, Under care of team, Wears dentures, Wears reading eyeglasses, and Wellness examination. PAST SURGICAL HISTORY: has a past surgical history that includes Colonoscopy (09/24/2008); Tonsillectomy; hernia repair; skin biopsy; Prostate biopsy; Prostate biopsy (N/A, 11/22/2021); Colonoscopy (N/A, 02/17/2022); Upper gastrointestinal endoscopy (N/A, 08/01/2022); Upper gastrointestinal endoscopy (N/A, 08/24/2022); Gastrojejunostomy w/ jejunostomy tube; Mediport insertion, single (Right); and Gastrostomy tube placement (N/A, 9/17/2022). CURRENT MEDICATIONS:  has a current medication list which includes the following prescription(s): amiodarone, tamsulosin, [employer] standard 1.4, omeprazole, oxycodone, and fluorouracil. ALLERGIES:  is allergic to paclitaxel. FAMILY HISTORY: Negative for any hematological or oncological conditions. SOCIAL HISTORY:  reports that he quit smoking about 21 years ago. His smoking use included cigarettes. He smoked an average of 1.00 packs per day. He has never used smokeless tobacco. He reports current alcohol use. He reports current drug use. Drug: Marijuana Seahali Alvarado). REVIEW OF SYSTEMS:     General: Positive for weakness and fatigue. No unanticipated weight loss or decreased appetite. No fever or chills. Eyes: No blurred vision, eye pain or double vision. Ears: No hearing problems or drainage. No tinnitus. Throat: No sore throat, problems with swallowing or dysphagia. Respiratory: No cough, sputum or hemoptysis. No shortness of breath. No pleuritic chest pain. Cardiovascular: No chest pain, orthopnea or PND. No lower extremity edema. No palpitation. Gastrointestinal: As above.     Genitourinary: No dysuria, hematuria, frequency or urgency. Musculoskeletal: No muscle aches or pains. No limitation of movement. No back pain. No gait disturbance, No joint complaints. Dermatologic: No skin rashes or pruritus. No skin lesions or discolorations. Psychiatric: No depression, anxiety, or stress or signs of schizophrenia. No change in mood or affect. Hematologic: No history of bleeding tendency. No bruises or ecchymosis. No history of clotting problems. Infectious disease: No fever, chills or frequent infections. Endocrine: No polydipsia or polyuria. No temperature intolerance. Neurologic: No headaches or dizziness. No weakness or numbness of the extremities. No changes in balance, coordination,  memory, mentation, behavior. Allergic/Immunologic: No nasal congestion or hives. No repeated infections. PHYSICAL EXAM:  The patient is not in acute distress. Vital signs: Blood pressure 99/61, pulse 72, temperature 97.8 °F (36.6 °C), temperature source Temporal, resp. rate 18, weight 172 lb 8 oz (78.2 kg). General appearance - well appearing, not in pain or distress  Mental status - good mood, alert and oriented  Eyes - pupils equal and reactive, extraocular eye movements intact  Ears - bilateral TM's and external ear canals normal  Nose - normal and patent, no erythema, discharge or polyps  Mouth - mucous membranes moist, pharynx normal without lesions  Neck - supple, no significant adenopathy  Lymphatics - no palpable lymphadenopathy, no hepatosplenomegaly  Chest -status post thoracotomy.   Clear to auscultation, no wheezes, rales or rhonchi, symmetric air entry  Heart - normal rate, regular rhythm, normal S1, S2, no murmurs, rubs, clicks or gallops  Abdomen - soft, nontender, nondistended, no masses or organomegaly  Neurological - alert, oriented, normal speech, no focal findings or movement disorder noted  Musculoskeletal - no joint tenderness, deformity or swelling  Extremities - peripheral pulses normal, no pedal edema, no clubbing or cyanosis  Skin - normal coloration and turgor, no rashes, no suspicious skin lesions noted     Review of Diagnostic data:   Lab Results   Component Value Date    WBC 3.5 11/04/2022    HGB 10.6 (L) 11/04/2022    HCT 33.7 (L) 11/04/2022    MCV 96.6 11/04/2022     11/04/2022       Chemistry        Component Value Date/Time     11/04/2022 0832    K 3.4 (L) 11/04/2022 0832     11/04/2022 0832    CO2 28 11/04/2022 0832    BUN 10 11/04/2022 0832    CREATININE 0.67 (L) 11/04/2022 0832        Component Value Date/Time    CALCIUM 9.2 11/04/2022 0832    ALKPHOS 75 11/04/2022 0832    AST 16 11/04/2022 0832    ALT 19 11/04/2022 0832    BILITOT 2.8 (H) 11/04/2022 0832          CT ABDOMEN PELVIS W IV CONTRAST Additional Contrast? Oral  Narrative: EXAMINATION:  CT OF THE CHEST WITH CONTRAST; CT OF THE ABDOMEN AND PELVIS WITH CONTRAST  11/23/2022 4:55 pm    TECHNIQUE:  CT of the chest was performed with the administration of intravenous  contrast. Multiplanar reformatted images are provided for review. Automated  exposure control, iterative reconstruction, and/or weight based adjustment of  the mA/kV was utilized to reduce the radiation dose to as low as reasonably  achievable.; CT of the abdomen and pelvis was performed with the  administration of intravenous contrast. Multiplanar reformatted images are  provided for review. Automated exposure control, iterative reconstruction,  and/or weight based adjustment of the mA/kV was utilized to reduce the  radiation dose to as low as reasonably achievable.     COMPARISON:  08/25/2022 PET-CT    HISTORY:  ORDERING SYSTEM PROVIDED HISTORY: Malignant neoplasm of lower third of  esophagus [facility])  TECHNOLOGIST PROVIDED HISTORY:  STAT Creatinine as needed:->Yes  Reason for Exam: H/O esophageal CA, pt on chemo.; ORDERING SYSTEM PROVIDED  HISTORY: Status post jejunostomy ([address]  TECHNOLOGIST PROVIDED HISTORY:    STAT Creatinine as needed:->Yes  j tube placement  Reason for Exam: H/O esophageal CA, pt on chemo. FINDINGS:    Chest:    Mediastinum:  Thyroid is grossly unremarkable within the limits of CT. Right  chest wall Port-A-Cath line with tip at the cavoatrial junction. Heart and  pericardium are unremarkable. No evidence of mediastinal, hilar or axillary  lymphadenopathy. The central airways are clear. There is a distal  esophageal/gastroesophageal junction soft tissue mass consistent with known  esophageal cancer without significant change in appearance since prior PET-CT. Lungs/pleura: Bibasilar subsegmental atelectasis. No acute consolidation or  pulmonary edema. No pleural effusion or pneumothorax. Soft Tissues/Bones:  No acute abnormality of the visualized osseous  structures. Abdomen/Pelvis:    Organs: Multiple scattered hepatic cysts stable since previous PET-CT. The  largest in the right hepatic lobe measuring approximately 7 cm in size. Liver otherwise enhances normally without evidence of hepatic mass. No  biliary ductal dilatation. Portal vein is patent. The gallbladder is  unremarkable. The spleen, pancreas and adrenal glands are unremarkable. The  kidneys enhance symmetrically without evidence of hydronephrosis. GI/Bowel: Large gastroesophageal junction mass consistent with known  esophageal cancer. The distal stomach is unremarkable. There is a  percutaneous jejunostomy tube present. No bowel obstruction. There is  diverticulosis without evidence of diverticulitis. Pelvis: No pelvic mass or lymphadenopathy. Enlarged prostate is again noted. Peritoneum/Retroperitoneum: No evidence of ascites or free air. There is a  small gastrohepatic ligament lymph node measuring approximately 9 mm on image  41 of series 3, stable compared to prior examination. No pathologically  enlarged lymphadenopathy. Aorta is normal in caliber.     Bones/Soft Tissues:  No acute abnormality of the visualized osseous  structures. Impression: Distal esophageal/gastroesophageal junction mass consistent with known  esophageal cancer. No specific CT evidence of distant metastatic disease. Borderline size  gastrohepatic ligament lymph node measuring 9 mm in short axis dimension is  stable since prior PET-CT where it demonstrated no FDG uptake. Enlarged prostate gland. Recommend correlation with PSA values. Extensive diverticulosis without evidence of acute diverticulitis. CT CHEST W CONTRAST  Narrative: EXAMINATION:  CT OF THE CHEST WITH CONTRAST; CT OF THE ABDOMEN AND PELVIS WITH CONTRAST  11/23/2022 4:55 pm    TECHNIQUE:  CT of the chest was performed with the administration of intravenous  contrast. Multiplanar reformatted images are provided for review. Automated  exposure control, iterative reconstruction, and/or weight based adjustment of  the mA/kV was utilized to reduce the radiation dose to as low as reasonably  achievable.; CT of the abdomen and pelvis was performed with the  administration of intravenous contrast. Multiplanar reformatted images are  provided for review. Automated exposure control, iterative reconstruction,  and/or weight based adjustment of the mA/kV was utilized to reduce the  radiation dose to as low as reasonably achievable. COMPARISON:  08/25/2022 PET-CT    HISTORY:  ORDERING SYSTEM PROVIDED HISTORY: Malignant neoplasm of lower third of  esophagus [facility])  TECHNOLOGIST PROVIDED HISTORY:  STAT Creatinine as needed:->Yes  Reason for Exam: H/O esophageal CA, pt on chemo.; ORDERING SYSTEM PROVIDED  HISTORY: Status post jejunostomy ([address]  TECHNOLOGIST PROVIDED HISTORY:    STAT Creatinine as needed:->Yes  j tube placement  Reason for Exam: H/O esophageal CA, pt on chemo. FINDINGS:    Chest:    Mediastinum:  Thyroid is grossly unremarkable within the limits of CT. Right  chest wall Port-A-Cath line with tip at the cavoatrial junction. Heart and  pericardium are unremarkable.  No evidence of mediastinal, hilar or axillary  lymphadenopathy. The central airways are clear. There is a distal  esophageal/gastroesophageal junction soft tissue mass consistent with known  esophageal cancer without significant change in appearance since prior PET-CT. Lungs/pleura: Bibasilar subsegmental atelectasis. No acute consolidation or  pulmonary edema. No pleural effusion or pneumothorax. Soft Tissues/Bones:  No acute abnormality of the visualized osseous  structures. Abdomen/Pelvis:    Organs: Multiple scattered hepatic cysts stable since previous PET-CT. The  largest in the right hepatic lobe measuring approximately 7 cm in size. Liver otherwise enhances normally without evidence of hepatic mass. No  biliary ductal dilatation. Portal vein is patent. The gallbladder is  unremarkable. The spleen, pancreas and adrenal glands are unremarkable. The  kidneys enhance symmetrically without evidence of hydronephrosis. GI/Bowel: Large gastroesophageal junction mass consistent with known  esophageal cancer. The distal stomach is unremarkable. There is a  percutaneous jejunostomy tube present. No bowel obstruction. There is  diverticulosis without evidence of diverticulitis. Pelvis: No pelvic mass or lymphadenopathy. Enlarged prostate is again noted. Peritoneum/Retroperitoneum: No evidence of ascites or free air. There is a  small gastrohepatic ligament lymph node measuring approximately 9 mm on image  41 of series 3, stable compared to prior examination. No pathologically  enlarged lymphadenopathy. Aorta is normal in caliber. Bones/Soft Tissues:  No acute abnormality of the visualized osseous  structures. Impression: Distal esophageal/gastroesophageal junction mass consistent with known  esophageal cancer. No specific CT evidence of distant metastatic disease.   Borderline size  gastrohepatic ligament lymph node measuring 9 mm in short axis dimension is  stable since prior PET-CT where it demonstrated no FDG uptake. Enlarged prostate gland. Recommend correlation with PSA values. Extensive diverticulosis without evidence of acute diverticulitis. 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 Complete response to induction chemoradiation. GERD  Past history of tobacco abuse    PLAN: Records, labs and images were reviewed and discussed with the patient. I explained to the patient the nature of esophageal cancer, staging, prognosis and treatment. Explained the results of the PET CT scan and the endoscopic ultrasound. Patient had induction combined chemoradiation with weekly Taxol carboplatin for locally advanced esophageal cancer. Patient had surgical resection at [facility] January 9, 2023. No complications. Pathology results showed complete response to induction chemoradiation. Patient will continue postoperative care at [facility]. Will monitor closely. I will see him in 6 to 8 weeks with repeated labs. Sooner for any problems. Patient's questions were answered to the best of his satisfaction and he verbalized full understanding and agreement.

## 2023-02-03 ENCOUNTER — TELEPHONE (OUTPATIENT)
Dept: ONCOLOGY | Age: 68
End: 2023-02-03

## 2023-02-03 NOTE — TELEPHONE ENCOUNTER
[facility] Nutrition Note:    RD received call from pt inquiring re: EN infusion bags. RD called [facility] on pt's behalf on this date ([phone]). RD spoke with [employer] rep for reorder to ship today.     MYRA Lujan, RD, LD  Registered Dietitian   [facility]  [phone]

## 2023-02-08 ENCOUNTER — TELEPHONE (OUTPATIENT)
Dept: ONCOLOGY | Age: 68
End: 2023-02-08

## 2023-03-06 ENCOUNTER — TELEPHONE (OUTPATIENT)
Dept: ONCOLOGY | Age: 68
End: 2023-03-06

## 2023-03-06 NOTE — TELEPHONE ENCOUNTER
[facility] Nutrition Note:    RD called pt on listed phone r/t nutrition follow up. Pt reports he is doing well and no longer requiring EN via J tube. Pt reports he has an appt 3/14 with [facility] for J tube removal. Pt denies any nutrition-related s/s. Pt inquired about where he can donate EN product. [facility] Pt appreciative of phone call & follow up.     MYRA Whitten, RD, LD  Registered Dietitian   [facility]  [phone]

## 2023-03-15 ENCOUNTER — HOSPITAL ENCOUNTER (OUTPATIENT)
Facility: MEDICAL CENTER | Age: 68
End: 2023-03-15
Payer: MEDICARE

## 2023-03-16 ENCOUNTER — TELEPHONE (OUTPATIENT)
Dept: DERMATOLOGY | Age: 68
End: 2023-03-16

## 2023-03-16 ENCOUNTER — OFFICE VISIT (OUTPATIENT)
Dept: ONCOLOGY | Age: 68
End: 2023-03-16
Payer: MEDICARE

## 2023-03-16 ENCOUNTER — TELEPHONE (OUTPATIENT)
Dept: ONCOLOGY | Age: 68
End: 2023-03-16

## 2023-03-16 ENCOUNTER — HOSPITAL ENCOUNTER (OUTPATIENT)
Dept: INFUSION THERAPY | Facility: MEDICAL CENTER | Age: 68
Discharge: HOME | End: 2023-03-16
Payer: MEDICARE

## 2023-03-16 VITALS
DIASTOLIC BLOOD PRESSURE: 77 MMHG | SYSTOLIC BLOOD PRESSURE: 117 MMHG | RESPIRATION RATE: 16 BRPM | WEIGHT: 174.8 LBS | HEART RATE: 70 BPM | BODY MASS INDEX: 23.06 KG/M2 | TEMPERATURE: 97.9 F

## 2023-03-16 DIAGNOSIS — C15.5 MALIGNANT NEOPLASM OF LOWER THIRD OF ESOPHAGUS (HCC): Primary | ICD-10-CM

## 2023-03-16 DIAGNOSIS — C15.9 ADENOSQUAMOUS CARCINOMA OF ESOPHAGUS (HCC): ICD-10-CM

## 2023-03-16 DIAGNOSIS — C44.90 SKIN CANCER: ICD-10-CM

## 2023-03-16 LAB
ABSOLUTE EOS #: 0.22 K/UL (ref 0–0.4)
ABSOLUTE IMMATURE GRANULOCYTE: 0.07 K/UL (ref 0–0.3)
ABSOLUTE LYMPH #: 0.72 K/UL (ref 1–4.8)
ABSOLUTE MONO #: 0.72 K/UL (ref 0.2–0.8)
ALBUMIN SERPL-MCNC: 4 G/DL (ref 3.5–5.2)
ALP SERPL-CCNC: 83 U/L (ref 40–129)
ALT SERPL-CCNC: 13 U/L (ref 5–41)
ANION GAP SERPL CALCULATED.3IONS-SCNC: 9 MMOL/L (ref 9–17)
AST SERPL-CCNC: 16 U/L
BASOPHILS # BLD: 1 %
BASOPHILS ABSOLUTE: 0.07 K/UL (ref 0–0.2)
BILIRUB SERPL-MCNC: 2.2 MG/DL (ref 0.3–1.2)
BUN SERPL-MCNC: 12 MG/DL (ref 8–23)
BUN/CREAT BLD: 16 (ref 9–20)
CALCIUM SERPL-MCNC: 9.3 MG/DL (ref 8.6–10.4)
CHLORIDE SERPL-SCNC: 103 MMOL/L (ref 98–107)
CO2 SERPL-SCNC: 29 MMOL/L (ref 20–31)
CREAT SERPL-MCNC: 0.73 MG/DL (ref 0.7–1.2)
EOSINOPHILS RELATIVE PERCENT: 3 % (ref 1–4)
GFR SERPL CREATININE-BSD FRML MDRD: >60 ML/MIN/1.73M2
GLUCOSE SERPL-MCNC: 109 MG/DL (ref 70–99)
HCT VFR BLD AUTO: 40.9 % (ref 40.7–50.3)
HGB BLD-MCNC: 12.9 G/DL (ref 13–17)
IMMATURE GRANULOCYTES: 1 %
LYMPHOCYTES # BLD: 10 % (ref 24–44)
MCH RBC QN AUTO: 30.8 PG (ref 25.2–33.5)
MCHC RBC AUTO-ENTMCNC: 31.5 G/DL (ref 28.4–34.8)
MCV RBC AUTO: 97.6 FL (ref 82.6–102.9)
MONOCYTES # BLD: 10 % (ref 1–7)
NRBC AUTOMATED: 0 PER 100 WBC
PDW BLD-RTO: 13.7 % (ref 11.8–14.4)
PLATELET # BLD AUTO: 264 K/UL (ref 138–453)
PMV BLD AUTO: 9.8 FL (ref 8.1–13.5)
POTASSIUM SERPL-SCNC: 4.2 MMOL/L (ref 3.7–5.3)
PROT SERPL-MCNC: 6.6 G/DL (ref 6.4–8.3)
RBC # BLD: 4.19 M/UL (ref 4.21–5.77)
SEG NEUTROPHILS: 75 % (ref 36–66)
SEGMENTED NEUTROPHILS ABSOLUTE COUNT: 5.4 K/UL (ref 1.8–7.7)
SODIUM SERPL-SCNC: 141 MMOL/L (ref 135–144)
WBC # BLD AUTO: 7.2 K/UL (ref 3.5–11.3)

## 2023-03-16 PROCEDURE — 36591 DRAW BLOOD OFF VENOUS DEVICE: CPT

## 2023-03-16 PROCEDURE — 85025 COMPLETE CBC W/AUTO DIFF WBC: CPT

## 2023-03-16 PROCEDURE — 80053 COMPREHEN METABOLIC PANEL: CPT

## 2023-03-16 PROCEDURE — 2580000003 HC RX 258: Performed by: INTERNAL MEDICINE

## 2023-03-16 PROCEDURE — 6360000002 HC RX W HCPCS: Performed by: INTERNAL MEDICINE

## 2023-03-16 PROCEDURE — 99211 OFF/OP EST MAY X REQ PHY/QHP: CPT | Performed by: INTERNAL MEDICINE

## 2023-03-16 RX ORDER — SODIUM CHLORIDE 0.9 % (FLUSH) 0.9 %
5-40 SYRINGE (ML) INJECTION PRN
Status: DISCONTINUED | OUTPATIENT
Start: 2023-03-16 | End: 2023-03-17 | Stop reason: HOSPADM

## 2023-03-16 RX ORDER — PANTOPRAZOLE SODIUM 40 MG/1
40 TABLET, DELAYED RELEASE ORAL DAILY
COMMUNITY

## 2023-03-16 RX ORDER — HEPARIN SODIUM (PORCINE) LOCK FLUSH IV SOLN 100 UNIT/ML 100 UNIT/ML
500 SOLUTION INTRAVENOUS PRN
Status: DISCONTINUED | OUTPATIENT
Start: 2023-03-16 | End: 2023-03-17 | Stop reason: HOSPADM

## 2023-03-16 RX ADMIN — SODIUM CHLORIDE, PRESERVATIVE FREE 10 ML: 5 INJECTION INTRAVENOUS at 16:42

## 2023-03-16 RX ADMIN — SODIUM CHLORIDE, PRESERVATIVE FREE 10 ML: 5 INJECTION INTRAVENOUS at 15:30

## 2023-03-16 RX ADMIN — HEPARIN 500 UNITS: 100 SYRINGE at 16:42

## 2023-03-16 NOTE — TELEPHONE ENCOUNTER
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  [place] STATES DUE TO THE DX PT SHOULD BE SCHEDULED ASAP HOWEVER THE  IS GONE FOR THE DAY BUT WILL CALL THE PT TOMORROW MORNING TO SCHEDULE APPOINTMENT  CT CHEST W/CONTRAST SCHEDULED WITH [facility]MAR FOR 06/16/22 @2PM ARRIVE BY 1:30PM [id number] 04/27/23 @2:30PM  RN DRAW CDP CMP 06/22/23 @1:30PM  MD VISIT 06/22/23 @2:15PM  AVS PRINTED AND GIVEN TO PATIENT WITH INSTRUCTIONS  PATIENT DISCHARGED AMBULATORY

## 2023-03-16 NOTE — PROGRESS NOTES
_      Chief Complaint   Patient presents with    Follow-up    Other     Would like port removed. Scab to posterior head would like examined. 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 Complete response to induction chemoradiation. GERD  Past history of tobacco abuse   CURRENT THERAPY:         started combined chemoradiation using weekly Taxol/ Carboplatin to be followed by surgery. Chemoradiation started 9/27/22. Developed allergy to Taxol. Switched to Abraxane/ carboplatin. Chemoradiation completed November 3, 2022. 1/9/2023: Thoracotomy, Casper Deyvi esophagectomy, pyloromyotomy, flap coverage of anastomosis (pedicled omentum), mediastinal lymph node sampling, j-tube exchange, left chest tube. J-tube removed March 9, 2023  BRIEF CASE HISTORY:      Mr. Leetta Shone is a very pleasant 75 y.o. male with history of multiple co morbidities as listed. Patient is referred for evaluation and further management of recently diagnosed esophageal adenocarcinoma. The patient was doing fairly well except for mild chronic GERD. It was not significant so he did not pay attention to it and he did not receive any treatment for it. For the last few weeks patient started having difficulty swallowing especially solid food. He was evaluated by gastroenterology and he had EGD which showed suspicious lesion at the distal part of the esophagus at 36 cm. Biopsy from the lesion on August 1, 2022 showed adenocarcinoma. Patient is referred for further management. Patient denies any active bleeding. No melena or hematochezia. No hematemesis. No weight loss or decreased appetite. No fever or night sweats. No other complaints. Patient quit smoking more than 20 years ago. He drinks alcohol socially. INTERIM HISTORY:   Patient seen for follow-up esophageal cancer.   Patient had surgical resection January 9, 2023. He was hospitalized for 9 days. He is recovering well. G-tube was removed last week. No complications. Patient is clinically fine. Very mild weight loss. Continues to have some problems with difficulty swallowing. Early satiety. No vomiting. PAST MEDICAL HISTORY: has a past medical history of Cancer [facility]), Dental root implant present, Elevated PSA, Hearing loss, Hiatal hernia, Keratosis, Prostate nodule, Snores, Under care of team, Wears dentures, Wears reading eyeglasses, and Wellness examination. PAST SURGICAL HISTORY: has a past surgical history that includes Colonoscopy (09/24/2008); Tonsillectomy; hernia repair; skin biopsy; Prostate biopsy; Prostate biopsy (N/A, 11/22/2021); Colonoscopy (N/A, 02/17/2022); Upper gastrointestinal endoscopy (N/A, 08/01/2022); Upper gastrointestinal endoscopy (N/A, 08/24/2022); Gastrojejunostomy w/ jejunostomy tube; Mediport insertion, single (Right); and Gastrostomy tube placement (N/A, 9/17/2022). CURRENT MEDICATIONS:  has a current medication list which includes the following prescription(s): pantoprazole, fluorouracil, and tamsulosin, and the following Facility-Administered Medications: heparin flush and sodium chloride flush. ALLERGIES:  is allergic to paclitaxel. FAMILY HISTORY: Negative for any hematological or oncological conditions. SOCIAL HISTORY:  reports that he quit smoking about 21 years ago. His smoking use included cigarettes. He smoked an average of 1.00 packs per day. He has never used smokeless tobacco. He reports current alcohol use. He reports current drug use. Drug: Marijuana Alex Aminata). REVIEW OF SYSTEMS:     General: Positive for weakness and fatigue. No unanticipated weight loss or decreased appetite. No fever or chills. Eyes: No blurred vision, eye pain or double vision. Ears: No hearing problems or drainage. No tinnitus. Throat: No sore throat, problems with swallowing or dysphagia. Respiratory: No cough, sputum or hemoptysis. No shortness of breath. No pleuritic chest pain. Cardiovascular: No chest pain, orthopnea or PND. No lower extremity edema. No palpitation. Gastrointestinal: As above. Genitourinary: No dysuria, hematuria, frequency or urgency. Musculoskeletal: No muscle aches or pains. No limitation of movement. No back pain. No gait disturbance, No joint complaints. Dermatologic: No skin rashes or pruritus. No skin lesions or discolorations. Psychiatric: No depression, anxiety, or stress or signs of schizophrenia. No change in mood or affect. Hematologic: No history of bleeding tendency. No bruises or ecchymosis. No history of clotting problems. Infectious disease: No fever, chills or frequent infections. Endocrine: No polydipsia or polyuria. No temperature intolerance. Neurologic: No headaches or dizziness. No weakness or numbness of the extremities. No changes in balance, coordination,  memory, mentation, behavior. Allergic/Immunologic: No nasal congestion or hives. No repeated infections. PHYSICAL EXAM:  The patient is not in acute distress. Vital signs: Blood pressure 117/77, pulse 70, temperature 97.9 °F (36.6 °C), temperature source Oral, resp. rate 16, weight 174 lb 12.8 oz (79.3 kg). General appearance - well appearing, not in pain or distress  Mental status - good mood, alert and oriented  Eyes - pupils equal and reactive, extraocular eye movements intact  Ears - bilateral TM's and external ear canals normal  Nose - normal and patent, no erythema, discharge or polyps  Mouth - mucous membranes moist, pharynx normal without lesions  Neck - supple, no significant adenopathy  Lymphatics - no palpable lymphadenopathy, no hepatosplenomegaly  Chest -status post thoracotomy.   Clear to auscultation, no wheezes, rales or rhonchi, symmetric air entry  Heart - normal rate, regular rhythm, normal S1, S2, no murmurs, rubs, clicks or gallops  Abdomen - soft, nontender, nondistended, no masses or organomegaly  Neurological - alert, oriented, normal speech, no focal findings or movement disorder noted  Musculoskeletal - no joint tenderness, deformity or swelling  Extremities - peripheral pulses normal, no pedal edema, no clubbing or cyanosis  Skin - normal coloration and turgor, no rashes, 1.5 cm abrasion on the scalp. Review of Diagnostic data:   Lab Results   Component Value Date    WBC 7.2 03/16/2023    HGB 12.9 (L) 03/16/2023    HCT 40.9 03/16/2023    MCV 97.6 03/16/2023     03/16/2023       Chemistry        Component Value Date/Time     03/16/2023 1530    K 4.2 03/16/2023 1530     03/16/2023 1530    CO2 29 03/16/2023 1530    BUN 12 03/16/2023 1530    CREATININE 0.73 03/16/2023 1530        Component Value Date/Time    CALCIUM 9.3 03/16/2023 1530    ALKPHOS 83 03/16/2023 1530    AST 16 03/16/2023 1530    ALT 13 03/16/2023 1530    BILITOT 2.2 (H) 03/16/2023 1530          CT ABDOMEN PELVIS W IV CONTRAST Additional Contrast? Oral  Narrative: EXAMINATION:  CT OF THE CHEST WITH CONTRAST; CT OF THE ABDOMEN AND PELVIS WITH CONTRAST  11/23/2022 4:55 pm    TECHNIQUE:  CT of the chest was performed with the administration of intravenous  contrast. Multiplanar reformatted images are provided for review. Automated  exposure control, iterative reconstruction, and/or weight based adjustment of  the mA/kV was utilized to reduce the radiation dose to as low as reasonably  achievable.; CT of the abdomen and pelvis was performed with the  administration of intravenous contrast. Multiplanar reformatted images are  provided for review. Automated exposure control, iterative reconstruction,  and/or weight based adjustment of the mA/kV was utilized to reduce the  radiation dose to as low as reasonably achievable.     COMPARISON:  08/25/2022 PET-CT    HISTORY:  ORDERING SYSTEM PROVIDED HISTORY: Malignant neoplasm of lower third of  esophagus [facility])  TECHNOLOGIST PROVIDED HISTORY:  STAT Creatinine as needed:->Yes  Reason for Exam: H/O esophageal CA, pt on chemo.; ORDERING SYSTEM PROVIDED  HISTORY: Status post jejunostomy ([address]  TECHNOLOGIST PROVIDED HISTORY:    STAT Creatinine as needed:->Yes  j tube placement  Reason for Exam: H/O esophageal CA, pt on chemo. FINDINGS:    Chest:    Mediastinum:  Thyroid is grossly unremarkable within the limits of CT. Right  chest wall Port-A-Cath line with tip at the cavoatrial junction. Heart and  pericardium are unremarkable. No evidence of mediastinal, hilar or axillary  lymphadenopathy. The central airways are clear. There is a distal  esophageal/gastroesophageal junction soft tissue mass consistent with known  esophageal cancer without significant change in appearance since prior PET-CT. Lungs/pleura: Bibasilar subsegmental atelectasis. No acute consolidation or  pulmonary edema. No pleural effusion or pneumothorax. Soft Tissues/Bones:  No acute abnormality of the visualized osseous  structures. Abdomen/Pelvis:    Organs: Multiple scattered hepatic cysts stable since previous PET-CT. The  largest in the right hepatic lobe measuring approximately 7 cm in size. Liver otherwise enhances normally without evidence of hepatic mass. No  biliary ductal dilatation. Portal vein is patent. The gallbladder is  unremarkable. The spleen, pancreas and adrenal glands are unremarkable. The  kidneys enhance symmetrically without evidence of hydronephrosis. GI/Bowel: Large gastroesophageal junction mass consistent with known  esophageal cancer. The distal stomach is unremarkable. There is a  percutaneous jejunostomy tube present. No bowel obstruction. There is  diverticulosis without evidence of diverticulitis. Pelvis: No pelvic mass or lymphadenopathy. Enlarged prostate is again noted. Peritoneum/Retroperitoneum: No evidence of ascites or free air.   There is a  small gastrohepatic ligament lymph node measuring approximately 9 mm on image  41 of series 3, stable compared to prior examination. No pathologically  enlarged lymphadenopathy. Aorta is normal in caliber. Bones/Soft Tissues:  No acute abnormality of the visualized osseous  structures. Impression: Distal esophageal/gastroesophageal junction mass consistent with known  esophageal cancer. No specific CT evidence of distant metastatic disease. Borderline size  gastrohepatic ligament lymph node measuring 9 mm in short axis dimension is  stable since prior PET-CT where it demonstrated no FDG uptake. Enlarged prostate gland. Recommend correlation with PSA values. Extensive diverticulosis without evidence of acute diverticulitis. CT CHEST W CONTRAST  Narrative: EXAMINATION:  CT OF THE CHEST WITH CONTRAST; CT OF THE ABDOMEN AND PELVIS WITH CONTRAST  11/23/2022 4:55 pm    TECHNIQUE:  CT of the chest was performed with the administration of intravenous  contrast. Multiplanar reformatted images are provided for review. Automated  exposure control, iterative reconstruction, and/or weight based adjustment of  the mA/kV was utilized to reduce the radiation dose to as low as reasonably  achievable.; CT of the abdomen and pelvis was performed with the  administration of intravenous contrast. Multiplanar reformatted images are  provided for review. Automated exposure control, iterative reconstruction,  and/or weight based adjustment of the mA/kV was utilized to reduce the  radiation dose to as low as reasonably achievable.     COMPARISON:  08/25/2022 PET-CT    HISTORY:  ORDERING SYSTEM PROVIDED HISTORY: Malignant neoplasm of lower third of  esophagus [facility])  TECHNOLOGIST PROVIDED HISTORY:  STAT Creatinine as needed:->Yes  Reason for Exam: H/O esophageal CA, pt on chemo.; ORDERING SYSTEM PROVIDED  HISTORY: Status post jejunostomy ([address]  TECHNOLOGIST PROVIDED HISTORY:    STAT Creatinine as needed:->Yes  j tube placement  Reason for Exam: H/O esophageal CA, pt on chemo.    FINDINGS:    Chest:    Mediastinum:  Thyroid is grossly unremarkable within the limits of CT. Right  chest wall Port-A-Cath line with tip at the cavoatrial junction. Heart and  pericardium are unremarkable. No evidence of mediastinal, hilar or axillary  lymphadenopathy. The central airways are clear. There is a distal  esophageal/gastroesophageal junction soft tissue mass consistent with known  esophageal cancer without significant change in appearance since prior PET-CT. Lungs/pleura: Bibasilar subsegmental atelectasis. No acute consolidation or  pulmonary edema. No pleural effusion or pneumothorax. Soft Tissues/Bones:  No acute abnormality of the visualized osseous  structures. Abdomen/Pelvis:    Organs: Multiple scattered hepatic cysts stable since previous PET-CT. The  largest in the right hepatic lobe measuring approximately 7 cm in size. Liver otherwise enhances normally without evidence of hepatic mass. No  biliary ductal dilatation. Portal vein is patent. The gallbladder is  unremarkable. The spleen, pancreas and adrenal glands are unremarkable. The  kidneys enhance symmetrically without evidence of hydronephrosis. GI/Bowel: Large gastroesophageal junction mass consistent with known  esophageal cancer. The distal stomach is unremarkable. There is a  percutaneous jejunostomy tube present. No bowel obstruction. There is  diverticulosis without evidence of diverticulitis. Pelvis: No pelvic mass or lymphadenopathy. Enlarged prostate is again noted. Peritoneum/Retroperitoneum: No evidence of ascites or free air. There is a  small gastrohepatic ligament lymph node measuring approximately 9 mm on image  41 of series 3, stable compared to prior examination. No pathologically  enlarged lymphadenopathy. Aorta is normal in caliber. Bones/Soft Tissues:  No acute abnormality of the visualized osseous  structures.   Impression: Distal esophageal/gastroesophageal junction mass consistent with known  esophageal cancer. No specific CT evidence of distant metastatic disease. Borderline size  gastrohepatic ligament lymph node measuring 9 mm in short axis dimension is  stable since prior PET-CT where it demonstrated no FDG uptake. Enlarged prostate gland. Recommend correlation with PSA values. Extensive diverticulosis without evidence of acute diverticulitis. 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 Complete response to induction chemoradiation. 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 Referred to dermatology. PLAN: Records, labs and images were reviewed and discussed with the patient. I explained to the patient the nature of esophageal cancer, staging, prognosis and treatment. Explained the results of the PET CT scan and the endoscopic ultrasound. Patient had induction combined chemoradiation with weekly Taxol carboplatin for locally advanced esophageal cancer. Patient had surgical resection at [facility] January 9, 2023. No complications. Pathology results showed complete response to induction chemoradiation. Patient will continue postoperative care at [facility]. He will be seen again at [facility] clinic in 3 months for EGD. Will monitor closely. I will repeat his chest CT scan in 3 months. Refer to Dr Anitha Dowling for scalp lesion. I will see him in 3 months repeated labs. Sooner for any problems. Patient's questions were answered to the best of his satisfaction and he verbalized full understanding and agreement.

## 2023-03-21 ENCOUNTER — OFFICE VISIT (OUTPATIENT)
Dept: DERMATOLOGY | Age: 68
End: 2023-03-21
Payer: MEDICARE

## 2023-03-21 VITALS
WEIGHT: 175 LBS | HEIGHT: 73 IN | DIASTOLIC BLOOD PRESSURE: 80 MMHG | SYSTOLIC BLOOD PRESSURE: 126 MMHG | BODY MASS INDEX: 23.19 KG/M2 | TEMPERATURE: 97.7 F | HEART RATE: 75 BPM | OXYGEN SATURATION: 98 %

## 2023-03-21 DIAGNOSIS — D48.9 NEOPLASM OF UNCERTAIN BEHAVIOR: Primary | ICD-10-CM

## 2023-03-21 DIAGNOSIS — L57.0 ACTINIC KERATOSES: ICD-10-CM

## 2023-03-21 DIAGNOSIS — L57.8 ACTINIC SKIN DAMAGE: ICD-10-CM

## 2023-03-21 PROCEDURE — 99203 OFFICE O/P NEW LOW 30 MIN: CPT | Performed by: PHYSICIAN ASSISTANT

## 2023-03-21 PROCEDURE — 3017F COLORECTAL CA SCREEN DOC REV: CPT | Performed by: PHYSICIAN ASSISTANT

## 2023-03-21 PROCEDURE — G8427 DOCREV CUR MEDS BY ELIG CLIN: HCPCS | Performed by: PHYSICIAN ASSISTANT

## 2023-03-21 PROCEDURE — 1123F ACP DISCUSS/DSCN MKR DOCD: CPT | Performed by: PHYSICIAN ASSISTANT

## 2023-03-21 PROCEDURE — 11102 TANGNTL BX SKIN SINGLE LES: CPT | Performed by: PHYSICIAN ASSISTANT

## 2023-03-21 PROCEDURE — 1036F TOBACCO NON-USER: CPT | Performed by: PHYSICIAN ASSISTANT

## 2023-03-21 PROCEDURE — G8420 CALC BMI NORM PARAMETERS: HCPCS | Performed by: PHYSICIAN ASSISTANT

## 2023-03-21 PROCEDURE — G8484 FLU IMMUNIZE NO ADMIN: HCPCS | Performed by: PHYSICIAN ASSISTANT

## 2023-03-21 RX ORDER — LIDOCAINE HYDROCHLORIDE AND EPINEPHRINE 10; 10 MG/ML; UG/ML
2 INJECTION, SOLUTION INFILTRATION; PERINEURAL ONCE
Status: SHIPPED | OUTPATIENT
Start: 2023-03-21

## 2023-03-21 NOTE — PROGRESS NOTES
Dermatology Patient Note  [address]  [address] [place] [id number]  Dept: [phone]  Dept Fax: [phone]      VISITDATE: 3/21/2023   REFERRING PROVIDER: No ref. provider found      Judy Anguiano is a 75 y.o. male  who presents today in the office for:    New Patient and Malignant Neoplasm (On top of head )      HISTORY OF PRESENT ILLNESS:  Right scalp vertex lesion x months. Increasing in size. Tender to minor trauma.   Pt has treated scalp, w/ 5-FU, in the past.    MEDICAL PROBLEMS:  Patient Active Problem List    Diagnosis Date Noted    Facial droop 09/29/2022     Priority: Medium    Encounter for gastrojejunal tube placement 09/17/2022     Priority: Medium    Esophageal cancer ([facility] 09/17/2022     Priority: Medium    Malignant neoplasm of lower third of esophagus ([facility] 08/18/2022     Priority: Medium    Esophageal mass 08/17/2022     Priority: Medium    Adenosquamous carcinoma of esophagus ([facility] [address] [facility] 08/17/2022     Priority: Medium    Hiatal hernia 07/18/2022     Priority: Medium    Arthritis of lumbar spine 07/18/2022     Priority: Medium    Benign cyst of right kidney 07/18/2022     Priority: Medium    Liver cyst 07/18/2022     Priority: Medium    Gastroesophageal reflux disease 07/13/2022     Priority: Medium    Abdominal pain, generalized 07/13/2022     Priority: Medium    Epidermoid cyst of finger of left hand 11/17/2021    Asbestos exposure 05/13/2021    Class 1 obesity due to excess calories without serious comorbidity with body mass index (BMI) of 32.0 to 32.9 in adult 05/13/2021    Former smoker 05/13/2021    Alcohol use 05/13/2021    BPH (benign prostatic hyperplasia) 11/11/2014       CURRENT MEDICATIONS:   Current Outpatient Medications   Medication Sig Dispense Refill    pantoprazole (PROTONIX) 40 MG tablet Take 40 mg by mouth daily      tamsulosin (FLOMAX) 0.4 MG capsule Take 1 capsule by mouth

## 2023-03-22 ENCOUNTER — NURSE ONLY (OUTPATIENT)
Dept: LAB | Age: 68
End: 2023-03-22

## 2023-03-24 ENCOUNTER — TELEPHONE (OUTPATIENT)
Dept: DERMATOLOGY | Age: 68
End: 2023-03-24

## 2023-03-24 NOTE — TELEPHONE ENCOUNTER
Patient came in today and  said he didnt know why he was here for 3/24/23 appointment because his concerns were already adressed at previous appointment he recently had. we do not have any results for patient at this time and patient said nothing has changed.  per Julissa Benedict pt will not be charged for this appt, & OK to cancel appointment

## 2023-04-21 ENCOUNTER — TELEPHONE (OUTPATIENT)
Dept: DERMATOLOGY | Age: 68
End: 2023-04-21

## 2023-04-21 DIAGNOSIS — C44.42 SQUAMOUS CELL CARCINOMA OF SCALP: Primary | ICD-10-CM

## 2023-04-27 ENCOUNTER — HOSPITAL ENCOUNTER (OUTPATIENT)
Dept: INFUSION THERAPY | Facility: MEDICAL CENTER | Age: 68
Discharge: HOME | End: 2023-04-27
Payer: MEDICARE

## 2023-04-27 VITALS
HEART RATE: 66 BPM | RESPIRATION RATE: 18 BRPM | TEMPERATURE: 98.6 F | DIASTOLIC BLOOD PRESSURE: 58 MMHG | SYSTOLIC BLOOD PRESSURE: 98 MMHG

## 2023-04-27 DIAGNOSIS — C15.9 ADENOSQUAMOUS CARCINOMA OF ESOPHAGUS (HCC): Primary | ICD-10-CM

## 2023-04-27 PROCEDURE — 6360000002 HC RX W HCPCS: Performed by: INTERNAL MEDICINE

## 2023-04-27 PROCEDURE — 2580000003 HC RX 258: Performed by: INTERNAL MEDICINE

## 2023-04-27 PROCEDURE — 96523 IRRIG DRUG DELIVERY DEVICE: CPT

## 2023-04-27 RX ORDER — HEPARIN SODIUM (PORCINE) LOCK FLUSH IV SOLN 100 UNIT/ML 100 UNIT/ML
500 SOLUTION INTRAVENOUS PRN
Status: DISCONTINUED | OUTPATIENT
Start: 2023-04-27 | End: 2023-04-28 | Stop reason: HOSPADM

## 2023-04-27 RX ORDER — SODIUM CHLORIDE 0.9 % (FLUSH) 0.9 %
5-40 SYRINGE (ML) INJECTION PRN
Status: DISCONTINUED | OUTPATIENT
Start: 2023-04-27 | End: 2023-04-28 | Stop reason: HOSPADM

## 2023-04-27 RX ORDER — HEPARIN SODIUM (PORCINE) LOCK FLUSH IV SOLN 100 UNIT/ML 100 UNIT/ML
500 SOLUTION INTRAVENOUS PRN
OUTPATIENT
Start: 2023-04-27

## 2023-04-27 RX ORDER — SODIUM CHLORIDE 9 MG/ML
5-250 INJECTION, SOLUTION INTRAVENOUS PRN
OUTPATIENT
Start: 2023-04-27

## 2023-04-27 RX ORDER — SODIUM CHLORIDE 0.9 % (FLUSH) 0.9 %
5-40 SYRINGE (ML) INJECTION PRN
OUTPATIENT
Start: 2023-04-27

## 2023-04-27 RX ADMIN — SODIUM CHLORIDE, PRESERVATIVE FREE 30 ML: 5 INJECTION INTRAVENOUS at 14:59

## 2023-04-27 RX ADMIN — HEPARIN 500 UNITS: 100 SYRINGE at 15:01

## 2023-04-27 RX ADMIN — SODIUM CHLORIDE, PRESERVATIVE FREE 20 ML: 5 INJECTION INTRAVENOUS at 15:01

## 2023-04-27 NOTE — PROGRESS NOTES
PT ARRIVES PER AMB PER SELF AND STATES DOING WELL AND PT TOLERATED PORT FLUSH WELL AND DONE PER PROTOCOL AND PT HAS NEXT APPTS AND PT DISCHARGED PER AMB PER SELF.
No

## 2023-05-09 ENCOUNTER — HOSPITAL ENCOUNTER (EMERGENCY)
Age: 68
Discharge: ANOTHER HOSPITAL | DRG: 054 | End: 2023-05-10
Attending: EMERGENCY MEDICINE
Payer: MEDICARE

## 2023-05-09 DIAGNOSIS — R90.0 INTRACRANIAL MASS: Primary | ICD-10-CM

## 2023-05-09 DIAGNOSIS — S01.01XA LACERATION OF SCALP, INITIAL ENCOUNTER: ICD-10-CM

## 2023-05-09 PROCEDURE — 99285 EMERGENCY DEPT VISIT HI MDM: CPT

## 2023-05-09 PROCEDURE — 12002 RPR S/N/AX/GEN/TRNK2.6-7.5CM: CPT

## 2023-05-09 ASSESSMENT — PAIN - FUNCTIONAL ASSESSMENT: PAIN_FUNCTIONAL_ASSESSMENT: 0-10

## 2023-05-09 ASSESSMENT — PAIN SCALES - GENERAL: PAINLEVEL_OUTOF10: 4

## 2023-05-10 ENCOUNTER — APPOINTMENT (OUTPATIENT)
Dept: GENERAL RADIOLOGY | Age: 68
DRG: 054 | End: 2023-05-10
Payer: MEDICARE

## 2023-05-10 ENCOUNTER — APPOINTMENT (OUTPATIENT)
Dept: MRI IMAGING | Age: 68
DRG: 054 | End: 2023-05-10
Attending: STUDENT IN AN ORGANIZED HEALTH CARE EDUCATION/TRAINING PROGRAM
Payer: MEDICARE

## 2023-05-10 ENCOUNTER — APPOINTMENT (OUTPATIENT)
Dept: CT IMAGING | Age: 68
DRG: 054 | End: 2023-05-10
Payer: MEDICARE

## 2023-05-10 ENCOUNTER — APPOINTMENT (OUTPATIENT)
Dept: CT IMAGING | Age: 68
DRG: 054 | End: 2023-05-10
Attending: STUDENT IN AN ORGANIZED HEALTH CARE EDUCATION/TRAINING PROGRAM
Payer: MEDICARE

## 2023-05-10 ENCOUNTER — HOSPITAL ENCOUNTER (INPATIENT)
Age: 68
LOS: 2 days | Discharge: HOME | DRG: 054 | End: 2023-05-12
Attending: STUDENT IN AN ORGANIZED HEALTH CARE EDUCATION/TRAINING PROGRAM | Admitting: STUDENT IN AN ORGANIZED HEALTH CARE EDUCATION/TRAINING PROGRAM
Payer: MEDICARE

## 2023-05-10 VITALS
OXYGEN SATURATION: 98 % | BODY MASS INDEX: 23.09 KG/M2 | TEMPERATURE: 97.8 F | RESPIRATION RATE: 18 BRPM | DIASTOLIC BLOOD PRESSURE: 86 MMHG | SYSTOLIC BLOOD PRESSURE: 120 MMHG | HEART RATE: 65 BPM | WEIGHT: 175 LBS

## 2023-05-10 DIAGNOSIS — K20.90 ESOPHAGITIS: ICD-10-CM
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
## 2023-05-10 PROBLEM — R90.0 INTRACRANIAL MASS: Status: ACTIVE | Noted: 2023-05-10

## 2023-05-10 PROBLEM — L98.9 SKIN LESION: Status: ACTIVE | Noted: 2023-05-10

## 2023-05-10 PROBLEM — G93.6 VASOGENIC BRAIN EDEMA (HCC): Status: ACTIVE | Noted: 2023-05-10

## 2023-05-10 LAB
ANION GAP SERPL CALCULATED.3IONS-SCNC: 11 MMOL/L (ref 9–17)
BUN SERPL-MCNC: 12 MG/DL (ref 8–23)
CALCIUM SERPL-MCNC: 8.7 MG/DL (ref 8.6–10.4)
CHLORIDE SERPL-SCNC: 105 MMOL/L (ref 98–107)
CO2 SERPL-SCNC: 21 MMOL/L (ref 20–31)
CREAT SERPL-MCNC: 0.59 MG/DL (ref 0.7–1.2)
GFR SERPL CREATININE-BSD FRML MDRD: >60 ML/MIN/1.73M2
GLUCOSE BLD-MCNC: 108 MG/DL (ref 75–110)
GLUCOSE BLD-MCNC: 170 MG/DL (ref 75–110)
GLUCOSE BLD-MCNC: 224 MG/DL (ref 75–110)
GLUCOSE SERPL-MCNC: 159 MG/DL (ref 70–99)
POTASSIUM SERPL-SCNC: 4.2 MMOL/L (ref 3.7–5.3)
SODIUM SERPL-SCNC: 137 MMOL/L (ref 135–144)

## 2023-05-10 PROCEDURE — 94761 N-INVAS EAR/PLS OXIMETRY MLT: CPT

## 2023-05-10 PROCEDURE — 99222 1ST HOSP IP/OBS MODERATE 55: CPT | Performed by: PSYCHIATRY & NEUROLOGY

## 2023-05-10 PROCEDURE — 99213 OFFICE O/P EST LOW 20 MIN: CPT

## 2023-05-10 PROCEDURE — 0HQ0XZZ REPAIR SCALP SKIN, EXTERNAL APPROACH: ICD-10-PCS | Performed by: EMERGENCY MEDICINE

## 2023-05-10 PROCEDURE — 2060000000 HC ICU INTERMEDIATE R&B

## 2023-05-10 PROCEDURE — 99222 1ST HOSP IP/OBS MODERATE 55: CPT | Performed by: NURSE PRACTITIONER

## 2023-05-10 PROCEDURE — 36415 COLL VENOUS BLD VENIPUNCTURE: CPT

## 2023-05-10 PROCEDURE — 2580000003 HC RX 258: Performed by: NURSE PRACTITIONER

## 2023-05-10 PROCEDURE — 6360000004 HC RX CONTRAST MEDICATION: Performed by: STUDENT IN AN ORGANIZED HEALTH CARE EDUCATION/TRAINING PROGRAM

## 2023-05-10 PROCEDURE — 6370000000 HC RX 637 (ALT 250 FOR IP): Performed by: NURSE PRACTITIONER

## 2023-05-10 PROCEDURE — 82947 ASSAY GLUCOSE BLOOD QUANT: CPT

## 2023-05-10 PROCEDURE — 70553 MRI BRAIN STEM W/O & W/DYE: CPT

## 2023-05-10 PROCEDURE — 6360000002 HC RX W HCPCS: Performed by: NURSE PRACTITIONER

## 2023-05-10 PROCEDURE — 74177 CT ABD & PELVIS W/CONTRAST: CPT

## 2023-05-10 PROCEDURE — 73110 X-RAY EXAM OF WRIST: CPT

## 2023-05-10 PROCEDURE — 4500000002 HC ER NO CHARGE

## 2023-05-10 PROCEDURE — 73130 X-RAY EXAM OF HAND: CPT

## 2023-05-10 PROCEDURE — 72125 CT NECK SPINE W/O DYE: CPT

## 2023-05-10 PROCEDURE — 80048 BASIC METABOLIC PNL TOTAL CA: CPT

## 2023-05-10 PROCEDURE — 6360000004 HC RX CONTRAST MEDICATION: Performed by: NURSE PRACTITIONER

## 2023-05-10 PROCEDURE — A9579 GAD-BASE MR CONTRAST NOS,1ML: HCPCS | Performed by: NURSE PRACTITIONER

## 2023-05-10 PROCEDURE — 70450 CT HEAD/BRAIN W/O DYE: CPT

## 2023-05-10 PROCEDURE — 99223 1ST HOSP IP/OBS HIGH 75: CPT | Performed by: INTERNAL MEDICINE

## 2023-05-10 RX ORDER — DEXAMETHASONE SODIUM PHOSPHATE 4 MG/ML
4 INJECTION, SOLUTION INTRA-ARTICULAR; INTRALESIONAL; INTRAMUSCULAR; INTRAVENOUS; SOFT TISSUE EVERY 6 HOURS
Status: DISCONTINUED | OUTPATIENT
Start: 2023-05-10 | End: 2023-05-12 | Stop reason: HOSPADM

## 2023-05-10 RX ORDER — INSULIN LISPRO 100 [IU]/ML
0-4 INJECTION, SOLUTION INTRAVENOUS; SUBCUTANEOUS
Status: DISCONTINUED | OUTPATIENT
Start: 2023-05-10 | End: 2023-05-12 | Stop reason: HOSPADM

## 2023-05-10 RX ORDER — ACETAMINOPHEN 650 MG/1
650 SUPPOSITORY RECTAL EVERY 6 HOURS PRN
Status: DISCONTINUED | OUTPATIENT
Start: 2023-05-10 | End: 2023-05-12 | Stop reason: HOSPADM

## 2023-05-10 RX ORDER — PANTOPRAZOLE SODIUM 40 MG/1
40 TABLET, DELAYED RELEASE ORAL DAILY
Status: DISCONTINUED | OUTPATIENT
Start: 2023-05-10 | End: 2023-05-11

## 2023-05-10 RX ORDER — ACETAMINOPHEN 325 MG/1
650 TABLET ORAL EVERY 6 HOURS PRN
Status: DISCONTINUED | OUTPATIENT
Start: 2023-05-10 | End: 2023-05-12 | Stop reason: HOSPADM

## 2023-05-10 RX ORDER — SODIUM CHLORIDE 9 MG/ML
INJECTION, SOLUTION INTRAVENOUS CONTINUOUS
Status: DISCONTINUED | OUTPATIENT
Start: 2023-05-10 | End: 2023-05-10

## 2023-05-10 RX ORDER — DEXTROSE MONOHYDRATE 100 MG/ML
INJECTION, SOLUTION INTRAVENOUS CONTINUOUS PRN
Status: DISCONTINUED | OUTPATIENT
Start: 2023-05-10 | End: 2023-05-12 | Stop reason: HOSPADM

## 2023-05-10 RX ORDER — ENOXAPARIN SODIUM 100 MG/ML
40 INJECTION SUBCUTANEOUS DAILY
Status: DISCONTINUED | OUTPATIENT
Start: 2023-05-10 | End: 2023-05-12 | Stop reason: HOSPADM

## 2023-05-10 RX ORDER — SODIUM CHLORIDE 0.9 % (FLUSH) 0.9 %
5-40 SYRINGE (ML) INJECTION EVERY 12 HOURS SCHEDULED
Status: DISCONTINUED | OUTPATIENT
Start: 2023-05-10 | End: 2023-05-12 | Stop reason: HOSPADM

## 2023-05-10 RX ORDER — LIDOCAINE HYDROCHLORIDE AND EPINEPHRINE 10; 10 MG/ML; UG/ML
20 INJECTION, SOLUTION INFILTRATION; PERINEURAL ONCE
Status: DISCONTINUED | OUTPATIENT
Start: 2023-05-10 | End: 2023-05-10 | Stop reason: HOSPADM

## 2023-05-10 RX ORDER — ONDANSETRON 4 MG/1
4 TABLET, ORALLY DISINTEGRATING ORAL EVERY 8 HOURS PRN
Status: DISCONTINUED | OUTPATIENT
Start: 2023-05-10 | End: 2023-05-12 | Stop reason: HOSPADM

## 2023-05-10 RX ORDER — SODIUM CHLORIDE 9 MG/ML
INJECTION, SOLUTION INTRAVENOUS PRN
Status: DISCONTINUED | OUTPATIENT
Start: 2023-05-10 | End: 2023-05-12 | Stop reason: HOSPADM

## 2023-05-10 RX ORDER — ONDANSETRON 2 MG/ML
4 INJECTION INTRAMUSCULAR; INTRAVENOUS EVERY 6 HOURS PRN
Status: DISCONTINUED | OUTPATIENT
Start: 2023-05-10 | End: 2023-05-12 | Stop reason: HOSPADM

## 2023-05-10 RX ORDER — POLYETHYLENE GLYCOL 3350 17 G/17G
17 POWDER, FOR SOLUTION ORAL DAILY PRN
Status: DISCONTINUED | OUTPATIENT
Start: 2023-05-10 | End: 2023-05-12 | Stop reason: HOSPADM

## 2023-05-10 RX ORDER — SODIUM CHLORIDE 0.9 % (FLUSH) 0.9 %
5-40 SYRINGE (ML) INJECTION PRN
Status: DISCONTINUED | OUTPATIENT
Start: 2023-05-10 | End: 2023-05-12 | Stop reason: HOSPADM

## 2023-05-10 RX ORDER — INSULIN LISPRO 100 [IU]/ML
0-4 INJECTION, SOLUTION INTRAVENOUS; SUBCUTANEOUS NIGHTLY
Status: DISCONTINUED | OUTPATIENT
Start: 2023-05-10 | End: 2023-05-12 | Stop reason: HOSPADM

## 2023-05-10 RX ORDER — LEVETIRACETAM 5 MG/ML
500 INJECTION INTRAVASCULAR EVERY 12 HOURS
Status: DISCONTINUED | OUTPATIENT
Start: 2023-05-10 | End: 2023-05-12 | Stop reason: HOSPADM

## 2023-05-10 RX ORDER — SODIUM CHLORIDE 0.9 % (FLUSH) 0.9 %
10 SYRINGE (ML) INJECTION PRN
Status: DISCONTINUED | OUTPATIENT
Start: 2023-05-10 | End: 2023-05-12 | Stop reason: HOSPADM

## 2023-05-10 RX ADMIN — ENOXAPARIN SODIUM 40 MG: 100 INJECTION SUBCUTANEOUS at 16:38

## 2023-05-10 RX ADMIN — LEVETIRACETAM 500 MG: 5 INJECTION, SOLUTION INTRAVENOUS at 13:57

## 2023-05-10 RX ADMIN — DEXAMETHASONE SODIUM PHOSPHATE 4 MG: 4 INJECTION, SOLUTION INTRAMUSCULAR; INTRAVENOUS at 21:18

## 2023-05-10 RX ADMIN — SODIUM CHLORIDE: 9 INJECTION, SOLUTION INTRAVENOUS at 08:52

## 2023-05-10 RX ADMIN — LEVETIRACETAM 500 MG: 5 INJECTION, SOLUTION INTRAVENOUS at 21:24

## 2023-05-10 RX ADMIN — DEXAMETHASONE SODIUM PHOSPHATE 4 MG: 4 INJECTION, SOLUTION INTRAMUSCULAR; INTRAVENOUS at 16:38

## 2023-05-10 RX ADMIN — PANTOPRAZOLE SODIUM 40 MG: 40 TABLET, DELAYED RELEASE ORAL at 08:52

## 2023-05-10 RX ADMIN — GADOTERIDOL 16 ML: 279.3 INJECTION, SOLUTION INTRAVENOUS at 17:41

## 2023-05-10 RX ADMIN — IOPAMIDOL 75 ML: 755 INJECTION, SOLUTION INTRAVENOUS at 18:08

## 2023-05-10 RX ADMIN — DEXAMETHASONE SODIUM PHOSPHATE 4 MG: 4 INJECTION, SOLUTION INTRAMUSCULAR; INTRAVENOUS at 11:59

## 2023-05-10 RX ADMIN — DIATRIZOATE MEGLUMINE AND DIATRIZOATE SODIUM 30 ML: 660; 100 LIQUID ORAL; RECTAL at 18:09

## 2023-05-10 RX ADMIN — SODIUM CHLORIDE, PRESERVATIVE FREE 10 ML: 5 INJECTION INTRAVENOUS at 21:20

## 2023-05-10 RX ADMIN — SODIUM CHLORIDE, PRESERVATIVE FREE 10 ML: 5 INJECTION INTRAVENOUS at 08:54

## 2023-05-10 ASSESSMENT — ENCOUNTER SYMPTOMS
COUGH: 0
VOMITING: 0
EYE DISCHARGE: 0
SHORTNESS OF BREATH: 0
NAUSEA: 0
SORE THROAT: 0
DIARRHEA: 0
EYE REDNESS: 0
COLOR CHANGE: 0
RHINORRHEA: 0

## 2023-05-10 ASSESSMENT — PAIN - FUNCTIONAL ASSESSMENT: PAIN_FUNCTIONAL_ASSESSMENT: 0-10

## 2023-05-10 ASSESSMENT — PAIN SCALES - GENERAL
PAINLEVEL_OUTOF10: 0
PAINLEVEL_OUTOF10: 3

## 2023-05-10 NOTE — ED NOTES
Report given to 1A nurse, patient to be transported upstairs at [facility].       Lissy Lua RN  05/10/23 1990

## 2023-05-10 NOTE — ED PROVIDER NOTES
Giancarlo Oliva MD  Authorized by: Giancarlo Oliva MD     Consent:     Consent obtained:  Verbal    Risks discussed:  Infection, pain, poor wound healing and poor cosmetic result    Alternatives discussed:  No treatment  Universal protocol:     Patient identity confirmed:  Verbally with patient  Anesthesia:     Anesthesia method:  Local infiltration    Local anesthetic:  Lidocaine 1% WITH epi  Laceration details:     Location:  Scalp    Scalp location:  Occipital    Length (cm):  4    Depth (mm):  3  Exploration:     Wound extent: no nerve damage noted, no tendon damage noted, no underlying fracture noted and no vascular damage noted    Treatment:     Area cleansed with:  Povidone-iodine    Amount of cleaning:  Standard  Skin repair:     Repair method:  Sutures    Suture size:  4-0    Suture material:  Nylon    Suture technique:  Simple interrupted    Number of sutures:  4  Approximation:     Approximation:  Close  Repair type:     Repair type:  Simple  Post-procedure details:     Dressing:  Non-adherent dressing    Procedure completion:  Tolerated well, no immediate complications      DATA FOR LAB AND RADIOLOGY TESTS ORDERED BELOW ARE REVIEWED BY THE ED CLINICIAN:    RADIOLOGY: All x-rays, CT, MRI, and formal ultrasound images (except ED bedside ultrasound) are read by the radiologist, see reports below, unless otherwise noted in MDM or here. Reports below are reviewed by myself. CT HEAD WO CONTRAST   Final Result   3.4 cm mass in the right cerebellum with surrounding area of vasogenic edema. There is associated local mass effect with narrowing of the 4th ventricle. Recommend further evaluation with contrast-enhanced MRI of the brain. No evidence of acute fracture or traumatic malalignment of the cervical spine. RECOMMENDATIONS:   Unavailable         CT CERVICAL SPINE WO CONTRAST   Final Result   3.4 cm mass in the right cerebellum with surrounding area of vasogenic edema.    There is

## 2023-05-10 NOTE — H&P
measuring 3.4 cm in the right cerebellum with surrounding area of vasogenic edema and associated local mass effect with narrowing of the fourth ventricle. Consult neurology, neurosurgery. Start Keppra, Decadron, JOEL-ISS while on steroids  History of esophageal CA with resection: Completed SCC, patient currently on chemo/radiation  Fall downstairs no formal evaluation per trauma surgery  Recent skin CA evaluation: With skin biopsy completed on 5/9/2023, there is a scalp laceration from mechanical fall without LOC. GI/DVT prophylaxis Protonix, Lovenox    Consultations:   IP CONSULT TO NEUROLOGY  IP CONSULT TO NEUROSURGERY  IP CONSULT TO ONCOLOGY     Patient is admitted as inpatient status because of co-morbidities listed above, severity of signs and symptoms as outlined, requirement for current medical therapies and most importantly because of direct risk to patient if care not provided in a hospital setting. Expected length of stay > 48 hours.     RAMYA Enriquez NP  5/10/2023  10:25 AM    Copy sent to Dr. Gayle Russell DO

## 2023-05-10 NOTE — ED PROVIDER NOTES
[facility] [facility]     Emergency Department     Triage Medical Screening Exam      Pt Name: Xiao Romero  MRN: [id number]  [place] 1955  Date of evaluation: 5/10/23    I was personally evaluated the patient in triage due to severe overcrowding in the ER that limits bed/room availability. Our exam was done as the pt was admitted to the floor but arrived in the ER due to overcrowding in the [address] [place] [address] Avenue is a 75 y.o. male who presents with direct admit but brought to ER until bed avalable. Report is given from the sending provider there is no information available on this patient patient here for mass per the ER charge nurse. Vitals: BP in the 120s, pt in in complete sentences without any difficulty or distress    Plan:   He is currently stable and in no acute distress at this time no indication for further intervention from the ER providers will continue to be available should status change occurs.         Lluvia Guerra DO, [address]  Attending Emergency Physician        Lluvia Guerra DO  05/10/23 [id number]

## 2023-05-10 NOTE — ED NOTES
Patient presents to the ED via EMS transfer from [facility] [address] for c/o fall as a direct admit. Patient was seen at [facility] [address] initially for a fall and bleeding from the back of his head. Patient was seen yesterday for an outpatient procedure to remove an area of skin cancer. Patient reported going up the steps and felt dizzy hitting his head on the pavement. Pt denies any LOC or use of blood thinners. Patient was transferred here to be evaluated by neuro for an abnormal CT scan. Pt has a hx of esophageal cancer. Patient is alert and oriented x4, answering questions appropriately. Patient is changed into a gown, placed on cardiac monitor, EKG done, IV established, will continue to monitor.            Antony Michaels RN  05/10/23 2029

## 2023-05-10 NOTE — ED NOTES
Pressure dressing applied to posterior head wound. Pt tolerated without complication.       Shawn Diaz RN  05/10/23 [id number]

## 2023-05-10 NOTE — ED NOTES
Pt came into ED for a fall he had at home down 3 stairs from a miss step. Pt hit the back of his head, and scraped his left forearm. Pt denies any LOC before or after fall. Pt has a history of cancer, and had skin removed on back of head earlier today by his doctor. Pts head wound opened up from fall causing minimal blood loss. Pt is alert and orientedx4. Pts lungs are clear bilaterally. Pt denies headache, or neck/back pain. Pt has full ROM with neck and left arm.  Pts vitals are stable     Teri Maloney RN  05/10/23 0159

## 2023-05-11 ENCOUNTER — APPOINTMENT (OUTPATIENT)
Dept: CT IMAGING | Age: 68
DRG: 054 | End: 2023-05-11
Attending: STUDENT IN AN ORGANIZED HEALTH CARE EDUCATION/TRAINING PROGRAM
Payer: MEDICARE

## 2023-05-11 PROBLEM — R14.0 ABDOMINAL DISTENSION: Status: ACTIVE | Noted: 2023-05-11

## 2023-05-11 LAB
ABSOLUTE EOS #: 0 K/UL (ref 0–0.44)
ABSOLUTE IMMATURE GRANULOCYTE: 0 K/UL (ref 0–0.3)
ABSOLUTE LYMPH #: 0.34 K/UL (ref 1.1–3.7)
ABSOLUTE MONO #: 0.46 K/UL (ref 0.1–1.2)
ALBUMIN SERPL-MCNC: 3.9 G/DL (ref 3.5–5.2)
ALBUMIN/GLOBULIN RATIO: 1.4 (ref 1–2.5)
ALP SERPL-CCNC: 95 U/L (ref 40–129)
ALT SERPL-CCNC: 15 U/L (ref 5–41)
ANION GAP SERPL CALCULATED.3IONS-SCNC: 12 MMOL/L (ref 9–17)
AST SERPL-CCNC: 14 U/L
BASOPHILS # BLD: 0 % (ref 0–2)
BASOPHILS ABSOLUTE: 0 K/UL (ref 0–0.2)
BILIRUB SERPL-MCNC: 2.1 MG/DL (ref 0.3–1.2)
BUN SERPL-MCNC: 10 MG/DL (ref 8–23)
CALCIUM SERPL-MCNC: 9.4 MG/DL (ref 8.6–10.4)
CHLORIDE SERPL-SCNC: 101 MMOL/L (ref 98–107)
CO2 SERPL-SCNC: 25 MMOL/L (ref 20–31)
CREAT SERPL-MCNC: 0.55 MG/DL (ref 0.7–1.2)
EOSINOPHILS RELATIVE PERCENT: 0 % (ref 1–4)
GFR SERPL CREATININE-BSD FRML MDRD: >60 ML/MIN/1.73M2
GLUCOSE BLD-MCNC: 138 MG/DL (ref 75–110)
GLUCOSE BLD-MCNC: 158 MG/DL (ref 75–110)
GLUCOSE BLD-MCNC: 170 MG/DL (ref 75–110)
GLUCOSE BLD-MCNC: 177 MG/DL (ref 75–110)
GLUCOSE SERPL-MCNC: 151 MG/DL (ref 70–99)
HCT VFR BLD AUTO: 40.2 % (ref 40.7–50.3)
HGB BLD-MCNC: 13.2 G/DL (ref 13–17)
IMMATURE GRANULOCYTES: 0 %
INR PPP: 1
LYMPHOCYTES # BLD: 3 % (ref 24–43)
MCH RBC QN AUTO: 32.2 PG (ref 25.2–33.5)
MCHC RBC AUTO-ENTMCNC: 32.8 G/DL (ref 28.4–34.8)
MCV RBC AUTO: 98 FL (ref 82.6–102.9)
MONOCYTES # BLD: 4 % (ref 3–12)
MORPHOLOGY: NORMAL
NRBC AUTOMATED: 0.2 PER 100 WBC
PDW BLD-RTO: 13 % (ref 11.8–14.4)
PLATELET # BLD AUTO: 339 K/UL (ref 138–453)
PMV BLD AUTO: 11.1 FL (ref 8.1–13.5)
POTASSIUM SERPL-SCNC: 3.8 MMOL/L (ref 3.7–5.3)
PROT SERPL-MCNC: 6.6 G/DL (ref 6.4–8.3)
PROTHROMBIN TIME: 12.7 SEC (ref 11.7–14.9)
RBC # BLD: 4.1 M/UL (ref 4.21–5.77)
SEG NEUTROPHILS: 93 % (ref 36–65)
SEGMENTED NEUTROPHILS ABSOLUTE COUNT: 10.6 K/UL (ref 1.5–8.1)
SODIUM SERPL-SCNC: 138 MMOL/L (ref 135–144)
WBC # BLD AUTO: 11.4 K/UL (ref 3.5–11.3)

## 2023-05-11 PROCEDURE — 99222 1ST HOSP IP/OBS MODERATE 55: CPT | Performed by: INTERNAL MEDICINE

## 2023-05-11 PROCEDURE — 99232 SBSQ HOSP IP/OBS MODERATE 35: CPT | Performed by: STUDENT IN AN ORGANIZED HEALTH CARE EDUCATION/TRAINING PROGRAM

## 2023-05-11 PROCEDURE — 82947 ASSAY GLUCOSE BLOOD QUANT: CPT

## 2023-05-11 PROCEDURE — 2580000003 HC RX 258: Performed by: NURSE PRACTITIONER

## 2023-05-11 PROCEDURE — 2060000000 HC ICU INTERMEDIATE R&B

## 2023-05-11 PROCEDURE — 85025 COMPLETE CBC W/AUTO DIFF WBC: CPT

## 2023-05-11 PROCEDURE — 6360000002 HC RX W HCPCS: Performed by: NURSE PRACTITIONER

## 2023-05-11 PROCEDURE — 99232 SBSQ HOSP IP/OBS MODERATE 35: CPT | Performed by: PSYCHIATRY & NEUROLOGY

## 2023-05-11 PROCEDURE — 85610 PROTHROMBIN TIME: CPT

## 2023-05-11 PROCEDURE — 36415 COLL VENOUS BLD VENIPUNCTURE: CPT

## 2023-05-11 PROCEDURE — 99232 SBSQ HOSP IP/OBS MODERATE 35: CPT | Performed by: INTERNAL MEDICINE

## 2023-05-11 PROCEDURE — 80053 COMPREHEN METABOLIC PANEL: CPT

## 2023-05-11 PROCEDURE — 6370000000 HC RX 637 (ALT 250 FOR IP): Performed by: STUDENT IN AN ORGANIZED HEALTH CARE EDUCATION/TRAINING PROGRAM

## 2023-05-11 PROCEDURE — 71260 CT THORAX DX C+: CPT

## 2023-05-11 PROCEDURE — 6360000004 HC RX CONTRAST MEDICATION: Performed by: INTERNAL MEDICINE

## 2023-05-11 PROCEDURE — APPSS30 APP SPLIT SHARED TIME 16-30 MINUTES: Performed by: NURSE PRACTITIONER

## 2023-05-11 RX ORDER — LORAZEPAM 2 MG/ML
1 INJECTION INTRAMUSCULAR
Status: ACTIVE | OUTPATIENT
Start: 2023-05-11 | End: 2023-05-12

## 2023-05-11 RX ORDER — PANTOPRAZOLE SODIUM 40 MG/1
40 TABLET, DELAYED RELEASE ORAL
Status: DISCONTINUED | OUTPATIENT
Start: 2023-05-11 | End: 2023-05-12 | Stop reason: HOSPADM

## 2023-05-11 RX ADMIN — IOPAMIDOL 75 ML: 755 INJECTION, SOLUTION INTRAVENOUS at 00:36

## 2023-05-11 RX ADMIN — SODIUM CHLORIDE, PRESERVATIVE FREE 10 ML: 5 INJECTION INTRAVENOUS at 21:30

## 2023-05-11 RX ADMIN — DEXAMETHASONE SODIUM PHOSPHATE 4 MG: 4 INJECTION, SOLUTION INTRAMUSCULAR; INTRAVENOUS at 10:06

## 2023-05-11 RX ADMIN — SODIUM CHLORIDE, PRESERVATIVE FREE 10 ML: 5 INJECTION INTRAVENOUS at 08:01

## 2023-05-11 RX ADMIN — PANTOPRAZOLE SODIUM 40 MG: 40 TABLET, DELAYED RELEASE ORAL at 16:06

## 2023-05-11 RX ADMIN — DEXAMETHASONE SODIUM PHOSPHATE 4 MG: 4 INJECTION, SOLUTION INTRAMUSCULAR; INTRAVENOUS at 03:31

## 2023-05-11 RX ADMIN — ENOXAPARIN SODIUM 40 MG: 100 INJECTION SUBCUTANEOUS at 08:01

## 2023-05-11 RX ADMIN — DEXAMETHASONE SODIUM PHOSPHATE 4 MG: 4 INJECTION, SOLUTION INTRAMUSCULAR; INTRAVENOUS at 16:06

## 2023-05-11 RX ADMIN — LEVETIRACETAM 500 MG: 5 INJECTION, SOLUTION INTRAVENOUS at 10:10

## 2023-05-11 RX ADMIN — DEXAMETHASONE SODIUM PHOSPHATE 4 MG: 4 INJECTION, SOLUTION INTRAMUSCULAR; INTRAVENOUS at 21:29

## 2023-05-11 RX ADMIN — LEVETIRACETAM 500 MG: 5 INJECTION, SOLUTION INTRAVENOUS at 21:35

## 2023-05-11 NOTE — CONSULTS
[place]  22.    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 Antonio Davison:   [x]Dr. Brock Dumont  []Dr. Carla Hanson  []Dr. Arti Roque  []Dr. Willian Yu  []Dr. Bryant Brownlee  []Dr. Farida White    History Obtained From:  patient, electronic medical record, care team    CHIEF COMPLAINT:         Mechanical Fall    HISTORY OF PRESENT ILLNESS:       The patient is a 75 y.o. 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 Patient is known to have eadenomacarcinoma of GE junction sophageal carcinoma and follows with heme/onc and currently on chemoradiation after 1/9/23 surgery at [address] [place] [address] for thoracotomy, esophagectomy, pyloromyotomy. Mechanical fall resulted from loss of balance and dizziness which patient reports has bene ongoing for about two weeks, since the beginning of May. Denies headaches currently or associated with new dizziness. Transferred from [address] [place] [address],[address] for neurosurgery evaluation.      PAST MEDICAL HISTORY :       Past Medical History:        Diagnosis Date    Cancer [facility])     esophageal    Dental root implant present     2 metal implants in lower jaw    Elevated PSA     Dr. Neva Thompson    Hearing loss     Hiatal hernia     Keratosis     uses Effudex prn    Prostate nodule     on MRI per patient    Snores     no sleep apnea diagnosis    Under care of team 09/12/2022    Oncology: Jaswinder Coates, last visit 9/2022    Wears dentures     full dentures    Wears reading eyeglasses     Wellness examination     PCP:Dr. José Bo [facility], last visit 8/2022       Past Surgical History:        Procedure Laterality Date    COLONOSCOPY  09/24/2008    [facility] Dr. Shandra Ribera
NEUROLOGY INPATIENT CONSULT NOTE      5/10/2023     Reason for Consult: 3.4cm mass in R cerebellum    Requesting Provider: AMINATA Meza        I had the pleasure of seeing your patient as a neurology consultation. As you would recall Neeta Norman is a  75 y.o. male with H/O esophageal cancer, prostate nodule, who was admitted on 5/10/2023 with Intracranial mass [R90.0]. The patient presented to the ED after he had a fall at home down 3 stairs after missing a step. He reports hitting the back of his head and scraping his left arm. He denies any loss of consciousness. He was alert and oriented on arrival.  CT head was done showing a 3.4 cm mass in the right cerebellum with surrounding vasogenic edema with associated local mass effect with narrowing of the fourth ventricle. Neurology was consulted for the above testing results. Patient has no acute complaints. He is alert and oriented. He does report ongoing dizziness for about the past month. Otherwise denies any acute neurologic complaints. No current facility-administered medications on file prior to encounter. Current Outpatient Medications on File Prior to Encounter   Medication Sig Dispense Refill    pantoprazole (PROTONIX) 40 MG tablet Take 1 tablet by mouth daily      tamsulosin (FLOMAX) 0.4 MG capsule Take 1 capsule by mouth daily (Patient not taking: Reported on 3/16/2023) 30 capsule 3    fluorouracil (EFUDEX) 5 % cream Apply twice daily to actinic keratosis for 3-4 weeks. Expect redness and irritation. (Patient not taking: Reported on 3/21/2023) 40 g 1       Allergies: Neeta Norman is allergic to paclitaxel.     Past Medical History:   Diagnosis Date    Cancer [facility])     esophageal    Dental root implant present     2 metal implants in lower jaw    Elevated PSA      [address]    Hearing loss     Hiatal hernia     Keratosis     uses Effudex prn    Prostate nodule     on MRI per patient    Snores     no sleep apnea diagnosis
Today's Date: 5/10/2023  Patient Name: Concha Drew  Date of admission: 5/10/2023  5:15 AM  Patient's age: 75 y.o., 1955  Admission Dx: Intracranial mass [R90.0]    Reason for Consult: management recommendations  Requesting Physician: Corry Lyons MD    CHIEF COMPLAINT:  mechanical fall     History Obtained From:  patient, electronic medical record    HISTORY OF PRESENT ILLNESS:      The patient is a 75 y.o.  male who is admitted to the hospital after mechanical fall, fell down from stairs hitting his head on pavement requiring  sutures. Report he lost balance and got dizzy, didn't lose any consciousness. Workup was done including CT head which show new 3.4 cm cerebellum mass along with ventricular swelling and patient was transferred to [facility] for neurosurgery evaluation. Patient is known to our service and he follows with Dr. Justin Hui . 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 Complete response to induction. started combined chemoradiation using weekly Taxol/ Carboplatin to be followed by surgery at [facility]. Chemoradiation started 9/27/22. Developed allergy to Taxol. Switched to Abraxane/ carboplatin. Chemoradiation completed November 3, 2022. 1/9/2023: Thoracotomy, Rush Deyvi esophagectomy, pyloromyotomy, flap coverage of anastomosis (pedicled omentum), mediastinal lymph node sampling, j-tube exchange, left chest tube. J-tube removed March 9, 2023 followed by chemoradiation. Patient was last seen in the office in March. And currently he is on induction combined chemoradiation with weekly taxol carboplatin for locally advanced esophageal cancer. Oncology is consulted for 3.4cm mass in the right cerebellum with mass effect and fourth ventricle swelling. On evaluation patient is awake and alert. Denies any loss of consciousness. Report of headache and dizziness.      Past Medical History:   has a past medical history of Cancer
dextrose, sodium chloride flush, diatrizoate meglumine-sodium    Allergies   Allergen Reactions    Paclitaxel Anaphylaxis       SOCIAL HISTORY:   Social History     Socioeconomic History    Marital status:      Spouse name: Not on file    Number of children: Not on file    Years of education: Not on file    Highest education level: Not on file   Occupational History    Not on file   Tobacco Use    Smoking status: Former     Packs/day: 1.00     Years: 0.00     Pack years: 0.00     Types: Cigarettes     Quit date: 2002     Years since quittin.3    Smokeless tobacco: Never   Vaping Use    Vaping Use: Never used   Substance and Sexual Activity    Alcohol use: Yes     Comment: beer few times/wk drinks 3-4    Drug use: Yes     Types: Marijuana (Weed)     Comment: uses edibles-once a week, smokes marijuana 1-2 times a week. Sexual activity: Not on file   Other Topics Concern    Not on file   Social History Narrative    Not on file     Social Determinants of Health     Financial Resource Strain: Not on file   Food Insecurity: Not on file   Transportation Needs: Not on file   Physical Activity: Not on file   Stress: Not on file   Social Connections: Not on file   Intimate Partner Violence: Not on file   Housing Stability: Not on file       FAMILY HISTORY:   History reviewed. No pertinent family history. REVIEW OF SYSTEMS:  10 out of 14 systems were reviewed and otherwise negative per patient recall. PHYSICAL EXAM:    /71   Pulse 70   Temp 98.5 °F (36.9 °C) (Oral)   Resp 13   SpO2 97%     General:  Alert and oriented x 3. No acute distress, pleasant conscious uncooperative. Nontoxic in appearance. HEENT:  Sclera are anicteric and conjunctiva are normal.  Normocephalic, mild bitemporal wasting. Hearing is adequate. Moist oropharynx without thrush. No oral ulcers are seen. Neck is supple without masses.   No palpable cervical or supraclavicular lymph nodes  Lungs:  Clear to

## 2023-05-11 NOTE — PLAN OF CARE
Problem: Discharge Planning  Goal: Discharge to home or other facility with appropriate resources  5/11/2023 1551 by Jeremy Ceballos RN  Outcome: Progressing  5/11/2023 0620 by Shirleyann Olszewski, RN  Outcome: Progressing     Problem: Pain  Goal: Verbalizes/displays adequate comfort level or baseline comfort level  5/11/2023 1551 by Jeremy Ceballos RN  Outcome: Progressing  5/11/2023 0620 by Shirleyann Olszewski, RN  Outcome: Progressing     Problem: Safety - Adult  Goal: Free from fall injury  Outcome: Progressing

## 2023-05-12 ENCOUNTER — ANESTHESIA EVENT (OUTPATIENT)
Dept: OPERATING ROOM | Age: 68
End: 2023-05-12
Payer: MEDICARE

## 2023-05-12 ENCOUNTER — ANESTHESIA (OUTPATIENT)
Dept: OPERATING ROOM | Age: 68
End: 2023-05-12
Payer: MEDICARE

## 2023-05-12 VITALS
TEMPERATURE: 97.3 F | OXYGEN SATURATION: 98 % | SYSTOLIC BLOOD PRESSURE: 115 MMHG | RESPIRATION RATE: 17 BRPM | DIASTOLIC BLOOD PRESSURE: 83 MMHG | HEART RATE: 61 BPM

## 2023-05-12 PROBLEM — G93.89 MASS OF CEREBELLUM: Status: ACTIVE | Noted: 2023-05-12

## 2023-05-12 LAB
ABSOLUTE EOS #: 0 K/UL (ref 0–0.44)
ABSOLUTE IMMATURE GRANULOCYTE: 0.16 K/UL (ref 0–0.3)
ABSOLUTE LYMPH #: 0.31 K/UL (ref 1.1–3.7)
ABSOLUTE MONO #: 0.63 K/UL (ref 0.1–1.2)
ANION GAP SERPL CALCULATED.3IONS-SCNC: 10 MMOL/L (ref 9–17)
BASOPHILS # BLD: 0 % (ref 0–2)
BASOPHILS ABSOLUTE: 0 K/UL (ref 0–0.2)
BUN SERPL-MCNC: 14 MG/DL (ref 8–23)
CALCIUM SERPL-MCNC: 9.2 MG/DL (ref 8.6–10.4)
CHLORIDE SERPL-SCNC: 107 MMOL/L (ref 98–107)
CO2 SERPL-SCNC: 23 MMOL/L (ref 20–31)
CREAT SERPL-MCNC: 0.53 MG/DL (ref 0.7–1.2)
EOSINOPHILS RELATIVE PERCENT: 0 % (ref 1–4)
GFR SERPL CREATININE-BSD FRML MDRD: >60 ML/MIN/1.73M2
GLUCOSE BLD-MCNC: 110 MG/DL (ref 75–110)
GLUCOSE BLD-MCNC: 115 MG/DL (ref 75–110)
GLUCOSE BLD-MCNC: 129 MG/DL (ref 75–110)
GLUCOSE BLD-MCNC: 141 MG/DL (ref 75–110)
GLUCOSE SERPL-MCNC: 147 MG/DL (ref 70–99)
HCT VFR BLD AUTO: 40.1 % (ref 40.7–50.3)
HGB BLD-MCNC: 13 G/DL (ref 13–17)
IMMATURE GRANULOCYTES: 1 %
LYMPHOCYTES # BLD: 2 % (ref 24–43)
MCH RBC QN AUTO: 31.7 PG (ref 25.2–33.5)
MCHC RBC AUTO-ENTMCNC: 32.4 G/DL (ref 28.4–34.8)
MCV RBC AUTO: 97.8 FL (ref 82.6–102.9)
MONOCYTES # BLD: 4 % (ref 3–12)
MORPHOLOGY: NORMAL
NRBC AUTOMATED: 0 PER 100 WBC
PDW BLD-RTO: 13.2 % (ref 11.8–14.4)
PLATELET # BLD AUTO: 261 K/UL (ref 138–453)
PMV BLD AUTO: 11 FL (ref 8.1–13.5)
POTASSIUM SERPL-SCNC: 4.1 MMOL/L (ref 3.7–5.3)
RBC # BLD: 4.1 M/UL (ref 4.21–5.77)
SEG NEUTROPHILS: 93 % (ref 36–65)
SEGMENTED NEUTROPHILS ABSOLUTE COUNT: 14.6 K/UL (ref 1.5–8.1)
SODIUM SERPL-SCNC: 140 MMOL/L (ref 135–144)
WBC # BLD AUTO: 15.7 K/UL (ref 3.5–11.3)

## 2023-05-12 PROCEDURE — 88312 SPECIAL STAINS GROUP 1: CPT

## 2023-05-12 PROCEDURE — 36415 COLL VENOUS BLD VENIPUNCTURE: CPT

## 2023-05-12 PROCEDURE — 88342 IMHCHEM/IMCYTCHM 1ST ANTB: CPT

## 2023-05-12 PROCEDURE — 6360000002 HC RX W HCPCS

## 2023-05-12 PROCEDURE — 3609012400 HC EGD TRANSORAL BIOPSY SINGLE/MULTIPLE: Performed by: INTERNAL MEDICINE

## 2023-05-12 PROCEDURE — 2500000003 HC RX 250 WO HCPCS

## 2023-05-12 PROCEDURE — 0DB48ZX EXCISION OF ESOPHAGOGASTRIC JUNCTION, VIA NATURAL OR ARTIFICIAL OPENING ENDOSCOPIC, DIAGNOSTIC: ICD-10-PCS | Performed by: INTERNAL MEDICINE

## 2023-05-12 PROCEDURE — 99232 SBSQ HOSP IP/OBS MODERATE 35: CPT | Performed by: STUDENT IN AN ORGANIZED HEALTH CARE EDUCATION/TRAINING PROGRAM

## 2023-05-12 PROCEDURE — 6360000002 HC RX W HCPCS: Performed by: NURSE PRACTITIONER

## 2023-05-12 PROCEDURE — 3700000001 HC ADD 15 MINUTES (ANESTHESIA): Performed by: INTERNAL MEDICINE

## 2023-05-12 PROCEDURE — 85025 COMPLETE CBC W/AUTO DIFF WBC: CPT

## 2023-05-12 PROCEDURE — 6360000002 HC RX W HCPCS: Performed by: INTERNAL MEDICINE

## 2023-05-12 PROCEDURE — 88305 TISSUE EXAM BY PATHOLOGIST: CPT

## 2023-05-12 PROCEDURE — 2580000003 HC RX 258

## 2023-05-12 PROCEDURE — 82947 ASSAY GLUCOSE BLOOD QUANT: CPT

## 2023-05-12 PROCEDURE — 7100000000 HC PACU RECOVERY - FIRST 15 MIN: Performed by: INTERNAL MEDICINE

## 2023-05-12 PROCEDURE — 80048 BASIC METABOLIC PNL TOTAL CA: CPT

## 2023-05-12 PROCEDURE — 7100000001 HC PACU RECOVERY - ADDTL 15 MIN: Performed by: INTERNAL MEDICINE

## 2023-05-12 PROCEDURE — 0DB78ZX EXCISION OF STOMACH, PYLORUS, VIA NATURAL OR ARTIFICIAL OPENING ENDOSCOPIC, DIAGNOSTIC: ICD-10-PCS | Performed by: INTERNAL MEDICINE

## 2023-05-12 PROCEDURE — 99233 SBSQ HOSP IP/OBS HIGH 50: CPT | Performed by: NEUROLOGICAL SURGERY

## 2023-05-12 PROCEDURE — 43239 EGD BIOPSY SINGLE/MULTIPLE: CPT | Performed by: INTERNAL MEDICINE

## 2023-05-12 PROCEDURE — 6370000000 HC RX 637 (ALT 250 FOR IP): Performed by: INTERNAL MEDICINE

## 2023-05-12 PROCEDURE — 3700000000 HC ANESTHESIA ATTENDED CARE: Performed by: INTERNAL MEDICINE

## 2023-05-12 PROCEDURE — 2709999900 HC NON-CHARGEABLE SUPPLY: Performed by: INTERNAL MEDICINE

## 2023-05-12 RX ORDER — SODIUM CHLORIDE 0.9 % (FLUSH) 0.9 %
5-40 SYRINGE (ML) INJECTION EVERY 12 HOURS SCHEDULED
Status: DISCONTINUED | OUTPATIENT
Start: 2023-05-12 | End: 2023-05-12 | Stop reason: HOSPADM

## 2023-05-12 RX ORDER — LIDOCAINE HYDROCHLORIDE 10 MG/ML
INJECTION, SOLUTION EPIDURAL; INFILTRATION; INTRACAUDAL; PERINEURAL PRN
Status: DISCONTINUED | OUTPATIENT
Start: 2023-05-12 | End: 2023-05-12 | Stop reason: SDUPTHER

## 2023-05-12 RX ORDER — SODIUM CHLORIDE 9 MG/ML
INJECTION, SOLUTION INTRAVENOUS PRN
Status: DISCONTINUED | OUTPATIENT
Start: 2023-05-12 | End: 2023-05-12 | Stop reason: HOSPADM

## 2023-05-12 RX ORDER — SODIUM CHLORIDE 0.9 % (FLUSH) 0.9 %
5-40 SYRINGE (ML) INJECTION PRN
Status: DISCONTINUED | OUTPATIENT
Start: 2023-05-12 | End: 2023-05-12 | Stop reason: HOSPADM

## 2023-05-12 RX ORDER — SODIUM CHLORIDE 9 MG/ML
INJECTION, SOLUTION INTRAVENOUS CONTINUOUS PRN
Status: DISCONTINUED | OUTPATIENT
Start: 2023-05-12 | End: 2023-05-12 | Stop reason: SDUPTHER

## 2023-05-12 RX ORDER — PROPOFOL 10 MG/ML
INJECTION, EMULSION INTRAVENOUS PRN
Status: DISCONTINUED | OUTPATIENT
Start: 2023-05-12 | End: 2023-05-12 | Stop reason: SDUPTHER

## 2023-05-12 RX ORDER — LEVETIRACETAM 500 MG/1
500 TABLET ORAL 2 TIMES DAILY
Qty: 60 TABLET | Refills: 3 | Status: SHIPPED | OUTPATIENT
Start: 2023-05-12

## 2023-05-12 RX ORDER — PREDNISONE 1 MG/1
4 TABLET ORAL 2 TIMES DAILY
Qty: 112 TABLET | Refills: 0 | Status: SHIPPED
Start: 2023-05-12 | End: 2023-05-13 | Stop reason: HOSPADM

## 2023-05-12 RX ORDER — PROPOFOL 10 MG/ML
INJECTION, EMULSION INTRAVENOUS CONTINUOUS PRN
Status: DISCONTINUED | OUTPATIENT
Start: 2023-05-12 | End: 2023-05-12 | Stop reason: SDUPTHER

## 2023-05-12 RX ADMIN — PANTOPRAZOLE SODIUM 40 MG: 40 TABLET, DELAYED RELEASE ORAL at 17:54

## 2023-05-12 RX ADMIN — SODIUM CHLORIDE: 9 INJECTION, SOLUTION INTRAVENOUS at 14:36

## 2023-05-12 RX ADMIN — PROPOFOL 100 MCG/KG/MIN: 10 INJECTION, EMULSION INTRAVENOUS at 14:41

## 2023-05-12 RX ADMIN — LIDOCAINE HYDROCHLORIDE 20 MG: 10 INJECTION, SOLUTION EPIDURAL; INFILTRATION; INTRACAUDAL; PERINEURAL at 14:41

## 2023-05-12 RX ADMIN — PROPOFOL 20 MG: 10 INJECTION, EMULSION INTRAVENOUS at 14:45

## 2023-05-12 RX ADMIN — DEXAMETHASONE SODIUM PHOSPHATE 4 MG: 4 INJECTION, SOLUTION INTRAMUSCULAR; INTRAVENOUS at 09:57

## 2023-05-12 RX ADMIN — DEXAMETHASONE SODIUM PHOSPHATE 4 MG: 4 INJECTION, SOLUTION INTRAMUSCULAR; INTRAVENOUS at 17:19

## 2023-05-12 RX ADMIN — PROPOFOL 50 MG: 10 INJECTION, EMULSION INTRAVENOUS at 14:41

## 2023-05-12 RX ADMIN — LEVETIRACETAM 500 MG: 5 INJECTION, SOLUTION INTRAVENOUS at 09:59

## 2023-05-12 RX ADMIN — PROPOFOL 30 MG: 10 INJECTION, EMULSION INTRAVENOUS at 14:43

## 2023-05-12 RX ADMIN — DEXAMETHASONE SODIUM PHOSPHATE 4 MG: 4 INJECTION, SOLUTION INTRAMUSCULAR; INTRAVENOUS at 04:15

## 2023-05-12 ASSESSMENT — PAIN - FUNCTIONAL ASSESSMENT: PAIN_FUNCTIONAL_ASSESSMENT: 0-10

## 2023-05-12 ASSESSMENT — PAIN SCALES - GENERAL: PAINLEVEL_OUTOF10: 0

## 2023-05-12 NOTE — OP NOTE
Operative Note      Patient: Concha Drew  YOB: 1955  MRN: [id number]    Date of Procedure: 5/12/2023    Pre-Op Diagnosis Codes:     * Esophagitis [K20.90]    Post-Op Diagnosis: Same  Esophageal gastric anastomosis with ulcerated mucosa. Biopsied  Moderate amount of food in the stomach  Otherwise normal stomach mucosa. Biopsied       Procedure(s):  EGD BIOPSY    Surgeon(s):  Mary Cronin MD    Assistant:   First Assistant: Grisel Hoyt RN    Anesthesia: Monitor Anesthesia Care    Estimated Blood Loss (mL): Minimal    Complications: None    Specimens:   ID Type Source Tests Collected by Time Destination   A : Asophagal Anastomosis BX Tissue Esophagus SURGICAL PATHOLOGY Mary Cronin MD 5/12/2023 1445    B : Gastric Bx Tissue Stomach SURGICAL PATHOLOGY, 79-01 MD Dinorah 5/12/2023 1449        Implants:  * No implants in log *      Drains: * No LDAs found *    Findings:   Evidence of esophagectomy with gastric pull-through and esophageal gastric anastomosis. The esophageal gastric anastomosis were found at 28 cm from the incisors. The anastomosis was characterized by ulceration and friable mucosa that bled to contact. Biopsies were performed for histology and to rule out malignancy. Moderate amount of food debris was found in the stomach. The mucosa appeared normal.  Biopsies were performed from antrum and at the pylorus for histology and H. pylori testing. The examined duodenum appeared normal.    Recommendations  Follow biopsy results. Continue pantoprazole 40 mg once daily. Postgastrectomy diet. Okay to resume DVT prophylaxis. Further recommendations per oncology and neurosurgery teams. GI will sign off. Please call for any questions or concerns.     Detailed Description of Procedure:   Informed consent was obtained from the patient after explanation of the procedure including indications, description of the procedure,  benefits and possible risks and

## 2023-05-12 NOTE — PLAN OF CARE
Problem: Discharge Planning  Goal: Discharge to home or other facility with appropriate resources  5/12/2023 0453 by Lalita Tomas RN  Outcome: Progressing

## 2023-05-12 NOTE — CARE COORDINATION
Case Management Assessment  Initial Evaluation    Date/Time of Evaluation: 5/12/2023 4:15 PM  Assessment Completed by: Kumar Callahan RN    If patient is discharged prior to next notation, then this note serves as note for discharge by case management. Patient Name: Darwin Espinoza                   YOB: 1955  Diagnosis: Intracranial mass [R90.0]                   Date / Time: 5/10/2023  5:15 AM    Patient Admission Status: Inpatient   Readmission Risk (Low < 19, Mod (19-27), High > 27): Readmission Risk Score: 10.8    Current PCP: Caridad Diamond DO  PCP verified by CM? (P) Yes HAIM Banerjee    Chart Reviewed: Yes      History Provided by: (P) Patient  Patient Orientation: (P) Alert and Oriented, Place, Person, Situation, Self    Patient Cognition: (P) Alert    Hospitalization in the last 30 days (Readmission):  No    If yes, Readmission Assessment in CM Navigator will be completed.     Advance Directives:      Code Status: Full Code   Patient's Primary Decision Maker is: (P) Legal Next of Kin      Discharge Planning:    Patient lives with: (P) Alone Type of Home: (P) House  Primary Care Giver: (P) Self  Patient Support Systems include: (P) Children, Family Members   Current Financial resources: (P) Medicare  Current community resources: (P) None  Current services prior to admission: (P) Durable Medical Equipment            Current DME: (P) Cane            Type of Home Care services:  (P) None    ADLS  Prior functional level: (P) Independent in ADLs/IADLs  Current functional level: (P) Independent in ADLs/IADLs    PT AM-PAC:   /24  OT AM-PAC:   /24    Family can provide assistance at DC: (P) Yes  Would you like Case Management to discuss the discharge plan with any other family members/significant others, and if so, who? (P) No  Plans to Return to Present Housing: (P) Yes  Other Identified Issues/Barriers to RETURNING to current housing: none  Potential Assistance needed at

## 2023-05-12 NOTE — DISCHARGE INSTR - COC
Continuity of Care Form    Patient Name: Xiao Romero   :  1955  MRN:  [id number]    Admit date:  5/10/2023  Discharge date:  ***    Code Status Order: Full Code   Advance Directives:   Advance Care Flowsheet Documentation       Date/Time Healthcare Directive Type of Healthcare Directive Copy in [address] Agent's Name Healthcare Agent's Phone Number    [phone] Yes, patient has an advance directive for healthcare treatment Living will No, copy requested from family Adult Children [facility] --            Admitting Physician:  Panchito Mistry MD  PCP: Jaison Cross DO    Discharging Nurse: [facility] Unit/Room#: 0103/0103-01  Discharging Unit Phone Number: ***    Emergency Contact:   Extended Emergency Contact Information  Primary Emergency Contact: [facility]  [facility] Phone: [phone]  Mobile Phone: [phone]  Relation: Child  Secondary Emergency Contact: [facility] Phone: [phone]  Mobile Phone: [phone]  Relation: Child    Past Surgical History:  Past Surgical History:   Procedure Laterality Date    COLONOSCOPY  2008    [facility] Dr. Homer Haque     COLONOSCOPY N/A 2022    COLONOSCOPY POLYPECTOMY HOT BIOPSY performed by La Nena Bee MD at [facility]    ESOPHAGOGASTRODUODENOSCOPY  2023    EGD BIOPSY    GASTROJEJUNOSTOMY W/ JEJUNOSTOMY TUBE      GASTROSTOMY TUBE PLACEMENT N/A 2022    XI ROBOTIC 260 [address] [place] [address], RIGHT INTERNAL JUGULAR VEIN MEDI-PORT PLACEMENT WITH FLOURO  (C-ARM) performed by Kassandra Mccallum DO at [facility]   rt inguinal w/ mesh at [facility], SINGLE Right     PROSTATE BIOPSY      PROSTATE BIOPSY N/A 2021    FUSION PROSTATE BIOPSY WITH ULTRASOUND, OFFICE NOTIFIED ULTRASOUND performed by Roxy Haque MD at [facility] noncancerous. Just keratosis.      TONSILLECTOMY

## 2023-05-12 NOTE — ANESTHESIA POSTPROCEDURE EVALUATION
Department of Anesthesiology  Postprocedure Note    Patient: Concha Drew  MRN: [id number]  YOB: 1955  Date of evaluation: 5/12/2023      Procedure Summary     Date: 05/12/23 Room / Location: [address]    Anesthesia Start: 5832 Anesthesia Stop: 4669    Procedure: EGD BIOPSY Diagnosis:       Esophagitis      (ESOPHAGITIS)    Surgeons: Mary Cronin MD Responsible Provider: Josie Torres MD    Anesthesia Type: MAC ASA Status: 4          Anesthesia Type: No value filed.     Chacho Phase I: Chacho Score: 10    Chacho Phase II:      POST-OP ANESTHESIA NOTE       /83   Pulse 61   Temp 97.3 °F (36.3 °C) (Temporal)   Resp 17   SpO2 98%    Pain Assessment: None - Denies Pain  Pain Level: 0      Anesthesia Post Evaluation    Patient location during evaluation: PACU  Patient participation: complete - patient participated  Level of consciousness: awake  Pain score: 0  Airway patency: patent  Nausea & Vomiting: no nausea and no vomiting  Complications: no  Cardiovascular status: hemodynamically stable  Respiratory status: acceptable  Hydration status: stable

## 2023-05-12 NOTE — PROGRESS NOTES
[id number] [facility] Wound Ostomy Continence Nurse  Consult Note       NAME:  Shruthi Connelly RECORD NUMBER:  [id number]  AGE: 75 y.o. GENDER: male  : 1955  TODAY'S DATE:  5/10/2023    Subjective:     Reason for WOCN Evaluation and Assessment: \"recent skin biopsy\"      Kent Prader is a 75 y.o. male referred by:   [x] Physician  [] Nursing  [] Other:     Wound Identification:  Wound Type: traumatic and surgical  Contributing Factors: immunosuppression        Mohs procedure for SCC on 2023. Tanmay Heard later that evening striking and lacerating his scalp distal to the open Mohs wound. The laceration was sutured in ED. Follow up to the dermatologist in 10 days scheduled. The dressing placed by the dermatologist was intended to stay in place through follow-up. Patient reports petroleum impregnated gauze, dry gauze applied at time of lesion resection. Objective:      /71   Pulse 71   Temp 98 °F (36.7 °C) (Oral)   Resp 19   SpO2 96%   Humza Risk Score: Humza Scale Score: 21    LABS    CBC:   Lab Results   Component Value Date/Time    WBC 7.2 2023 03:30 PM    RBC 4.19 2023 03:30 PM    HGB 12.9 2023 03:30 PM     CMP:  Albumin:    Lab Results   Component Value Date/Time    LABALBU 4.0 2023 03:30 PM     PT/INR:    Lab Results   Component Value Date/Time    PROTIME 10.0 2022 04:42 PM    INR 1.0 2022 04:42 PM     HgBA1c:  No results found for: LABA1C  PTT: No components found for: LABPTT      Assessment:       Measurements:       05/10/23 1223   Wound 05/10/23 Head Lower; Posterior   Date First Assessed/Time First Assessed: 05/10/23 1059   Present on Hospital Admission: Yes  Location: Head  Wound Location Orientation: Lower; Posterior   Wound Image    Wound Etiology Surgical  (traumatic)   Dressing Status New dressing applied   Wound Cleansed Cleansed with saline   Dressing/Treatment Petroleum impregnated gauze;Dry dressing;ABD   Wound Length (cm) 4 cm   Wound Width (cm) 1.5 cm
NEUROLOGY INPATIENT PROGRESS NOTE    5/11/2023         Current Exam:     Chart reviewed. Discussed with RN. MRI brain reviewed with 3.5 cm R cerebellar lesion concerning for metastasis. Neurosurgery consult is pending. Patient is doing well clinically, has no acute complaints. No seizures. Brief History:    Emilee Samuels is a  75 y.o. male with H/O esophageal cancer, prostate nodule, who was admitted on 5/10/2023 with fall. The patient presented to the ED after he had a fall at home down 3 stairs after missing a step. He reports hitting the back of his head and scraping his left arm. He denies any loss of consciousness. He was alert and oriented on arrival.  CT head was done showing a 3.4 cm mass in the right cerebellum with surrounding vasogenic edema with associated local mass effect with narrowing of the fourth ventricle. Neurology was consulted for the above testing results. Patient has no acute complaints. He is alert and oriented. He does report ongoing dizziness for about the past month. MRI brain was ordered showing a 3.5 cm enhancing mass lesion within the right cerebellar hemisphere concerning for metastatic disease. Neurosurgery was consulted. The patient was placed on Keppra prophylactically, there have been no seizures. No current facility-administered medications on file prior to encounter. Current Outpatient Medications on File Prior to Encounter   Medication Sig Dispense Refill    pantoprazole (PROTONIX) 40 MG tablet Take 1 tablet by mouth daily      tamsulosin (FLOMAX) 0.4 MG capsule Take 1 capsule by mouth daily (Patient not taking: Reported on 3/16/2023) 30 capsule 3    fluorouracil (EFUDEX) 5 % cream Apply twice daily to actinic keratosis for 3-4 weeks. Expect redness and irritation. (Patient not taking: Reported on 3/21/2023) 40 g 1       Allergies: Emilee Samuels is allergic to paclitaxel.     Past Medical History:   Diagnosis Date    Cancer [facility])     esophageal    Dental
Neurosurgery Service  Resident Daily Progress Note   5/12/2023 9:54 AM     Subjective    Patient seen and examined at the bedside. Chart reviewed. Discussed with nursing staff. No any acute event overnight. Vitals:    05/11/23 1928 05/11/23 2320 05/12/23 0326 05/12/23 0832   BP: 108/76 113/64 103/64 118/72   Pulse: 67 69 59 67   Resp: 16 15 15 13   Temp: 97.8 °F (36.6 °C) 97.7 °F (36.5 °C) 97.7 °F (36.5 °C) 97.5 °F (36.4 °C)   TempSrc: Oral Oral Oral Oral   SpO2: 97% 96% 98% 100%       Physical Exam:  Gen: Resting comfortably, no acute distress  CV: RRR  Resp: CTAB  GI: Abdomen soft, non-tender  Skin: Cap refill< 2 seconds,     Neuro:      A&Ox3   PERRL, EOMI   CNII-XII intact     Normal speech and mentation      L deltoid 5/5; R deltoid 5/5  L biceps 5/5; R biceps 5/5  L triceps 5/5; R triceps 5/5  L wrist extension 5/5; R wrist extension 5/5  L intrinsics 5/5; R intrinsics 5/5      L iliopsoas 5/5 , R iliopsoas 5/5  L quadriceps 5/5; R quadriceps 5/5  L Dorsiflexion 5/5; R dorsiflexion 5/5  L Plantarflexion 5/5; R plantarflexion 5/5  L EHL 5/5; R EHL 5/5      Deep Tendon Reflexes:    Right Bicep:  1+  Left Bicep:  1+  Right Knee:  1+  Left Knee:  1+  Labs:  Lab Results   Component Value Date    WBC 15.7 (H) 05/12/2023    HGB 13.0 05/12/2023    HCT 40.1 (L) 05/12/2023     05/12/2023    CHOL 189 08/13/2021    TRIG 219 (H) 08/13/2021    HDL 34 (L) 08/13/2021    ALT 15 05/11/2023    AST 14 05/11/2023     05/12/2023    K 4.1 05/12/2023     05/12/2023    CREATININE 0.53 (L) 05/12/2023    BUN 14 05/12/2023    CO2 23 05/12/2023    PSA 18.86 (H) 08/13/2021    INR 1.0 05/11/2023       Radiology (last 24 hours):   No new studies     ASSESSMENT & PLAN:    Raj Quinn is a 75 y.o. male who presents with history of esophageal cancer with new intracranial mass causing dizziness and loss of balance      -3.5 enhancing mass within right cerebellar hemisphere  -Dizziness  -Imbalance    Labs and imaging
Occupational [address]  Occupational Therapy Not Seen Note    DATE: 2023    NAME: Brandyn Aceves  MRN: [id number]   : 1955      Patient not seen this date for Occupational Therapy due to:     Other: Awaiting neurosurgery consult and POC    Next Scheduled Treatment: PM or 2023    Electronically signed by CECIL Sweeney on 2023 at 12:18 PM
Chest - clear to auscultation, no wheezes, rales or rhonchi, symmetric air entry   Heart - normal rate, regular rhythm, normal S1, S2, no murmurs  Abdomen - soft, nontender, nondistended, no masses or organomegaly   Neurological - alert, oriented, normal speech, no focal findings or movement disorder noted   Musculoskeletal - no joint tenderness, deformity or swelling/ laceration on scalp s/p sutures  Extremities - peripheral pulses normal, no pedal edema, no clubbing or cyanosis   Skin - normal coloration and turgor, no rashes, no suspicious skin lesions noted ,    DATA:    Labs:   CBC: No results for input(s): WBC, HGB, HCT, PLT in the last 72 hours. BMP:   Recent Labs     05/10/23  1519      K 4.2   CO2 21   BUN 12   CREATININE 0.59*   LABGLOM >60   GLUCOSE 159*     PT/INR:   Recent Labs     05/11/23  0219   PROTIME 12.7   INR 1.0       IMAGING DATA:  CT CHEST W CONTRAST   Preliminary Result   1. Status post esophagectomy with gastric pull-through. No abnormal soft   tissue mass at the anastomosis. No evidence of local recurrence or   metastases in the thorax. 2. Distension of the stomach, at the level of the diaphragm, both above and   below the diaphragm. Degree of distension has slightly improved since May   10, 2023, but not resolved. Although not as well seen on current exam for   lack of oral contrast, pyloric thickening seen on the prior CT of the abdomen   and pelvis, has not significantly changed. CT ABDOMEN PELVIS W IV CONTRAST Additional Contrast? Oral   Final Result   Since prior examination, there has been interval esophagectomy with gastric   pull-through. There is significant distension of the part of stomach within   the lower chest and upper abdomen. There is significant wall thickening of   distal stomach/pylorus which may be the cause of significant distension. .   The area is amenable to direct visualization to exclude mass lesion.       Multiple simple cysts within the
diverticulitis. Wall thickening of the small bowel loops in left upper quadrant area. Finding may be related to enteritis. MRI BRAIN W WO CONTRAST    Result Date: 5/10/2023  Interval development of 3.5 cm enhancing mass lesion within the right cerebellar hemisphere with extensive amount of surrounding vasogenic edema exerting significant mass effect on the 4th ventricle effacing it. No hydrocephalus is noted. The mass lesion is highly concerning for metastatic disease. There is no acute infarction, intracranial hemorrhage. Mild chronic microangiopathic ischemic disease. Mild generalized volume loss. Severe mucosal thickening of left mastoid air cells       Physical Examination:        General appearance:  alert, cooperative and no distress  Mental Status:  oriented to person, place and time and normal affect  Lungs:  clear to auscultation bilaterally, normal effort  Heart:  regular rate and rhythm, no murmur  Abdomen:  soft, nontender, nondistended, normal bowel sounds, no masses, hepatomegaly, splenomegaly  Extremities:  no edema, redness, tenderness in the calves  Skin:  no gross lesions, rashes, induration    Assessment:        Hospital Problems             Last Modified POA    * (Principal) Intracranial mass 5/10/2023 Yes    Adenosquamous carcinoma of esophagus ([address] 5/10/2023 Yes    Malignant neoplasm of lower third of esophagus ([address] 5/10/2023 Yes    Skin lesion 5/10/2023 Yes    Fall 5/10/2023 Yes    Vasogenic brain edema ([address] 5/10/2023 Yes       Plan:        Cerebellar mass-3.5 cm lesion with extensive vasogenic edema, metastatic disease, neurosurgery and neurology following. Continue on Decadron 4 mg, continue Keppra for seizure prophylaxis. Will need to have final plan from neurosurgery. History of esophageal cancer with resection-CT abdomen shows distention of stomach and thickening of the pyloric area, GI consulted, concern for recurrence, will need EGD, timing to be decided.  Oncology
5/10/2023 Yes    Adenosquamous carcinoma of esophagus ([facility] [address] 5/10/2023 Yes    Malignant neoplasm of lower third of esophagus ([address] 5/10/2023 Yes    Skin lesion 5/10/2023 Yes    Fall 5/10/2023 Yes    Vasogenic brain edema ([address] 5/10/2023 Yes    Abdominal distension 5/11/2023 Yes     Plan:        Cerebellar mass-3.5 cm lesion with extensive vasogenic edema, metastatic disease, neurosurgery and neurology following. Continue on Decadron 4 mg, continue Keppra for seizure prophylaxis. Will need to have final plan from neurosurgery regarding discharge once egd is done    History of esophageal cancer with resection-CT abdomen shows distention of stomach and thickening of the pyloric area, gi following, egd done, no mass but biopsies done. Oncology following. Fall with head wound- seen by wound care, continue dressing change    Recent mohs surgery for skin cancer has dermatology outpatient follow up    Continue protonix for gi prophylaxis  As egd is done, will check with neurosurg if patient can be discharged home. Patient advised to not stay alone.     Jac Garcia MD  5/12/2023  4:06 PM

## 2023-05-12 NOTE — PLAN OF CARE
Discussed case with neurosurgery, they are okay for discharge. Recommend prednisone for 2 weeks. Patient is low risk for surgery. Has > 4 mets.     Delia Diego MD  [facility]  9/68/9001,5:22 PM

## 2023-05-12 NOTE — ANESTHESIA PRE PROCEDURE
Department of Anesthesiology  Preprocedure Note       Name:  Asia Donnelly   Age:  75 y.o.  :  1955                                          MRN:  [id number]         Date:  2023      Surgeon: Hudson Botello):  Letha French MD    Procedure: Procedure(s):  EGD ESOPHAGOGASTRODUODENOSCOPY    Medications prior to admission:   Prior to Admission medications    Medication Sig Start Date End Date Taking? Authorizing Provider   pantoprazole (PROTONIX) 40 MG tablet Take 1 tablet by mouth daily    Historical Provider, MD   tamsulosin (FLOMAX) 0.4 MG capsule Take 1 capsule by mouth daily  Patient not taking: Reported on 3/16/2023 9/19/22   Dejon Bright MD   fluorouracil (EFUDEX) 5 % cream Apply twice daily to actinic keratosis for 3-4 weeks. Expect redness and irritation. Patient not taking: Reported on 3/21/2023 11/1/18   Estephania Lux MD       Current medications:    No current facility-administered medications for this visit. No current outpatient medications on file.      Facility-Administered Medications Ordered in Other Visits   Medication Dose Route Frequency Provider Last Rate Last Admin    pantoprazole (PROTONIX) tablet 40 mg  40 mg Oral BID AC Airam Hurtado MD   40 mg at 23 1606    LORazepam (ATIVAN) injection 1 mg  1 mg IntraVENous Once PRN RAMYA Day - CNP        sodium chloride flush 0.9 % injection 5-40 mL  5-40 mL IntraVENous 2 times per day Maritza Brown APRN - CNP   10 mL at 23 2130    sodium chloride flush 0.9 % injection 5-40 mL  5-40 mL IntraVENous PRN Aundrea Brown APRN - CNP        0.9 % sodium chloride infusion   IntraVENous PRN Aundrea Brown APRN - CNP        [Held by provider] enoxaparin (LOVENOX) injection 40 mg  40 mg SubCUTAneous Daily Aundrea Brown APRN - CNP   40 mg at 23 0801    ondansetron (ZOFRAN-ODT) disintegrating tablet 4 mg  4 mg Oral Q8H PRN RAMYA Santana - CNP        Or    ondansetron

## 2023-05-13 ENCOUNTER — TELEPHONE (OUTPATIENT)
Dept: INTERNAL MEDICINE CLINIC | Age: 68
End: 2023-05-13

## 2023-05-13 RX ORDER — DEXAMETHASONE 4 MG/1
4 TABLET ORAL 2 TIMES DAILY WITH MEALS
Qty: 60 TABLET | Refills: 0 | Status: SHIPPED | OUTPATIENT
Start: 2023-05-13 | End: 2023-06-12

## 2023-05-13 NOTE — DISCHARGE SUMMARY
the cervical spine. RECOMMENDATIONS: Unavailable     CT CHEST W CONTRAST    Result Date: 5/11/2023  1. Status post esophagectomy with gastric pull-through. No abnormal soft tissue mass at the anastomosis. No evidence of local recurrence or metastases in the thorax. 2. Distension of the stomach, at the level of the diaphragm, both above and below the diaphragm. Degree of distension has slightly improved since May 10, 2023, but not resolved. Although not as well seen on current exam for lack of oral contrast, pyloric thickening seen on the prior CT of the abdomen and pelvis, has not significantly changed. CT CERVICAL SPINE WO CONTRAST    Result Date: 5/10/2023  3.4 cm mass in the right cerebellum with surrounding area of vasogenic edema. There is associated local mass effect with narrowing of the 4th ventricle. Recommend further evaluation with contrast-enhanced MRI of the brain. No evidence of acute fracture or traumatic malalignment of the cervical spine. RECOMMENDATIONS: Unavailable     CT ABDOMEN PELVIS W IV CONTRAST Additional Contrast? Oral    Result Date: 5/10/2023  Since prior examination, there has been interval esophagectomy with gastric pull-through. There is significant distension of the part of stomach within the lower chest and upper abdomen. There is significant wall thickening of distal stomach/pylorus which may be the cause of significant distension. . The area is amenable to direct visualization to exclude mass lesion. Multiple simple cysts within the liver. Moderate prostate enlargement with diverticula arising from the urinary bladder. Diverticulosis with no evidence of diverticulitis. Wall thickening of the small bowel loops in left upper quadrant area. Finding may be related to enteritis.      MRI BRAIN W WO CONTRAST    Result Date: 5/10/2023  Interval development of 3.5 cm enhancing mass lesion within the right cerebellar hemisphere with extensive amount of surrounding vasogenic edema

## 2023-05-13 NOTE — TELEPHONE ENCOUNTER
I noticed that patient was given prednisone 4 mg at the time of discharge, I checked with neurosurgery team again today and they want decadron 4 mg twice daily. I called the patient, he is telling me that he is not taking steroids as he read its side effects. I emphasized the importance and reasoning for taking the steroids. I told him that we changed the medication to decadron and he will have to  from pharmacy. Patient verbalizes understanding. He tells me that on Monday he will follow up with his pcp. He will  medication first thing in the morning.     Sweta Dickey MD  [facility]  [address], [facility]  7/50/0258,6:29 PM

## 2023-05-15 ENCOUNTER — OFFICE VISIT (OUTPATIENT)
Dept: FAMILY MEDICINE CLINIC | Age: 68
End: 2023-05-15
Payer: MEDICARE

## 2023-05-15 VITALS
DIASTOLIC BLOOD PRESSURE: 80 MMHG | BODY MASS INDEX: 23.04 KG/M2 | WEIGHT: 174.6 LBS | SYSTOLIC BLOOD PRESSURE: 120 MMHG | OXYGEN SATURATION: 98 % | TEMPERATURE: 98.1 F | HEART RATE: 70 BPM

## 2023-05-15 DIAGNOSIS — G93.89 MASS OF CEREBELLUM: Primary | ICD-10-CM

## 2023-05-15 DIAGNOSIS — S01.01XD LACERATION OF SCALP WITHOUT FOREIGN BODY, SUBSEQUENT ENCOUNTER: ICD-10-CM

## 2023-05-15 DIAGNOSIS — Z85.01 HISTORY OF ESOPHAGEAL CANCER: ICD-10-CM

## 2023-05-15 DIAGNOSIS — Z09 HOSPITAL DISCHARGE FOLLOW-UP: ICD-10-CM

## 2023-05-15 DIAGNOSIS — L98.9 SCALP LESION: ICD-10-CM

## 2023-05-15 DIAGNOSIS — G93.6 VASOGENIC CEREBRAL EDEMA (HCC): ICD-10-CM
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
## 2023-05-15 PROCEDURE — 1111F DSCHRG MED/CURRENT MED MERGE: CPT | Performed by: FAMILY MEDICINE

## 2023-05-15 PROCEDURE — G8420 CALC BMI NORM PARAMETERS: HCPCS | Performed by: FAMILY MEDICINE

## 2023-05-15 PROCEDURE — G8427 DOCREV CUR MEDS BY ELIG CLIN: HCPCS | Performed by: FAMILY MEDICINE

## 2023-05-15 PROCEDURE — 1036F TOBACCO NON-USER: CPT | Performed by: FAMILY MEDICINE

## 2023-05-15 PROCEDURE — 99214 OFFICE O/P EST MOD 30 MIN: CPT | Performed by: FAMILY MEDICINE

## 2023-05-15 PROCEDURE — 3017F COLORECTAL CA SCREEN DOC REV: CPT | Performed by: FAMILY MEDICINE

## 2023-05-15 PROCEDURE — 1123F ACP DISCUSS/DSCN MKR DOCD: CPT | Performed by: FAMILY MEDICINE

## 2023-05-15 SDOH — ECONOMIC STABILITY: INCOME INSECURITY: HOW HARD IS IT FOR YOU TO PAY FOR THE VERY BASICS LIKE FOOD, HOUSING, MEDICAL CARE, AND HEATING?: NOT HARD AT ALL

## 2023-05-15 SDOH — ECONOMIC STABILITY: FOOD INSECURITY: WITHIN THE PAST 12 MONTHS, YOU WORRIED THAT YOUR FOOD WOULD RUN OUT BEFORE YOU GOT MONEY TO BUY MORE.: NEVER TRUE

## 2023-05-15 SDOH — ECONOMIC STABILITY: HOUSING INSECURITY
IN THE LAST 12 MONTHS, WAS THERE A TIME WHEN YOU DID NOT HAVE A STEADY PLACE TO SLEEP OR SLEPT IN A SHELTER (INCLUDING NOW)?: NO

## 2023-05-15 SDOH — ECONOMIC STABILITY: FOOD INSECURITY: WITHIN THE PAST 12 MONTHS, THE FOOD YOU BOUGHT JUST DIDN'T LAST AND YOU DIDN'T HAVE MONEY TO GET MORE.: NEVER TRUE

## 2023-05-15 ASSESSMENT — ENCOUNTER SYMPTOMS
GASTROINTESTINAL NEGATIVE: 1
RESPIRATORY NEGATIVE: 1
EYES NEGATIVE: 1
ALLERGIC/IMMUNOLOGIC NEGATIVE: 1

## 2023-05-15 ASSESSMENT — PATIENT HEALTH QUESTIONNAIRE - PHQ9
SUM OF ALL RESPONSES TO PHQ QUESTIONS 1-9: 0
SUM OF ALL RESPONSES TO PHQ9 QUESTIONS 1 & 2: 0
2. FEELING DOWN, DEPRESSED OR HOPELESS: 0
SUM OF ALL RESPONSES TO PHQ QUESTIONS 1-9: 0
SUM OF ALL RESPONSES TO PHQ QUESTIONS 1-9: 0
1. LITTLE INTEREST OR PLEASURE IN DOING THINGS: 0
SUM OF ALL RESPONSES TO PHQ QUESTIONS 1-9: 0

## 2023-05-15 NOTE — PROGRESS NOTES
Post-Discharge Transitional Care Follow Up      Ventura Mcclure   YOB: 1955    Date of Office Visit:  5/15/2023  Date of Hospital Admission: 5/10/23  Date of Hospital Discharge: 5/12/23  Readmission Risk Score (high >=14%. Medium >=10%):Readmission Risk Score: 10.8      Care management risk score Rising risk (score 2-5) and Complex Care (Scores >=6): No Risk Score On File     Non face to face  following discharge, date last encounter closed (first attempt may have been earlier): *No documented post hospital discharge outreach found in the last 14 days     Call initiated 2 business days of discharge: *No response recorded in the last 14 days     Mass of cerebellum  Assessment & Plan:   Monitored by specialist- no acute findings meriting change in the plan   Encouraged to take Prednisone and Keppra  Assisted in getting NSG follow up as directed by hospital  Orders:  -     Heaven Mares DO, Neurosurgery, [address]  Vasogenic cerebral edema [facility])  Assessment & Plan:   Monitored by specialist- no acute findings meriting change in the plan   Encouraged to take Prednisone and Keppra  Assisted in getting NSG follow up as directed by hospital  Orders:  -     Heaven Mares DO, Neurosurgery, [address]  History of esophageal cancer  Assessment & Plan:   Monitored by specialist- no acute findings meriting change in the plan   Concerned for brain [address] discharge follow-up  -     [place] DISCHARGE MEDS RECONCILED W/ CURRENT OUTPATIENT MED LIST  Laceration of scalp without foreign body, subsequent encounter  Fall, subsequent encounter  Assessment & Plan:  Reviewed hospital note  Scalp lesion  Assessment & Plan:   Monitored by specialist- no acute findings meriting change in the plan    Medical Decision Making: high complexity  Return in 3 months (on 8/15/2023).     On this date 5/15/2023 I have spent 41 minutes reviewing previous notes, test results and face to face with the patient

## 2023-05-15 NOTE — ASSESSMENT & PLAN NOTE
Monitored by specialist- no acute findings meriting change in the plan   Encouraged to take Prednisone and Keppra  Assisted in getting NSG follow up as directed by hospital

## 2023-05-16 LAB — SURGICAL PATHOLOGY REPORT: NORMAL

## 2023-05-17 ENCOUNTER — TELEPHONE (OUTPATIENT)
Dept: INFUSION THERAPY | Facility: MEDICAL CENTER | Age: 68
End: 2023-05-17

## 2023-05-17 ENCOUNTER — OFFICE VISIT (OUTPATIENT)
Dept: NEUROSURGERY | Age: 68
End: 2023-05-17
Payer: MEDICARE

## 2023-05-17 VITALS
OXYGEN SATURATION: 96 % | HEART RATE: 67 BPM | WEIGHT: 174 LBS | HEIGHT: 73 IN | SYSTOLIC BLOOD PRESSURE: 102 MMHG | TEMPERATURE: 97.4 F | BODY MASS INDEX: 23.06 KG/M2 | DIASTOLIC BLOOD PRESSURE: 64 MMHG

## 2023-05-17 DIAGNOSIS — G93.6 VASOGENIC CEREBRAL EDEMA (HCC): ICD-10-CM

## 2023-05-17 DIAGNOSIS — G93.89 MASS OF CEREBELLUM: Primary | ICD-10-CM

## 2023-05-17 DIAGNOSIS — G91.1 OBSTRUCTIVE HYDROCEPHALUS (HCC): ICD-10-CM

## 2023-05-17 PROCEDURE — 1036F TOBACCO NON-USER: CPT | Performed by: NEUROLOGICAL SURGERY

## 2023-05-17 PROCEDURE — 1123F ACP DISCUSS/DSCN MKR DOCD: CPT | Performed by: NEUROLOGICAL SURGERY

## 2023-05-17 PROCEDURE — 99214 OFFICE O/P EST MOD 30 MIN: CPT | Performed by: NEUROLOGICAL SURGERY

## 2023-05-17 PROCEDURE — 3017F COLORECTAL CA SCREEN DOC REV: CPT | Performed by: NEUROLOGICAL SURGERY

## 2023-05-17 PROCEDURE — G8427 DOCREV CUR MEDS BY ELIG CLIN: HCPCS | Performed by: NEUROLOGICAL SURGERY

## 2023-05-17 PROCEDURE — G8420 CALC BMI NORM PARAMETERS: HCPCS | Performed by: NEUROLOGICAL SURGERY

## 2023-05-17 PROCEDURE — 1111F DSCHRG MED/CURRENT MED MERGE: CPT | Performed by: NEUROLOGICAL SURGERY

## 2023-05-17 NOTE — PROGRESS NOTES
[address]  [facility] [address], [address] [facility] [address]  Dept: [phone]    Patient:  Ventura Mcclure  YOB: 1955  Date: 5/17/23    The patient is a 75 y.o. male who presents today for consult of the following problems:     Chief Complaint   Patient presents with    Neurologic Problem     Dizziness constantly. HPI:     Ventura Mcclure is a 75 y.o. 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 The patient was recently seen in the hospital with periods of dizziness and was evaluated with MRI findings of a right cerebellar mass. Patient has had previous esophageal cancer and recently underwent excision of some type of skin malignancy presumably squamous cell cancer carcinoma. He is scheduled to undergo grafting tomorrow and is in the process of evaluation with oncology. Today in follow-up he denies any new symptoms but still has a persistent dizziness no presyncope no nausea or headaches. .      History:     Past Medical History:   Diagnosis Date    Cancer ([address]     esophageal    Dental root implant present     2 metal implants in lower jaw    Elevated PSA     Dr. Manuelito Miller    Hearing loss     Hiatal hernia     Keratosis     uses Effudex prn    Prostate nodule     on MRI per patient    Snores     no sleep apnea diagnosis    Under care of team 09/12/2022    Oncology: Naomie Roman, last visit 9/2022    Wears dentures     full dentures    Wears reading eyeglasses     Wellness examination     PCP:Dr. Anu Meza [facility], last visit 8/2022     Past Surgical History:   Procedure Laterality Date    COLONOSCOPY  09/24/2008    [facility] Dr. Fitzpatrick Done     COLONOSCOPY N/A 02/17/2022    COLONOSCOPY POLYPECTOMY HOT

## 2023-05-17 NOTE — TELEPHONE ENCOUNTER
Kolton Chopra called and wanted to inform Dr. Brennen Rice that he is currently seeing Dr. Miriam De Paz dermatology for a new finding of squamous sell skin cancer . Kolton Chopra is also is seeing Fransico Dickson for a new finding of a  cerebellum mass. Fransico Dickson is recommending biopsy and resection of brain mass. Pt states brain surgery will be set after he see's [facility] Dermatologic Surgeon . Pt will call office and inform of biopsy findings .

## 2023-05-24 ENCOUNTER — OFFICE VISIT (OUTPATIENT)
Dept: NEUROSURGERY | Age: 68
End: 2023-05-24
Payer: MEDICARE

## 2023-05-24 VITALS
DIASTOLIC BLOOD PRESSURE: 66 MMHG | BODY MASS INDEX: 22.96 KG/M2 | SYSTOLIC BLOOD PRESSURE: 101 MMHG | WEIGHT: 174 LBS | HEART RATE: 79 BPM | RESPIRATION RATE: 18 BRPM | OXYGEN SATURATION: 100 % | TEMPERATURE: 98.8 F

## 2023-05-24 DIAGNOSIS — G93.6 VASOGENIC CEREBRAL EDEMA (HCC): Primary | ICD-10-CM

## 2023-05-24 DIAGNOSIS — G93.89 MASS OF CEREBELLUM: ICD-10-CM

## 2023-05-24 DIAGNOSIS — G91.1 OBSTRUCTIVE HYDROCEPHALUS (HCC): ICD-10-CM

## 2023-05-24 PROCEDURE — 3017F COLORECTAL CA SCREEN DOC REV: CPT | Performed by: NEUROLOGICAL SURGERY

## 2023-05-24 PROCEDURE — 1123F ACP DISCUSS/DSCN MKR DOCD: CPT | Performed by: NEUROLOGICAL SURGERY

## 2023-05-24 PROCEDURE — 1111F DSCHRG MED/CURRENT MED MERGE: CPT | Performed by: NEUROLOGICAL SURGERY

## 2023-05-24 PROCEDURE — G8420 CALC BMI NORM PARAMETERS: HCPCS | Performed by: NEUROLOGICAL SURGERY

## 2023-05-24 PROCEDURE — 99214 OFFICE O/P EST MOD 30 MIN: CPT | Performed by: NEUROLOGICAL SURGERY

## 2023-05-24 PROCEDURE — G8427 DOCREV CUR MEDS BY ELIG CLIN: HCPCS | Performed by: NEUROLOGICAL SURGERY

## 2023-05-24 PROCEDURE — 1036F TOBACCO NON-USER: CPT | Performed by: NEUROLOGICAL SURGERY

## 2023-05-24 NOTE — PROGRESS NOTES
order to get medical optimization along with a fresh Stealth MRI. Risks of the surgery include bleeding stroke seizure as well as brainstem injury and spinal fluid leakage. This will require the use of navigation which I also did go through with the patient. He recently had skin grafting of a squamous cell carcinoma resection on the crown at the vertex. I was concerned about infection but appears that this area is actually healing quite well and he has been on prophylactic antibiotics. Now that has been grafted and the area is close to healing we will proceed with planning surgical resection. R EVD  R retrosigmoid crani for tumor    Followup: No follow-ups on file. Prescriptions Ordered:  No orders of the defined types were placed in this encounter. Orders Placed:  No orders of the defined types were placed in this encounter. Electronically signed by Leonora Blair DO on 5/24/2023 at 9:04 AM    Please note that this chart was generated using voice recognition Dragon dictation software. Although every effort was made to ensure the accuracy of this automated transcription, some errors in transcription may have occurred.

## 2023-06-01 ENCOUNTER — APPOINTMENT (OUTPATIENT)
Dept: GENERAL RADIOLOGY | Age: 68
DRG: 025 | End: 2023-06-01
Attending: NEUROLOGICAL SURGERY
Payer: MEDICARE

## 2023-06-01 ENCOUNTER — TELEPHONE (OUTPATIENT)
Dept: NEUROSURGERY | Age: 68
End: 2023-06-01

## 2023-06-01 ENCOUNTER — ANESTHESIA EVENT (OUTPATIENT)
Dept: OPERATING ROOM | Age: 68
End: 2023-06-01
Payer: MEDICARE

## 2023-06-01 ENCOUNTER — APPOINTMENT (OUTPATIENT)
Dept: MRI IMAGING | Age: 68
DRG: 025 | End: 2023-06-01
Attending: NEUROLOGICAL SURGERY
Payer: MEDICARE

## 2023-06-01 ENCOUNTER — HOSPITAL ENCOUNTER (INPATIENT)
Age: 68
LOS: 5 days | Discharge: HOME | DRG: 025 | End: 2023-06-06
Attending: NEUROLOGICAL SURGERY | Admitting: NEUROLOGICAL SURGERY
Payer: MEDICARE

## 2023-06-01 DIAGNOSIS — G91.1 OBSTRUCTIVE HYDROCEPHALUS (HCC): ICD-10-CM

## 2023-06-01 DIAGNOSIS — G93.89 MASS OF CEREBELLUM: ICD-10-CM

## 2023-06-01 PROBLEM — D49.6 CEREBELLAR TUMOR (HCC): Status: ACTIVE | Noted: 2023-06-01

## 2023-06-01 LAB
ANION GAP SERPL CALCULATED.3IONS-SCNC: 10 MMOL/L (ref 9–17)
BASOPHILS # BLD: 0 K/UL (ref 0–0.2)
BASOPHILS NFR BLD: 0 % (ref 0–2)
BILIRUB UR QL STRIP: NEGATIVE
BUN SERPL-MCNC: 18 MG/DL (ref 8–23)
CALCIUM SERPL-MCNC: 8.7 MG/DL (ref 8.6–10.4)
CHLORIDE SERPL-SCNC: 104 MMOL/L (ref 98–107)
CLARITY UR: CLEAR
CO2 SERPL-SCNC: 22 MMOL/L (ref 20–31)
COLOR UR: YELLOW
COMMENT UA: NORMAL
CREAT SERPL-MCNC: 0.66 MG/DL (ref 0.7–1.2)
EOSINOPHIL # BLD: 0 K/UL (ref 0–0.4)
EOSINOPHILS RELATIVE PERCENT: 0 % (ref 1–4)
ERYTHROCYTE [DISTWIDTH] IN BLOOD BY AUTOMATED COUNT: 14.2 % (ref 11.8–14.4)
GFR SERPL CREATININE-BSD FRML MDRD: >60 ML/MIN/1.73M2
GLUCOSE BLD-MCNC: 131 MG/DL (ref 75–110)
GLUCOSE BLD-MCNC: 165 MG/DL (ref 75–110)
GLUCOSE SERPL-MCNC: 217 MG/DL (ref 70–99)
GLUCOSE UR STRIP-MCNC: NEGATIVE MG/DL
HCT VFR BLD AUTO: 38.4 % (ref 40.7–50.3)
HGB BLD-MCNC: 12.7 G/DL (ref 13–17)
HGB UR QL STRIP.AUTO: NEGATIVE
IMM GRANULOCYTES # BLD AUTO: 0.11 K/UL (ref 0–0.3)
IMM GRANULOCYTES NFR BLD: 1 %
INR PPP: 0.9
KETONES UR STRIP-MCNC: NEGATIVE MG/DL
LEUKOCYTE ESTERASE UR QL STRIP: NEGATIVE
LYMPHOCYTES # BLD: 2 % (ref 24–44)
LYMPHOCYTES NFR BLD: 0.22 K/UL (ref 1–4.8)
MCH RBC QN AUTO: 31.5 PG (ref 25.2–33.5)
MCHC RBC AUTO-ENTMCNC: 33.1 G/DL (ref 28.4–34.8)
MCV RBC AUTO: 95.3 FL (ref 82.6–102.9)
MONOCYTES NFR BLD: 0.44 K/UL (ref 0.1–0.8)
MONOCYTES NFR BLD: 4 % (ref 1–7)
MORPHOLOGY: NORMAL
NEUTROPHILS NFR BLD: 93 % (ref 36–66)
NEUTS SEG NFR BLD: 10.13 K/UL (ref 1.8–7.7)
NITRITE UR QL STRIP: NEGATIVE
NRBC AUTOMATED: 0 PER 100 WBC
PARTIAL THROMBOPLASTIN TIME: 26.3 SEC (ref 23–36.5)
PH UR STRIP: 6 [PH] (ref 5–8)
PLATELET # BLD AUTO: 235 K/UL (ref 138–453)
PMV BLD AUTO: 10.5 FL (ref 8.1–13.5)
POTASSIUM SERPL-SCNC: 4.1 MMOL/L (ref 3.7–5.3)
PROT UR STRIP-MCNC: NEGATIVE MG/DL
PROTHROMBIN TIME: 12.6 SEC (ref 11.7–14.9)
RBC # BLD AUTO: 4.03 M/UL (ref 4.21–5.77)
SODIUM SERPL-SCNC: 136 MMOL/L (ref 135–144)
SP GR UR STRIP: 1.03 (ref 1–1.03)
UROBILINOGEN UR STRIP-ACNC: NORMAL
WBC OTHER # BLD: 10.9 K/UL (ref 3.5–11.3)

## 2023-06-01 PROCEDURE — 6360000002 HC RX W HCPCS: Performed by: NURSE PRACTITIONER

## 2023-06-01 PROCEDURE — 80048 BASIC METABOLIC PNL TOTAL CA: CPT

## 2023-06-01 PROCEDURE — 85610 PROTHROMBIN TIME: CPT

## 2023-06-01 PROCEDURE — 86901 BLOOD TYPING SEROLOGIC RH(D): CPT

## 2023-06-01 PROCEDURE — 86850 RBC ANTIBODY SCREEN: CPT

## 2023-06-01 PROCEDURE — 86920 COMPATIBILITY TEST SPIN: CPT

## 2023-06-01 PROCEDURE — 6360000004 HC RX CONTRAST MEDICATION: Performed by: NURSE PRACTITIONER

## 2023-06-01 PROCEDURE — 70553 MRI BRAIN STEM W/O & W/DYE: CPT

## 2023-06-01 PROCEDURE — 93005 ELECTROCARDIOGRAM TRACING: CPT | Performed by: NURSE PRACTITIONER

## 2023-06-01 PROCEDURE — 99223 1ST HOSP IP/OBS HIGH 75: CPT | Performed by: NEUROLOGICAL SURGERY

## 2023-06-01 PROCEDURE — A9576 INJ PROHANCE MULTIPACK: HCPCS | Performed by: NURSE PRACTITIONER

## 2023-06-01 PROCEDURE — 82947 ASSAY GLUCOSE BLOOD QUANT: CPT

## 2023-06-01 PROCEDURE — 81003 URINALYSIS AUTO W/O SCOPE: CPT

## 2023-06-01 PROCEDURE — 36415 COLL VENOUS BLD VENIPUNCTURE: CPT

## 2023-06-01 PROCEDURE — 85730 THROMBOPLASTIN TIME PARTIAL: CPT

## 2023-06-01 PROCEDURE — 2580000003 HC RX 258: Performed by: NURSE PRACTITIONER

## 2023-06-01 PROCEDURE — 71045 X-RAY EXAM CHEST 1 VIEW: CPT

## 2023-06-01 PROCEDURE — 85027 COMPLETE CBC AUTOMATED: CPT

## 2023-06-01 PROCEDURE — APPSS30 APP SPLIT SHARED TIME 16-30 MINUTES: Performed by: NURSE PRACTITIONER

## 2023-06-01 PROCEDURE — 1200000000 HC SEMI PRIVATE

## 2023-06-01 PROCEDURE — 86900 BLOOD TYPING SEROLOGIC ABO: CPT

## 2023-06-01 RX ORDER — ONDANSETRON 2 MG/ML
4 INJECTION INTRAMUSCULAR; INTRAVENOUS EVERY 6 HOURS PRN
Status: DISCONTINUED | OUTPATIENT
Start: 2023-06-01 | End: 2023-06-06 | Stop reason: HOSPADM

## 2023-06-01 RX ORDER — DEXTROSE MONOHYDRATE 100 MG/ML
INJECTION, SOLUTION INTRAVENOUS CONTINUOUS PRN
Status: DISCONTINUED | OUTPATIENT
Start: 2023-06-01 | End: 2023-06-06 | Stop reason: HOSPADM

## 2023-06-01 RX ORDER — LEVETIRACETAM 5 MG/ML
500 INJECTION INTRAVASCULAR EVERY 12 HOURS
Status: DISCONTINUED | OUTPATIENT
Start: 2023-06-01 | End: 2023-06-01

## 2023-06-01 RX ORDER — INSULIN LISPRO 100 [IU]/ML
0-4 INJECTION, SOLUTION INTRAVENOUS; SUBCUTANEOUS
Status: DISCONTINUED | OUTPATIENT
Start: 2023-06-01 | End: 2023-06-06

## 2023-06-01 RX ORDER — INSULIN LISPRO 100 [IU]/ML
0-4 INJECTION, SOLUTION INTRAVENOUS; SUBCUTANEOUS NIGHTLY
Status: DISCONTINUED | OUTPATIENT
Start: 2023-06-01 | End: 2023-06-06

## 2023-06-01 RX ORDER — PANTOPRAZOLE SODIUM 40 MG/1
40 TABLET, DELAYED RELEASE ORAL
Status: DISCONTINUED | OUTPATIENT
Start: 2023-06-02 | End: 2023-06-06 | Stop reason: HOSPADM

## 2023-06-01 RX ORDER — SODIUM CHLORIDE 0.9 % (FLUSH) 0.9 %
10 SYRINGE (ML) INJECTION PRN
Status: DISCONTINUED | OUTPATIENT
Start: 2023-06-01 | End: 2023-06-06 | Stop reason: HOSPADM

## 2023-06-01 RX ORDER — SODIUM CHLORIDE 9 MG/ML
INJECTION, SOLUTION INTRAVENOUS CONTINUOUS
Status: DISCONTINUED | OUTPATIENT
Start: 2023-06-01 | End: 2023-06-06 | Stop reason: HOSPADM

## 2023-06-01 RX ORDER — DEXAMETHASONE SODIUM PHOSPHATE 4 MG/ML
4 INJECTION, SOLUTION INTRA-ARTICULAR; INTRALESIONAL; INTRAMUSCULAR; INTRAVENOUS; SOFT TISSUE EVERY 6 HOURS
Status: DISCONTINUED | OUTPATIENT
Start: 2023-06-01 | End: 2023-06-05

## 2023-06-01 RX ORDER — SODIUM CHLORIDE 9 MG/ML
INJECTION, SOLUTION INTRAVENOUS PRN
Status: DISCONTINUED | OUTPATIENT
Start: 2023-06-01 | End: 2023-06-06 | Stop reason: HOSPADM

## 2023-06-01 RX ORDER — ACETAMINOPHEN 325 MG/1
650 TABLET ORAL EVERY 4 HOURS PRN
Status: DISCONTINUED | OUTPATIENT
Start: 2023-06-01 | End: 2023-06-06 | Stop reason: HOSPADM

## 2023-06-01 RX ADMIN — SODIUM CHLORIDE: 9 INJECTION, SOLUTION INTRAVENOUS at 20:41

## 2023-06-01 RX ADMIN — GADOTERIDOL 15 ML: 279.3 INJECTION, SOLUTION INTRAVENOUS at 19:28

## 2023-06-01 RX ADMIN — DEXAMETHASONE SODIUM PHOSPHATE 4 MG: 4 INJECTION, SOLUTION INTRAMUSCULAR; INTRAVENOUS at 22:33

## 2023-06-01 ASSESSMENT — PAIN SCALES - GENERAL: PAINLEVEL_OUTOF10: 0

## 2023-06-02 ENCOUNTER — HOSPITAL ENCOUNTER (OUTPATIENT)
Dept: RADIATION ONCOLOGY | Age: 68
Discharge: HOME | End: 2023-06-02

## 2023-06-02 ENCOUNTER — APPOINTMENT (OUTPATIENT)
Dept: CT IMAGING | Age: 68
DRG: 025 | End: 2023-06-02
Attending: NEUROLOGICAL SURGERY
Payer: MEDICARE

## 2023-06-02 ENCOUNTER — TELEPHONE (OUTPATIENT)
Dept: ONCOLOGY | Age: 68
End: 2023-06-02

## 2023-06-02 ENCOUNTER — ANESTHESIA (OUTPATIENT)
Dept: OPERATING ROOM | Age: 68
End: 2023-06-02
Payer: MEDICARE

## 2023-06-02 LAB
ALLEN TEST: ABNORMAL
CA-I BLD-SCNC: 1.14 MMOL/L (ref 1.13–1.33)
CARBOXYHEMOGLOBIN: 0.7 % (ref 0–5)
CHLORIDE, WHOLE BLOOD: 107 MMOL/L (ref 98–110)
EKG ATRIAL RATE: 74 BPM
EKG P AXIS: 57 DEGREES
EKG P-R INTERVAL: 130 MS
EKG Q-T INTERVAL: 408 MS
EKG QRS DURATION: 96 MS
EKG QTC CALCULATION (BAZETT): 452 MS
EKG R AXIS: -39 DEGREES
EKG T AXIS: 18 DEGREES
EKG VENTRICULAR RATE: 74 BPM
FIO2: ABNORMAL
GLUCOSE BLD-MCNC: 114 MG/DL (ref 75–110)
GLUCOSE BLD-MCNC: 121 MG/DL (ref 75–110)
GLUCOSE BLD-MCNC: 130 MG/DL (ref 75–110)
GLUCOSE BLD-MCNC: 162 MG/DL (ref 75–110)
HCO3 ARTERIAL: 22.9 MMOL/L (ref 22–27)
HCT VFR BLD CALC: 39.1 % (ref 40.7–50.3)
HEMOGLOBIN: 12.7 GM/DL (ref 13–17)
NEGATIVE BASE EXCESS, ART: 2.7 MMOL/L (ref 0–2)
O2 SAT, ARTERIAL: 98.8 % (ref 94–100)
PATIENT TEMP: 37
PCO2 ARTERIAL: 45.6 MMHG (ref 32–45)
PH ARTERIAL: 7.32 (ref 7.35–7.45)
PO2 ARTERIAL: 186 MMHG (ref 75–95)
POTASSIUM, WHOLE BLOOD: 3.6 MMOL/L (ref 3.6–5)
SODIUM, WHOLE BLOOD: 136 MMOL/L (ref 136–145)

## 2023-06-02 PROCEDURE — 3600000014 HC SURGERY LEVEL 4 ADDTL 15MIN: Performed by: NEUROLOGICAL SURGERY

## 2023-06-02 PROCEDURE — 6370000000 HC RX 637 (ALT 250 FOR IP): Performed by: NURSE PRACTITIONER

## 2023-06-02 PROCEDURE — 00BC0ZZ EXCISION OF CEREBELLUM, OPEN APPROACH: ICD-10-PCS | Performed by: NEUROLOGICAL SURGERY

## 2023-06-02 PROCEDURE — 82805 BLOOD GASES W/O2 SATURATION: CPT

## 2023-06-02 PROCEDURE — 6370000000 HC RX 637 (ALT 250 FOR IP): Performed by: NEUROLOGICAL SURGERY

## 2023-06-02 PROCEDURE — 87086 URINE CULTURE/COLONY COUNT: CPT

## 2023-06-02 PROCEDURE — 6360000002 HC RX W HCPCS: Performed by: NURSE ANESTHETIST, CERTIFIED REGISTERED

## 2023-06-02 PROCEDURE — 88307 TISSUE EXAM BY PATHOLOGIST: CPT

## 2023-06-02 PROCEDURE — 85014 HEMATOCRIT: CPT

## 2023-06-02 PROCEDURE — 2709999900 HC NON-CHARGEABLE SUPPLY: Performed by: NEUROLOGICAL SURGERY

## 2023-06-02 PROCEDURE — C1763 CONN TISS, NON-HUMAN: HCPCS | Performed by: NEUROLOGICAL SURGERY

## 2023-06-02 PROCEDURE — 6360000002 HC RX W HCPCS: Performed by: NURSE PRACTITIONER

## 2023-06-02 PROCEDURE — 88341 IMHCHEM/IMCYTCHM EA ADD ANTB: CPT

## 2023-06-02 PROCEDURE — 2000000000 HC ICU R&B

## 2023-06-02 PROCEDURE — 7100000000 HC PACU RECOVERY - FIRST 15 MIN: Performed by: NEUROLOGICAL SURGERY

## 2023-06-02 PROCEDURE — 88342 IMHCHEM/IMCYTCHM 1ST ANTB: CPT

## 2023-06-02 PROCEDURE — 6360000002 HC RX W HCPCS: Performed by: NEUROLOGICAL SURGERY

## 2023-06-02 PROCEDURE — 6360000002 HC RX W HCPCS

## 2023-06-02 PROCEDURE — 2580000003 HC RX 258

## 2023-06-02 PROCEDURE — 88333 PATH CONSLTJ SURG CYTO XM 1: CPT

## 2023-06-02 PROCEDURE — 7100000001 HC PACU RECOVERY - ADDTL 15 MIN: Performed by: NEUROLOGICAL SURGERY

## 2023-06-02 PROCEDURE — 82330 ASSAY OF CALCIUM: CPT

## 2023-06-02 PROCEDURE — 61781 SCAN PROC CRANIAL INTRA: CPT | Performed by: NEUROLOGICAL SURGERY

## 2023-06-02 PROCEDURE — 3600000004 HC SURGERY LEVEL 4 BASE: Performed by: NEUROLOGICAL SURGERY

## 2023-06-02 PROCEDURE — 2500000003 HC RX 250 WO HCPCS: Performed by: ANESTHESIOLOGY

## 2023-06-02 PROCEDURE — C1729 CATH, DRAINAGE: HCPCS | Performed by: NEUROLOGICAL SURGERY

## 2023-06-02 PROCEDURE — 2500000003 HC RX 250 WO HCPCS: Performed by: NEUROLOGICAL SURGERY

## 2023-06-02 PROCEDURE — 2720000010 HC SURG SUPPLY STERILE: Performed by: NEUROLOGICAL SURGERY

## 2023-06-02 PROCEDURE — 61107 TDH PNXR IMPLT VENTR CATH: CPT | Performed by: NEUROLOGICAL SURGERY

## 2023-06-02 PROCEDURE — 3700000000 HC ANESTHESIA ATTENDED CARE: Performed by: NEUROLOGICAL SURGERY

## 2023-06-02 PROCEDURE — 61510 CRNEC TREPH EXC BRN TUM STTL: CPT | Performed by: NEUROLOGICAL SURGERY

## 2023-06-02 PROCEDURE — 88331 PATH CONSLTJ SURG 1 BLK 1SPC: CPT

## 2023-06-02 PROCEDURE — 82947 ASSAY GLUCOSE BLOOD QUANT: CPT

## 2023-06-02 PROCEDURE — 70450 CT HEAD/BRAIN W/O DYE: CPT

## 2023-06-02 PROCEDURE — 2580000003 HC RX 258: Performed by: NEUROLOGICAL SURGERY

## 2023-06-02 PROCEDURE — 2580000003 HC RX 258: Performed by: NURSE PRACTITIONER

## 2023-06-02 PROCEDURE — 3700000001 HC ADD 15 MINUTES (ANESTHESIA): Performed by: NEUROLOGICAL SURGERY

## 2023-06-02 PROCEDURE — 2500000003 HC RX 250 WO HCPCS

## 2023-06-02 PROCEDURE — 009630Z DRAINAGE OF CEREBRAL VENTRICLE WITH DRAINAGE DEVICE, PERCUTANEOUS APPROACH: ICD-10-PCS | Performed by: NEUROLOGICAL SURGERY

## 2023-06-02 PROCEDURE — 6360000002 HC RX W HCPCS: Performed by: STUDENT IN AN ORGANIZED HEALTH CARE EDUCATION/TRAINING PROGRAM

## 2023-06-02 PROCEDURE — 85018 HEMOGLOBIN: CPT

## 2023-06-02 PROCEDURE — 2580000003 HC RX 258: Performed by: ANESTHESIOLOGY

## 2023-06-02 PROCEDURE — 00U20KZ SUPPLEMENT DURA MATER WITH NONAUTOLOGOUS TISSUE SUBSTITUTE, OPEN APPROACH: ICD-10-PCS | Performed by: NEUROLOGICAL SURGERY

## 2023-06-02 PROCEDURE — C1713 ANCHOR/SCREW BN/BN,TIS/BN: HCPCS | Performed by: NEUROLOGICAL SURGERY

## 2023-06-02 PROCEDURE — 84132 ASSAY OF SERUM POTASSIUM: CPT

## 2023-06-02 DEVICE — 2 HOLE PLATES(3)SELF DRILL SCREW(6) AXS: Type: IMPLANTABLE DEVICE | Site: CEREBELLUM | Status: FUNCTIONAL

## 2023-06-02 DEVICE — DURAGEN® PLUS DURAL REGENERATION MATRIX, 2 IN X 2 IN (5 CM X 5 CM)
Type: IMPLANTABLE DEVICE | Site: CEREBELLUM | Status: FUNCTIONAL
Brand: DURAGEN® PLUS

## 2023-06-02 RX ORDER — LIDOCAINE HYDROCHLORIDE AND EPINEPHRINE 10; 10 MG/ML; UG/ML
INJECTION, SOLUTION INFILTRATION; PERINEURAL PRN
Status: DISCONTINUED | OUTPATIENT
Start: 2023-06-02 | End: 2023-06-02 | Stop reason: HOSPADM

## 2023-06-02 RX ORDER — LIDOCAINE HYDROCHLORIDE 10 MG/ML
INJECTION, SOLUTION EPIDURAL; INFILTRATION; INTRACAUDAL; PERINEURAL PRN
Status: DISCONTINUED | OUTPATIENT
Start: 2023-06-02 | End: 2023-06-02 | Stop reason: SDUPTHER

## 2023-06-02 RX ORDER — SODIUM CHLORIDE 0.9 % (FLUSH) 0.9 %
5-40 SYRINGE (ML) INJECTION EVERY 12 HOURS SCHEDULED
Status: DISCONTINUED | OUTPATIENT
Start: 2023-06-02 | End: 2023-06-02 | Stop reason: HOSPADM

## 2023-06-02 RX ORDER — SENNA PLUS 8.6 MG/1
1 TABLET ORAL NIGHTLY
Status: DISCONTINUED | OUTPATIENT
Start: 2023-06-02 | End: 2023-06-06 | Stop reason: HOSPADM

## 2023-06-02 RX ORDER — MANNITOL 20 G/100ML
INJECTION, SOLUTION INTRAVENOUS PRN
Status: DISCONTINUED | OUTPATIENT
Start: 2023-06-02 | End: 2023-06-02 | Stop reason: SDUPTHER

## 2023-06-02 RX ORDER — ONDANSETRON 2 MG/ML
INJECTION INTRAMUSCULAR; INTRAVENOUS PRN
Status: DISCONTINUED | OUTPATIENT
Start: 2023-06-02 | End: 2023-06-02 | Stop reason: SDUPTHER

## 2023-06-02 RX ORDER — OXYCODONE HYDROCHLORIDE 5 MG/1
10 TABLET ORAL EVERY 4 HOURS PRN
Status: DISCONTINUED | OUTPATIENT
Start: 2023-06-02 | End: 2023-06-06 | Stop reason: HOSPADM

## 2023-06-02 RX ORDER — VANCOMYCIN HYDROCHLORIDE 1 G/20ML
INJECTION, POWDER, LYOPHILIZED, FOR SOLUTION INTRAVENOUS PRN
Status: DISCONTINUED | OUTPATIENT
Start: 2023-06-02 | End: 2023-06-02 | Stop reason: ALTCHOICE

## 2023-06-02 RX ORDER — EPHEDRINE SULFATE/0.9% NACL/PF 50 MG/5 ML
SYRINGE (ML) INTRAVENOUS PRN
Status: DISCONTINUED | OUTPATIENT
Start: 2023-06-02 | End: 2023-06-02 | Stop reason: SDUPTHER

## 2023-06-02 RX ORDER — SODIUM CHLORIDE 9 MG/ML
INJECTION, SOLUTION INTRAVENOUS CONTINUOUS PRN
Status: DISCONTINUED | OUTPATIENT
Start: 2023-06-02 | End: 2023-06-02 | Stop reason: SDUPTHER

## 2023-06-02 RX ORDER — CEFAZOLIN SODIUM 2 G/50ML
SOLUTION INTRAVENOUS PRN
Status: DISCONTINUED | OUTPATIENT
Start: 2023-06-02 | End: 2023-06-02 | Stop reason: SDUPTHER

## 2023-06-02 RX ORDER — DEXAMETHASONE SODIUM PHOSPHATE 10 MG/ML
INJECTION INTRAMUSCULAR; INTRAVENOUS PRN
Status: DISCONTINUED | OUTPATIENT
Start: 2023-06-02 | End: 2023-06-02 | Stop reason: SDUPTHER

## 2023-06-02 RX ORDER — SODIUM CHLORIDE 0.9 % (FLUSH) 0.9 %
5-40 SYRINGE (ML) INJECTION PRN
Status: DISCONTINUED | OUTPATIENT
Start: 2023-06-02 | End: 2023-06-02 | Stop reason: HOSPADM

## 2023-06-02 RX ORDER — MORPHINE SULFATE 2 MG/ML
2 INJECTION, SOLUTION INTRAMUSCULAR; INTRAVENOUS
Status: DISCONTINUED | OUTPATIENT
Start: 2023-06-02 | End: 2023-06-06

## 2023-06-02 RX ORDER — LIDOCAINE HYDROCHLORIDE AND EPINEPHRINE 15; 5 MG/ML; UG/ML
INJECTION, SOLUTION EPIDURAL PRN
Status: DISCONTINUED | OUTPATIENT
Start: 2023-06-02 | End: 2023-06-02 | Stop reason: HOSPADM

## 2023-06-02 RX ORDER — PHENYLEPHRINE HCL IN 0.9% NACL 50MG/250ML
PLASTIC BAG, INJECTION (ML) INTRAVENOUS CONTINUOUS PRN
Status: DISCONTINUED | OUTPATIENT
Start: 2023-06-02 | End: 2023-06-02 | Stop reason: SDUPTHER

## 2023-06-02 RX ORDER — FENTANYL CITRATE 50 UG/ML
INJECTION, SOLUTION INTRAMUSCULAR; INTRAVENOUS PRN
Status: DISCONTINUED | OUTPATIENT
Start: 2023-06-02 | End: 2023-06-02 | Stop reason: SDUPTHER

## 2023-06-02 RX ORDER — MAGNESIUM HYDROXIDE 1200 MG/15ML
LIQUID ORAL CONTINUOUS PRN
Status: DISCONTINUED | OUTPATIENT
Start: 2023-06-02 | End: 2023-06-02 | Stop reason: HOSPADM

## 2023-06-02 RX ORDER — FENTANYL CITRATE 50 UG/ML
25 INJECTION, SOLUTION INTRAMUSCULAR; INTRAVENOUS EVERY 5 MIN PRN
Status: DISCONTINUED | OUTPATIENT
Start: 2023-06-02 | End: 2023-06-02 | Stop reason: HOSPADM

## 2023-06-02 RX ORDER — GINSENG 100 MG
CAPSULE ORAL PRN
Status: DISCONTINUED | OUTPATIENT
Start: 2023-06-02 | End: 2023-06-02 | Stop reason: HOSPADM

## 2023-06-02 RX ORDER — PROPOFOL 10 MG/ML
INJECTION, EMULSION INTRAVENOUS CONTINUOUS PRN
Status: DISCONTINUED | OUTPATIENT
Start: 2023-06-02 | End: 2023-06-02

## 2023-06-02 RX ORDER — PROPOFOL 10 MG/ML
INJECTION, EMULSION INTRAVENOUS PRN
Status: DISCONTINUED | OUTPATIENT
Start: 2023-06-02 | End: 2023-06-02 | Stop reason: SDUPTHER

## 2023-06-02 RX ORDER — OXYCODONE HYDROCHLORIDE 5 MG/1
5 TABLET ORAL EVERY 4 HOURS PRN
Status: DISCONTINUED | OUTPATIENT
Start: 2023-06-02 | End: 2023-06-06 | Stop reason: HOSPADM

## 2023-06-02 RX ORDER — DOCUSATE SODIUM 100 MG/1
100 CAPSULE, LIQUID FILLED ORAL DAILY
Status: DISCONTINUED | OUTPATIENT
Start: 2023-06-02 | End: 2023-06-06 | Stop reason: HOSPADM

## 2023-06-02 RX ORDER — ROCURONIUM BROMIDE 10 MG/ML
INJECTION, SOLUTION INTRAVENOUS PRN
Status: DISCONTINUED | OUTPATIENT
Start: 2023-06-02 | End: 2023-06-02 | Stop reason: SDUPTHER

## 2023-06-02 RX ORDER — MORPHINE SULFATE 4 MG/ML
4 INJECTION, SOLUTION INTRAMUSCULAR; INTRAVENOUS
Status: DISCONTINUED | OUTPATIENT
Start: 2023-06-02 | End: 2023-06-06

## 2023-06-02 RX ORDER — PROPOFOL 10 MG/ML
INJECTION, EMULSION INTRAVENOUS CONTINUOUS PRN
Status: DISCONTINUED | OUTPATIENT
Start: 2023-06-02 | End: 2023-06-02 | Stop reason: SDUPTHER

## 2023-06-02 RX ORDER — POLYETHYLENE GLYCOL 3350 17 G/17G
17 POWDER, FOR SOLUTION ORAL DAILY
Status: DISCONTINUED | OUTPATIENT
Start: 2023-06-02 | End: 2023-06-06 | Stop reason: HOSPADM

## 2023-06-02 RX ORDER — HYDRALAZINE HYDROCHLORIDE 20 MG/ML
10 INJECTION INTRAMUSCULAR; INTRAVENOUS EVERY 6 HOURS PRN
Status: DISCONTINUED | OUTPATIENT
Start: 2023-06-02 | End: 2023-06-06 | Stop reason: HOSPADM

## 2023-06-02 RX ORDER — SODIUM CHLORIDE 9 MG/ML
INJECTION, SOLUTION INTRAVENOUS PRN
Status: DISCONTINUED | OUTPATIENT
Start: 2023-06-02 | End: 2023-06-02 | Stop reason: HOSPADM

## 2023-06-02 RX ADMIN — PROPOFOL 50 MG: 10 INJECTION, EMULSION INTRAVENOUS at 07:42

## 2023-06-02 RX ADMIN — PROPOFOL 50 MCG/KG/MIN: 10 INJECTION, EMULSION INTRAVENOUS at 08:30

## 2023-06-02 RX ADMIN — MORPHINE SULFATE 2 MG: 2 INJECTION, SOLUTION INTRAMUSCULAR; INTRAVENOUS at 23:00

## 2023-06-02 RX ADMIN — CEFAZOLIN SODIUM 2000 MG: 2 SOLUTION INTRAVENOUS at 11:48

## 2023-06-02 RX ADMIN — DEXAMETHASONE SODIUM PHOSPHATE 10 MG: 10 INJECTION INTRAMUSCULAR; INTRAVENOUS at 08:23

## 2023-06-02 RX ADMIN — FENTANYL CITRATE 25 MCG: 50 INJECTION, SOLUTION INTRAMUSCULAR; INTRAVENOUS at 14:13

## 2023-06-02 RX ADMIN — FENTANYL CITRATE 25 MCG: 50 INJECTION, SOLUTION INTRAMUSCULAR; INTRAVENOUS at 14:18

## 2023-06-02 RX ADMIN — FENTANYL CITRATE 50 MCG: 50 INJECTION, SOLUTION INTRAMUSCULAR; INTRAVENOUS at 13:44

## 2023-06-02 RX ADMIN — SODIUM CHLORIDE: 9 INJECTION, SOLUTION INTRAVENOUS at 13:26

## 2023-06-02 RX ADMIN — DEXAMETHASONE SODIUM PHOSPHATE 4 MG: 4 INJECTION, SOLUTION INTRAMUSCULAR; INTRAVENOUS at 23:00

## 2023-06-02 RX ADMIN — Medication 20 MCG/MIN: at 10:47

## 2023-06-02 RX ADMIN — DEXAMETHASONE SODIUM PHOSPHATE 4 MG: 4 INJECTION, SOLUTION INTRAMUSCULAR; INTRAVENOUS at 04:54

## 2023-06-02 RX ADMIN — ROCURONIUM BROMIDE 30 MG: 10 INJECTION, SOLUTION INTRAVENOUS at 09:34

## 2023-06-02 RX ADMIN — OXYCODONE HYDROCHLORIDE 10 MG: 5 TABLET ORAL at 17:46

## 2023-06-02 RX ADMIN — MANNITOL 80 G: 20 INJECTION, SOLUTION INTRAVENOUS at 10:41

## 2023-06-02 RX ADMIN — DOCUSATE SODIUM 100 MG: 100 CAPSULE ORAL at 17:29

## 2023-06-02 RX ADMIN — FENTANYL CITRATE 50 MCG: 50 INJECTION, SOLUTION INTRAMUSCULAR; INTRAVENOUS at 13:20

## 2023-06-02 RX ADMIN — CEFAZOLIN SODIUM 2000 MG: 2 SOLUTION INTRAVENOUS at 07:48

## 2023-06-02 RX ADMIN — ONDANSETRON 4 MG: 2 INJECTION INTRAMUSCULAR; INTRAVENOUS at 13:20

## 2023-06-02 RX ADMIN — SUGAMMADEX 200 MG: 100 INJECTION, SOLUTION INTRAVENOUS at 10:40

## 2023-06-02 RX ADMIN — DEXAMETHASONE SODIUM PHOSPHATE 4 MG: 4 INJECTION, SOLUTION INTRAMUSCULAR; INTRAVENOUS at 17:29

## 2023-06-02 RX ADMIN — REMIFENTANIL HYDROCHLORIDE 0.05 MCG/KG/MIN: 1 INJECTION, POWDER, LYOPHILIZED, FOR SOLUTION INTRAVENOUS at 08:30

## 2023-06-02 RX ADMIN — FENTANYL CITRATE 25 MCG: 50 INJECTION, SOLUTION INTRAMUSCULAR; INTRAVENOUS at 14:23

## 2023-06-02 RX ADMIN — Medication 10 MG: at 08:48

## 2023-06-02 RX ADMIN — Medication 5 MG: at 09:56

## 2023-06-02 RX ADMIN — FENTANYL CITRATE 25 MCG: 50 INJECTION, SOLUTION INTRAMUSCULAR; INTRAVENOUS at 08:38

## 2023-06-02 RX ADMIN — Medication 10 MG: at 10:54

## 2023-06-02 RX ADMIN — ROCURONIUM BROMIDE 50 MG: 10 INJECTION, SOLUTION INTRAVENOUS at 07:40

## 2023-06-02 RX ADMIN — MORPHINE SULFATE 4 MG: 4 INJECTION INTRAVENOUS at 20:59

## 2023-06-02 RX ADMIN — SODIUM CHLORIDE: 9 INJECTION, SOLUTION INTRAVENOUS at 16:53

## 2023-06-02 RX ADMIN — Medication 5 MG: at 09:26

## 2023-06-02 RX ADMIN — SODIUM CHLORIDE: 9 INJECTION, SOLUTION INTRAVENOUS at 07:40

## 2023-06-02 RX ADMIN — FENTANYL CITRATE 25 MCG: 50 INJECTION, SOLUTION INTRAMUSCULAR; INTRAVENOUS at 15:03

## 2023-06-02 RX ADMIN — OXYCODONE HYDROCHLORIDE 5 MG: 5 TABLET ORAL at 22:03

## 2023-06-02 RX ADMIN — PROPOFOL 200 MG: 10 INJECTION, EMULSION INTRAVENOUS at 07:40

## 2023-06-02 RX ADMIN — SENNOSIDES 8.6 MG: 8.6 TABLET, COATED ORAL at 20:07

## 2023-06-02 RX ADMIN — FENTANYL CITRATE 75 MCG: 50 INJECTION, SOLUTION INTRAMUSCULAR; INTRAVENOUS at 07:40

## 2023-06-02 RX ADMIN — Medication 2000 MG: at 20:07

## 2023-06-02 RX ADMIN — MORPHINE SULFATE 4 MG: 4 INJECTION INTRAVENOUS at 16:44

## 2023-06-02 RX ADMIN — ROCURONIUM BROMIDE 10 MG: 10 INJECTION, SOLUTION INTRAVENOUS at 07:45

## 2023-06-02 RX ADMIN — Medication 10 MG: at 08:08

## 2023-06-02 RX ADMIN — LIDOCAINE HYDROCHLORIDE 50 MG: 10 INJECTION, SOLUTION EPIDURAL; INFILTRATION; INTRACAUDAL; PERINEURAL at 07:40

## 2023-06-02 RX ADMIN — SODIUM CHLORIDE: 9 INJECTION, SOLUTION INTRAVENOUS at 09:29

## 2023-06-02 RX ADMIN — MORPHINE SULFATE 4 MG: 4 INJECTION INTRAVENOUS at 18:56

## 2023-06-02 RX ADMIN — SODIUM CHLORIDE: 9 INJECTION, SOLUTION INTRAVENOUS at 07:44

## 2023-06-02 RX ADMIN — Medication 10 MG: at 10:44

## 2023-06-02 ASSESSMENT — PAIN SCALES - GENERAL
PAINLEVEL_OUTOF10: 4
PAINLEVEL_OUTOF10: 5
PAINLEVEL_OUTOF10: 7
PAINLEVEL_OUTOF10: 3
PAINLEVEL_OUTOF10: 4
PAINLEVEL_OUTOF10: 5
PAINLEVEL_OUTOF10: 6

## 2023-06-02 ASSESSMENT — PAIN DESCRIPTION - FREQUENCY
FREQUENCY: CONTINUOUS
FREQUENCY: CONTINUOUS

## 2023-06-02 ASSESSMENT — PAIN DESCRIPTION - LOCATION
LOCATION: HEAD

## 2023-06-02 ASSESSMENT — PAIN - FUNCTIONAL ASSESSMENT: PAIN_FUNCTIONAL_ASSESSMENT: 0-10

## 2023-06-02 ASSESSMENT — PAIN DESCRIPTION - DESCRIPTORS
DESCRIPTORS: JABBING
DESCRIPTORS: ACHING
DESCRIPTORS: ACHING
DESCRIPTORS: STABBING
DESCRIPTORS: JABBING
DESCRIPTORS: DISCOMFORT
DESCRIPTORS: DISCOMFORT
DESCRIPTORS: JABBING

## 2023-06-02 ASSESSMENT — PAIN DESCRIPTION - PAIN TYPE
TYPE: SURGICAL PAIN
TYPE: SURGICAL PAIN

## 2023-06-02 NOTE — TELEPHONE ENCOUNTER
Name: Tavia Mueller  : 1955  MRN: [id number]    Oncology Navigation- Initial Note: (INPATIENT REFERRAL)    Intake-  Contact Type: Telephone    Continuum of Care: Diagnosis/Active Treatment    Notes: Oncology navigation referral received. Upon review of chart noted pt currently admitted @ [facility] [address] & resection of brain mass completed per Dr. Ria Tavarez today. Will follow closely & contact pt upon d/c.     Electronically signed by Renetta Fisher RN on 2023 at 4:01 PM

## 2023-06-02 NOTE — ANESTHESIA PROCEDURE NOTES
Arterial Line:    An arterial line was placed using ultrasound guidance, in the OR for the following indication(s): continuous blood pressure monitoring and blood sampling needed. A  (size) (length), Arrow (type) catheter was placed, Seldinger technique used, into the right radial artery, secured by Tegaderm. Anesthesia type: General    Events:  patient tolerated procedure well with no complications. 6/2/2023 8:00 AM6/2/2023 8:02 AM  Anesthesiologist: Martha Salgado MD  Performed: Anesthesiologist   Preanesthetic Checklist  Completed: patient identified, IV checked, site marked, risks and benefits discussed, surgical/procedural consents, equipment checked, pre-op evaluation, timeout performed, anesthesia consent given, oxygen available, monitors applied/VS acknowledged, fire risk safety assessment completed and verbalized and blood product R/B/A discussed and consented

## 2023-06-02 NOTE — ANESTHESIA POSTPROCEDURE EVALUATION
Department of Anesthesiology  Postprocedure Note    Patient: Andre Pryor  MRN: [id number]  YOB: 1955  Date of evaluation: 6/2/2023      Procedure Summary     Date: 06/02/23 Room / Location: [address]    Anesthesia Start: 3473 Anesthesia Stop: 3614    Procedures:       [facility] [place] (Right: Head)      VENTRICULAR DRAIN PLACEMENT (11551) (Right: Head) Diagnosis:       Mass of cerebellum      Obstructive hydrocephalus (HCC)      (RIGHT MASS OF CEREBELLUM, OBSTRUCTIVE HYDROCEPHALUS)    Surgeons: Nicanor White DO Responsible Provider: Marissa Young MD    Anesthesia Type: general ASA Status: 3          Anesthesia Type: No value filed.     Chacho Phase I: Chacho Score: 9    Chacho Phase II:        Anesthesia Post Evaluation    Patient location during evaluation: bedside  Patient participation: complete - patient participated  Level of consciousness: awake  Airway patency: patent  Nausea & Vomiting: no nausea and no vomiting  Complications: no  Cardiovascular status: hemodynamically stable  Respiratory status: acceptable  Hydration status: stable  Comments: /73   Pulse 97   Temp 98 °F (36.7 °C) (Temporal)   Resp 18   Ht 6' 1\" (1.854 m)   Wt 174 lb (78.9 kg)   SpO2 100%   BMI 22.96 kg/m²

## 2023-06-02 NOTE — ANESTHESIA PRE PROCEDURE
Department of Anesthesiology  Preprocedure Note       Name:  Maksim Bauer   Age:  70 y.o.  :  1955                                          MRN:  [id number]         Date:  2023      Surgeon: Tamy Prado):  Niyah Flynn DO    Procedure: Procedure(s):  SUBOCCIPITAL RESECTION BRAIN MASS  (SANTINO NAVIGATION, LATERAL DECUBITUS, BEAN BAG, LATERAL LEFT SIDE DOWN, REGULAR TABLE, BUCK HEADHOLDER, MICROSCOPE, EVOKES CONF# [id number])    Medications prior to admission:   Prior to Admission medications    Medication Sig Start Date End Date Taking? Authorizing Provider   dexamethasone (DECADRON) 4 MG tablet Take 1 tablet by mouth 2 times daily (with meals) 23  Georges Robb MD   levETIRAcetam (KEPPRA) 500 MG tablet Take 1 tablet by mouth 2 times daily 23   Georges Robb MD   pantoprazole (PROTONIX) 40 MG tablet Take 1 tablet by mouth daily    Historical Provider, MD       Current medications:    No current facility-administered medications for this visit. No current outpatient medications on file.      Facility-Administered Medications Ordered in Other Visits   Medication Dose Route Frequency Provider Last Rate Last Admin    [MAR Hold] remifentanil (ULTIVA) 2,000 mcg in sodium chloride 0.9 % 40 mL infusion  0.05 mcg/kg/min IntraVENous Continuous Alicja Gibbons MD        [facility] Hold] 0.9 % sodium chloride infusion   IntraVENous Continuous RAMYA Hassan -  mL/hr at 23 New Bag at 23    [MAR Hold] ondansetron (ZOFRAN) injection 4 mg  4 mg IntraVENous Q6H PRN Jose Marcial APRN - NP        [MAR Hold] acetaminophen (TYLENOL) tablet 650 mg  650 mg Oral Q4H PRN Jose Marcial APRN - NP        [facility] Hold] dexamethasone (DECADRON) injection 4 mg  4 mg IntraVENous Q6H Jose Marcial APRN - NP   4 mg at 23 0454    [MAR Hold] pantoprazole (PROTONIX) tablet 40 mg  40 mg Oral QAM AC Jose Marcial APRN - NP        [facility] Hold] insulin lispro

## 2023-06-03 ENCOUNTER — APPOINTMENT (OUTPATIENT)
Dept: MRI IMAGING | Age: 68
DRG: 025 | End: 2023-06-03
Attending: NEUROLOGICAL SURGERY
Payer: MEDICARE

## 2023-06-03 LAB
ABO/RH: NORMAL
ANION GAP SERPL CALCULATED.3IONS-SCNC: 5 MMOL/L (ref 9–17)
ANTIBODY SCREEN: NEGATIVE
ARM BAND NUMBER: NORMAL
BASOPHILS # BLD: 0 K/UL (ref 0–0.2)
BASOPHILS NFR BLD: 0 % (ref 0–2)
BLD PROD TYP BPU: NORMAL
BPU ID: NORMAL
BUN SERPL-MCNC: 11 MG/DL (ref 8–23)
CALCIUM SERPL-MCNC: 8.7 MG/DL (ref 8.6–10.4)
CHLORIDE SERPL-SCNC: 102 MMOL/L (ref 98–107)
CO2 SERPL-SCNC: 25 MMOL/L (ref 20–31)
CREAT SERPL-MCNC: 0.37 MG/DL (ref 0.7–1.2)
CROSSMATCH RESULT: NORMAL
DISPENSE STATUS BLOOD BANK: NORMAL
EOSINOPHIL # BLD: 0 K/UL (ref 0–0.4)
EOSINOPHILS RELATIVE PERCENT: 0 % (ref 1–4)
ERYTHROCYTE [DISTWIDTH] IN BLOOD BY AUTOMATED COUNT: 14.3 % (ref 11.8–14.4)
EXPIRATION DATE: NORMAL
GFR SERPL CREATININE-BSD FRML MDRD: >60 ML/MIN/1.73M2
GLUCOSE BLD-MCNC: 111 MG/DL (ref 75–110)
GLUCOSE BLD-MCNC: 138 MG/DL (ref 75–110)
GLUCOSE BLD-MCNC: 89 MG/DL (ref 75–110)
GLUCOSE BLD-MCNC: 92 MG/DL (ref 75–110)
GLUCOSE SERPL-MCNC: 125 MG/DL (ref 70–99)
HCT VFR BLD AUTO: 38 % (ref 40.7–50.3)
HGB BLD-MCNC: 12.6 G/DL (ref 13–17)
IMM GRANULOCYTES # BLD AUTO: 0.21 K/UL (ref 0–0.3)
IMM GRANULOCYTES NFR BLD: 1 %
LYMPHOCYTES # BLD: 0 % (ref 24–44)
LYMPHOCYTES NFR BLD: 0 K/UL (ref 1–4.8)
MCH RBC QN AUTO: 31.6 PG (ref 25.2–33.5)
MCHC RBC AUTO-ENTMCNC: 33.2 G/DL (ref 28.4–34.8)
MCV RBC AUTO: 95.2 FL (ref 82.6–102.9)
MICROORGANISM SPEC CULT: NO GROWTH
MONOCYTES NFR BLD: 0.42 K/UL (ref 0.1–0.8)
MONOCYTES NFR BLD: 2 % (ref 1–7)
MORPHOLOGY: NORMAL
NEUTROPHILS NFR BLD: 97 % (ref 36–66)
NEUTS SEG NFR BLD: 20.17 K/UL (ref 1.8–7.7)
NRBC AUTOMATED: 0 PER 100 WBC
PLATELET # BLD AUTO: 227 K/UL (ref 138–453)
PMV BLD AUTO: 10.9 FL (ref 8.1–13.5)
POTASSIUM SERPL-SCNC: 4.3 MMOL/L (ref 3.7–5.3)
RBC # BLD AUTO: 3.99 M/UL (ref 4.21–5.77)
SERVICE CMNT-IMP: NORMAL
SODIUM SERPL-SCNC: 132 MMOL/L (ref 135–144)
SPECIMEN DESCRIPTION: NORMAL
TRANSFUSION STATUS: NORMAL
UNIT DIVISION: 0
WBC OTHER # BLD: 20.8 K/UL (ref 3.5–11.3)

## 2023-06-03 PROCEDURE — APPSS15 APP SPLIT SHARED TIME 0-15 MINUTES: Performed by: NURSE PRACTITIONER

## 2023-06-03 PROCEDURE — 6360000002 HC RX W HCPCS: Performed by: NURSE PRACTITIONER

## 2023-06-03 PROCEDURE — 80048 BASIC METABOLIC PNL TOTAL CA: CPT

## 2023-06-03 PROCEDURE — 2000000000 HC ICU R&B

## 2023-06-03 PROCEDURE — 2580000003 HC RX 258: Performed by: NURSE PRACTITIONER

## 2023-06-03 PROCEDURE — 85027 COMPLETE CBC AUTOMATED: CPT

## 2023-06-03 PROCEDURE — A9579 GAD-BASE MR CONTRAST NOS,1ML: HCPCS | Performed by: NURSE PRACTITIONER

## 2023-06-03 PROCEDURE — 82947 ASSAY GLUCOSE BLOOD QUANT: CPT

## 2023-06-03 PROCEDURE — 6360000004 HC RX CONTRAST MEDICATION: Performed by: NURSE PRACTITIONER

## 2023-06-03 PROCEDURE — 99223 1ST HOSP IP/OBS HIGH 75: CPT | Performed by: INTERNAL MEDICINE

## 2023-06-03 PROCEDURE — 70553 MRI BRAIN STEM W/O & W/DYE: CPT

## 2023-06-03 PROCEDURE — 6370000000 HC RX 637 (ALT 250 FOR IP): Performed by: NURSE PRACTITIONER

## 2023-06-03 RX ORDER — SODIUM CHLORIDE 0.9 % (FLUSH) 0.9 %
10 SYRINGE (ML) INJECTION PRN
Status: COMPLETED | OUTPATIENT
Start: 2023-06-03 | End: 2023-06-03

## 2023-06-03 RX ADMIN — DEXAMETHASONE SODIUM PHOSPHATE 4 MG: 4 INJECTION, SOLUTION INTRAMUSCULAR; INTRAVENOUS at 22:31

## 2023-06-03 RX ADMIN — POLYETHYLENE GLYCOL 3350 17 G: 17 POWDER, FOR SOLUTION ORAL at 08:10

## 2023-06-03 RX ADMIN — Medication 2000 MG: at 12:11

## 2023-06-03 RX ADMIN — PANTOPRAZOLE SODIUM 40 MG: 40 TABLET, DELAYED RELEASE ORAL at 08:02

## 2023-06-03 RX ADMIN — OXYCODONE HYDROCHLORIDE 10 MG: 5 TABLET ORAL at 19:51

## 2023-06-03 RX ADMIN — MORPHINE SULFATE 2 MG: 2 INJECTION, SOLUTION INTRAMUSCULAR; INTRAVENOUS at 06:16

## 2023-06-03 RX ADMIN — DOCUSATE SODIUM 100 MG: 100 CAPSULE ORAL at 08:04

## 2023-06-03 RX ADMIN — DEXAMETHASONE SODIUM PHOSPHATE 4 MG: 4 INJECTION, SOLUTION INTRAMUSCULAR; INTRAVENOUS at 04:20

## 2023-06-03 RX ADMIN — Medication 2000 MG: at 04:20

## 2023-06-03 RX ADMIN — GADOTERIDOL 16 ML: 279.3 INJECTION, SOLUTION INTRAVENOUS at 11:34

## 2023-06-03 RX ADMIN — DEXAMETHASONE SODIUM PHOSPHATE 4 MG: 4 INJECTION, SOLUTION INTRAMUSCULAR; INTRAVENOUS at 17:00

## 2023-06-03 RX ADMIN — MORPHINE SULFATE 2 MG: 2 INJECTION, SOLUTION INTRAMUSCULAR; INTRAVENOUS at 01:00

## 2023-06-03 RX ADMIN — DEXAMETHASONE SODIUM PHOSPHATE 4 MG: 4 INJECTION, SOLUTION INTRAMUSCULAR; INTRAVENOUS at 12:11

## 2023-06-03 RX ADMIN — MORPHINE SULFATE 2 MG: 2 INJECTION, SOLUTION INTRAMUSCULAR; INTRAVENOUS at 14:09

## 2023-06-03 RX ADMIN — SENNOSIDES 8.6 MG: 8.6 TABLET, COATED ORAL at 19:51

## 2023-06-03 RX ADMIN — SODIUM CHLORIDE, PRESERVATIVE FREE 10 ML: 5 INJECTION INTRAVENOUS at 11:34

## 2023-06-03 ASSESSMENT — PAIN SCALES - GENERAL
PAINLEVEL_OUTOF10: 0
PAINLEVEL_OUTOF10: 5
PAINLEVEL_OUTOF10: 4
PAINLEVEL_OUTOF10: 3
PAINLEVEL_OUTOF10: 7

## 2023-06-03 ASSESSMENT — PAIN DESCRIPTION - DESCRIPTORS
DESCRIPTORS: ACHING;DISCOMFORT
DESCRIPTORS: ACHING
DESCRIPTORS: ACHING
DESCRIPTORS: ACHING;DISCOMFORT
DESCRIPTORS: DISCOMFORT

## 2023-06-03 ASSESSMENT — PAIN DESCRIPTION - LOCATION
LOCATION: HEAD
LOCATION: NECK
LOCATION: HEAD

## 2023-06-03 ASSESSMENT — PAIN DESCRIPTION - ORIENTATION: ORIENTATION: RIGHT

## 2023-06-04 LAB
GLUCOSE BLD-MCNC: 113 MG/DL (ref 75–110)
GLUCOSE BLD-MCNC: 131 MG/DL (ref 75–110)
GLUCOSE BLD-MCNC: 137 MG/DL (ref 75–110)
GLUCOSE BLD-MCNC: 156 MG/DL (ref 75–110)

## 2023-06-04 PROCEDURE — 6360000002 HC RX W HCPCS: Performed by: NURSE PRACTITIONER

## 2023-06-04 PROCEDURE — 2580000003 HC RX 258: Performed by: NURSE PRACTITIONER

## 2023-06-04 PROCEDURE — 82947 ASSAY GLUCOSE BLOOD QUANT: CPT

## 2023-06-04 PROCEDURE — 6370000000 HC RX 637 (ALT 250 FOR IP): Performed by: NURSE PRACTITIONER

## 2023-06-04 PROCEDURE — 2000000000 HC ICU R&B

## 2023-06-04 PROCEDURE — APPSS15 APP SPLIT SHARED TIME 0-15 MINUTES: Performed by: NURSE PRACTITIONER

## 2023-06-04 PROCEDURE — 99232 SBSQ HOSP IP/OBS MODERATE 35: CPT | Performed by: INTERNAL MEDICINE

## 2023-06-04 RX ORDER — ENOXAPARIN SODIUM 100 MG/ML
30 INJECTION SUBCUTANEOUS DAILY
Status: DISCONTINUED | OUTPATIENT
Start: 2023-06-04 | End: 2023-06-06 | Stop reason: HOSPADM

## 2023-06-04 RX ADMIN — ENOXAPARIN SODIUM 30 MG: 30 INJECTION SUBCUTANEOUS at 11:12

## 2023-06-04 RX ADMIN — OXYCODONE HYDROCHLORIDE 5 MG: 5 TABLET ORAL at 20:08

## 2023-06-04 RX ADMIN — PANTOPRAZOLE SODIUM 40 MG: 40 TABLET, DELAYED RELEASE ORAL at 08:25

## 2023-06-04 RX ADMIN — DEXAMETHASONE SODIUM PHOSPHATE 4 MG: 4 INJECTION, SOLUTION INTRAMUSCULAR; INTRAVENOUS at 11:12

## 2023-06-04 RX ADMIN — SENNOSIDES 8.6 MG: 8.6 TABLET, COATED ORAL at 21:13

## 2023-06-04 RX ADMIN — DEXAMETHASONE SODIUM PHOSPHATE 4 MG: 4 INJECTION, SOLUTION INTRAMUSCULAR; INTRAVENOUS at 06:09

## 2023-06-04 RX ADMIN — DEXAMETHASONE SODIUM PHOSPHATE 4 MG: 4 INJECTION, SOLUTION INTRAMUSCULAR; INTRAVENOUS at 18:07

## 2023-06-04 RX ADMIN — SODIUM CHLORIDE: 9 INJECTION, SOLUTION INTRAVENOUS at 02:12

## 2023-06-04 RX ADMIN — MORPHINE SULFATE 2 MG: 2 INJECTION, SOLUTION INTRAMUSCULAR; INTRAVENOUS at 03:05

## 2023-06-04 ASSESSMENT — PAIN SCALES - GENERAL
PAINLEVEL_OUTOF10: 0
PAINLEVEL_OUTOF10: 4
PAINLEVEL_OUTOF10: 4
PAINLEVEL_OUTOF10: 0

## 2023-06-04 ASSESSMENT — PAIN DESCRIPTION - LOCATION
LOCATION: NECK
LOCATION: HEAD
LOCATION: NECK

## 2023-06-04 ASSESSMENT — PAIN DESCRIPTION - DESCRIPTORS: DESCRIPTORS: ACHING;DISCOMFORT

## 2023-06-05 LAB
GLUCOSE BLD-MCNC: 102 MG/DL (ref 75–110)
GLUCOSE BLD-MCNC: 128 MG/DL (ref 75–110)
GLUCOSE BLD-MCNC: 130 MG/DL (ref 75–110)
GLUCOSE BLD-MCNC: 135 MG/DL (ref 75–110)

## 2023-06-05 PROCEDURE — 2000000000 HC ICU R&B

## 2023-06-05 PROCEDURE — 99232 SBSQ HOSP IP/OBS MODERATE 35: CPT | Performed by: INTERNAL MEDICINE

## 2023-06-05 PROCEDURE — 6360000002 HC RX W HCPCS: Performed by: NURSE PRACTITIONER

## 2023-06-05 PROCEDURE — 82947 ASSAY GLUCOSE BLOOD QUANT: CPT

## 2023-06-05 PROCEDURE — 6370000000 HC RX 637 (ALT 250 FOR IP): Performed by: NURSE PRACTITIONER

## 2023-06-05 PROCEDURE — 6360000002 HC RX W HCPCS: Performed by: REGISTERED NURSE

## 2023-06-05 RX ORDER — DEXAMETHASONE SODIUM PHOSPHATE 4 MG/ML
4 INJECTION, SOLUTION INTRA-ARTICULAR; INTRALESIONAL; INTRAMUSCULAR; INTRAVENOUS; SOFT TISSUE EVERY 8 HOURS
Status: DISCONTINUED | OUTPATIENT
Start: 2023-06-05 | End: 2023-06-06

## 2023-06-05 RX ADMIN — DOCUSATE SODIUM 100 MG: 100 CAPSULE ORAL at 08:27

## 2023-06-05 RX ADMIN — ENOXAPARIN SODIUM 30 MG: 30 INJECTION SUBCUTANEOUS at 08:27

## 2023-06-05 RX ADMIN — DEXAMETHASONE SODIUM PHOSPHATE 4 MG: 4 INJECTION, SOLUTION INTRAMUSCULAR; INTRAVENOUS at 12:38

## 2023-06-05 RX ADMIN — DEXAMETHASONE SODIUM PHOSPHATE 4 MG: 4 INJECTION, SOLUTION INTRAMUSCULAR; INTRAVENOUS at 00:01

## 2023-06-05 RX ADMIN — DEXAMETHASONE SODIUM PHOSPHATE 4 MG: 4 INJECTION, SOLUTION INTRAMUSCULAR; INTRAVENOUS at 20:35

## 2023-06-05 RX ADMIN — DEXAMETHASONE SODIUM PHOSPHATE 4 MG: 4 INJECTION, SOLUTION INTRAMUSCULAR; INTRAVENOUS at 05:05

## 2023-06-05 RX ADMIN — POLYETHYLENE GLYCOL 3350 17 G: 17 POWDER, FOR SOLUTION ORAL at 08:31

## 2023-06-05 RX ADMIN — PANTOPRAZOLE SODIUM 40 MG: 40 TABLET, DELAYED RELEASE ORAL at 08:27

## 2023-06-05 NOTE — CARE COORDINATION
Spoke with pt regarding transitional plan of care. Pt's plan is to return home with help from sister and other family members. Pt endorses that his sister will be able to transport him home at time of discharge as well as help with home needs. Option given for [facility] referral, pt denies at this time. No other needs expressed.

## 2023-06-05 NOTE — PLAN OF CARE
Problem: Discharge Planning  Goal: Discharge to home or other facility with appropriate resources  6/5/2023 0624 by Stanley Nova RN  Outcome: Progressing  6/4/2023 1630 by Andrea Bliss RN  Outcome: Progressing     Problem: Safety - Adult  Goal: Free from fall injury  6/5/2023 0624 by Stanley Nova RN  Outcome: Progressing  6/4/2023 1630 by Andrea Bliss RN  Outcome: Progressing     Problem: Pain  Goal: Verbalizes/displays adequate comfort level or baseline comfort level  6/5/2023 0624 by Stanley Nova RN  Outcome: Progressing  6/4/2023 1630 by Andrea Bliss RN  Outcome: Progressing     Problem: ABCDS Injury Assessment  Goal: Absence of physical injury  6/5/2023 0624 by Stanley Nova RN  Outcome: Progressing  6/4/2023 1630 by Andrea Bliss RN  Outcome: Progressing

## 2023-06-06 ENCOUNTER — APPOINTMENT (OUTPATIENT)
Dept: CT IMAGING | Age: 68
DRG: 025 | End: 2023-06-06
Attending: NEUROLOGICAL SURGERY
Payer: MEDICARE

## 2023-06-06 VITALS
BODY MASS INDEX: 23.06 KG/M2 | DIASTOLIC BLOOD PRESSURE: 77 MMHG | OXYGEN SATURATION: 96 % | RESPIRATION RATE: 17 BRPM | HEIGHT: 73 IN | WEIGHT: 174 LBS | HEART RATE: 63 BPM | TEMPERATURE: 98 F | SYSTOLIC BLOOD PRESSURE: 115 MMHG

## 2023-06-06 PROCEDURE — 6360000002 HC RX W HCPCS: Performed by: REGISTERED NURSE

## 2023-06-06 PROCEDURE — 70496 CT ANGIOGRAPHY HEAD: CPT

## 2023-06-06 PROCEDURE — 70450 CT HEAD/BRAIN W/O DYE: CPT

## 2023-06-06 PROCEDURE — 6360000004 HC RX CONTRAST MEDICATION: Performed by: REGISTERED NURSE

## 2023-06-06 PROCEDURE — 6370000000 HC RX 637 (ALT 250 FOR IP): Performed by: NURSE PRACTITIONER

## 2023-06-06 PROCEDURE — 99232 SBSQ HOSP IP/OBS MODERATE 35: CPT | Performed by: INTERNAL MEDICINE

## 2023-06-06 RX ORDER — OXYCODONE HYDROCHLORIDE 5 MG/1
5 TABLET ORAL EVERY 6 HOURS PRN
Qty: 28 TABLET | Refills: 0 | Status: CANCELLED | OUTPATIENT
Start: 2023-06-06 | End: 2023-06-13

## 2023-06-06 RX ORDER — DEXAMETHASONE 4 MG/1
4 TABLET ORAL DAILY
Qty: 3 TABLET | Refills: 0 | Status: SHIPPED | OUTPATIENT
Start: 2023-06-10 | End: 2023-06-13

## 2023-06-06 RX ORDER — DEXAMETHASONE 4 MG/1
4 TABLET ORAL EVERY 8 HOURS SCHEDULED
Qty: 3 TABLET | Refills: 0 | Status: SHIPPED | OUTPATIENT
Start: 2023-06-06 | End: 2023-06-07

## 2023-06-06 RX ORDER — DEXAMETHASONE 4 MG/1
4 TABLET ORAL EVERY 12 HOURS SCHEDULED
Qty: 4 TABLET | Refills: 0 | Status: SHIPPED | OUTPATIENT
Start: 2023-06-07 | End: 2023-06-09

## 2023-06-06 RX ORDER — DEXAMETHASONE 2 MG/1
2 TABLET ORAL DAILY
Qty: 3 TABLET | Refills: 0 | Status: SHIPPED | OUTPATIENT
Start: 2023-06-13 | End: 2023-06-16

## 2023-06-06 RX ORDER — DEXAMETHASONE 4 MG/1
4 TABLET ORAL EVERY 8 HOURS SCHEDULED
Status: DISCONTINUED | OUTPATIENT
Start: 2023-06-06 | End: 2023-06-06 | Stop reason: HOSPADM

## 2023-06-06 RX ORDER — DEXAMETHASONE 2 MG/1
2 TABLET ORAL DAILY
Status: DISCONTINUED | OUTPATIENT
Start: 2023-06-13 | End: 2023-06-06 | Stop reason: HOSPADM

## 2023-06-06 RX ORDER — DEXAMETHASONE 4 MG/1
4 TABLET ORAL EVERY 12 HOURS SCHEDULED
Status: DISCONTINUED | OUTPATIENT
Start: 2023-06-07 | End: 2023-06-06 | Stop reason: HOSPADM

## 2023-06-06 RX ORDER — DEXAMETHASONE 4 MG/1
4 TABLET ORAL DAILY
Status: DISCONTINUED | OUTPATIENT
Start: 2023-06-10 | End: 2023-06-06 | Stop reason: HOSPADM

## 2023-06-06 RX ADMIN — IOPAMIDOL 75 ML: 755 INJECTION, SOLUTION INTRAVENOUS at 09:47

## 2023-06-06 RX ADMIN — PANTOPRAZOLE SODIUM 40 MG: 40 TABLET, DELAYED RELEASE ORAL at 08:33

## 2023-06-06 RX ADMIN — DOCUSATE SODIUM 100 MG: 100 CAPSULE ORAL at 08:33

## 2023-06-06 RX ADMIN — POLYETHYLENE GLYCOL 3350 17 G: 17 POWDER, FOR SOLUTION ORAL at 08:33

## 2023-06-06 RX ADMIN — DEXAMETHASONE 4 MG: 4 TABLET ORAL at 14:31

## 2023-06-06 RX ADMIN — DEXAMETHASONE SODIUM PHOSPHATE 4 MG: 4 INJECTION, SOLUTION INTRAMUSCULAR; INTRAVENOUS at 04:32

## 2023-06-06 NOTE — DISCHARGE INSTRUCTIONS
Craniotomy Discharge Instructions     Thank you for choosing [facility] and [facility] for your surgical needs. The following instructions will help to ensure your comfort and that you are well prepared after your surgery. Post-Operative Visit:   The office is located at:    [address], [address]    [place] [address], [address] [facility], main floor    [facility], [address] [place] [address]    [phone]     Please also call your primary care physician to schedule an appointment for further evaluation and care. Diet:   You may resume your regular diet   Be sure to eat a well-balanced diet. Protein promotes wound healing. Pain medication and decreased activity can cause constipation. Drink 8-10 glasses of water a day, eat fresh fruits and vegetables, and add prunes, raisins and bran cereals to your diet if you do become constipated. A stool softener taken 1-2 times a day is helpful. Dulcolax suppositories or Fleets enemas are also available without a prescription. Call our office if the problem continues. Activity and Exercise:   No lifting greater than 5 pounds (gallon of milk) for the first few weeks after surgery. No driving until you are seen in the office. If you have had a seizure or have visual deficits, you may not drive until cleared by a neurologist.   Avoid riding in a car for the first two weeks until you come to the office for your scheduled follow-up. Start taking short, frequent walks in the beginning. [place], more frequent walks throughout the day are more beneficial than one long walk each day. You may gradually increase the distance; as tolerated. If your pain increases, you may be walking too much or too far. Try backing off for a day or two and then resume slowly. No baths, swimming or hot tub until you discuss this with your doctor. Incision Care and Hygiene:    Your incision is closed with staples or sutures and

## 2023-06-06 NOTE — PLAN OF CARE
Problem: Discharge Planning  Goal: Discharge to home or other facility with appropriate resources  6/6/2023 1546 by Rod Gil RN  Outcome: Adequate for Discharge  6/6/2023 0448 by Maria Del Rosario Mcclellan RN  Outcome: Progressing     Problem: Safety - Adult  Goal: Free from fall injury  6/6/2023 1546 by Rod Gil RN  Outcome: Adequate for Discharge  6/6/2023 0448 by Maria Del Rosario Mcclellan RN  Outcome: Progressing     Problem: Pain  Goal: Verbalizes/displays adequate comfort level or baseline comfort level  6/6/2023 1546 by Rod Gil RN  Outcome: Adequate for Discharge  6/6/2023 0448 by Maria Del Rosario Mcclellan RN  Outcome: Progressing     Problem: Nutrition Deficit:  Goal: Optimize nutritional status  6/6/2023 1546 by Rod Gil RN  Outcome: Adequate for Discharge  6/6/2023 0448 by Maria Del Rosario Mcclellan RN  Outcome: Progressing     Problem: ABCDS Injury Assessment  Goal: Absence of physical injury  6/6/2023 1546 by Rod Gil RN  Outcome: Adequate for Discharge  6/6/2023 0448 by Maria Del Rosario Mcclellan RN  Outcome: Progressing

## 2023-06-06 NOTE — PROGRESS NOTES
Neurosurgery JOHN/Resident    Daily Progress Note   No chief complaint on file. 6/5/2023  1:58 PM    Chart reviewed. No acute events overnight. No new complaints. Vitals:    06/05/23 1000 06/05/23 1100 06/05/23 1200 06/05/23 1300   BP: 120/83 121/81 115/83 114/62   Pulse: 62 67 84 87   Resp: 17 15 19 20   Temp:   (P) 97.7 °F (36.5 °C)    TempSrc:       SpO2: 95% 93% 95% 94%   Weight:       Height:             PE: AOx3   CNII-XII intact   PERRL, EOMI   Motor   L deltoid 5/5; R deltoid 5/5  L biceps 5/5; R biceps 5/5  L triceps 5/5; R triceps 5/5  L intrinsics 5/5; R intrinsics 5/5      L iliopsoas 5/5 , R iliopsoas 5/5  L quadriceps 5/5; R quadriceps 5/5  L Dorsiflexion 5/5; R dorsiflexion 5/5  L Plantarflexion 5/5; R plantarflexion 5/5  L EHL 5/5; R EHL 5/5    Sensation intact    Drain output 19 ml/12h  Incision cranial and EVD site dry      Lab Results   Component Value Date    WBC 20.8 (H) 06/03/2023    HGB 12.6 (L) 06/03/2023    HCT 38.0 (L) 06/03/2023     06/03/2023    CHOL 189 08/13/2021    TRIG 219 (H) 08/13/2021    HDL 34 (L) 08/13/2021    ALT 15 05/11/2023    AST 14 05/11/2023     (L) 06/03/2023    K 4.3 06/03/2023     06/03/2023    CREATININE 0.37 (L) 06/03/2023    BUN 11 06/03/2023    CO2 25 06/03/2023    PSA 18.86 (H) 08/13/2021    INR 0.9 06/01/2023         A/P  Right cerebellar mass  POD 3 s/p right suboccipital craniotomy for tumor resection     - Clamp  EVD today, monitor for drainage and swelling  - CT head in AM  - PT and OT   - Wean decadron to 4 mg q8 hours today  - SCDs and Lovenox for DVT prophylaxis  - Encourage IS  - activity as tolerated, PT and OT      Please contact neurosurgery with any changes in patients neurologic status.        Julissa Olguin CNP  6/5/23  1:58 PM
Pt discharged home with documented belongings. Discharge instructions provided and all questions answered. All lines removed. Pt discharged home with daughter.
Today's Date: 6/5/2023  Patient Name: Rajinder Chacon  Date of admission: 6/1/2023  4:33 PM  Patient's age: 70 y.o., 1955  Admission Dx: Cerebellar tumor ([facility] [address] [D49.6]  Mass of cerebellum [G93.89]    Reason for Consult: management recommendations  Requesting Physician: Candida Self DO    CHIEF COMPLAINT:  elective surgical intervention    SUBJECTIVE:  . Patient was seen and examined. He is clinically stable. Still recovering from his surgery. No headaches. No seizures. He feels slightly dizzy by getting up. No other events. BRIEF CASE HISTORY:    The patient is a 70 y.o. 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 Patient follows with Dr. Rivera Earing outpatient. 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 Complete response to induction. started combined chemoradiation using weekly Taxol/ Carboplatin to be followed by surgery at [facility]. Chemoradiation started 9/27/22. Developed allergy to Taxol. Switched to Abraxane/ carboplatin. Chemoradiation completed November 3, 2022. 1/9/2023: Thoracotomy, [place] [facility] esophagectomy, pyloromyotomy, flap coverage of anastomosis (pedicled omentum), mediastinal lymph node sampling, j-tube exchange, left chest tube. J-tube removed March 9, 2023 followed by chemoradiation. Patient was last seen in the office in March. And currently he is on induction combined chemoradiation with weekly taxol carboplatin for locally advanced esophageal cancer. Oncology is consulted during last hospitalization in 5/2023 for 3.4cm mass in the right cerebellum with mass effect and fourth ventricle swelling.  Neurosurgery consulted and
report from dedicated CT of the head performed the same day. The superior and inferior sagittal sinuses are patent. The straight sinus is patent. There is a filling defect at the junction of the right transverse sinus and sigmoid sinus. The transverse sinuses and sigmoid sinuses are otherwise patent. Unremarkable CTA of the head. Filling defect at the junction of the right transverse and sigmoid sinus that may relate to postsurgical change or thrombus and attention is recommended on follow-up dural venous sinus imaging. A/P  Right cerebellar mass  POD 4 s/p right suboccipital craniotomy for tumor resection      - Likely remove EVD today after review CT head and CTV head with Dr Yanet Cramer   - PT and OT   - Wean decadron off over 9 days  - SCDs and Lovenox for DVT prophylaxis  - Encourage IS  - activity as tolerated, PT and OT  - discharge planning-possible discharge home later today       Please contact neurosurgery with any changes in patients neurologic status.        Aubrie Moreau CNP  6/6/23  12:31 PM
W WO CONTRAST   Final Result   No residual cerebellar mass. CT HEAD WO CONTRAST   Final Result   Status post right occipital craniotomy and right cerebellar mass resection. There is a small volume of air and hemorrhage within the surgical bed. Effacement of the 4th ventricle, not significantly changed from the previous   exam.      Right frontal ventriculostomy catheter terminating in the 3rd ventricle. There is no ventriculomegaly. MRI BRAIN W WO CONTRAST   Final Result   There is a 3.3 x 4.4 x 3.4 cm cystic and solid mass in the right cerebellar   hemisphere with surrounding vasogenic edema and mass effect on the 4th   ventricle. There is no significant hydrocephalus at this time. The most   likely etiology of this mass is a metastatic lesion. RECOMMENDATIONS:   Unavailable         XR CHEST PORTABLE   Final Result   No acute process. Primary Problem  Cerebellar tumor [facility])    Active Hospital Problems    Diagnosis Date Noted    Cerebellar tumor ([facility] [address] [D49.6] 06/01/2023    Mass of cerebellum [G93.89] 05/12/2023    Vasogenic edema ([address] [G93.6] 05/10/2023         IMPRESSION:   Right cerebellar mass. 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 On chemoradiation. 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 RECOMMENDATIONS:  Continue postoperative care. Reviewed the records and images and other providers entries and discussed with the patient. Continue postoperative care by neurosurgery. Pathology results showed adenocarcinoma of esophageal primary. Patient would benefit from radiation therapy following recovery. He was seen by radiation oncologist.    Patient will be discharged today.   He will be seen as

## 2023-06-06 NOTE — PLAN OF CARE
Problem: Discharge Planning  Goal: Discharge to home or other facility with appropriate resources  Outcome: Progressing     Problem: Discharge Planning  Goal: Discharge to home or other facility with appropriate resources  Outcome: Progressing     Problem: Safety - Adult  Goal: Free from fall injury  Outcome: Progressing     Problem: Pain  Goal: Verbalizes/displays adequate comfort level or baseline comfort level  Outcome: Progressing     Problem: Nutrition Deficit:  Goal: Optimize nutritional status  Outcome: Progressing     Problem: ABCDS Injury Assessment  Goal: Absence of physical injury  Outcome: Progressing

## 2023-06-07 ENCOUNTER — TELEPHONE (OUTPATIENT)
Dept: ONCOLOGY | Age: 68
End: 2023-06-07

## 2023-06-07 ENCOUNTER — TELEPHONE (OUTPATIENT)
Dept: NEUROSURGERY | Age: 68
End: 2023-06-07

## 2023-06-07 ENCOUNTER — TELEPHONE (OUTPATIENT)
Dept: FAMILY MEDICINE CLINIC | Age: 68
End: 2023-06-07

## 2023-06-07 LAB — SURGICAL PATHOLOGY REPORT: NORMAL

## 2023-06-07 NOTE — TELEPHONE ENCOUNTER
Name: Rajinder Chacon  : 1955  MRN: [id number]    Oncology Navigation Follow-Up Note    Contact Type:  Telephone    Notes: Latha Montano to resume oncology navigation.     Electronically signed by Vincenzo Red RN on 2023 at 8:07 AM

## 2023-06-07 NOTE — TELEPHONE ENCOUNTER
Called patient to inform of path result indicating mets esophageal adenocarcinoma.      Informed keep f/u with us and rad/med onc teams

## 2023-06-07 NOTE — TELEPHONE ENCOUNTER
Care Transitions Initial Follow Up Call    Outreach made within 2 business days of discharge: Yes    Patient: Rajinder Chacon Patient : 1955   MRN: [id number]  Reason for Admission: There are no discharge diagnoses documented for the most recent discharge. Discharge Date: 23       Spoke with: Patient      Discharge department/facility: [facility] Patient Contact:  Was patient able to fill all prescriptions: Yes  Was patient instructed to bring all medications to the follow-up visit: Yes  Is patient taking all medications as directed in the discharge summary?  Yes  Does patient understand their discharge instructions: Yes  Does patient have questions or concerns that need addressed prior to 7-14 day follow up office visit: no    Scheduled appointment with PCP within 7-14 days    Follow Up  Future Appointments   Date Time Provider Rik Savage   2023  2:00 PM STA CT SCAN RM 1 STAZ CT SCAN [facility] Radiolog   2023  1:30 PM STV STA CHAIR 09 STVZ STA MED [facility]   2023  2:15 PM Pj Hughes MD [facility]   2023  1:30 PM [address], RAMYA - CNP [facility]   2023  3:00 PM Sally Sharp MD Neuro [facility] Neurology -   2023 11:30 AM RAMYA Richard - CNP [facility] [place], [place]

## 2023-06-07 NOTE — TELEPHONE ENCOUNTER
Name: Jacklyn Diaz  : 1955  MRN: [id number]    Oncology Navigation Follow-Up Note    Contact Type:  Telephone    Notes: Writer called patient to f/u with him regarding his recent admission. Pt states he was home and in his basement when he became dizzy and fell. He states he had a large open laceration and went to [facility] where they discovered a brain mass. He states he had surgery to remove mass and Dr. Magdiel Laird called him today to report that there is still residual cancer cells. Pt is aware that radiation will need to take place once staples are removed. Pt has post surgical f/u with Tracey Harrison NP neurosurgery on 23. Pt states other than feeling dizzy at times he feels OK and blesses to be alive. Writer informed pt I would be out of office  and returning  but there would be direction on my VM on who to call if he has any needs. Pt appreciative of call and f/u. Will continue to follow.     Electronically signed by Patricia Graff RN on 2023 at 4:10 PM

## 2023-06-09 ENCOUNTER — TELEPHONE (OUTPATIENT)
Dept: ONCOLOGY | Age: 68
End: 2023-06-09
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
## 2023-06-09 NOTE — TELEPHONE ENCOUNTER
Name: Lachelle Nathan  : 1955  MRN: [id number]    Oncology Navigation Follow-Up Note    Contact Type:  Telephone    Notes: Writer covering for Riddhi Shaw pt's oncology navigator. Spoke w/Dr. Trace Valle to inquire on CT chest scheduled . Notified pt completed 5/10 during admission. Dr. Trace Valle stated  CT imaging to be canceled. Notified Dr. Trace Valle pt scheduled for Dr. Maite Posadas [facility] f/u . Notified Dr. Trace Valle pt previously schedule for routine MO f/u . Dr. Trace Valle stated  f/u to remain as scheduled. Pt notified  CT imaging canceled & confirmed  Dr. Trace Valle f/u. Pt denied questions/concerns.     Electronically signed by Shira Rangel RN on 2023 at 3:37 PM

## 2023-06-19 ENCOUNTER — HOSPITAL ENCOUNTER (OUTPATIENT)
Dept: RADIATION ONCOLOGY | Age: 68
Discharge: HOME | End: 2023-06-19
Payer: MEDICARE

## 2023-06-19 VITALS
BODY MASS INDEX: 23.17 KG/M2 | DIASTOLIC BLOOD PRESSURE: 65 MMHG | SYSTOLIC BLOOD PRESSURE: 102 MMHG | RESPIRATION RATE: 16 BRPM | HEART RATE: 71 BPM | WEIGHT: 175.6 LBS | TEMPERATURE: 99 F

## 2023-06-19 PROCEDURE — 99212 OFFICE O/P EST SF 10 MIN: CPT | Performed by: RADIOLOGY

## 2023-06-19 ASSESSMENT — PAIN SCALES - GENERAL: PAINLEVEL_OUTOF10: 0

## 2023-06-19 NOTE — PROGRESS NOTES
Vicky Spotted  6/19/2023  3:56 PM      Vitals:    06/19/23 1530   BP: 102/65   Pulse: 71   Resp: 16   Temp: 99 °F (37.2 °C)    :     Pain Assessment: 0-10  Pain Level: 0       Wt Readings from Last 1 Encounters:   06/19/23 175 lb 9.6 oz (79.7 kg)                Current Outpatient Medications:     pantoprazole (PROTONIX) 40 MG tablet, Take 1 tablet by mouth daily, Disp: , Rfl:                FALLS RISK SCREEN  Instructions:  Assess the patient and enter the appropriate indicators that are present for fall risk identification. Total the numbers entered and assign a fall risk score from Table 2.  Reassess patient at a minimum every 12 weeks or with status change. Assessment   Date  6/19/2023     1. Mental Ability: confusion/cognitively impaired 0     2. Elimination Issues: incontinence, frequency 0       3. Ambulatory: use of assistive devices (walker, cane, off-loading devices),        attached to equipment (IV pole, oxygen) 0     4. Sensory Limitations: dizziness, vertigo, impaired vision 0     5. Age less than 65        0     6. Age 72 or greater 1     7. Medication: diuretics, strong analgesics, hypnotics, sedatives,        antihypertensive agents 0   8. Falls:  recent history of falls within the last 3 months (not to include slipping or        tripping) 0   TOTAL 1    If score of 4 or greater was education given? N/A            TABLE 2   Risk Score Risk Level Plan of Care   0-3 Little or  No Risk 1. Provide assistance as indicated for ambulation activities  2. Reorient confused/cognitively impaired patient  3. Chair/bed in low position, stretcher/bed with siderails up except when performing patient care activities  5. Educate patient/family/caregiver on falls prevention  6.  Reassess in 12 weeks or with any noted change in patient condition which places them at a risk for a fall   4-6 Moderate Risk 1. Provide assistance as indicated for ambulation activities  2.   Reorient confused/cognitively

## 2023-06-20 NOTE — PROGRESS NOTES
[address]            Radiation Oncology          [address] [facility]          [place] [address]        Lesly Cam: [phone]        F: [phone]       [email or web address]           Date of Service: 2023     Location:  [facility],   [address] [facility] [place]   [phone]       RADIATION ONCOLOGY FOLLOW UP NOTE    Patient ID:   William Alvares  : 1955   MRN: [id number]    DIAGNOSIS:  Cancer Staging   Malignant neoplasm of lower third of esophagus [facility])  Staging form: Esophagus - Adenocarcinoma, AJCC 8th Edition  - Clinical stage from 2022: Stage RED (cT3, cN2, cM0, GX) - Signed by Dinah Henley MD on 2022    -s/p neoadj chemoRT 50.4Gy 11/3/22  -s/p esophagectomy 23 pT0 pN0  -new R Cerebellar lesion    INTERVAL HISTORY:   William Alvares is a 70 y.o.. male with a history of a lower esophageal adenocarcinoma treated with neoadjuvant chemotherapy followed by esophagectomy earlier this year who presents to the hospital with dizziness and falls. Patient was found to have a right cerebellar lesion for which he underwent resection on 2023 confirming metastatic carcinoma of esophageal origin. Patient did have improvement in his hydrocephalus after having the tumor removed. He comes in today feeling better. He does have occasional headaches but he uses OTC medications which help. He denies any issues with vision changes, dizziness, chest pain, shortness of breath, abdominal pain, nausea, diarrhea, bony pain, weight loss, fatigue, swelling, or difficulty walking. Patient does still have some staples from his 2 incisions will be seeing neurosurgery next week to have them removed. He completed his dexamethasone taper. Patient also incidentally did have a squamous cell carcinoma removed off his scalp in March.        MEDICATIONS:    Current Outpatient Medications:     pantoprazole (PROTONIX) 40 MG tablet, Take 1

## 2023-06-21 ENCOUNTER — HOSPITAL ENCOUNTER (OUTPATIENT)
Facility: MEDICAL CENTER | Age: 68
End: 2023-06-21
Payer: MEDICARE

## 2023-06-22 ENCOUNTER — HOSPITAL ENCOUNTER (OUTPATIENT)
Dept: INFUSION THERAPY | Facility: MEDICAL CENTER | Age: 68
Discharge: HOME | End: 2023-06-22
Payer: MEDICARE

## 2023-06-22 ENCOUNTER — OFFICE VISIT (OUTPATIENT)
Dept: ONCOLOGY | Age: 68
End: 2023-06-22
Payer: MEDICARE

## 2023-06-22 ENCOUNTER — TELEPHONE (OUTPATIENT)
Dept: ONCOLOGY | Age: 68
End: 2023-06-22

## 2023-06-22 VITALS
BODY MASS INDEX: 23.31 KG/M2 | WEIGHT: 176.7 LBS | TEMPERATURE: 97.9 F | HEART RATE: 73 BPM | SYSTOLIC BLOOD PRESSURE: 101 MMHG | DIASTOLIC BLOOD PRESSURE: 61 MMHG

## 2023-06-22 DIAGNOSIS — C79.31 METASTASIS TO BRAIN (HCC): ICD-10-CM

## 2023-06-22 DIAGNOSIS — C15.5 MALIGNANT NEOPLASM OF LOWER THIRD OF ESOPHAGUS (HCC): Primary | ICD-10-CM

## 2023-06-22 DIAGNOSIS — M79.89 RIGHT LEG SWELLING: ICD-10-CM

## 2023-06-22 DIAGNOSIS — C15.9 ADENOSQUAMOUS CARCINOMA OF ESOPHAGUS (HCC): Primary | ICD-10-CM

## 2023-06-22 LAB
ALBUMIN SERPL-MCNC: 3.7 G/DL (ref 3.5–5.2)
ALP SERPL-CCNC: 97 U/L (ref 40–129)
ALT SERPL-CCNC: 20 U/L (ref 5–41)
ANION GAP SERPL CALCULATED.3IONS-SCNC: 6 MMOL/L (ref 9–17)
AST SERPL-CCNC: 18 U/L
BASOPHILS # BLD: 0.07 K/UL (ref 0–0.2)
BASOPHILS NFR BLD: 1 % (ref 0–2)
BILIRUB SERPL-MCNC: 1.4 MG/DL (ref 0.3–1.2)
BUN SERPL-MCNC: 14 MG/DL (ref 8–23)
BUN/CREAT SERPL: 23 (ref 9–20)
CALCIUM SERPL-MCNC: 8.8 MG/DL (ref 8.6–10.4)
CHLORIDE SERPL-SCNC: 107 MMOL/L (ref 98–107)
CO2 SERPL-SCNC: 29 MMOL/L (ref 20–31)
CREAT SERPL-MCNC: 0.61 MG/DL (ref 0.7–1.2)
EOSINOPHIL # BLD: 0.14 K/UL (ref 0–0.44)
EOSINOPHILS RELATIVE PERCENT: 2 % (ref 1–4)
ERYTHROCYTE [DISTWIDTH] IN BLOOD BY AUTOMATED COUNT: 14.8 % (ref 11.8–14.4)
GFR SERPL CREATININE-BSD FRML MDRD: >60 ML/MIN/1.73M2
GLUCOSE SERPL-MCNC: 103 MG/DL (ref 70–99)
HCT VFR BLD AUTO: 36.5 % (ref 40.7–50.3)
HGB BLD-MCNC: 11.7 G/DL (ref 13–17)
IMM GRANULOCYTES # BLD AUTO: 0.07 K/UL (ref 0–0.3)
IMM GRANULOCYTES NFR BLD: 1 %
LYMPHOCYTES # BLD: 7 % (ref 24–43)
LYMPHOCYTES NFR BLD: 0.48 K/UL (ref 1.1–3.7)
MCH RBC QN AUTO: 31.8 PG (ref 25.2–33.5)
MCHC RBC AUTO-ENTMCNC: 32.1 G/DL (ref 28.4–34.8)
MCV RBC AUTO: 99.2 FL (ref 82.6–102.9)
MONOCYTES NFR BLD: 0.75 K/UL (ref 0.1–1.2)
MONOCYTES NFR BLD: 11 % (ref 3–12)
NEUTROPHILS NFR BLD: 78 % (ref 36–65)
NEUTS SEG NFR BLD: 5.29 K/UL (ref 1.5–8.1)
NRBC AUTOMATED: 0 PER 100 WBC
PLATELET # BLD AUTO: 245 K/UL (ref 138–453)
PMV BLD AUTO: 10 FL (ref 8.1–13.5)
POTASSIUM SERPL-SCNC: 4.1 MMOL/L (ref 3.7–5.3)
PROT SERPL-MCNC: 6 G/DL (ref 6.4–8.3)
RBC # BLD AUTO: 3.68 M/UL (ref 4.21–5.77)
SODIUM SERPL-SCNC: 142 MMOL/L (ref 135–144)
WBC OTHER # BLD: 6.8 K/UL (ref 3.5–11.3)

## 2023-06-22 PROCEDURE — 99211 OFF/OP EST MAY X REQ PHY/QHP: CPT | Performed by: INTERNAL MEDICINE

## 2023-06-22 PROCEDURE — 85027 COMPLETE CBC AUTOMATED: CPT

## 2023-06-22 PROCEDURE — G8427 DOCREV CUR MEDS BY ELIG CLIN: HCPCS | Performed by: INTERNAL MEDICINE

## 2023-06-22 PROCEDURE — 3017F COLORECTAL CA SCREEN DOC REV: CPT | Performed by: INTERNAL MEDICINE

## 2023-06-22 PROCEDURE — G8420 CALC BMI NORM PARAMETERS: HCPCS | Performed by: INTERNAL MEDICINE

## 2023-06-22 PROCEDURE — 1111F DSCHRG MED/CURRENT MED MERGE: CPT | Performed by: INTERNAL MEDICINE

## 2023-06-22 PROCEDURE — 1123F ACP DISCUSS/DSCN MKR DOCD: CPT | Performed by: INTERNAL MEDICINE

## 2023-06-22 PROCEDURE — 36591 DRAW BLOOD OFF VENOUS DEVICE: CPT

## 2023-06-22 PROCEDURE — 80053 COMPREHEN METABOLIC PANEL: CPT

## 2023-06-22 PROCEDURE — 2580000003 HC RX 258: Performed by: INTERNAL MEDICINE

## 2023-06-22 PROCEDURE — 6360000002 HC RX W HCPCS: Performed by: INTERNAL MEDICINE

## 2023-06-22 PROCEDURE — 1036F TOBACCO NON-USER: CPT | Performed by: INTERNAL MEDICINE

## 2023-06-22 PROCEDURE — 99214 OFFICE O/P EST MOD 30 MIN: CPT | Performed by: INTERNAL MEDICINE

## 2023-06-22 RX ORDER — HEPARIN SODIUM 100 [USP'U]/ML
500 INJECTION, SOLUTION INTRAVENOUS PRN
Status: DISCONTINUED | OUTPATIENT
Start: 2023-06-22 | End: 2023-06-23 | Stop reason: HOSPADM

## 2023-06-22 RX ORDER — SODIUM CHLORIDE 9 MG/ML
5-250 INJECTION, SOLUTION INTRAVENOUS PRN
OUTPATIENT
Start: 2023-06-22

## 2023-06-22 RX ORDER — SODIUM CHLORIDE 0.9 % (FLUSH) 0.9 %
5-40 SYRINGE (ML) INJECTION PRN
OUTPATIENT
Start: 2023-06-22

## 2023-06-22 RX ORDER — HEPARIN SODIUM 100 [USP'U]/ML
500 INJECTION, SOLUTION INTRAVENOUS PRN
OUTPATIENT
Start: 2023-06-22

## 2023-06-22 RX ORDER — SODIUM CHLORIDE 0.9 % (FLUSH) 0.9 %
5-40 SYRINGE (ML) INJECTION PRN
Status: DISCONTINUED | OUTPATIENT
Start: 2023-06-22 | End: 2023-06-23 | Stop reason: HOSPADM

## 2023-06-22 RX ADMIN — SODIUM CHLORIDE, PRESERVATIVE FREE 10 ML: 5 INJECTION INTRAVENOUS at 13:50

## 2023-06-22 RX ADMIN — SODIUM CHLORIDE, PRESERVATIVE FREE 10 ML: 5 INJECTION INTRAVENOUS at 15:08

## 2023-06-22 RX ADMIN — HEPARIN 500 UNITS: 100 SYRINGE at 15:07

## 2023-06-22 NOTE — TELEPHONE ENCOUNTER
Yakov Scott MD VISIT & TX  Rt leg doppler us soon  PET/CT scan soon  RV 3-4 weeks   US RIGHT LEG & PET/CT IS ON 7/10/23 @ 8AM IN [facility] ARRIVAL @ 7:30AM  MD VISIT 7/18/23 @ 2:30PM  AVS PRINTED W/ INSTRUCTIONS AND GIVEN TO PT ON EXIT

## 2023-06-22 NOTE — PROGRESS NOTES
Patient arrived ambulatory per self for labs and MD visit. Patient reports swelling and neuropathy to bilateral lower legs as well as constant R ankle pain. Patient states this started after his recent neurosurgery. Vitals as charted. Port accessed; specimen sent. Physician met with patient; labs reviewed; ok to discharge, patient will be scheduled for doppler. Port flushed and heparinized with intact morillo needle removed per protocol. Patient ambulated off unit per self at discharge, AVS per .

## 2023-06-26 ENCOUNTER — TELEPHONE (OUTPATIENT)
Dept: RADIATION ONCOLOGY | Age: 68
End: 2023-06-26

## 2023-06-26 ENCOUNTER — OFFICE VISIT (OUTPATIENT)
Dept: NEUROSURGERY | Age: 68
End: 2023-06-26

## 2023-06-26 ENCOUNTER — HOSPITAL ENCOUNTER (OUTPATIENT)
Dept: VASCULAR LAB | Age: 68
Discharge: HOME | End: 2023-06-26
Payer: MEDICARE

## 2023-06-26 VITALS
DIASTOLIC BLOOD PRESSURE: 63 MMHG | BODY MASS INDEX: 23.19 KG/M2 | HEIGHT: 73 IN | WEIGHT: 175 LBS | HEART RATE: 58 BPM | SYSTOLIC BLOOD PRESSURE: 109 MMHG

## 2023-06-26 DIAGNOSIS — M79.89 RIGHT LEG SWELLING: ICD-10-CM

## 2023-06-26 DIAGNOSIS — Z98.890 S/P CRANIOTOMY: ICD-10-CM

## 2023-06-26 DIAGNOSIS — C79.31 METASTATIC ADENOCARCINOMA TO BRAIN (HCC): Primary | ICD-10-CM

## 2023-06-26 DIAGNOSIS — C79.31 METASTATIC ADENOCARCINOMA TO BRAIN (HCC): ICD-10-CM

## 2023-06-26 DIAGNOSIS — C79.31 METASTASIS TO BRAIN (HCC): Primary | ICD-10-CM

## 2023-06-26 PROCEDURE — 93971 EXTREMITY STUDY: CPT

## 2023-06-26 PROCEDURE — 99024 POSTOP FOLLOW-UP VISIT: CPT | Performed by: NURSE PRACTITIONER

## 2023-06-28 PROBLEM — C79.31 METASTASIS TO BRAIN (HCC): Status: ACTIVE | Noted: 2023-06-28

## 2023-06-30 ENCOUNTER — TELEPHONE (OUTPATIENT)
Dept: RADIATION ONCOLOGY | Age: 68
End: 2023-06-30

## 2023-07-03 ENCOUNTER — HOSPITAL ENCOUNTER (OUTPATIENT)
Dept: MRI IMAGING | Age: 68
Discharge: HOME | End: 2023-07-05
Attending: RADIOLOGY
Payer: MEDICARE

## 2023-07-03 ENCOUNTER — HOSPITAL ENCOUNTER (OUTPATIENT)
Dept: RADIATION ONCOLOGY | Age: 68
Discharge: HOME | End: 2023-07-03
Payer: MEDICARE

## 2023-07-03 VITALS
SYSTOLIC BLOOD PRESSURE: 103 MMHG | RESPIRATION RATE: 14 BRPM | TEMPERATURE: 97.8 F | WEIGHT: 175.6 LBS | DIASTOLIC BLOOD PRESSURE: 68 MMHG | HEART RATE: 69 BPM | BODY MASS INDEX: 23.17 KG/M2

## 2023-07-03 DIAGNOSIS — C79.31 METASTASIS TO BRAIN (HCC): ICD-10-CM

## 2023-07-03 DIAGNOSIS — Z85.01 HISTORY OF ESOPHAGEAL CANCER: ICD-10-CM

## 2023-07-03 DIAGNOSIS — C79.31 METASTASIS TO BRAIN (HCC): Primary | ICD-10-CM

## 2023-07-03 DIAGNOSIS — C15.5 MALIGNANT NEOPLASM OF LOWER THIRD OF ESOPHAGUS (HCC): ICD-10-CM

## 2023-07-03 PROCEDURE — 6370000000 HC RX 637 (ALT 250 FOR IP): Performed by: RADIOLOGY

## 2023-07-03 PROCEDURE — 77334 RADIATION TREATMENT AID(S): CPT | Performed by: RADIOLOGY

## 2023-07-03 PROCEDURE — 70552 MRI BRAIN STEM W/DYE: CPT

## 2023-07-03 PROCEDURE — 77300 RADIATION THERAPY DOSE PLAN: CPT | Performed by: RADIOLOGY

## 2023-07-03 PROCEDURE — 6360000004 HC RX CONTRAST MEDICATION: Performed by: RADIOLOGY

## 2023-07-03 PROCEDURE — 77290 THER RAD SIMULAJ FIELD CPLX: CPT | Performed by: RADIOLOGY

## 2023-07-03 PROCEDURE — 77295 3-D RADIOTHERAPY PLAN: CPT | Performed by: RADIOLOGY

## 2023-07-03 PROCEDURE — A9579 GAD-BASE MR CONTRAST NOS,1ML: HCPCS | Performed by: RADIOLOGY

## 2023-07-03 PROCEDURE — 77371 SRS MULTISOURCE: CPT | Performed by: RADIOLOGY

## 2023-07-03 RX ORDER — LORAZEPAM 0.5 MG/1
0.5 TABLET ORAL ONCE
Status: COMPLETED | OUTPATIENT
Start: 2023-07-03 | End: 2023-07-03

## 2023-07-03 RX ADMIN — GADOTERIDOL 32 ML: 279.3 INJECTION, SOLUTION INTRAVENOUS at 09:26

## 2023-07-03 RX ADMIN — LORAZEPAM 0.5 MG: 0.5 TABLET ORAL at 08:43

## 2023-07-03 ASSESSMENT — PAIN SCALES - GENERAL: PAINLEVEL_OUTOF10: 0

## 2023-07-03 NOTE — PROGRESS NOTES
Gamma Knife Radiation Delivery Time Out for MASK     1. Patient states name and birthdate correctly? Yes    2. Procedure listed on consent? Stereotactic Radiosurgery, Gamma Knife for brain metastasis    3. Is this the correct procedure? Yes    4. Does the patient have only one benign target or lesion? Yes    -If yes, what side are we treating? yes    -If yes, is the side marked for laterality? Right    5. Has the final radiologist report been reviewed? yes    6. Has the patient received IV Dexamethasone prior to radiation delivery?  no    7. Does the patient require Keppra or Ativan prior to treatment delivery?  no    8. Is the mask the correct patient's mask? Yes    9. Is a CBCT required prior to treatment delivery? Yes    10. Are all present in agreement? Yes    11.  Those present for time out:  LEI SJ SP AW

## 2023-07-03 NOTE — PROGRESS NOTES
Gamma Knife RN Pre-Procedure Checklist     1. Has the patient ever had any surgery on the head or neck? Yes      2. Has the patient ever received radiation treatment to the head or neck? No          3. Has the patient received chemotherapy or immunotherapy in the past month? No      Patient arrived with daughter, Zack Dakin, as . He denies pain and has had his staples removed. Patient verbalized concerns with claustrophobia with the mask. Discussed with Dr. Kenn Gonzalez and orders received for Ativan. Port accessed without difficulty, good blood return, and flushes well. Patient completed Gamma Knife treatment without difficulty and does not need any steroids per Dr. Kenn Gonzalez. Appts made for follow up MRI and appt.

## 2023-07-03 NOTE — DISCHARGE INSTRUCTIONS
GAMMA KNIFE POST-PROCEDURE INSTRUCTIONS    You may return to work or school after 24 hours if you feel well enough. You should resume all of your regular activities unless the physician has instructed you otherwise. You may resume your usual diet right away. Follow up with your physicians as directed. Please contact our office at [phone] with any new vision changes, balance problems, numbness/tingling, or severe headache. If you have an emergent concern and need to be seen by a physician immediately, please go to the nearest emergency room.

## 2023-07-07 NOTE — PROGRESS NOTES
[facility]            Radiation Oncology          [facility], [facility] Alfredo Chow Carrero: [phone]        F: [phone]       [email or web address]           Dear Dr Shannon Singh: Thank you for referring Deonte Tompkins to me for evaluation and treatment. Below is a summary of the patient's recently completed radiation course. If you have questions, please do not hesitate to call me. I look forward to following this patient along with you. Sincerely,  Electronically signed by Drake King MD on 7/3/2023 at 4:13 PM EDT      CC: Patient Care Team:  Denita Interiano DO as PCP - General (Family Medicine)  Denita Interiano DO as PCP - Empaneled Provider  Olga Lidia Rivers RD as Dietitian (Dietitian Registered)  Mehnaz Joseph RD, LD as Dietitian (Dietitian Registered)  Rosa Maria Delacruz MD as Consulting Physician (Gastroenterology)  Kaleigh Melvin RN as Nurse Navigator (Oncology)  ------------------------------------------------------------------------------------------------------------------------------------------------------------------------------------------        Date of Service: 7/3/2023     Location:  [facility] Radiation Oncology,   [facility] [place], [address] [place] [address]   [phone]        RADIATION ONCOLOGY END OF TREATMENT SUMMARY:    Patient ID:   Deonte Tompkins  : 1955   MRN: [id number]    DIAGNOSIS:  Cancer Staging   Malignant neoplasm of lower third of esophagus [facility])  Staging form: Esophagus - Adenocarcinoma, AJCC 8th Edition  - Clinical stage from 2022: Stage RED (cT3, cN2, cM0, GX) - Signed by Drake King MD on 2022      TREATMENT DETAILS:    Treatment Technique: SRS  Fraction Technique: Single fraction          Concurrent Chemotherapy: NA    CLINICAL COURSE:    Patient completed the treatment as prescribed and tolerated treatment as expected. Patient developed no symptoms from treatment.  Patient will come

## 2023-07-10 ENCOUNTER — HOSPITAL ENCOUNTER (OUTPATIENT)
Dept: NUCLEAR MEDICINE | Age: 68
Discharge: HOME | End: 2023-07-12
Attending: INTERNAL MEDICINE
Payer: MEDICARE

## 2023-07-10 ENCOUNTER — HOSPITAL ENCOUNTER (OUTPATIENT)
Dept: VASCULAR LAB | Age: 68
Discharge: HOME | End: 2023-07-10
Attending: INTERNAL MEDICINE
Payer: MEDICARE

## 2023-07-10 DIAGNOSIS — C15.5 MALIGNANT NEOPLASM OF LOWER THIRD OF ESOPHAGUS (HCC): ICD-10-CM

## 2023-07-10 DIAGNOSIS — M79.89 RIGHT LEG SWELLING: ICD-10-CM

## 2023-07-10 LAB — GLUCOSE BLD-MCNC: 108 MG/DL (ref 75–110)

## 2023-07-10 PROCEDURE — 93971 EXTREMITY STUDY: CPT

## 2023-07-10 PROCEDURE — 3430000000 HC RX DIAGNOSTIC RADIOPHARMACEUTICAL: Performed by: INTERNAL MEDICINE

## 2023-07-10 PROCEDURE — 2580000003 HC RX 258: Performed by: INTERNAL MEDICINE

## 2023-07-10 PROCEDURE — A9552 F18 FDG: HCPCS | Performed by: INTERNAL MEDICINE

## 2023-07-10 PROCEDURE — 78815 PET IMAGE W/CT SKULL-THIGH: CPT

## 2023-07-10 PROCEDURE — 82947 ASSAY GLUCOSE BLOOD QUANT: CPT

## 2023-07-10 RX ORDER — SODIUM CHLORIDE 0.9 % (FLUSH) 0.9 %
5-40 SYRINGE (ML) INJECTION
Status: COMPLETED | OUTPATIENT
Start: 2023-07-10 | End: 2023-07-10

## 2023-07-10 RX ORDER — FLUDEOXYGLUCOSE F 18 200 MCI/ML
10 INJECTION, SOLUTION INTRAVENOUS
Status: COMPLETED | OUTPATIENT
Start: 2023-07-10 | End: 2023-07-10

## 2023-07-10 RX ADMIN — SODIUM CHLORIDE, PRESERVATIVE FREE 10 ML: 5 INJECTION INTRAVENOUS at 07:58

## 2023-07-10 RX ADMIN — FLUDEOXYGLUCOSE F 18 10.03 MILLICURIE: 200 INJECTION, SOLUTION INTRAVENOUS at 07:58

## 2023-07-14 ENCOUNTER — TELEPHONE (OUTPATIENT)
Dept: RADIATION ONCOLOGY | Age: 68
End: 2023-07-14

## 2023-07-14 NOTE — TELEPHONE ENCOUNTER
Called pt for post [facility] phone call. Patient stated he had a headache the day after and that other than that he has been feeling well without concern of bothersome side effects. Patient aware of appts for follow up and MRI brain in October and instructed to call if any questions/concerns in the future.

## 2023-07-18 ENCOUNTER — TELEPHONE (OUTPATIENT)
Dept: ONCOLOGY | Age: 68
End: 2023-07-18

## 2023-07-18 ENCOUNTER — OFFICE VISIT (OUTPATIENT)
Dept: ONCOLOGY | Age: 68
End: 2023-07-18
Payer: MEDICARE

## 2023-07-18 VITALS
SYSTOLIC BLOOD PRESSURE: 107 MMHG | TEMPERATURE: 99.5 F | RESPIRATION RATE: 18 BRPM | HEART RATE: 74 BPM | OXYGEN SATURATION: 99 % | BODY MASS INDEX: 23.62 KG/M2 | DIASTOLIC BLOOD PRESSURE: 72 MMHG | WEIGHT: 179 LBS

## 2023-07-18 DIAGNOSIS — C79.31 METASTASIS TO BRAIN (HCC): ICD-10-CM

## 2023-07-18 DIAGNOSIS — C15.5 MALIGNANT NEOPLASM OF LOWER THIRD OF ESOPHAGUS (HCC): Primary | ICD-10-CM

## 2023-07-18 PROCEDURE — 99211 OFF/OP EST MAY X REQ PHY/QHP: CPT | Performed by: INTERNAL MEDICINE

## 2023-07-18 PROCEDURE — G8420 CALC BMI NORM PARAMETERS: HCPCS | Performed by: INTERNAL MEDICINE

## 2023-07-18 PROCEDURE — 3017F COLORECTAL CA SCREEN DOC REV: CPT | Performed by: INTERNAL MEDICINE

## 2023-07-18 PROCEDURE — G8427 DOCREV CUR MEDS BY ELIG CLIN: HCPCS | Performed by: INTERNAL MEDICINE

## 2023-07-18 PROCEDURE — 1123F ACP DISCUSS/DSCN MKR DOCD: CPT | Performed by: INTERNAL MEDICINE

## 2023-07-18 PROCEDURE — 99214 OFFICE O/P EST MOD 30 MIN: CPT | Performed by: INTERNAL MEDICINE

## 2023-07-18 PROCEDURE — 1036F TOBACCO NON-USER: CPT | Performed by: INTERNAL MEDICINE

## 2023-07-18 NOTE — TELEPHONE ENCOUNTER
Reji Mishra MD VISIT  DR Sanchez Villafana IN TO SEE PATIENT  ORDERS RECEIVED  RV 3 MONTHS WITH CT CHEST BEFORE  CT CHEST WO CONTRAST WITH MATTHEW FOR 10/11/23 @11AM ARRIVE BY 10:45AM Jerson Levy MD VISIT 10/17/23 @8:15AM  AVS PRINTED AND GIVEN TO PATIENT WITH INSTRUCTIONS  PATIENT DISCHARGED AMBULATORY

## 2023-07-18 NOTE — PROGRESS NOTES
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 Complete response to induction chemoradiation. Cerebellar Brain metastasis. S/p resection 6/2/23  GERD  Past history of tobacco abuse   CURRENT THERAPY:         started combined chemoradiation using weekly Taxol/ Carboplatin to be followed by surgery. Chemoradiation started 9/27/22. Developed allergy to Taxol. Switched to Abraxane/ carboplatin. Chemoradiation completed November 3, 2022. 1/9/2023: Thoracotomy, [place]vi esophagectomy, pyloromyotomy, flap coverage of anastomosis (pedicled omentum), mediastinal lymph node sampling, j-tube exchange, left chest tube. J-tube removed March 9, 2023  S/p cerebellar brain metastasis resection 6/2/23. Status post XRT with SRS in July 3, 2023 (one treatment)    BRIEF CASE HISTORY:      Mr. aRsta Do is a very pleasant 70 y.o. male with history of multiple co morbidities as listed. Patient is referred for evaluation and further management of recently diagnosed esophageal adenocarcinoma. The patient was doing fairly well except for mild chronic GERD. It was not significant so he did not pay attention to it and he did not receive any treatment for it. For the last few weeks patient started having difficulty swallowing especially solid food. He was evaluated by gastroenterology and he had EGD which showed suspicious lesion at the distal part of the esophagus at 36 cm. Biopsy from the lesion on August 1, 2022 showed adenocarcinoma. Patient is referred for further management. Patient denies any active bleeding. No melena or hematochezia. No hematemesis. No weight loss or decreased appetite. No fever or night sweats. No other complaints. Patient quit smoking more than 20 years ago.

## 2023-07-25 ENCOUNTER — OFFICE VISIT (OUTPATIENT)
Dept: NEUROLOGY | Age: 68
End: 2023-07-25

## 2023-07-25 VITALS
BODY MASS INDEX: 23.72 KG/M2 | WEIGHT: 179 LBS | HEART RATE: 73 BPM | TEMPERATURE: 97.9 F | DIASTOLIC BLOOD PRESSURE: 73 MMHG | SYSTOLIC BLOOD PRESSURE: 109 MMHG | OXYGEN SATURATION: 98 % | HEIGHT: 73 IN

## 2023-07-25 DIAGNOSIS — Z09 HOSPITAL DISCHARGE FOLLOW-UP: Primary | ICD-10-CM

## 2023-07-25 RX ORDER — LEVETIRACETAM 500 MG/1
TABLET ORAL
COMMUNITY
Start: 2023-06-07 | End: 2023-07-26 | Stop reason: ALTCHOICE

## 2023-07-25 ASSESSMENT — ENCOUNTER SYMPTOMS
SORE THROAT: 0
ABDOMINAL DISTENTION: 0
RHINORRHEA: 0
BACK PAIN: 0
SINUS PAIN: 0

## 2023-07-25 NOTE — PROGRESS NOTES
[facility] [place] [place] [address], [address], [address]  P: [phone]  F: [phone]    NEUROLOGY CLINIC NOTE     PATIENT NAME: Kar Ceja  PATIENT MRN: [id number]  PRIMARY CARE PHYSICIAN: Bhavesh Sosa DO    HPI:      Kar Ceja is a 70 y.o.  male with PMH significant for esophageal adenocarcinoma T3 N2 M0, S/P chemoradiation, and [place] resection on Jan 2023. Patient then was found to be ataxic had a 3.5 cm mass in the cerebellum with obstructive hydrocephalus as well as vasogenic edema for which he underwent retrosigmoid craniotomy and resection with Dr. Rosa Elena Ferrell on 06/02/2023      History obtained from Patient    Patient post operative, denies the presence of weakness, nausea, vomiting or numbness. He reports no visual changes. Patient endorses some continuous high pitched ringing sensation in his ears more on left rather than right. Symptoms are not of pulsatile nature. Symptoms have started after cerebellar mass resection, however, patient reports it is not severe and can only be noticed in quite rooms. Patient also endorses some light headedness and dizziness on standing up quickly or leaning down and standing up quickly. Symptoms have led to fall one time. Patient reports it has sig improved. Patient denies the presence of headache.        PATIENT HISTORY:     Past Medical History:   Diagnosis Date    Cancer [facility])     esophageal    Dental root implant present     2 metal implants in lower jaw    Elevated PSA     Dr. Katelyn Mayen    Hearing loss     Hiatal hernia     Keratosis     uses Effudex prn    Prostate nodule     on MRI per patient    Snores     no sleep apnea diagnosis    Under care of team 09/12/2022    Oncology: Ryland Councilman, last visit 9/2022    Wears dentures     full dentures    Wears reading eyeglasses     Wellness examination     PCP:Dr. Lena Adams [facility], last visit 8/2022

## 2023-08-09 ENCOUNTER — OFFICE VISIT (OUTPATIENT)
Dept: NEUROSURGERY | Age: 68
End: 2023-08-09
Payer: MEDICARE

## 2023-08-09 VITALS
TEMPERATURE: 97.5 F | DIASTOLIC BLOOD PRESSURE: 68 MMHG | HEIGHT: 73 IN | HEART RATE: 77 BPM | BODY MASS INDEX: 23.72 KG/M2 | WEIGHT: 179 LBS | OXYGEN SATURATION: 95 % | SYSTOLIC BLOOD PRESSURE: 102 MMHG

## 2023-08-09 DIAGNOSIS — C79.31 METASTATIC ADENOCARCINOMA TO BRAIN (HCC): Primary | ICD-10-CM

## 2023-08-09 PROCEDURE — 99213 OFFICE O/P EST LOW 20 MIN: CPT | Performed by: NEUROLOGICAL SURGERY

## 2023-08-09 PROCEDURE — G8427 DOCREV CUR MEDS BY ELIG CLIN: HCPCS | Performed by: NEUROLOGICAL SURGERY

## 2023-08-09 PROCEDURE — 1036F TOBACCO NON-USER: CPT | Performed by: NEUROLOGICAL SURGERY

## 2023-08-09 PROCEDURE — 1123F ACP DISCUSS/DSCN MKR DOCD: CPT | Performed by: NEUROLOGICAL SURGERY

## 2023-08-09 PROCEDURE — 3017F COLORECTAL CA SCREEN DOC REV: CPT | Performed by: NEUROLOGICAL SURGERY

## 2023-08-09 PROCEDURE — G8420 CALC BMI NORM PARAMETERS: HCPCS | Performed by: NEUROLOGICAL SURGERY

## 2023-08-09 RX ORDER — DOXYCYCLINE HYCLATE 100 MG/1
CAPSULE ORAL
COMMUNITY
Start: 2023-08-08

## 2023-08-09 NOTE — PROGRESS NOTES
[address]  [address]  MOB # [address], [address] [place] [place] [address]  Dept: [phone]    Patient:  Feliciano Cho  YOB: 1955  Date: 8/9/23    The patient is a 70 y.o. male who presents today for consult of the following problems:     Chief Complaint   Patient presents with    Neurologic Problem     -Vasogenic cerebral edema ([address])             HPI:     Feliciano Cho is a 70 y.o. 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 The patient is completed all adjuvant therapy and has been seeing his dermatologist for an infected graft site as well as potentially a new lesion that needs to biopsied as well. Denies any ataxia weakness focal deficits including speech. Does have some issues with orthostasis when he has sudden change in posture. Is currently on antibiotics including doxycycline per his dermatologist.    He underwent surgical resection of his esophageal lesion at [facility] clinic and has no further follow-up there.   Recently underwent a PET/CT that was within normal limits    History:     Past Medical History:   Diagnosis Date    Cancer [facility])     esophageal    Dental root implant present     2 metal implants in lower jaw    Elevated PSA     Dr. Manuel Resides    Hearing loss     Hiatal hernia     Keratosis     uses Effudex prn    Prostate nodule     on MRI per patient    Snores     no sleep apnea diagnosis    Under care of team 09/12/2022    Oncology: Chaim Kinsey, last visit 9/2022    Wears dentures     full dentures    Wears reading eyeglasses     Wellness examination     PCP:Dr. Adam Hart [facility], last visit 8/2022     Past Surgical History:   Procedure

## 2023-08-10 ENCOUNTER — OFFICE VISIT (OUTPATIENT)
Dept: DERMATOLOGY | Age: 68
End: 2023-08-10
Payer: MEDICARE

## 2023-08-10 VITALS
HEIGHT: 73 IN | BODY MASS INDEX: 23.99 KG/M2 | HEART RATE: 72 BPM | SYSTOLIC BLOOD PRESSURE: 111 MMHG | OXYGEN SATURATION: 100 % | WEIGHT: 181 LBS | DIASTOLIC BLOOD PRESSURE: 72 MMHG

## 2023-08-10 DIAGNOSIS — D48.9 NEOPLASM OF UNCERTAIN BEHAVIOR: ICD-10-CM

## 2023-08-10 DIAGNOSIS — L57.0 ACTINIC KERATOSES: Primary | ICD-10-CM

## 2023-08-10 PROCEDURE — G8420 CALC BMI NORM PARAMETERS: HCPCS | Performed by: PHYSICIAN ASSISTANT

## 2023-08-10 PROCEDURE — 1036F TOBACCO NON-USER: CPT | Performed by: PHYSICIAN ASSISTANT

## 2023-08-10 PROCEDURE — 99213 OFFICE O/P EST LOW 20 MIN: CPT | Performed by: PHYSICIAN ASSISTANT

## 2023-08-10 PROCEDURE — 1123F ACP DISCUSS/DSCN MKR DOCD: CPT | Performed by: PHYSICIAN ASSISTANT

## 2023-08-10 PROCEDURE — 3017F COLORECTAL CA SCREEN DOC REV: CPT | Performed by: PHYSICIAN ASSISTANT

## 2023-08-10 PROCEDURE — G8427 DOCREV CUR MEDS BY ELIG CLIN: HCPCS | Performed by: PHYSICIAN ASSISTANT

## 2023-08-10 RX ORDER — FLUOROURACIL 50 MG/G
CREAM TOPICAL
Qty: 40 G | Refills: 1 | Status: SHIPPED | OUTPATIENT
Start: 2023-08-10

## 2023-09-15 ENCOUNTER — TELEPHONE (OUTPATIENT)
Dept: ONCOLOGY | Age: 68
End: 2023-09-15

## 2023-09-15 NOTE — TELEPHONE ENCOUNTER
Name: Adenike Sotelo  : 1955  MRN: [id number]    Oncology Navigation Follow-Up Note    Contact Type:  Telephone    Notes: Writer called patient to check on him and to see how he's feeling. He states considering what he's been through \"Im doing very well\" He states that hes outside currently doing yard work and overall feels good. Pt was appreciative of check in call. Will continue to follow.     Electronically signed by Hema Garza RN on 9/15/2023 at 2:40 PM

## 2023-10-03 ENCOUNTER — TELEPHONE (OUTPATIENT)
Dept: DERMATOLOGY | Age: 68
End: 2023-10-03

## 2023-10-03 ENCOUNTER — HOSPITAL ENCOUNTER (OUTPATIENT)
Dept: MRI IMAGING | Age: 68
Discharge: HOME | End: 2023-10-05
Attending: RADIOLOGY
Payer: MEDICARE

## 2023-10-03 DIAGNOSIS — C15.5 MALIGNANT NEOPLASM OF LOWER THIRD OF ESOPHAGUS (HCC): ICD-10-CM

## 2023-10-03 DIAGNOSIS — Z85.01 HISTORY OF ESOPHAGEAL CANCER: ICD-10-CM

## 2023-10-03 DIAGNOSIS — C79.31 METASTASIS TO BRAIN (HCC): ICD-10-CM

## 2023-10-03 PROCEDURE — 6360000004 HC RX CONTRAST MEDICATION: Performed by: RADIOLOGY

## 2023-10-03 PROCEDURE — 70553 MRI BRAIN STEM W/O & W/DYE: CPT

## 2023-10-03 PROCEDURE — 2580000003 HC RX 258: Performed by: RADIOLOGY

## 2023-10-03 PROCEDURE — A9579 GAD-BASE MR CONTRAST NOS,1ML: HCPCS | Performed by: RADIOLOGY

## 2023-10-03 RX ORDER — SODIUM CHLORIDE 0.9 % (FLUSH) 0.9 %
10 SYRINGE (ML) INJECTION PRN
Status: DISCONTINUED | OUTPATIENT
Start: 2023-10-03 | End: 2023-10-06 | Stop reason: HOSPADM

## 2023-10-03 RX ADMIN — GADOTERIDOL 34 ML: 279.3 INJECTION, SOLUTION INTRAVENOUS at 10:27

## 2023-10-03 RX ADMIN — SODIUM CHLORIDE, PRESERVATIVE FREE 10 ML: 5 INJECTION INTRAVENOUS at 10:27

## 2023-10-03 NOTE — TELEPHONE ENCOUNTER
Patient cancelled Dermatology appointment scheduled for 10/6/2023. Patient says that he is doing well. The cream is working. The scabs on his skull are gone.

## 2023-10-05 ENCOUNTER — HOSPITAL ENCOUNTER (OUTPATIENT)
Dept: RADIATION ONCOLOGY | Age: 68
Discharge: HOME | End: 2023-10-05
Payer: MEDICARE

## 2023-10-05 VITALS
TEMPERATURE: 97.7 F | OXYGEN SATURATION: 98 % | RESPIRATION RATE: 16 BRPM | WEIGHT: 191.8 LBS | BODY MASS INDEX: 25.3 KG/M2 | HEART RATE: 79 BPM | DIASTOLIC BLOOD PRESSURE: 72 MMHG | SYSTOLIC BLOOD PRESSURE: 112 MMHG

## 2023-10-05 DIAGNOSIS — C15.9 ADENOSQUAMOUS CARCINOMA OF ESOPHAGUS (HCC): ICD-10-CM

## 2023-10-05 DIAGNOSIS — C79.31 METASTASIS TO BRAIN (HCC): Primary | ICD-10-CM

## 2023-10-05 PROCEDURE — 99212 OFFICE O/P EST SF 10 MIN: CPT | Performed by: RADIOLOGY

## 2023-10-05 PROCEDURE — 99213 OFFICE O/P EST LOW 20 MIN: CPT | Performed by: RADIOLOGY

## 2023-10-05 NOTE — PROGRESS NOTES
given? No           TABLE 2   Risk Score Risk Level Plan of Care   0-3 Little or  No Risk 1. Provide assistance as indicated for ambulation activities  2. Reorient confused/cognitively impaired patient  3. Chair/bed in low position, stretcher/bed with siderails up except when performing patient care activities  5. Educate patient/family/caregiver on falls prevention  6.  Reassess in 12 weeks or with any noted change in patient condition which places them at a risk for a fall   4-6 Moderate Risk 1. Provide assistance as indicated for ambulation activities  2. Reorient confused/cognitively impaired patient  3. Chair/bed in low position, stretcher/bed with siderails up except when performing patient care activities  4. Educate patient/family/caregiver on falls prevention     7 or   Higher High Risk 1. Place patient in easily observable treatment room  2. Patient attended at all times by family member or staff  3. Provide assistance as indicated for ambulation activities  4. Reorient confused/cognitively impaired patient  5. Chair/bed in low position, stretcher/bed with siderails up except when performing patient care activities  6. Educate patient/family/caregiver on falls prevention         PLAN: Patient is seen today in follow up. He arrives ambulatory with steady gait. States he is doing well. Denies difficulty with swallowing most foods. States eating smaller more frequent meals. Had recent brain MRI and is anxious for results. Dr. Kandis Wilson evaluated patient. MRI brain reviewed with patient. He will follow up in 3 months with brain MRI.     Patient Pain Score:  0          Joelle La RN
Britton Sherman MD on 10/5/2023 at 10:30 AM        Medications Prescribed:   New Prescriptions    No medications on file       Orders:   Orders Placed This Encounter   Procedures    MRI BRAIN W WO CONTRAST       CC:  Patient Care Team:  Chucky Hewitt DO as PCP - General (Family Medicine)  Chucky Hewitt DO as PCP - Empaneled Provider  Sundar Celis, [address] [facility] as Dietitian (Dietitian Registered)  Shana Le, [address] [facility],  as Dietitian (Dietitian Registered)  Vickey Pike MD as Consulting Physician (Gastroenterology)  Lizandro Richardson, RN as Nurse Navigator (Oncology)     Total time spent on this case on the day of encounter is 20 minutes. This time includes combination of one or more of the following - review of necessary tests, review of pertinent medical records from the EMR, performing medically appropriate examination and evaluation, counseling and educating the patient/family/caregiver, ordering necessary medical tests, procedures etc., documenting the clinical information in the electronic medical record, care coordination, referring and communicating with other health care providers and interpretation of results independently. This note is created with the assistance of a speech recognition program.  While intending to generate a document that actually reflects the content of the visit, the document can still have some errors including those of syntax and sound a like substitutions which may escape proof reading. It such instances, actual meaning can be extrapolated by contextual diversion.

## 2023-10-09 ENCOUNTER — OFFICE VISIT (OUTPATIENT)
Dept: NEUROSURGERY | Age: 68
End: 2023-10-09
Payer: MEDICARE

## 2023-10-09 VITALS
TEMPERATURE: 98.3 F | WEIGHT: 191 LBS | BODY MASS INDEX: 25.2 KG/M2 | OXYGEN SATURATION: 94 % | RESPIRATION RATE: 22 BRPM | SYSTOLIC BLOOD PRESSURE: 105 MMHG | HEART RATE: 79 BPM | DIASTOLIC BLOOD PRESSURE: 66 MMHG

## 2023-10-09 DIAGNOSIS — C79.31 METASTATIC ADENOCARCINOMA TO BRAIN (HCC): Primary | ICD-10-CM

## 2023-10-09 DIAGNOSIS — M70.22 OLECRANON BURSITIS OF LEFT ELBOW: ICD-10-CM

## 2023-10-09 PROCEDURE — 99213 OFFICE O/P EST LOW 20 MIN: CPT | Performed by: NEUROLOGICAL SURGERY

## 2023-10-09 PROCEDURE — G8417 CALC BMI ABV UP PARAM F/U: HCPCS | Performed by: NEUROLOGICAL SURGERY

## 2023-10-09 PROCEDURE — 1123F ACP DISCUSS/DSCN MKR DOCD: CPT | Performed by: NEUROLOGICAL SURGERY

## 2023-10-09 PROCEDURE — 3017F COLORECTAL CA SCREEN DOC REV: CPT | Performed by: NEUROLOGICAL SURGERY

## 2023-10-09 PROCEDURE — G8484 FLU IMMUNIZE NO ADMIN: HCPCS | Performed by: NEUROLOGICAL SURGERY

## 2023-10-09 PROCEDURE — G8427 DOCREV CUR MEDS BY ELIG CLIN: HCPCS | Performed by: NEUROLOGICAL SURGERY

## 2023-10-09 PROCEDURE — 1036F TOBACCO NON-USER: CPT | Performed by: NEUROLOGICAL SURGERY

## 2023-10-09 NOTE — PROGRESS NOTES
[address]  [address]  MOB # 2 [address], [address] [place] [address]  Dept: [phone]    Patient:  Mariano Mckay  YOB: 1955  Date: 10/9/23    The patient is a 70 y.o. male who presents today for consult of the following problems:     Chief Complaint   Patient presents with    Follow-up             HPI:     Mariano Mckay is a 70 y.o. male on whom neurosurgical consultation was requested by Chucky Hewitt DO for management of esophageal cancer metastatic to right cerebellum status post resection. Patient has been doing quite well with no issues in terms of numbness tingling weakness speech difficulties seizures vision changes of any kind or ataxia. Is off all chemotherapy at this point and did complete radiation as well. Has been started on pantoprazole and is complaining of some issues with reflux and surgery as well. Also having some bursitis over his left olecranon with swelling and discomfort. Isabel Kahn       History:     Past Medical History:   Diagnosis Date    Cancer ([address])     esophageal    Dental root implant present     2 metal implants in lower jaw    Elevated PSA     Dr. Sher Baez    Hearing loss     Hiatal hernia     Keratosis     uses Effudex prn    Prostate nodule     on MRI per patient    Snores     no sleep apnea diagnosis    Under care of team 09/12/2022    Oncology: Nuno Xiong, last visit 9/2022    Wears dentures     full dentures    Wears reading eyeglasses     Wellness examination     PCP:Dr. Madalyn Sheets [facility], last visit 8/2022     Past Surgical History:   Procedure Laterality Date    BRAIN SURGERY Right 06/02/2023    SUBOCCIPITAL RESECTION BRAIN MASS WITH SANTINO NAVIGATION - Right    COLONOSCOPY  09/24/2008    [facility] Dr. Lizbeth Nichole     COLONOSCOPY N/A 02/17/2022    COLONOSCOPY POLYPECTOMY HOT BIOPSY performed by Vickey Pike MD at

## 2023-10-10 ENCOUNTER — TELEPHONE (OUTPATIENT)
Dept: ONCOLOGY | Age: 68
End: 2023-10-10

## 2023-10-10 NOTE — TELEPHONE ENCOUNTER
Name: Avinash Sanchez  : 1955  MRN: [id number]    Oncology Navigation Follow-Up Note    Contact Type:  Telephone    Notes: Writer called pt to check on him and to see how he's feeling. Pt reports feeling very well and was happy to say his recent MRI looked good without evidence of disease. Pt has CT scan tomorrow and f/u with Dr. Nathen Escobar for results next week. Writer informed him I would f/u with him after next f/u and CT results. Pt appreciative of call. Will continue to follow.     Electronically signed by Blanca Wilhelm RN on 10/10/2023 at 11:57 AM

## 2023-10-11 ENCOUNTER — HOSPITAL ENCOUNTER (OUTPATIENT)
Dept: CT IMAGING | Age: 68
Discharge: HOME | End: 2023-10-13
Attending: INTERNAL MEDICINE
Payer: MEDICARE

## 2023-10-11 DIAGNOSIS — C15.5 MALIGNANT NEOPLASM OF LOWER THIRD OF ESOPHAGUS (HCC): ICD-10-CM

## 2023-10-11 DIAGNOSIS — C79.31 METASTASIS TO BRAIN (HCC): ICD-10-CM

## 2023-10-11 PROCEDURE — 71250 CT THORAX DX C-: CPT

## 2023-10-17 ENCOUNTER — OFFICE VISIT (OUTPATIENT)
Dept: ONCOLOGY | Age: 68
End: 2023-10-17
Payer: MEDICARE

## 2023-10-17 ENCOUNTER — TELEPHONE (OUTPATIENT)
Dept: ONCOLOGY | Age: 68
End: 2023-10-17

## 2023-10-17 VITALS
WEIGHT: 195.3 LBS | TEMPERATURE: 98.5 F | DIASTOLIC BLOOD PRESSURE: 75 MMHG | HEART RATE: 73 BPM | SYSTOLIC BLOOD PRESSURE: 109 MMHG | RESPIRATION RATE: 18 BRPM | BODY MASS INDEX: 25.77 KG/M2

## 2023-10-17 DIAGNOSIS — C15.5 MALIGNANT NEOPLASM OF LOWER THIRD OF ESOPHAGUS (HCC): Primary | ICD-10-CM

## 2023-10-17 PROCEDURE — 1123F ACP DISCUSS/DSCN MKR DOCD: CPT | Performed by: INTERNAL MEDICINE

## 2023-10-17 PROCEDURE — 1036F TOBACCO NON-USER: CPT | Performed by: INTERNAL MEDICINE

## 2023-10-17 PROCEDURE — 99214 OFFICE O/P EST MOD 30 MIN: CPT | Performed by: INTERNAL MEDICINE

## 2023-10-17 PROCEDURE — G8427 DOCREV CUR MEDS BY ELIG CLIN: HCPCS | Performed by: INTERNAL MEDICINE

## 2023-10-17 PROCEDURE — 3017F COLORECTAL CA SCREEN DOC REV: CPT | Performed by: INTERNAL MEDICINE

## 2023-10-17 PROCEDURE — G8417 CALC BMI ABV UP PARAM F/U: HCPCS | Performed by: INTERNAL MEDICINE

## 2023-10-17 PROCEDURE — 99211 OFF/OP EST MAY X REQ PHY/QHP: CPT

## 2023-10-17 PROCEDURE — G8484 FLU IMMUNIZE NO ADMIN: HCPCS | Performed by: INTERNAL MEDICINE

## 2023-10-17 NOTE — PROGRESS NOTES
of lower third of  esophagus [facility])  TECHNOLOGIST PROVIDED HISTORY:  Reason for Exam: Esophageal cancer, mets to brain    FINDINGS:  Mediastinum: No acute thyroid or aortic abnormality. No mediastinal  adenopathy. Postsurgical changes lower esophagectomy and gastric  pull-through procedure noted. Anastomotic area is smoothly demarcated  without irregularity. No adjacent adenopathy. No cardiomegaly, pericardial  effusion or epicardial adenopathy. Lungs/pleura: Emphysematous changes are present. Pleuroparenchymal banding  is present at the bases, either atelectasis or scar. No effusion,  consolidation or extrapleural air is noted. Tracheobronchial tree is patent. Mild lower lobe traction bronchiectasis is evident. Upper Abdomen: Large bilobed cyst is present in the right lobe of the liver  8.2 x 7.7 cm unchanged. No adenopathy in the diaphragmatic hiatus. Soft Tissues/Bones: No acute osseous abnormality. Multilevel mild  degenerative changes are present. Infusion port is buried under the right  upper chest with catheter terminating into the superior vena cava. Impression: 1. Status post distal esophageal resection and gastric pull-through  procedure. 2.  Anastomotic area is smooth without irregularity or asymmetry. No  adjacent phlegm a eric process or lymphadenopathy. 3.  No mediastinal adenopathy. 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 Complete response to induction chemoradiation. Cerebellar Brain metastasis. 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 Referred to dermatology. PLAN: Records, labs and images were reviewed and discussed with the patient. I explained to the patient the nature of esophageal cancer, staging, prognosis and treatment. Patient had induction combined chemoradiation with weekly Taxol carboplatin for locally advanced esophageal cancer.

## 2023-10-17 NOTE — TELEPHONE ENCOUNTER
Brady Quinones MD VISIT  DR Wolfgang Ty IN TO SEE PATIENT  ORDERS RECEIVED  RV 3 MONTHS WITH CT CHEST BEFORE  CT CHEST SCHEDULED WITH Corby Shen FOR 01/09/24 @9AM ARRIVE BY 8:45AM [address] TRISTAN VISIT 01/16/24 @10:15AM  JAYCE PRINTED AND GIVEN TO PATIENT WITH INSTRUCTIONS  PATIENT DISCHARGED AMBULATORY

## 2023-11-07 ENCOUNTER — TELEPHONE (OUTPATIENT)
Dept: GASTROENTEROLOGY | Age: 68
End: 2023-11-07

## 2023-12-12 ENCOUNTER — TELEPHONE (OUTPATIENT)
Dept: ONCOLOGY | Age: 68
End: 2023-12-12

## 2023-12-12 NOTE — TELEPHONE ENCOUNTER
Mailed as requested.
listed below. If you have any concerns in these or other areas, please speak with your doctors or nurses to find out how you can get help with them. Lifestyle/Behaviors   A number of lifestyle/behaviors can affect your ongoing health, including the risk for the cancer coming back or developing another cancer. Discuss these recommendations with your doctor or nurse:            [employer]           Survivorship Patient [id number] ([employer]. org/SurvivorshipCenter)  Cancer. Net (www.cancer. net/survivorship)   Cancer Survivors Network (csn.cancer. org)   LIVESTRONG (www.livestrong. org)   [address] (www.survivorship.cancer.gov/springboard)   [employer] (www.canceradvocacy. org)    [address] [facility] (www.nccn.org/patients/resources/life_after_cancer/)       Referrals/Additional Comments        This Survivorship Care Plan  was shared with the following providers.    Dr. Felton Ricks, PCP         Electronically signed by Brenda Styles RN on 12/12/2023 at 1:00 PM

## 2023-12-13 ENCOUNTER — OFFICE VISIT (OUTPATIENT)
Dept: GASTROENTEROLOGY | Age: 68
End: 2023-12-13
Payer: MEDICARE

## 2023-12-13 VITALS
TEMPERATURE: 98.5 F | BODY MASS INDEX: 26.65 KG/M2 | WEIGHT: 202 LBS | DIASTOLIC BLOOD PRESSURE: 73 MMHG | SYSTOLIC BLOOD PRESSURE: 112 MMHG

## 2023-12-13 DIAGNOSIS — G93.89 MASS OF CEREBELLUM: ICD-10-CM

## 2023-12-13 DIAGNOSIS — C15.5 MALIGNANT NEOPLASM OF LOWER THIRD OF ESOPHAGUS (HCC): ICD-10-CM

## 2023-12-13 DIAGNOSIS — K22.89 ESOPHAGEAL MASS: ICD-10-CM

## 2023-12-13 DIAGNOSIS — R90.0 INTRACRANIAL MASS: ICD-10-CM

## 2023-12-13 DIAGNOSIS — C79.31 METASTASIS TO BRAIN (HCC): Primary | ICD-10-CM

## 2023-12-13 DIAGNOSIS — K21.00 GASTROESOPHAGEAL REFLUX DISEASE WITH ESOPHAGITIS WITHOUT HEMORRHAGE: ICD-10-CM

## 2023-12-13 DIAGNOSIS — D49.6 CEREBELLAR TUMOR (HCC): ICD-10-CM

## 2023-12-13 DIAGNOSIS — C15.9 ADENOSQUAMOUS CARCINOMA OF ESOPHAGUS (HCC): ICD-10-CM

## 2023-12-13 DIAGNOSIS — Z87.891 FORMER SMOKER: ICD-10-CM

## 2023-12-13 PROCEDURE — G8484 FLU IMMUNIZE NO ADMIN: HCPCS | Performed by: INTERNAL MEDICINE

## 2023-12-13 PROCEDURE — G8427 DOCREV CUR MEDS BY ELIG CLIN: HCPCS | Performed by: INTERNAL MEDICINE

## 2023-12-13 PROCEDURE — 3017F COLORECTAL CA SCREEN DOC REV: CPT | Performed by: INTERNAL MEDICINE

## 2023-12-13 PROCEDURE — 1123F ACP DISCUSS/DSCN MKR DOCD: CPT | Performed by: INTERNAL MEDICINE

## 2023-12-13 PROCEDURE — 1036F TOBACCO NON-USER: CPT | Performed by: INTERNAL MEDICINE

## 2023-12-13 PROCEDURE — G8417 CALC BMI ABV UP PARAM F/U: HCPCS | Performed by: INTERNAL MEDICINE

## 2023-12-13 PROCEDURE — 99214 OFFICE O/P EST MOD 30 MIN: CPT | Performed by: INTERNAL MEDICINE

## 2023-12-13 ASSESSMENT — ENCOUNTER SYMPTOMS
VOMITING: 0
COLOR CHANGE: 0
ABDOMINAL PAIN: 0
BLOOD IN STOOL: 0
CONSTIPATION: 0
SHORTNESS OF BREATH: 0
TROUBLE SWALLOWING: 0
CHOKING: 0
WHEEZING: 0
NAUSEA: 0
COUGH: 0
SORE THROAT: 0
RECTAL PAIN: 0
DIARRHEA: 1
ANAL BLEEDING: 0
ABDOMINAL DISTENTION: 1

## 2024-01-05 ENCOUNTER — HOSPITAL ENCOUNTER (OUTPATIENT)
Dept: MRI IMAGING | Age: 69
End: 2024-01-05
Attending: RADIOLOGY
Payer: MEDICARE

## 2024-01-05 DIAGNOSIS — C15.9 ADENOSQUAMOUS CARCINOMA OF ESOPHAGUS (HCC): ICD-10-CM

## 2024-01-05 DIAGNOSIS — C79.31 METASTASIS TO BRAIN (HCC): ICD-10-CM

## 2024-01-05 LAB
CREAT SERPL-MCNC: 0.7 MG/DL (ref 0.7–1.2)
GFR SERPL CREATININE-BSD FRML MDRD: >60 ML/MIN/1.73M2

## 2024-01-05 PROCEDURE — 82565 ASSAY OF CREATININE: CPT

## 2024-01-05 PROCEDURE — 6360000004 HC RX CONTRAST MEDICATION: Performed by: RADIOLOGY

## 2024-01-05 PROCEDURE — 2580000003 HC RX 258: Performed by: RADIOLOGY

## 2024-01-05 PROCEDURE — 70553 MRI BRAIN STEM W/O & W/DYE: CPT

## 2024-01-05 PROCEDURE — A9579 GAD-BASE MR CONTRAST NOS,1ML: HCPCS | Performed by: RADIOLOGY

## 2024-01-05 RX ORDER — SODIUM CHLORIDE 0.9 % (FLUSH) 0.9 %
10 SYRINGE (ML) INJECTION ONCE
Status: COMPLETED | OUTPATIENT
Start: 2024-01-05 | End: 2024-01-05

## 2024-01-05 RX ADMIN — GADOTERIDOL 19 ML: 279.3 INJECTION, SOLUTION INTRAVENOUS at 10:50

## 2024-01-05 RX ADMIN — SODIUM CHLORIDE, PRESERVATIVE FREE 10 ML: 5 INJECTION INTRAVENOUS at 10:50

## 2024-01-09 ENCOUNTER — HOSPITAL ENCOUNTER (OUTPATIENT)
Dept: CT IMAGING | Age: 69
Discharge: HOME | End: 2024-01-11
Attending: INTERNAL MEDICINE
Payer: MEDICARE

## 2024-01-09 DIAGNOSIS — C15.5 MALIGNANT NEOPLASM OF LOWER THIRD OF ESOPHAGUS (HCC): ICD-10-CM

## 2024-01-09 PROCEDURE — 71250 CT THORAX DX C-: CPT

## 2024-01-10 ENCOUNTER — TELEPHONE (OUTPATIENT)
Dept: FAMILY MEDICINE CLINIC | Age: 69
End: 2024-01-10

## 2024-01-11 ENCOUNTER — HOSPITAL ENCOUNTER (OUTPATIENT)
Dept: RADIATION ONCOLOGY | Age: 69
Discharge: HOME | End: 2024-01-11
Payer: MEDICARE

## 2024-01-11 VITALS
RESPIRATION RATE: 16 BRPM | OXYGEN SATURATION: 98 % | TEMPERATURE: 98.4 F | HEART RATE: 100 BPM | BODY MASS INDEX: 26.62 KG/M2 | SYSTOLIC BLOOD PRESSURE: 147 MMHG | WEIGHT: 201.8 LBS | DIASTOLIC BLOOD PRESSURE: 75 MMHG

## 2024-01-11 DIAGNOSIS — C15.5 MALIGNANT NEOPLASM OF LOWER THIRD OF ESOPHAGUS (HCC): Primary | ICD-10-CM

## 2024-01-11 DIAGNOSIS — C79.31 METASTASIS TO BRAIN (HCC): ICD-10-CM

## 2024-01-11 PROCEDURE — 99212 OFFICE O/P EST SF 10 MIN: CPT | Performed by: RADIOLOGY

## 2024-01-11 NOTE — PROGRESS NOTES
Nando Montanez  1/11/2024  11:28 AM      Vitals:    01/11/24 1014   BP: (!) 147/75   Pulse: 100   Resp: 16   Temp: 98.4 °F (36.9 °C)   SpO2: 98%    :     Pain Assessment: None - Denies Pain          Wt Readings from Last 1 Encounters:   01/11/24 91.5 kg (201 lb 12.8 oz)                Current Outpatient Medications:     pantoprazole (PROTONIX) 40 MG tablet, Take 1 tablet by mouth daily, Disp: , Rfl:         FALLS RISK SCREEN  Instructions:  Assess the patient and enter the appropriate indicators that are present for fall risk identification.   Total the numbers entered and assign a fall risk score from Table 2.  Reassess patient at a minimum every 12 weeks or with status change.    Assessment   Date  1/11/2024     1.  Mental Ability: confusion/cognitively impaired 0     2.  Elimination Issues: incontinence, frequency 0       3.  Ambulatory: use of assistive devices (walker, cane, off-loading devices),        attached to equipment (IV pole, oxygen) 0     4.  Sensory Limitations: dizziness, vertigo, impaired vision 0     5.  Age less than 65        0     6.  Age 65 or greater 1     7.  Medication: diuretics, strong analgesics, hypnotics, sedatives,        antihypertensive agents 0   8.  Falls:  recent history of falls within the last 3 months (not to include slipping or        tripping) 0   TOTAL 1    If score of 4 or greater was education given? No           TABLE 2   Risk Score Risk Level Plan of Care   0-3 Little or  No Risk 1.  Provide assistance as indicated for ambulation activities  2.  Reorient confused/cognitively impaired patient  3.  Chair/bed in low position, stretcher/bed with siderails up except when performing patient care activities  5.  Educate patient/family/caregiver on falls prevention  6.  Reassess in 12 weeks or with any noted change in patient condition which places them at a risk for a fall   4-6 Moderate Risk 1.  Provide assistance as indicated for ambulation activities  2.  Reorient

## 2024-01-16 ENCOUNTER — OFFICE VISIT (OUTPATIENT)
Dept: ONCOLOGY | Age: 69
End: 2024-01-16

## 2024-01-16 DIAGNOSIS — C15.5 MALIGNANT NEOPLASM OF LOWER THIRD OF ESOPHAGUS (HCC): Primary | ICD-10-CM

## 2024-04-11 ENCOUNTER — HOSPITAL ENCOUNTER (OUTPATIENT)
Dept: MRI IMAGING | Age: 69
Discharge: HOME | End: 2024-04-13
Attending: RADIOLOGY
Payer: MEDICARE

## 2024-04-11 DIAGNOSIS — C79.31 METASTASIS TO BRAIN (HCC): ICD-10-CM

## 2024-04-11 DIAGNOSIS — C15.5 MALIGNANT NEOPLASM OF LOWER THIRD OF ESOPHAGUS (HCC): ICD-10-CM

## 2024-04-11 LAB
CREAT SERPL-MCNC: 0.7 MG/DL (ref 0.7–1.2)
GFR SERPL CREATININE-BSD FRML MDRD: >90 ML/MIN/1.73M2

## 2024-04-11 PROCEDURE — 70553 MRI BRAIN STEM W/O & W/DYE: CPT

## 2024-04-11 PROCEDURE — 6360000004 HC RX CONTRAST MEDICATION: Performed by: RADIOLOGY

## 2024-04-11 PROCEDURE — A9579 GAD-BASE MR CONTRAST NOS,1ML: HCPCS | Performed by: RADIOLOGY

## 2024-04-11 PROCEDURE — 2580000003 HC RX 258: Performed by: RADIOLOGY

## 2024-04-11 PROCEDURE — 36415 COLL VENOUS BLD VENIPUNCTURE: CPT

## 2024-04-11 PROCEDURE — 82565 ASSAY OF CREATININE: CPT

## 2024-04-11 RX ORDER — SODIUM CHLORIDE 0.9 % (FLUSH) 0.9 %
10 SYRINGE (ML) INJECTION ONCE
Status: COMPLETED | OUTPATIENT
Start: 2024-04-11 | End: 2024-04-11

## 2024-04-11 RX ADMIN — SODIUM CHLORIDE, PRESERVATIVE FREE 10 ML: 5 INJECTION INTRAVENOUS at 10:16

## 2024-04-11 RX ADMIN — GADOTERIDOL 38 ML: 279.3 INJECTION, SOLUTION INTRAVENOUS at 10:16

## 2024-04-18 ENCOUNTER — HOSPITAL ENCOUNTER (OUTPATIENT)
Dept: RADIATION ONCOLOGY | Age: 69
Discharge: HOME | End: 2024-04-18
Payer: MEDICARE

## 2024-04-18 VITALS
BODY MASS INDEX: 28.05 KG/M2 | SYSTOLIC BLOOD PRESSURE: 108 MMHG | RESPIRATION RATE: 16 BRPM | WEIGHT: 212.6 LBS | OXYGEN SATURATION: 96 % | HEART RATE: 72 BPM | DIASTOLIC BLOOD PRESSURE: 70 MMHG | TEMPERATURE: 97.4 F

## 2024-04-18 DIAGNOSIS — C15.5 MALIGNANT NEOPLASM OF LOWER THIRD OF ESOPHAGUS (HCC): Primary | ICD-10-CM

## 2024-04-18 DIAGNOSIS — D49.6 CEREBELLAR TUMOR (HCC): ICD-10-CM

## 2024-04-18 DIAGNOSIS — C79.31 METASTASIS TO BRAIN (HCC): ICD-10-CM

## 2024-04-18 PROCEDURE — 99212 OFFICE O/P EST SF 10 MIN: CPT | Performed by: RADIOLOGY

## 2024-04-18 RX ORDER — PANTOPRAZOLE SODIUM 40 MG/1
40 TABLET, DELAYED RELEASE ORAL
Qty: 90 TABLET | Refills: 3 | Status: SHIPPED | OUTPATIENT
Start: 2024-04-18

## 2024-04-18 NOTE — PROGRESS NOTES
Nando Montanez  4/18/2024  10:20 AM      Vitals:    04/18/24 1013   BP: 108/70   Pulse: 72   Resp: 16   Temp: 97.4 °F (36.3 °C)   SpO2: 96%    :     Pain Assessment: None - Denies Pain          Wt Readings from Last 1 Encounters:   04/18/24 96.4 kg (212 lb 9.6 oz)                Current Outpatient Medications:     pantoprazole (PROTONIX) 40 MG tablet, Take 1 tablet by mouth daily, Disp: , Rfl:         FALLS RISK SCREEN  Instructions:  Assess the patient and enter the appropriate indicators that are present for fall risk identification.   Total the numbers entered and assign a fall risk score from Table 2.  Reassess patient at a minimum every 12 weeks or with status change.    Assessment   Date  4/18/2024     1.  Mental Ability: confusion/cognitively impaired 0     2.  Elimination Issues: incontinence, frequency 0       3.  Ambulatory: use of assistive devices (walker, cane, off-loading devices),        attached to equipment (IV pole, oxygen) 0     4.  Sensory Limitations: dizziness, vertigo, impaired vision 0     5.  Age less than 65        0     6.  Age 65 or greater 1     7.  Medication: diuretics, strong analgesics, hypnotics, sedatives,        antihypertensive agents 0   8.  Falls:  recent history of falls within the last 3 months (not to include slipping or        tripping) 0   TOTAL 1    If score of 4 or greater was education given? No           TABLE 2   Risk Score Risk Level Plan of Care   0-3 Little or  No Risk 1.  Provide assistance as indicated for ambulation activities  2.  Reorient confused/cognitively impaired patient  3.  Chair/bed in low position, stretcher/bed with siderails up except when performing patient care activities  5.  Educate patient/family/caregiver on falls prevention  6.  Reassess in 12 weeks or with any noted change in patient condition which places them at a risk for a fall   4-6 Moderate Risk 1.  Provide assistance as indicated for ambulation activities  2.  Reorient

## 2024-06-12 ENCOUNTER — OFFICE VISIT (OUTPATIENT)
Dept: GASTROENTEROLOGY | Age: 69
End: 2024-06-12
Payer: MEDICARE

## 2024-06-12 VITALS
DIASTOLIC BLOOD PRESSURE: 67 MMHG | BODY MASS INDEX: 27.97 KG/M2 | SYSTOLIC BLOOD PRESSURE: 117 MMHG | WEIGHT: 212 LBS | TEMPERATURE: 97.5 F

## 2024-06-12 DIAGNOSIS — K22.89 ESOPHAGEAL MASS: ICD-10-CM

## 2024-06-12 DIAGNOSIS — D49.6 CEREBELLAR TUMOR (HCC): ICD-10-CM

## 2024-06-12 DIAGNOSIS — C79.31 METASTASIS TO BRAIN (HCC): Primary | ICD-10-CM

## 2024-06-12 DIAGNOSIS — C15.5 MALIGNANT NEOPLASM OF LOWER THIRD OF ESOPHAGUS (HCC): ICD-10-CM

## 2024-06-12 DIAGNOSIS — C15.9 ADENOSQUAMOUS CARCINOMA OF ESOPHAGUS (HCC): ICD-10-CM

## 2024-06-12 PROCEDURE — G8427 DOCREV CUR MEDS BY ELIG CLIN: HCPCS | Performed by: INTERNAL MEDICINE

## 2024-06-12 PROCEDURE — 3017F COLORECTAL CA SCREEN DOC REV: CPT | Performed by: INTERNAL MEDICINE

## 2024-06-12 PROCEDURE — 99214 OFFICE O/P EST MOD 30 MIN: CPT | Performed by: INTERNAL MEDICINE

## 2024-06-12 PROCEDURE — 1123F ACP DISCUSS/DSCN MKR DOCD: CPT | Performed by: INTERNAL MEDICINE

## 2024-06-12 PROCEDURE — 1036F TOBACCO NON-USER: CPT | Performed by: INTERNAL MEDICINE

## 2024-06-12 PROCEDURE — G8417 CALC BMI ABV UP PARAM F/U: HCPCS | Performed by: INTERNAL MEDICINE

## 2024-06-12 RX ORDER — BACILLUS COAGULANS 1B CELL
1 CAPSULE ORAL ONCE
Qty: 90 EACH | Refills: 2 | Status: SHIPPED | OUTPATIENT
Start: 2024-06-12 | End: 2024-06-12

## 2024-06-12 ASSESSMENT — ENCOUNTER SYMPTOMS
WHEEZING: 0
BLOOD IN STOOL: 0
RECTAL PAIN: 0
ABDOMINAL DISTENTION: 0
DIARRHEA: 1
CONSTIPATION: 0
COLOR CHANGE: 0
TROUBLE SWALLOWING: 0
ANAL BLEEDING: 0
VOMITING: 0
COUGH: 0
SORE THROAT: 0
CHOKING: 0
ABDOMINAL PAIN: 1
NAUSEA: 0
SHORTNESS OF BREATH: 0

## 2024-06-12 NOTE — PROGRESS NOTES
GI CLINIC FOLLOW UP    NTERVAL HISTORY:   No referring provider defined for this encounter.    Chief Complaint   Patient presents with    GI Problem     Pt is here today for a f/u last seen on 12/13/23 for adenosquamous carcinoma of esophagus.       1. Metastasis to brain (HCC)    2. Cerebellar tumor (HCC)    3. Malignant neoplasm of lower third of esophagus (HCC)    4. Adenosquamous carcinoma of esophagus (HCC)    5. Esophageal mass      This patient seen my office as a follow-up in the past he has been diagnosed with esophageal squamous cell carcinoma underwent surgery    Unfortunately had developed metastasis to the brain    Has been seen and followed by hematology oncology  Has received chemoradiation therapy currently seems to be doing okay questionable in remission    Complaining of intermittent dysphagia symptom    Has not had endoscopy done in more than a year    Had colonoscopy done in the past    Has history for colon polyp    He likes to wait for colonoscopy at this time    No current smoking alcohol abuse illicit drug usage    His records were reviewed with him      HISTORY OF PRESENT ILLNESS: Mr.Paul ADAN Montanez is a 69 y.o. male with a past history remarkable for , referred for evaluation of   Chief Complaint   Patient presents with    GI Problem     Pt is here today for a f/u last seen on 12/13/23 for adenosquamous carcinoma of esophagus.   .    Past Medical,Family, and Social History reviewed and does contribute to the patient presenting condition.    Patient's PMH/PSH,SH,PSYCH Hx, MEDs, ALLERGIES, and ROS were all reviewed and updated in the appropriate sections.    PAST MEDICAL HISTORY:  Past Medical History:   Diagnosis Date    Cancer (HCC)     esophageal    Dental root implant present     2 metal implants in lower jaw    Elevated PSA     Dr. Hamlin    Hearing loss     Hiatal hernia     Keratosis     uses Effudex prn    Prostate nodule     on MRI per patient    Snores     no sleep

## 2024-06-17 ENCOUNTER — TELEPHONE (OUTPATIENT)
Dept: GASTROENTEROLOGY | Age: 69
End: 2024-06-17

## 2024-06-17 NOTE — TELEPHONE ENCOUNTER
Procedure scheduled/Dr MERVAT Zaldivar  Procedure: EGD   Dx:  Date: 12/3/24   Time: 8:30 a.m.  Hospital: [facility]   Bowel Prep instructions given: N/A   In office/via phone: office   Clearance needed: no

## 2024-07-18 ENCOUNTER — HOSPITAL ENCOUNTER (OUTPATIENT)
Dept: MRI IMAGING | Age: 69
Discharge: HOME | End: 2024-07-20
Attending: RADIOLOGY
Payer: MEDICARE

## 2024-07-18 ENCOUNTER — HOSPITAL ENCOUNTER (OUTPATIENT)
Dept: CT IMAGING | Age: 69
Discharge: HOME | End: 2024-07-20
Attending: RADIOLOGY
Payer: MEDICARE

## 2024-07-18 DIAGNOSIS — D49.6 CEREBELLAR TUMOR (HCC): ICD-10-CM

## 2024-07-18 DIAGNOSIS — C15.5 MALIGNANT NEOPLASM OF LOWER THIRD OF ESOPHAGUS (HCC): ICD-10-CM

## 2024-07-18 DIAGNOSIS — C79.31 METASTASIS TO BRAIN (HCC): ICD-10-CM

## 2024-07-18 LAB
CREAT SERPL-MCNC: 0.8 MG/DL (ref 0.7–1.2)
GFR, ESTIMATED: >90 ML/MIN/1.73M2

## 2024-07-18 PROCEDURE — 6360000004 HC RX CONTRAST MEDICATION: Performed by: RADIOLOGY

## 2024-07-18 PROCEDURE — 71250 CT THORAX DX C-: CPT

## 2024-07-18 PROCEDURE — 2580000003 HC RX 258: Performed by: RADIOLOGY

## 2024-07-18 PROCEDURE — 70553 MRI BRAIN STEM W/O & W/DYE: CPT

## 2024-07-18 PROCEDURE — 82565 ASSAY OF CREATININE: CPT

## 2024-07-18 PROCEDURE — A9579 GAD-BASE MR CONTRAST NOS,1ML: HCPCS | Performed by: RADIOLOGY

## 2024-07-18 RX ORDER — SODIUM CHLORIDE 0.9 % (FLUSH) 0.9 %
10 SYRINGE (ML) INJECTION PRN
Status: DISCONTINUED | OUTPATIENT
Start: 2024-07-18 | End: 2024-07-21 | Stop reason: HOSPADM

## 2024-07-18 RX ADMIN — GADOTERIDOL 38 ML: 279.3 INJECTION, SOLUTION INTRAVENOUS at 09:57

## 2024-07-18 RX ADMIN — SODIUM CHLORIDE, PRESERVATIVE FREE 10 ML: 5 INJECTION INTRAVENOUS at 09:57

## 2024-07-22 ENCOUNTER — OFFICE VISIT (OUTPATIENT)
Dept: ONCOLOGY | Age: 69
End: 2024-07-22
Payer: MEDICARE

## 2024-07-22 ENCOUNTER — TELEPHONE (OUTPATIENT)
Dept: ONCOLOGY | Age: 69
End: 2024-07-22

## 2024-07-22 ENCOUNTER — HOSPITAL ENCOUNTER (OUTPATIENT)
Dept: RADIATION ONCOLOGY | Age: 69
Discharge: HOME | End: 2024-07-22
Payer: MEDICARE

## 2024-07-22 VITALS
BODY MASS INDEX: 26.95 KG/M2 | WEIGHT: 204.3 LBS | RESPIRATION RATE: 18 BRPM | OXYGEN SATURATION: 95 % | DIASTOLIC BLOOD PRESSURE: 68 MMHG | TEMPERATURE: 97.7 F | SYSTOLIC BLOOD PRESSURE: 104 MMHG | HEART RATE: 73 BPM

## 2024-07-22 VITALS
BODY MASS INDEX: 26.86 KG/M2 | DIASTOLIC BLOOD PRESSURE: 79 MMHG | HEART RATE: 77 BPM | SYSTOLIC BLOOD PRESSURE: 116 MMHG | WEIGHT: 203.6 LBS | RESPIRATION RATE: 18 BRPM | OXYGEN SATURATION: 98 % | TEMPERATURE: 98.5 F

## 2024-07-22 DIAGNOSIS — Z85.01 HISTORY OF ESOPHAGEAL CANCER: ICD-10-CM

## 2024-07-22 DIAGNOSIS — G93.89 MASS OF CEREBELLUM: ICD-10-CM

## 2024-07-22 DIAGNOSIS — C79.31 METASTASIS TO BRAIN (HCC): Primary | ICD-10-CM

## 2024-07-22 DIAGNOSIS — C15.5 MALIGNANT NEOPLASM OF LOWER THIRD OF ESOPHAGUS (HCC): ICD-10-CM

## 2024-07-22 DIAGNOSIS — C15.5 MALIGNANT NEOPLASM OF LOWER THIRD OF ESOPHAGUS (HCC): Primary | ICD-10-CM

## 2024-07-22 PROCEDURE — 1036F TOBACCO NON-USER: CPT | Performed by: INTERNAL MEDICINE

## 2024-07-22 PROCEDURE — 99214 OFFICE O/P EST MOD 30 MIN: CPT | Performed by: INTERNAL MEDICINE

## 2024-07-22 PROCEDURE — 3017F COLORECTAL CA SCREEN DOC REV: CPT | Performed by: INTERNAL MEDICINE

## 2024-07-22 PROCEDURE — 1123F ACP DISCUSS/DSCN MKR DOCD: CPT | Performed by: INTERNAL MEDICINE

## 2024-07-22 PROCEDURE — G8427 DOCREV CUR MEDS BY ELIG CLIN: HCPCS | Performed by: INTERNAL MEDICINE

## 2024-07-22 PROCEDURE — 99211 OFF/OP EST MAY X REQ PHY/QHP: CPT | Performed by: INTERNAL MEDICINE

## 2024-07-22 PROCEDURE — G8417 CALC BMI ABV UP PARAM F/U: HCPCS | Performed by: INTERNAL MEDICINE

## 2024-07-22 PROCEDURE — 99212 OFFICE O/P EST SF 10 MIN: CPT | Performed by: RADIOLOGY

## 2024-07-22 NOTE — TELEPHONE ENCOUNTER
Instructions   from Dr. Anjum Babb MD    RV 6 months     Patient scheduled for 6 month f/u 01/27/2025 at 1:30 pm.

## 2024-07-22 NOTE — PROGRESS NOTES
neoplasm of lower third of esophagus  f/u    FINDINGS:  Mediastinum: Thyroid gland is unremarkable.  No pathologically enlarged  mediastinal lymph nodes are noted.  The thoracic aorta is normal caliber.  There are postsurgical changes partial esophagectomy and gastric pull-through.    Lungs/pleura: No pleural effusion identified.  There is stable scarring in  the lung bases.  No suspicious lung nodule identified.    Upper Abdomen: Partially imaged stable low-density liver lesions consistent  with cysts.    Soft Tissues/Bones: Right chest port terminates at the SVC level.  Impression: No findings of recurrent malignancy or metastatic disease.        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  Complete response to induction chemoradiation.  Cerebellar Brain metastasis. 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  Referred to dermatology.    PLAN: Records, labs and images were reviewed and discussed with the patient. I explained to the patient the nature of esophageal cancer, staging, prognosis and treatment.    Patient had induction combined chemoradiation with weekly Taxol carboplatin for locally advanced esophageal cancer.    Patient had surgical resection at [facility] January 9, 2023.  No complications.  Pathology results showed complete response to induction chemoradiation.    Patient will continue postoperative care at [facility].  He will be seen again at [facility] in 3 months for EGD.  Unfortunately he developed cerebellar metastasis.   S/p resection 6/2/23  Status post SRS July 3, 2023.  We reviewed the results of the repeated PET/CT scan July 10, 2023 for evaluation of systemic relapse.  No evidence of systemic relapse.  I do not see indication to start systemic treatment.  Patient was lost to follow-up follow-up for the last 10 months or so.  Repeated CT scan will be reviewed.  It was

## 2024-07-23 NOTE — PROGRESS NOTES
Nando Montanez  7/22/2024  3:15 PM      Vitals:    07/22/24 1426   BP: 116/79   Pulse: 77   Resp: 18   Temp: 98.5 °F (36.9 °C)   SpO2: 98%    :     Pain Assessment: None - Denies Pain          Wt Readings from Last 1 Encounters:   07/22/24 92.7 kg (204 lb 4.8 oz)                Current Outpatient Medications:     pantoprazole (PROTONIX) 40 MG tablet, Take 1 tablet by mouth every morning (before breakfast), Disp: 90 tablet, Rfl: 3    pantoprazole (PROTONIX) 40 MG tablet, Take 1 tablet by mouth daily, Disp: , Rfl:         FALLS RISK SCREEN  Instructions:  Assess the patient and enter the appropriate indicators that are present for fall risk identification.   Total the numbers entered and assign a fall risk score from Table 2.  Reassess patient at a minimum every 12 weeks or with status change.    Assessment   Date  7/22/2024     1.  Mental Ability: confusion/cognitively impaired 0     2.  Elimination Issues: incontinence, frequency 0       3.  Ambulatory: use of assistive devices (walker, cane, off-loading devices),        attached to equipment (IV pole, oxygen) 0     4.  Sensory Limitations: dizziness, vertigo, impaired vision 0     5.  Age less than 65        0     6.  Age 65 or greater 1     7.  Medication: diuretics, strong analgesics, hypnotics, sedatives,        antihypertensive agents 0   8.  Falls:  recent history of falls within the last 3 months (not to include slipping or        tripping) 0   TOTAL 1    If score of 4 or greater was education given? No           TABLE 2   Risk Score Risk Level Plan of Care   0-3 Little or  No Risk 1.  Provide assistance as indicated for ambulation activities  2.  Reorient confused/cognitively impaired patient  3.  Chair/bed in low position, stretcher/bed with siderails up except when performing patient care activities  5.  Educate patient/family/caregiver on falls prevention  6.  Reassess in 12 weeks or with any noted change in patient condition which places them at a risk 
findings of recurrent malignancy or metastatic disease.     7/18/24 MRI Brain  IMPRESSION:  1. Slight interval increase in size of the enhancing finding in the right  cerebellar hemisphere with surrounding vasogenic edema.  Restriction of  diffusion at the margin has increased from prior examination.  Findings are  concerning for residual metastatic disease.  Radiation necrosis could be a  differential if clinically applicable.  MR spectroscopy or follow-up imaging  may be considered for further evaluation.  2. No acute intracranial abnormality.    ASSESSMENT AND PLAN:  Nando Montanez is a 69 y.o. male with a Cancer Staging   Malignant neoplasm of lower third of esophagus (HCC)  Staging form: Esophagus - Adenocarcinoma, AJCC 8th Edition  - Clinical stage from 8/24/2022: Stage RED (cT3, cN2, cM0, GX) - Signed by Karla Delarosa MD on 9/14/2022  .  Patient comes in today for 1 year follow-up after completion of his SRS course for his intracranial metastasis.  Patient has recovered well from treatment and otherwise has been feeling well.  He denies any acute symptoms and has no radiographic evidence of recurrent or residual disease on his recent MRI.  There is some question of increased enhancement around the resection cavity which may be related to radiation necrosis.  Patient is asymptomatic at this time therefore we will continue to monitor this with surveillance imaging.  Patient will be recommended to have a repeat MRI of the brain in 4 months.  We encouraged him to continue working on maintaining his activity level and nutrition.      Patient is recommended to come back in 4 months for another follow up visit and exam, or can call to come see us sooner if symptoms change.    Patient was in agreement with my recommendations. All questions were answered to their satisfaction. Patient was advised to contact us anytime should they have any questions or concerns.         Electronically signed by Karla Delarosa MD on

## 2024-11-21 ENCOUNTER — OFFICE VISIT (OUTPATIENT)
Dept: FAMILY MEDICINE CLINIC | Age: 69
End: 2024-11-21

## 2024-11-21 VITALS
BODY MASS INDEX: 27.96 KG/M2 | OXYGEN SATURATION: 98 % | HEIGHT: 73 IN | SYSTOLIC BLOOD PRESSURE: 122 MMHG | DIASTOLIC BLOOD PRESSURE: 82 MMHG | TEMPERATURE: 98.2 F | WEIGHT: 211 LBS | HEART RATE: 78 BPM | RESPIRATION RATE: 16 BRPM

## 2024-11-21 DIAGNOSIS — D49.6 CEREBELLAR TUMOR (HCC): ICD-10-CM

## 2024-11-21 DIAGNOSIS — Z00.00 ROUTINE HEALTH MAINTENANCE: ICD-10-CM

## 2024-11-21 DIAGNOSIS — Z13.31 DEPRESSION SCREENING: ICD-10-CM

## 2024-11-21 DIAGNOSIS — C15.9 ADENOSQUAMOUS CARCINOMA OF ESOPHAGUS (HCC): ICD-10-CM

## 2024-11-21 DIAGNOSIS — Z76.89 ENCOUNTER TO ESTABLISH CARE: Primary | ICD-10-CM

## 2024-11-21 DIAGNOSIS — Z13.1 DIABETES MELLITUS SCREENING: ICD-10-CM

## 2024-11-21 DIAGNOSIS — Z93.4 STATUS POST JEJUNOSTOMY (HCC): ICD-10-CM

## 2024-11-21 DIAGNOSIS — Z13.220 SCREENING FOR CHOLESTEROL LEVEL: ICD-10-CM

## 2024-11-21 DIAGNOSIS — C79.31 METASTASIS TO BRAIN (HCC): ICD-10-CM

## 2024-11-21 DIAGNOSIS — Z23 INFLUENZA VACCINE NEEDED: ICD-10-CM

## 2024-11-21 DIAGNOSIS — K21.00 GASTROESOPHAGEAL REFLUX DISEASE WITH ESOPHAGITIS WITHOUT HEMORRHAGE: ICD-10-CM

## 2024-11-21 DIAGNOSIS — E55.9 VITAMIN D INSUFFICIENCY: ICD-10-CM

## 2024-11-21 DIAGNOSIS — E44.0 MODERATE PROTEIN-CALORIE MALNUTRITION (HCC): ICD-10-CM

## 2024-11-21 DIAGNOSIS — R35.0 BENIGN PROSTATIC HYPERPLASIA WITH URINARY FREQUENCY: ICD-10-CM

## 2024-11-21 DIAGNOSIS — I48.0 PAROXYSMAL ATRIAL FIBRILLATION (HCC): ICD-10-CM

## 2024-11-21 DIAGNOSIS — N40.1 BENIGN PROSTATIC HYPERPLASIA WITH URINARY FREQUENCY: ICD-10-CM

## 2024-11-21 SDOH — ECONOMIC STABILITY: INCOME INSECURITY: HOW HARD IS IT FOR YOU TO PAY FOR THE VERY BASICS LIKE FOOD, HOUSING, MEDICAL CARE, AND HEATING?: NOT VERY HARD

## 2024-11-21 SDOH — ECONOMIC STABILITY: FOOD INSECURITY: WITHIN THE PAST 12 MONTHS, YOU WORRIED THAT YOUR FOOD WOULD RUN OUT BEFORE YOU GOT MONEY TO BUY MORE.: NEVER TRUE

## 2024-11-21 SDOH — ECONOMIC STABILITY: FOOD INSECURITY: WITHIN THE PAST 12 MONTHS, THE FOOD YOU BOUGHT JUST DIDN'T LAST AND YOU DIDN'T HAVE MONEY TO GET MORE.: NEVER TRUE

## 2024-11-21 ASSESSMENT — PATIENT HEALTH QUESTIONNAIRE - PHQ9
SUM OF ALL RESPONSES TO PHQ QUESTIONS 1-9: 0
SUM OF ALL RESPONSES TO PHQ9 QUESTIONS 1 & 2: 0
2. FEELING DOWN, DEPRESSED OR HOPELESS: NOT AT ALL
SUM OF ALL RESPONSES TO PHQ QUESTIONS 1-9: 0
1. LITTLE INTEREST OR PLEASURE IN DOING THINGS: NOT AT ALL

## 2024-11-21 NOTE — PROGRESS NOTES
[facility]     Date of Visit:  2024  Patient Name: Nando Montanez   Patient :  1955     CHIEF COMPLAINT:     Nando Montanez is a 69 y.o. male who presents today for an general visit to be evaluated for the following condition(s):    Chief Complaint   Patient presents with    Establish Care     New to Provider - not [facility]. Last PCP was Dr. Phoenix Manzanares at [place].  Last seen here 5/15/2023.  Follows with multiple specialist in the area and thru [facility].       HISTORY OF PRESENT ILLNESS:         Follows with:  Neurosurgery: Lizzie Beatty DO Rad. Oncology: Kalra Delarosa MD  GI: Sury Zaldivar MD  Oncology: Anjum Babb MD  General Surgery: Robin Kelley DO  [facility]    BRIEF CASE HISTORY:      Mr. Nando Montanez is a very pleasant 69 y.o. male with history of multiple co morbidities as listed.  Patient is referred for evaluation and further management of recently diagnosed esophageal adenocarcinoma.  The patient was doing fairly well except for mild chronic GERD.  It was not significant so he did not pay attention to it and he did not receive any treatment for it.  For the last few weeks patient started having difficulty swallowing especially solid food.  He was evaluated by gastroenterology and he had EGD which showed suspicious lesion at the distal part of the esophagus at 36 cm.  Biopsy from the lesion on 2022 showed adenocarcinoma.  Patient is referred for further management.  Patient denies any active bleeding.  No melena or hematochezia.  No hematemesis.  No weight loss or decreased appetite.  No fever or night sweats.  No other complaints.  Patient quit smoking more than 20 years ago.  He drinks alcohol socially.     INTERIM HISTORY:   Patient seen for follow-up esophageal cancer.  Patient had surgical resection 2023.  He was found to have complete response to treatment.  No residual cancer was detected.

## 2024-11-27 ENCOUNTER — APPOINTMENT (OUTPATIENT)
Dept: RADIATION ONCOLOGY | Age: 69
End: 2024-11-27
Payer: MEDICARE

## 2024-11-29 ENCOUNTER — HOSPITAL ENCOUNTER (OUTPATIENT)
Dept: MRI IMAGING | Age: 69
Discharge: HOME | End: 2024-12-01
Attending: RADIOLOGY
Payer: MEDICARE

## 2024-11-29 DIAGNOSIS — G93.89 MASS OF CEREBELLUM: ICD-10-CM

## 2024-11-29 DIAGNOSIS — C79.31 METASTASIS TO BRAIN (HCC): ICD-10-CM

## 2024-11-29 DIAGNOSIS — C15.5 MALIGNANT NEOPLASM OF LOWER THIRD OF ESOPHAGUS (HCC): ICD-10-CM

## 2024-11-29 LAB
CREAT SERPL-MCNC: 0.8 MG/DL (ref 0.7–1.2)
GFR, ESTIMATED: >90 ML/MIN/1.73M2

## 2024-11-29 PROCEDURE — 82565 ASSAY OF CREATININE: CPT

## 2024-11-29 PROCEDURE — 2580000003 HC RX 258: Performed by: RADIOLOGY

## 2024-11-29 PROCEDURE — 6360000004 HC RX CONTRAST MEDICATION: Performed by: RADIOLOGY

## 2024-11-29 PROCEDURE — A9579 GAD-BASE MR CONTRAST NOS,1ML: HCPCS | Performed by: RADIOLOGY

## 2024-11-29 PROCEDURE — 70553 MRI BRAIN STEM W/O & W/DYE: CPT

## 2024-11-29 RX ORDER — SODIUM CHLORIDE 0.9 % (FLUSH) 0.9 %
10 SYRINGE (ML) INJECTION PRN
Status: DISCONTINUED | OUTPATIENT
Start: 2024-11-29 | End: 2024-12-02 | Stop reason: HOSPADM

## 2024-11-29 RX ADMIN — SODIUM CHLORIDE, PRESERVATIVE FREE 10 ML: 5 INJECTION INTRAVENOUS at 09:36

## 2024-11-29 RX ADMIN — GADOTERIDOL 20 ML: 279.3 INJECTION, SOLUTION INTRAVENOUS at 09:34

## 2024-12-09 ENCOUNTER — HOSPITAL ENCOUNTER (OUTPATIENT)
Dept: RADIATION ONCOLOGY | Age: 69
Discharge: HOME | End: 2024-12-09
Payer: MEDICARE

## 2024-12-09 VITALS
SYSTOLIC BLOOD PRESSURE: 125 MMHG | RESPIRATION RATE: 16 BRPM | WEIGHT: 211.8 LBS | TEMPERATURE: 97.3 F | DIASTOLIC BLOOD PRESSURE: 78 MMHG | OXYGEN SATURATION: 94 % | HEART RATE: 85 BPM | BODY MASS INDEX: 27.94 KG/M2

## 2024-12-09 DIAGNOSIS — C79.31 METASTASIS TO BRAIN (HCC): Primary | ICD-10-CM

## 2024-12-09 DIAGNOSIS — C15.5 MALIGNANT NEOPLASM OF LOWER THIRD OF ESOPHAGUS (HCC): ICD-10-CM

## 2024-12-09 PROCEDURE — 99212 OFFICE O/P EST SF 10 MIN: CPT | Performed by: RADIOLOGY

## 2024-12-10 NOTE — PROGRESS NOTES
Nando Montanez  12/9/2024  3:03 PM      Vitals:    12/09/24 1443   BP: 125/78   Pulse: 85   Resp: 16   Temp: 97.3 °F (36.3 °C)   SpO2: 94%      :     Pain Assessment: None - Denies Pain          Wt Readings from Last 1 Encounters:   12/09/24 96.1 kg (211 lb 12.8 oz)                Current Outpatient Medications:     pantoprazole (PROTONIX) 40 MG tablet, Take 1 tablet by mouth every morning (before breakfast), Disp: 90 tablet, Rfl: 3        FALLS RISK SCREEN  Instructions:  Assess the patient and enter the appropriate indicators that are present for fall risk identification.   Total the numbers entered and assign a fall risk score from Table 2.  Reassess patient at a minimum every 12 weeks or with status change.    Assessment   Date  12/9/2024     1.  Mental Ability: confusion/cognitively impaired 0     2.  Elimination Issues: incontinence, frequency 0       3.  Ambulatory: use of assistive devices (walker, cane, off-loading devices),        attached to equipment (IV pole, oxygen) 0     4.  Sensory Limitations: dizziness, vertigo, impaired vision 0     5.  Age less than 65        0     6.  Age 65 or greater 1     7.  Medication: diuretics, strong analgesics, hypnotics, sedatives,        antihypertensive agents 0   8.  Falls:  recent history of falls within the last 3 months (not to include slipping or        tripping) 0   TOTAL 1    If score of 4 or greater was education given? No           TABLE 2   Risk Score Risk Level Plan of Care   0-3 Little or  No Risk 1.  Provide assistance as indicated for ambulation activities  2.  Reorient confused/cognitively impaired patient  3.  Chair/bed in low position, stretcher/bed with siderails up except when performing patient care activities  5.  Educate patient/family/caregiver on falls prevention  6.  Reassess in 12 weeks or with any noted change in patient condition which places them at a risk for a fall   4-6 Moderate Risk 1.  Provide assistance as indicated for ambulation 
Final     Comment:     The Roche \"ECLIA\" assay is used.  Results obtained with different assay methods   cannot be used interchangeably.  Performed at [address] [address] [phone]     2015 6.01 (H) <4.1 ug/L Final     Comment:     The Roche \"ECLIA\" assay is used.  Results obtained with different assay methods   cannot be used interchangeably.  Performed at [address] [id number] [phone]     2013 6.8 (H) 0.0 - 4.0 ug/L Final     Comment:     Siemens Immulite 2000 immunometric chemiluminescent assay is used. Results   obtained with different assay methods or instruments cannot be used   interchangeably.   2013 5.13 (H) 0 - 4 ug/L Final     Comment:     Performed at [address] [address] [phone]       IMAGIN/5/24 MRI Brain  IMPRESSION:  Relatively stable exam with a slightly more nodular area of enhancement along  the posterior margin which may be technical.  Recommend attention to this  area on future exams.  Increased T2 and FLAIR hyperintensity at the anterior  margin of the more contracted resection cavity favored to be post treatment  changes.    24 CT Chest  IMPRESSION:  Stable exam without acute abnormality or evidence for recurrent or metastatic  disease to the chest.     24 MRI Brain  IMPRESSION:  Interval retraction of the right cerebellar resection cavity with increased  surrounding edema and enhancement most notably along the medial aspect as  described above.  While these findings could be secondary to post treatment  changes such as radiation therapy, residual disease is not excluded.  Recommend attention to this area on follow-up and consider perfusion imaging  on future exams.    24 CT Chest  IMPRESSION:  No findings of recurrent malignancy or metastatic disease.     24 MRI Brain  IMPRESSION:  1. Slight interval increase in size of the enhancing finding

## 2025-01-28 ENCOUNTER — ANESTHESIA (OUTPATIENT)
Dept: OPERATING ROOM | Age: 70
End: 2025-01-28
Payer: MEDICARE

## 2025-01-28 ENCOUNTER — ANESTHESIA EVENT (OUTPATIENT)
Dept: OPERATING ROOM | Age: 70
End: 2025-01-28
Payer: MEDICARE

## 2025-01-28 ENCOUNTER — HOSPITAL ENCOUNTER (OUTPATIENT)
Age: 70
Setting detail: OUTPATIENT SURGERY
Discharge: HOME | End: 2025-01-28
Attending: INTERNAL MEDICINE | Admitting: INTERNAL MEDICINE
Payer: MEDICARE

## 2025-01-28 VITALS
HEART RATE: 70 BPM | RESPIRATION RATE: 13 BRPM | SYSTOLIC BLOOD PRESSURE: 109 MMHG | OXYGEN SATURATION: 97 % | DIASTOLIC BLOOD PRESSURE: 75 MMHG | BODY MASS INDEX: 28.76 KG/M2 | HEIGHT: 73 IN | WEIGHT: 217 LBS | TEMPERATURE: 97.2 F

## 2025-01-28 DIAGNOSIS — C15.5 MALIGNANT NEOPLASM OF LOWER THIRD OF ESOPHAGUS (HCC): ICD-10-CM

## 2025-01-28 DIAGNOSIS — C15.9 ADENOSQUAMOUS CARCINOMA OF ESOPHAGUS (HCC): ICD-10-CM

## 2025-01-28 DIAGNOSIS — C79.31 METASTASIS TO BRAIN (HCC): ICD-10-CM

## 2025-01-28 DIAGNOSIS — K22.89 ESOPHAGEAL MASS: ICD-10-CM

## 2025-01-28 DIAGNOSIS — D49.6 CEREBELLAR TUMOR (HCC): ICD-10-CM

## 2025-01-28 PROCEDURE — 3700000000 HC ANESTHESIA ATTENDED CARE: Performed by: INTERNAL MEDICINE

## 2025-01-28 PROCEDURE — 2580000003 HC RX 258

## 2025-01-28 PROCEDURE — 7100000010 HC PHASE II RECOVERY - FIRST 15 MIN: Performed by: INTERNAL MEDICINE

## 2025-01-28 PROCEDURE — 3609012400 HC EGD TRANSORAL BIOPSY SINGLE/MULTIPLE: Performed by: INTERNAL MEDICINE

## 2025-01-28 PROCEDURE — 2709999900 HC NON-CHARGEABLE SUPPLY: Performed by: INTERNAL MEDICINE

## 2025-01-28 PROCEDURE — 88305 TISSUE EXAM BY PATHOLOGIST: CPT

## 2025-01-28 PROCEDURE — 7100000011 HC PHASE II RECOVERY - ADDTL 15 MIN: Performed by: INTERNAL MEDICINE

## 2025-01-28 PROCEDURE — 6360000002 HC RX W HCPCS: Performed by: NURSE ANESTHETIST, CERTIFIED REGISTERED

## 2025-01-28 PROCEDURE — 43239 EGD BIOPSY SINGLE/MULTIPLE: CPT | Performed by: INTERNAL MEDICINE

## 2025-01-28 RX ORDER — SODIUM CHLORIDE 0.9 % (FLUSH) 0.9 %
5-40 SYRINGE (ML) INJECTION EVERY 12 HOURS SCHEDULED
Status: DISCONTINUED | OUTPATIENT
Start: 2025-01-28 | End: 2025-01-28 | Stop reason: HOSPADM

## 2025-01-28 RX ORDER — SODIUM CHLORIDE 9 MG/ML
INJECTION, SOLUTION INTRAVENOUS PRN
Status: DISCONTINUED | OUTPATIENT
Start: 2025-01-28 | End: 2025-01-28 | Stop reason: HOSPADM

## 2025-01-28 RX ORDER — LIDOCAINE HYDROCHLORIDE 10 MG/ML
1 INJECTION, SOLUTION EPIDURAL; INFILTRATION; INTRACAUDAL; PERINEURAL
Status: DISCONTINUED | OUTPATIENT
Start: 2025-01-29 | End: 2025-01-28 | Stop reason: HOSPADM

## 2025-01-28 RX ORDER — SODIUM CHLORIDE 0.9 % (FLUSH) 0.9 %
5-40 SYRINGE (ML) INJECTION PRN
Status: DISCONTINUED | OUTPATIENT
Start: 2025-01-28 | End: 2025-01-28 | Stop reason: HOSPADM

## 2025-01-28 RX ORDER — SODIUM CHLORIDE 9 MG/ML
INJECTION, SOLUTION INTRAVENOUS CONTINUOUS
Status: DISCONTINUED | OUTPATIENT
Start: 2025-01-28 | End: 2025-01-28 | Stop reason: HOSPADM

## 2025-01-28 RX ORDER — PROPOFOL 10 MG/ML
INJECTION, EMULSION INTRAVENOUS
Status: DISCONTINUED | OUTPATIENT
Start: 2025-01-28 | End: 2025-01-28 | Stop reason: SDUPTHER

## 2025-01-28 RX ORDER — SODIUM CHLORIDE, SODIUM LACTATE, POTASSIUM CHLORIDE, CALCIUM CHLORIDE 600; 310; 30; 20 MG/100ML; MG/100ML; MG/100ML; MG/100ML
INJECTION, SOLUTION INTRAVENOUS CONTINUOUS
Status: DISCONTINUED | OUTPATIENT
Start: 2025-01-28 | End: 2025-01-28 | Stop reason: HOSPADM

## 2025-01-28 RX ORDER — LIDOCAINE HYDROCHLORIDE 20 MG/ML
INJECTION, SOLUTION EPIDURAL; INFILTRATION; INTRACAUDAL; PERINEURAL
Status: DISCONTINUED | OUTPATIENT
Start: 2025-01-28 | End: 2025-01-28 | Stop reason: SDUPTHER

## 2025-01-28 RX ADMIN — SODIUM CHLORIDE, POTASSIUM CHLORIDE, SODIUM LACTATE AND CALCIUM CHLORIDE: 600; 310; 30; 20 INJECTION, SOLUTION INTRAVENOUS at 11:24

## 2025-01-28 RX ADMIN — PROPOFOL 60 MG: 10 INJECTION, EMULSION INTRAVENOUS at 11:43

## 2025-01-28 RX ADMIN — PROPOFOL 20 MG: 10 INJECTION, EMULSION INTRAVENOUS at 11:45

## 2025-01-28 RX ADMIN — PROPOFOL 20 MG: 10 INJECTION, EMULSION INTRAVENOUS at 11:47

## 2025-01-28 RX ADMIN — LIDOCAINE HYDROCHLORIDE 60 MG: 20 INJECTION, SOLUTION EPIDURAL; INFILTRATION; INTRACAUDAL; PERINEURAL at 11:43

## 2025-01-28 ASSESSMENT — PAIN - FUNCTIONAL ASSESSMENT: PAIN_FUNCTIONAL_ASSESSMENT: 0-10

## 2025-01-28 ASSESSMENT — ENCOUNTER SYMPTOMS: SHORTNESS OF BREATH: 0

## 2025-01-28 ASSESSMENT — PAIN SCALES - GENERAL: PAINLEVEL_OUTOF10: 0

## 2025-01-28 NOTE — ANESTHESIA POSTPROCEDURE EVALUATION
Department of Anesthesiology  Postprocedure Note    Patient: Nando Montanez  MRN: [id number]  YOB: 1955  Date of evaluation: 1/28/2025    Procedure Summary       Date: 01/28/25 Room / Location: [address]    Anesthesia Start: 1141 Anesthesia Stop: 1157    Procedure: ESOPHAGOGASTRODUODENOSCOPY BIOPSIES Diagnosis:       Metastasis to brain (HCC)      Cerebellar tumor (HCC)      Malignant neoplasm of lower third of esophagus (HCC)      Adenosquamous carcinoma of esophagus (HCC)      Esophageal mass      (Metastasis to brain (HCC) [C79.31])      (Cerebellar tumor (HCC) [D49.6])      (Malignant neoplasm of lower third of esophagus (HCC) [C15.5])      (Adenosquamous carcinoma of esophagus (HCC) [C15.9])      (Esophageal mass [K22.89])    Surgeons: Sury Zaldivar MD Responsible Provider: Breana Steiner MD    Anesthesia Type: MAC ASA Status: 4            Anesthesia Type: No value filed.    Chacho Phase I: Chacho Score: 10    Chacho Phase II: Chacho Score: 10    Anesthesia Post Evaluation    Airway patency: patent  Cardiovascular status: hemodynamically stable  Respiratory status: acceptable    No notable events documented.

## 2025-01-28 NOTE — ANESTHESIA PRE PROCEDURE
2022    XI ROBOTIC LAPAROSCOPIC JEJUNOSTOMY TUBE PLACEMENT, RIGHT INTERNAL JUGULAR VEIN MEDI-PORT PLACEMENT WITH FLOURO  (C-ARM) performed by Robin Kelley DO at [facility] OR   • HERNIA REPAIR         rt inguinal w/ mesh at [facility]   • MEDIPORT INSERTION, SINGLE Right    • NEUROVASCULAR PROCEDURE Right 2023    VENTRICULAR DRAIN PLACEMENT (18630) performed by Lizzie Beatty DO at [facility] OR   • PROSTATE BIOPSY     • PROSTATE BIOPSY N/A 2021    FUSION PROSTATE BIOPSY WITH ULTRASOUND, OFFICE NOTIFIED ULTRASOUND performed by Prudencio Hamlin MD at [facility] OR   • SKIN BIOPSY      head noncancerous. Just keratosis.    • TONSILLECTOMY     • UPPER GASTROINTESTINAL ENDOSCOPY N/A 2022    EGD BIOPSY performed by Sury Zaldivar MD at [facility] OR   • UPPER GASTROINTESTINAL ENDOSCOPY N/A 2022    ENDOSCOPIC ULTRASOUND WITH RADIO SCOPE performed by Devan Allan MD at [facility] Endoscopy   • UPPER GASTROINTESTINAL ENDOSCOPY N/A 2023    EGD BIOPSY performed by Merlin Kwong MD at [facility] OR       Social History:    Social History     Tobacco Use   • Smoking status: Former     Current packs/day: 0.00     Types: Cigarettes     Start date: 2002     Quit date: 2002     Years since quittin.0   • Smokeless tobacco: Never   Substance Use Topics   • Alcohol use: Yes     Comment: beer few times/wk drinks 3-4                                Counseling given: Not Answered      Vital Signs (Current):   Vitals:    25 1055   BP: 123/86   Pulse: 82   Resp: 18   Temp: 97.7 °F (36.5 °C)   SpO2: 97%                                              BP Readings from Last 3 Encounters:   25 123/86   24 125/78   24 122/82       NPO Status:                                                                                 BMI:   Wt Readings from Last 3 Encounters:   24 96.1 kg (211 lb 12.8 oz)   24 95.7 kg (211 lb)   24 92.4 kg (203 lb 9.6 oz)     There is no height or weight on

## 2025-01-28 NOTE — H&P
PROSTATE BIOPSY N/A 11/22/2021    FUSION PROSTATE BIOPSY WITH ULTRASOUND, OFFICE NOTIFIED ULTRASOUND performed by Prudencio Hamlin MD at [facility] OR    SKIN BIOPSY      head noncancerous. Just keratosis.     TONSILLECTOMY      UPPER GASTROINTESTINAL ENDOSCOPY N/A 08/01/2022    EGD BIOPSY performed by Sury Zaldivar MD at [facility] OR    UPPER GASTROINTESTINAL ENDOSCOPY N/A 08/24/2022    ENDOSCOPIC ULTRASOUND WITH RADIO SCOPE performed by Devan Allan MD at [facility]oscopy    UPPER GASTROINTESTINAL ENDOSCOPY N/A 05/12/2023    EGD BIOPSY performed by Merlin Kwong MD at [facility] OR        Medications Prior to Admission:     Prior to Admission medications    Medication Sig Start Date End Date Taking? Authorizing Provider   pantoprazole (PROTONIX) 40 MG tablet Take 1 tablet by mouth every morning (before breakfast) 4/18/24  Yes Karla Delarosa MD        Allergies:     Patient has no known allergies.    Social History:     Tobacco:    reports that he quit smoking about 23 years ago. His smoking use included cigarettes. He started smoking about 23 years ago. He has never used smokeless tobacco.  Alcohol:      reports current alcohol use.  Drug Use:  reports current drug use. Drug: Marijuana (Weed).    Family History:     History reviewed. No pertinent family history.    Review of Systems:     Positive and Negative as described in HPI.    CONSTITUTIONAL:  negative for fevers, chills, sweats, fatigue, weight loss  HEENT: [place] does not use any hearing aids Full dentures  negative for vision, hearing changes, runny nose, throat pain  RESPIRATORY:  negative for shortness of breath, cough, congestion, wheezing.  CARDIOVASCULAR:  negative for chest pain, palpitations.  GASTROINTESTINAL: See HPI    GENITOURINARY:  negative for difficulty of urination, burning with urination, frequency   INTEGUMENT:  negative for rash, skin lesions, easy bruising   HEMATOLOGIC/LYMPHATIC:  negative for swelling/edema   ALLERGIC/IMMUNOLOGIC:  negative for

## 2025-01-28 NOTE — OP NOTE
.PROCEDURE NOTE    DATE OF PROCEDURE: 1/28/2025     SURGEON: Sury Zaldivar MD    ASSISTANT: None    PREOPERATIVE DIAGNOSIS: HX OF METASTATIC ESOPHAGEAL CANCER  MILD INT DYSPHAGIA    POSTOPERATIVE DIAGNOSIS: As described below    OPERATION: Upper GI endoscopy with Biopsy    ANESTHESIA: MAC PER ANESTHESIA     ESTIMATED BLOOD LOSS: Less than 50 ml    COMPLICATIONS: None.     SPECIMENS:  Was Obtained:     HISTORY: The patient is a 69 y.o. year old male with history of above preop diagnosis.  I recommended esophagogastroduodenoscopy with possible biopsy and I explained the risk, benefits, expected outcome, and alternatives to the procedure.  Risks included but are not limited to bleeding, infection, respiratory distress, hypotension, and perforation of the esophagus, stomach, or duodenum.  Patient understands and is in agreement.    PROCEDURE: The patient was given IV conscious sedation.  The patient's SPO2 remained above 90% throughout the procedure. The gastroscope was inserted orally and advanced under direct vision through the esophagus, through the stomach, through the pylorus, and into the descending duodenum.      Findings:    Retropharyngeal area was grossly normal appearing    Esophagus: abnormal: ANASTOMOSIS NOTED AT 30 CM FROM INCISORS  EDEMA AND EROSIONS   NO OBSTRUCTION OR NARROWING WAS NOTED  MULTIPLE BIOPSIES AND PICTURES WERE TAKEN     Stomach:  A PROMINENT POLYP AT THE ANTRUM WAS BIOPSIED     Duodenum:     Descending: normal    Bulb: normal    The scope was removed and the patient tolerated the procedure well.     Recommendations/Plan:   F/U Biopsies  F/U In Office in 3-4 weeks  Discussed with the family  Post sedation patient was stable with stable vital signs and stable O2 saturations    Electronically signed by Sury Zaldivar MD  on 1/28/2025 at 11:50 AM

## 2025-01-30 LAB — SURGICAL PATHOLOGY REPORT: NORMAL

## 2025-02-04 ENCOUNTER — OFFICE VISIT (OUTPATIENT)
Dept: ONCOLOGY | Age: 70
End: 2025-02-04

## 2025-02-04 DIAGNOSIS — C15.5 MALIGNANT NEOPLASM OF LOWER THIRD OF ESOPHAGUS (HCC): Primary | ICD-10-CM

## 2025-02-11 DIAGNOSIS — C79.31 METASTASIS TO BRAIN (HCC): ICD-10-CM

## 2025-02-11 DIAGNOSIS — C15.5 MALIGNANT NEOPLASM OF LOWER THIRD OF ESOPHAGUS (HCC): ICD-10-CM

## 2025-02-11 DIAGNOSIS — D49.6 CEREBELLAR TUMOR (HCC): ICD-10-CM

## 2025-02-11 DIAGNOSIS — K22.89 ESOPHAGEAL MASS: ICD-10-CM

## 2025-02-11 DIAGNOSIS — C15.9 ADENOSQUAMOUS CARCINOMA OF ESOPHAGUS (HCC): ICD-10-CM

## 2025-03-11 ENCOUNTER — HOSPITAL ENCOUNTER (OUTPATIENT)
Age: 70
Setting detail: SPECIMEN
Discharge: HOME | End: 2025-03-11

## 2025-03-11 DIAGNOSIS — E44.0 MODERATE PROTEIN-CALORIE MALNUTRITION: ICD-10-CM

## 2025-03-11 DIAGNOSIS — Z00.00 ROUTINE HEALTH MAINTENANCE: ICD-10-CM

## 2025-03-11 DIAGNOSIS — N40.1 BENIGN PROSTATIC HYPERPLASIA WITH URINARY FREQUENCY: ICD-10-CM

## 2025-03-11 DIAGNOSIS — Z13.31 DEPRESSION SCREENING: ICD-10-CM

## 2025-03-11 DIAGNOSIS — Z13.1 DIABETES MELLITUS SCREENING: ICD-10-CM

## 2025-03-11 DIAGNOSIS — R35.0 BENIGN PROSTATIC HYPERPLASIA WITH URINARY FREQUENCY: ICD-10-CM

## 2025-03-11 DIAGNOSIS — Z13.220 SCREENING FOR CHOLESTEROL LEVEL: ICD-10-CM

## 2025-03-11 DIAGNOSIS — E55.9 VITAMIN D INSUFFICIENCY: ICD-10-CM

## 2025-03-11 DIAGNOSIS — K21.00 GASTROESOPHAGEAL REFLUX DISEASE WITH ESOPHAGITIS WITHOUT HEMORRHAGE: ICD-10-CM

## 2025-03-11 LAB
25(OH)D3 SERPL-MCNC: 21.2 NG/ML (ref 30–100)
ALBUMIN SERPL-MCNC: 4.2 G/DL (ref 3.5–5.2)
ALBUMIN/GLOB SERPL: 1.6 {RATIO} (ref 1–2.5)
ALP SERPL-CCNC: 96 U/L (ref 40–129)
ALT SERPL-CCNC: 21 U/L (ref 10–50)
ANION GAP SERPL CALCULATED.3IONS-SCNC: 10 MMOL/L (ref 9–16)
AST SERPL-CCNC: 21 U/L (ref 10–50)
BASOPHILS # BLD: 0.06 K/UL (ref 0–0.2)
BASOPHILS NFR BLD: 1 % (ref 0–2)
BILIRUB SERPL-MCNC: 2.7 MG/DL (ref 0–1.2)
BUN SERPL-MCNC: 14 MG/DL (ref 8–23)
CALCIUM SERPL-MCNC: 9.4 MG/DL (ref 8.6–10.4)
CHLORIDE SERPL-SCNC: 103 MMOL/L (ref 98–107)
CHOLEST SERPL-MCNC: 211 MG/DL (ref 0–199)
CHOLESTEROL/HDL RATIO: 4
CO2 SERPL-SCNC: 29 MMOL/L (ref 20–31)
CREAT SERPL-MCNC: 0.9 MG/DL (ref 0.7–1.2)
EOSINOPHIL # BLD: 0.2 K/UL (ref 0–0.44)
EOSINOPHILS RELATIVE PERCENT: 4 % (ref 1–4)
ERYTHROCYTE [DISTWIDTH] IN BLOOD BY AUTOMATED COUNT: 13.8 % (ref 11.8–14.4)
EST. AVERAGE GLUCOSE BLD GHB EST-MCNC: 100 MG/DL
GFR, ESTIMATED: >90 ML/MIN/1.73M2
GLUCOSE SERPL-MCNC: 110 MG/DL (ref 74–99)
HBA1C MFR BLD: 5.1 % (ref 4–6)
HCT VFR BLD AUTO: 45.3 % (ref 40.7–50.3)
HDLC SERPL-MCNC: 53 MG/DL
HGB BLD-MCNC: 14.2 G/DL (ref 13–17)
IMM GRANULOCYTES # BLD AUTO: <0.03 K/UL (ref 0–0.3)
IMM GRANULOCYTES NFR BLD: 0 %
LDLC SERPL CALC-MCNC: 135 MG/DL (ref 0–100)
LYMPHOCYTES NFR BLD: 0.92 K/UL (ref 1.1–3.7)
LYMPHOCYTES RELATIVE PERCENT: 16 % (ref 24–43)
MAGNESIUM SERPL-MCNC: 2.2 MG/DL (ref 1.6–2.4)
MCH RBC QN AUTO: 29.3 PG (ref 25.2–33.5)
MCHC RBC AUTO-ENTMCNC: 31.3 G/DL (ref 28.4–34.8)
MCV RBC AUTO: 93.4 FL (ref 82.6–102.9)
MONOCYTES NFR BLD: 0.73 K/UL (ref 0.1–1.2)
MONOCYTES NFR BLD: 13 % (ref 3–12)
NEUTROPHILS NFR BLD: 66 % (ref 36–65)
NEUTS SEG NFR BLD: 3.82 K/UL (ref 1.5–8.1)
NRBC BLD-RTO: 0 PER 100 WBC
PLATELET # BLD AUTO: 244 K/UL (ref 138–453)
PMV BLD AUTO: 11.2 FL (ref 8.1–13.5)
POTASSIUM SERPL-SCNC: 4.1 MMOL/L (ref 3.7–5.3)
PROT SERPL-MCNC: 6.9 G/DL (ref 6.6–8.7)
PSA SERPL-MCNC: 9.73 NG/ML (ref 0–4)
RBC # BLD AUTO: 4.85 M/UL (ref 4.21–5.77)
SODIUM SERPL-SCNC: 142 MMOL/L (ref 136–145)
TRIGL SERPL-MCNC: 115 MG/DL
TSH SERPL DL<=0.05 MIU/L-ACNC: 1.87 UIU/ML (ref 0.27–4.2)
VIT B12 SERPL-MCNC: 288 PG/ML (ref 232–1245)
VLDLC SERPL CALC-MCNC: 23 MG/DL (ref 1–30)
WBC OTHER # BLD: 5.8 K/UL (ref 3.5–11.3)

## 2025-03-12 ENCOUNTER — RESULTS FOLLOW-UP (OUTPATIENT)
Dept: FAMILY MEDICINE CLINIC | Age: 70
End: 2025-03-12

## 2025-05-28 ENCOUNTER — OFFICE VISIT (OUTPATIENT)
Dept: FAMILY MEDICINE CLINIC | Age: 70
End: 2025-05-28
Payer: MEDICARE

## 2025-05-28 VITALS
WEIGHT: 212.6 LBS | SYSTOLIC BLOOD PRESSURE: 126 MMHG | BODY MASS INDEX: 28.05 KG/M2 | DIASTOLIC BLOOD PRESSURE: 84 MMHG | OXYGEN SATURATION: 96 % | HEART RATE: 82 BPM

## 2025-05-28 DIAGNOSIS — E55.9 VITAMIN D INSUFFICIENCY: ICD-10-CM

## 2025-05-28 DIAGNOSIS — D49.6 CEREBELLAR TUMOR (HCC): ICD-10-CM

## 2025-05-28 DIAGNOSIS — N40.1 BENIGN PROSTATIC HYPERPLASIA WITH URINARY FREQUENCY: ICD-10-CM

## 2025-05-28 DIAGNOSIS — K21.00 GASTROESOPHAGEAL REFLUX DISEASE WITH ESOPHAGITIS WITHOUT HEMORRHAGE: ICD-10-CM

## 2025-05-28 DIAGNOSIS — C79.31 METASTASIS TO BRAIN (HCC): ICD-10-CM

## 2025-05-28 DIAGNOSIS — C15.9 ADENOSQUAMOUS CARCINOMA OF ESOPHAGUS (HCC): ICD-10-CM

## 2025-05-28 DIAGNOSIS — R35.0 BENIGN PROSTATIC HYPERPLASIA WITH URINARY FREQUENCY: ICD-10-CM

## 2025-05-28 DIAGNOSIS — I48.0 PAROXYSMAL ATRIAL FIBRILLATION (HCC): ICD-10-CM

## 2025-05-28 DIAGNOSIS — Z71.89 ENCOUNTER FOR MEDICATION REVIEW AND COUNSELING: ICD-10-CM

## 2025-05-28 DIAGNOSIS — Z93.4 STATUS POST JEJUNOSTOMY (HCC): ICD-10-CM

## 2025-05-28 DIAGNOSIS — Z09 FOLLOW-UP EXAMINATION: Primary | ICD-10-CM

## 2025-05-28 PROCEDURE — G8417 CALC BMI ABV UP PARAM F/U: HCPCS | Performed by: FAMILY MEDICINE

## 2025-05-28 PROCEDURE — 1036F TOBACCO NON-USER: CPT | Performed by: FAMILY MEDICINE

## 2025-05-28 PROCEDURE — 1123F ACP DISCUSS/DSCN MKR DOCD: CPT | Performed by: FAMILY MEDICINE

## 2025-05-28 PROCEDURE — G8427 DOCREV CUR MEDS BY ELIG CLIN: HCPCS | Performed by: FAMILY MEDICINE

## 2025-05-28 PROCEDURE — 1160F RVW MEDS BY RX/DR IN RCRD: CPT | Performed by: FAMILY MEDICINE

## 2025-05-28 PROCEDURE — 1159F MED LIST DOCD IN RCRD: CPT | Performed by: FAMILY MEDICINE

## 2025-05-28 PROCEDURE — 99214 OFFICE O/P EST MOD 30 MIN: CPT | Performed by: FAMILY MEDICINE

## 2025-05-28 PROCEDURE — 3017F COLORECTAL CA SCREEN DOC REV: CPT | Performed by: FAMILY MEDICINE

## 2025-05-28 SDOH — ECONOMIC STABILITY: FOOD INSECURITY: WITHIN THE PAST 12 MONTHS, THE FOOD YOU BOUGHT JUST DIDN'T LAST AND YOU DIDN'T HAVE MONEY TO GET MORE.: NEVER TRUE

## 2025-05-28 SDOH — ECONOMIC STABILITY: FOOD INSECURITY: WITHIN THE PAST 12 MONTHS, YOU WORRIED THAT YOUR FOOD WOULD RUN OUT BEFORE YOU GOT MONEY TO BUY MORE.: NEVER TRUE

## 2025-05-28 ASSESSMENT — PATIENT HEALTH QUESTIONNAIRE - PHQ9
1. LITTLE INTEREST OR PLEASURE IN DOING THINGS: NOT AT ALL
SUM OF ALL RESPONSES TO PHQ QUESTIONS 1-9: 0
2. FEELING DOWN, DEPRESSED OR HOPELESS: NOT AT ALL
SUM OF ALL RESPONSES TO PHQ QUESTIONS 1-9: 0

## 2025-05-28 NOTE — PROGRESS NOTES
[facility]     Date of Visit:  2025  Patient Name: Nando Montanez   Patient :  1955     CHIEF COMPLAINT:     Nando Montanez is a 70 y.o. male who presents today for an general visit to be evaluated for the following condition(s):    Chief Complaint   Patient presents with    Follow-up     Here for 6 month follow up from appointment on 24.       HISTORY OF PRESENT ILLNESS:       Follows with:  Neurosurgery: Lizzie Beatty DO Rad. Oncology: Karla Delarosa MD  GI: Sury Zaldivar MD  Oncology: Anjum Babb MD  General Surgery: Robin Kelley DO  [facility]             REVIEW OF SYSTEMS:      REVIEW OF SYSTEMS:     General: Positive for weakness and fatigue. No unanticipated weight loss or decreased appetite. No fever or chills.   Eyes: No blurred vision, eye pain or double vision.   Ears: No hearing problems or drainage. No tinnitus.   Throat: No sore throat, problems with swallowing or dysphagia.   Respiratory: No cough, sputum or hemoptysis. No shortness of breath. No pleuritic chest pain.     Cardiovascular: No chest pain, orthopnea or PND. No lower extremity edema. No palpitation.   Gastrointestinal: As above.    Genitourinary: No dysuria, hematuria, frequency or urgency.     Musculoskeletal: No muscle aches or pains. No limitation of movement. No back pain. No gait disturbance, No joint complaints.  Dermatologic: No skin rashes or pruritus. No skin lesions or discolorations.   Psychiatric: No depression, anxiety, or stress or signs of schizophrenia. No change in mood or affect.    Hematologic: No history of bleeding tendency. No bruises or ecchymosis. No history of clotting problems.  Infectious disease: No fever, chills or frequent infections.   Endocrine: No polydipsia or polyuria. No temperature intolerance.  Neurologic: No headaches or dizziness. No weakness or numbness of the extremities. No changes in balance, coordination,  memory, mentation,  Heterosexual

## 2025-06-09 ENCOUNTER — HOSPITAL ENCOUNTER (OUTPATIENT)
Dept: MRI IMAGING | Age: 70
Discharge: HOME | End: 2025-06-11
Attending: RADIOLOGY
Payer: MEDICARE

## 2025-06-09 DIAGNOSIS — C15.5 MALIGNANT NEOPLASM OF LOWER THIRD OF ESOPHAGUS (HCC): ICD-10-CM

## 2025-06-09 DIAGNOSIS — C79.31 METASTASIS TO BRAIN (HCC): ICD-10-CM

## 2025-06-09 LAB
CREAT SERPL-MCNC: 0.9 MG/DL (ref 0.7–1.2)
GFR, ESTIMATED: >90 ML/MIN/1.73M2

## 2025-06-09 PROCEDURE — 70553 MRI BRAIN STEM W/O & W/DYE: CPT

## 2025-06-09 PROCEDURE — 82565 ASSAY OF CREATININE: CPT

## 2025-06-09 PROCEDURE — 36415 COLL VENOUS BLD VENIPUNCTURE: CPT

## 2025-06-09 PROCEDURE — A9579 GAD-BASE MR CONTRAST NOS,1ML: HCPCS | Performed by: RADIOLOGY

## 2025-06-09 PROCEDURE — 6360000004 HC RX CONTRAST MEDICATION: Performed by: RADIOLOGY

## 2025-06-09 RX ORDER — GADOTERIDOL 279.3 MG/ML
20 INJECTION INTRAVENOUS
Status: COMPLETED | OUTPATIENT
Start: 2025-06-09 | End: 2025-06-09

## 2025-06-09 RX ADMIN — GADOTERIDOL 20 ML: 279.3 INJECTION, SOLUTION INTRAVENOUS at 13:41

## 2025-06-17 ENCOUNTER — HOSPITAL ENCOUNTER (OUTPATIENT)
Dept: RADIATION ONCOLOGY | Age: 70
Discharge: HOME | End: 2025-06-17
Payer: MEDICARE

## 2025-06-17 VITALS
SYSTOLIC BLOOD PRESSURE: 103 MMHG | TEMPERATURE: 98.1 F | WEIGHT: 205.8 LBS | DIASTOLIC BLOOD PRESSURE: 68 MMHG | HEART RATE: 78 BPM | RESPIRATION RATE: 12 BRPM | BODY MASS INDEX: 27.15 KG/M2

## 2025-06-17 DIAGNOSIS — C79.31 METASTASIS TO BRAIN (HCC): Primary | ICD-10-CM

## 2025-06-17 PROCEDURE — 99212 OFFICE O/P EST SF 10 MIN: CPT | Performed by: RADIOLOGY

## 2025-06-17 NOTE — PROGRESS NOTES
Name: Nando Montanez  : 1955  MRN: [id number]    Oncology Navigation Follow-Up Note    Contact Type:  Telephone    Notes: Pt here for 6 month f/u. Pt states he has history of RLS but states he has been getting leg cramping on a regular basis. Writer suggested increasing his electrolyte drinks to see if it helps, otherwise, pt denies any other issues. Dr. Delarosa into see pt.       Electronically signed by Mary Anne Mark RN on 2025 at 2:08 PM

## 2025-08-25 RX ORDER — TOBRAMYCIN 3 MG/ML
1 SOLUTION/ DROPS OPHTHALMIC EVERY 4 HOURS
Qty: 5 ML | Refills: 0 | Status: SHIPPED | OUTPATIENT
Start: 2025-08-25 | End: 2025-09-04

## (undated) DEVICE — 2.3MM TAPERED ROUTER

## (undated) DEVICE — FORCEPS BX L240CM JAW DIA2.8MM L CAP W/ NDL MIC MESH TOOTH

## (undated) DEVICE — GOWN,AURORA,NONREINFORCED,LARGE: Brand: MEDLINE

## (undated) DEVICE — BASIN EMSIS 700ML GRAPHITE PLAS KID SHP GRAD

## (undated) DEVICE — SUTURE PERMAHAND SZ 2-0 L30IN NONABSORBABLE BLK L17MM RB-1 K873H

## (undated) DEVICE — GLOVE ORANGE PI 7   MSG9070

## (undated) DEVICE — ARM DRAPE

## (undated) DEVICE — ZYPHR DISPOSABLE CRANIAL PERFORATOR, LARGE 14/11MM: Brand: ZYPHR

## (undated) DEVICE — DRESSING TRNSPAR W5XL4.5IN FLM SHT SEMIPERMEABLE WIND

## (undated) DEVICE — DRESSING TRNSPAR W2XL2.75IN FLM SHT SEMIPERMEABLE WIND

## (undated) DEVICE — KIT CRAN ACCS NDL 18GA L1.5IN FEN DRP RETRCT SCALP ADSN

## (undated) DEVICE — PAD,NON-ADHERENT,3X8,STERILE,LF,1/PK: Brand: MEDLINE

## (undated) DEVICE — CO2 CANNULA,SUPERSOFT, ADLT,7'O2,7'CO2: Brand: MEDLINE

## (undated) DEVICE — ULTRASONIC SET CRV TIP LNG 1.6 MM MIC TUBE SONASTAR

## (undated) DEVICE — SUTURE MCRYL SZ 3-0 L27IN ABSRB UD L24MM PS-1 3/8 CIR PRIM Y936H

## (undated) DEVICE — TUBING, SUCTION, 1/4" X 12', STRAIGHT: Brand: MEDLINE

## (undated) DEVICE — JELLY,LUBE,STERILE,FLIP TOP,TUBE,2-OZ: Brand: MEDLINE

## (undated) DEVICE — Device

## (undated) DEVICE — BLADELESS OBTURATOR: Brand: WECK VISTA

## (undated) DEVICE — BITE BLOCK ENDOSCP AD 60 FR W/ ADJ STRP PLAS GRN BLOX

## (undated) DEVICE — GLOVE ORANGE PI 7 1/2   MSG9075

## (undated) DEVICE — CANNULA SEAL

## (undated) DEVICE — STAZ ENDO KIT: Brand: MEDLINE INDUSTRIES, INC.

## (undated) DEVICE — CRANIALMASK TRACKER: Brand: CRANIALMASK TRACKER

## (undated) DEVICE — CUP MED 1OZ CLR POLYPR FEED GRAD W/O LID

## (undated) DEVICE — Device: Brand: DEFENDO VALVE AND CONNECTOR KIT

## (undated) DEVICE — TROCAR: Brand: KII FIOS FIRST ENTRY

## (undated) DEVICE — SUTURE VCRL SZ 3-0 L27IN ABSRB UD L26MM SH 1/2 CIR J416H

## (undated) DEVICE — APPLICATOR MEDICATED 26 CC SOLUTION HI LT ORNG CHLORAPREP

## (undated) DEVICE — SUTURE MCRYL SZ 4-0 L18IN ABSRB UD L16MM PC-3 3/8 CIR PRIM Y845G

## (undated) DEVICE — BATTERY PACK 2 FOR QUIKDRIVE: Brand: UNIVERSAL NEURO 2, QUIKDRIVE

## (undated) DEVICE — CONTAINER,SPECIMEN,4OZ,OR STRL: Brand: MEDLINE

## (undated) DEVICE — GAUZE,SPONGE,FLUFF,6"X6.75",STRL,5/TRAY: Brand: MEDLINE

## (undated) DEVICE — GLOVE SURG SZ 65 THK91MIL LTX FREE SYN POLYISOPRENE

## (undated) DEVICE — STRAP,POSITIONING,KNEE/BODY,FOAM,4X60": Brand: MEDLINE

## (undated) DEVICE — DEVICE TRCR 12X9X3IN WHT CLSR DISP OMNICLOSE

## (undated) DEVICE — TRAP,MUCUS SPECIMEN, 80CC: Brand: MEDLINE

## (undated) DEVICE — LIQUIBAND RAPID ADHESIVE 36/CS 0.8ML: Brand: MEDLINE

## (undated) DEVICE — TOWEL,OR,DSP,ST,NATURAL,DLX,4/PK,20PK/CS: Brand: MEDLINE

## (undated) DEVICE — YANKAUER,FLEXIBLE HANDLE,REGLR CAPACITY: Brand: MEDLINE INDUSTRIES, INC.

## (undated) DEVICE — APPLICATOR MEDICATED 10.5 CC SOLUTION HI LT ORNG CHLORAPREP

## (undated) DEVICE — GLOVE SURG 8 11.7IN BEAD CUF LIGHT BRN SENSICARE LTX FREE

## (undated) DEVICE — TIP COVER ACCESSORY

## (undated) DEVICE — SUTURE VCRL SZ 3-0 L18IN ABSRB UD L26MM SH 1/2 CIR J864D

## (undated) DEVICE — TUBE FEED 14FR BLLN 7-10ML L45CM JEJU SIL INFL INT SECUR

## (undated) DEVICE — BLADE,CARBON-STEEL,11,STRL,DISPOSABLE,TB: Brand: MEDLINE

## (undated) DEVICE — BLADE CLP TAPR HD WET DRY CAPABILITY GTT IN CHARGING USE

## (undated) DEVICE — ELECTRODE PT RET AD L9FT HI MOIST COND ADH HYDRGEL CORDED

## (undated) DEVICE — FORCEPS BX L240CM WRK CHN 2.8MM STD CAP W/ NDL MIC MESH

## (undated) DEVICE — SUTURE VCRL SZ 2-0 L18IN ABSRB UD CT-1 L36MM 1/2 CIR J839D

## (undated) DEVICE — SOLUTION ANTIFOG VIS SYS CLEARIFY LAPSCP

## (undated) DEVICE — INTENDED FOR TISSUE SEPARATION, AND OTHER PROCEDURES THAT REQUIRE A SHARP SURGICAL BLADE TO PUNCTURE OR CUT.: Brand: BARD-PARKER ® CARBON RIB-BACK BLADES

## (undated) DEVICE — DRAPE,REIN 53X77,STERILE: Brand: MEDLINE

## (undated) DEVICE — FORCEPS BX L240CM JAW DIA2.4MM ORNG L CAP W/ NDL DISP RAD

## (undated) DEVICE — GOWN,SIRUS,NONRNF,SETINSLV,XL,20/CS: Brand: MEDLINE

## (undated) DEVICE — CODMAN® BACTISEAL® EVD CATHETER SET (1 PACK): Brand: CODMAN® BACTISEAL®

## (undated) DEVICE — SINGLE-USE POLYPECTOMY SNARE: Brand: CAPTIFLEX

## (undated) DEVICE — SPONGE,NEURO,0.5"X0.5",XR,STRL,10/PK: Brand: MEDLINE

## (undated) DEVICE — SURGICAL SUCTION CONNECTING TUBE WITH MALE CONNECTOR AND SUCTION CLAMP, 2 FT. LONG (.6 M), 5 MM I.D.: Brand: CONMED

## (undated) DEVICE — GLOVE ORTHO 7 1/2   MSG9475

## (undated) DEVICE — AGENT HEMOSTATIC SURGIFLOW MATRIX KIT W/THROMBIN

## (undated) DEVICE — 3M™ STERI-STRIP™ COMPOUND BENZOIN TINCTURE 40 BAGS/CARTON 4 CARTONS/CASE C1544: Brand: 3M™ STERI-STRIP™

## (undated) DEVICE — SOCK SPEC SHT 4.5 IN UNIV CANSTR COMPATIBILITY

## (undated) DEVICE — CONNECTOR,TUBING,5-IN-1,NON-STERILE: Brand: MEDLINE INDUSTRIES, INC.

## (undated) DEVICE — DISPOSABLE BIPOLAR FORCEPS 9" (22.9CM) COHEN BAYONET, INSULATED, IRRIGATING, 1MM TIP: Brand: KIRWAN

## (undated) DEVICE — GAUZE,SPONGE,4"X4",16PLY,STRL,LF,10/TRAY: Brand: MEDLINE

## (undated) DEVICE — BLOCK BITE 60FR RUBBER ADLT DENTAL

## (undated) DEVICE — BINDER ABD UNISX 9IN 62IN L AND XL UNIV

## (undated) DEVICE — THE STERILE LIGHT HANDLE COVER IS USED WITH STERIS SURGICAL LIGHTING AND VISUALIZATION SYSTEMS.

## (undated) DEVICE — 1LYRTR 16FR10ML100%SIL UMS SNP: Brand: MEDLINE INDUSTRIES, INC.

## (undated) DEVICE — ADHESIVE SKIN CLOSURE TOP 36 CC HI VISC DERMBND MINI

## (undated) DEVICE — DRAPE MICSCP W117XL305CM DIA65MM LENS W VARI LENS2 FOR LEICA

## (undated) DEVICE — TOTAL TRAY, 16FR 10ML SIL FOLEY, URN: Brand: MEDLINE

## (undated) DEVICE — STERILE POLYISOPRENE POWDER-FREE SURGICAL GLOVES WITH EMOLLIENT COATING: Brand: PROTEXIS

## (undated) DEVICE — SUTURE NRLN SZ 4-0 L18IN NONABSORBABLE BLK L13MM TF 1/2 CIR C584D

## (undated) DEVICE — MONOPTY® DISPOSABLE CORE BIOPSY INSTRUMENT, 22MM PENETRATION DEPTH, 18G X 20CM: Brand: MONOPTY

## (undated) DEVICE — SPONGE GZ W3XL3IN 4 PLY RAYON POLY STD NONWOVEN

## (undated) DEVICE — LARGE BLUNT, DUAL PACK: Brand: LINA SKIN HOOK™

## (undated) DEVICE — MARKER,SKIN,WI/RULER AND LABELS: Brand: MEDLINE

## (undated) DEVICE — INSTRUMENT BATTERY

## (undated) DEVICE — COVER,LIGHT HANDLE,FLX,2/PK: Brand: MEDLINE INDUSTRIES, INC.

## (undated) DEVICE — SVMMC CRANI PK

## (undated) DEVICE — SUTURE SZ 0 27IN 5/8 CIR UR-6  TAPER PT VIOLET ABSRB VICRYL J603H

## (undated) DEVICE — STRIP,CLOSURE,WOUND,MEDI-STRIP,1/2X4: Brand: MEDLINE

## (undated) DEVICE — MEDICINE CUP, GRADUATED, STER: Brand: MEDLINE

## (undated) DEVICE — TRAP SURG QUAD PARABOLA SLOT DSGN SFTY SCRN TRAPEASE

## (undated) DEVICE — 3.0MM PRECISION NEURO (MATCH HEAD)

## (undated) DEVICE — ADAPTER TBNG LUER STUB 15 GA INTMED

## (undated) DEVICE — SYRINGE MED 50ML LUERLOCK TIP

## (undated) DEVICE — INSUFFLATION TUBING SET WITH FILTER, FUNNEL CONNECTOR AND LUER LOCK: Brand: JOSNOE MEDICAL INC

## (undated) DEVICE — C-ARM: Brand: UNBRANDED

## (undated) DEVICE — GLOVE ORANGE PI 8   MSG9080

## (undated) DEVICE — PROVE COVER: Brand: UNBRANDED

## (undated) DEVICE — ACCUDRAIN® EXTERNAL CSF DRAINAGE SYSTEM WITH ANTI-REFLUX VALVE: Brand: ACCUDRAIN®

## (undated) DEVICE — SHEET, T, LAPAROTOMY, STERILE: Brand: MEDLINE

## (undated) DEVICE — BLADE CLIPPER GEN PURP NS

## (undated) DEVICE — DECANTER BAG 9": Brand: MEDLINE INDUSTRIES, INC.

## (undated) DEVICE — SUTURE PROL SZ 3-0 L30IN NONABSORBABLE BLU L26MM SH 1/2 CIR 8832H